# Patient Record
Sex: MALE | Race: BLACK OR AFRICAN AMERICAN
[De-identification: names, ages, dates, MRNs, and addresses within clinical notes are randomized per-mention and may not be internally consistent; named-entity substitution may affect disease eponyms.]

---

## 2017-12-23 VITALS — SYSTOLIC BLOOD PRESSURE: 149 MMHG | DIASTOLIC BLOOD PRESSURE: 76 MMHG

## 2018-12-10 ENCOUNTER — HOSPITAL ENCOUNTER (OUTPATIENT)
Dept: HOSPITAL 61 - ECHO | Age: 63
Discharge: HOME | End: 2018-12-10
Attending: INTERNAL MEDICINE
Payer: MEDICARE

## 2018-12-10 DIAGNOSIS — F17.210: ICD-10-CM

## 2018-12-10 DIAGNOSIS — I13.0: ICD-10-CM

## 2018-12-10 DIAGNOSIS — N18.3: ICD-10-CM

## 2018-12-10 DIAGNOSIS — I08.0: Primary | ICD-10-CM

## 2018-12-10 DIAGNOSIS — I50.20: ICD-10-CM

## 2018-12-10 DIAGNOSIS — E78.5: ICD-10-CM

## 2018-12-10 PROCEDURE — 93306 TTE W/DOPPLER COMPLETE: CPT

## 2018-12-10 NOTE — CARD
MR#: [id number]

Account#: [id number]

Accession#: [id number]

Date of Study: 12/10/2018

Ordering Physician: LING KRISHNAMURTHY, 

Referring Physician: LING KRISHNAMURTHY, 

Tech: Melva Rust





--------------- APPROVED REPORT --------------





EXAM: Two-dimensional and M-mode echocardiogram with Doppler and color Doppler.



Other Information 

Quality : AverageHR: 38bpm



INDICATION

Cardiomyopathy 



RISK FACTORS

Hypertension 

Hyperlipidemia

Smoking 



2D DIMENSIONS 

RVDd4.3 (2.9-3.5cm)Left Atrium(2D)4.3 (1.6-4.0cm)

IVSd1.3 (0.7-1.1cm)Aortic Root(2D)4.0 (2.0-3.7cm)

LVDd6.2 (3.9-5.9cm)LVOT Diameter2.3 (1.8-2.4cm)

PWd1.2 (0.7-1.1cm)LVDs4.6 (2.5-4.0cm)

FS (%) 24.8 %SV93.1 ml

LVEF(%)48.3 (>50%)



Aortic Valve

AoV Peak Richard.147.3cm/sAoV VTI30.3cm

AO Peak GR.8.7mmHgLVOT Peak Richard.73.3cm/s

LVOT  VTI 17.14cmAO Mean GR.5mmHg

ALIE (VMAX)1.46pg4JKK   (VTI)2.39cm2

AI P 1/2 [id number]



Mitral Valve

MV E Tvgyezus45.7cm/sMV E Peak Gr.84mmHg

MV DECEL [id number] A Tpadjmtj70.6cm/s

MV HUK79tgR/A  Ratio1.4

MVA (PHT)3.16cm2



TDI

E/Lateral E'8.1E/Medial E'7.1



Pulmonary Valve

PV Peak Kzldvrna595.7cm/sPV Peak Grad.4mmHg



Tricuspid Valve

TR P. Inemoouk923lj/sRAP YVOINORK8gqJz

TR Peak Gr.26zpHaGNLT23unQl



 LEFT VENTRICLE 

The Left Ventricle is mildly dilated. There is mild concentric left ventricular hypertrophy. The left
 ventricular systolic function is moderately impaired. The Ejection Fraction is 35%. There is global 
hypokinesis of the left ventricle.



 RIGHT VENTRICLE 

The right ventricle is mildly dilated. There is normal right ventricular wall thickness. The right ve
ntricular systolic function is normal.



 ATRIA 

The left atrium size is normal. The right atrium is moderately dilated. The interatrial septum is int
act with no evidence for an atrial septal defect or patent foramen ovale as noted on 2-D or Doppler i
maging.



 AORTIC VALVE 

The aortic valve is normal in structure and function. Doppler and Color Flow revealed mild to moderat
e aortic regurgitation. There is no significant aortic valvular stenosis.



 MITRAL VALVE 

The mitral valve is normal in structure and function. There is no mitral valve stenosis. Doppler and 
Color-flow revealed mild mitral regurgitation.



 TRICUSPID VALVE 

The tricuspid valve is normal in structure and function. Doppler and Color Flow revealed trace tricus
pid regurgitation. There is no tricuspid valve stenosis. 



 PULMONIC VALVE 

The pulmonic valve is not well visualized. Doppler and Color Flow revealed trace pulmonic valvular re
gurgitation.



 GREAT VESSELS 

The aortic root is mildly enlarged. The IVC is normal in size and collapses >50% with inspiration.



 PERICARDIAL EFFUSION 

There is no evidence of significant pericardial effusion.



Critical Notification

Critical Value: No



<Conclusion>

The left ventricular systolic function is moderately impaired.

The Ejection Fraction is 35%.

Mild to moderate aortic regurgitation.

Mild mitral regurgitation.

Trace tricuspid regurgitation.

There is no evidence of significant pericardial effusion.



Signed by : Prakash Salamanca, 

Electronically Approved : 12/10/2018 17:58:00

## 2019-09-25 ENCOUNTER — HOSPITAL ENCOUNTER (INPATIENT)
Dept: HOSPITAL 61 - ER | Age: 64
LOS: 7 days | Discharge: HOME | DRG: 177 | End: 2019-10-02
Attending: INTERNAL MEDICINE | Admitting: INTERNAL MEDICINE
Payer: MEDICARE

## 2019-09-25 VITALS — DIASTOLIC BLOOD PRESSURE: 71 MMHG | SYSTOLIC BLOOD PRESSURE: 104 MMHG

## 2019-09-25 VITALS — HEIGHT: 73 IN | BODY MASS INDEX: 23.92 KG/M2 | WEIGHT: 180.5 LBS

## 2019-09-25 VITALS — SYSTOLIC BLOOD PRESSURE: 126 MMHG | DIASTOLIC BLOOD PRESSURE: 80 MMHG

## 2019-09-25 VITALS — SYSTOLIC BLOOD PRESSURE: 141 MMHG | DIASTOLIC BLOOD PRESSURE: 83 MMHG

## 2019-09-25 VITALS — SYSTOLIC BLOOD PRESSURE: 149 MMHG | DIASTOLIC BLOOD PRESSURE: 91 MMHG

## 2019-09-25 VITALS — SYSTOLIC BLOOD PRESSURE: 138 MMHG | DIASTOLIC BLOOD PRESSURE: 76 MMHG

## 2019-09-25 VITALS — DIASTOLIC BLOOD PRESSURE: 79 MMHG | SYSTOLIC BLOOD PRESSURE: 140 MMHG

## 2019-09-25 VITALS — DIASTOLIC BLOOD PRESSURE: 80 MMHG | SYSTOLIC BLOOD PRESSURE: 124 MMHG

## 2019-09-25 VITALS — SYSTOLIC BLOOD PRESSURE: 146 MMHG | DIASTOLIC BLOOD PRESSURE: 85 MMHG

## 2019-09-25 VITALS — SYSTOLIC BLOOD PRESSURE: 133 MMHG | DIASTOLIC BLOOD PRESSURE: 79 MMHG

## 2019-09-25 VITALS — DIASTOLIC BLOOD PRESSURE: 86 MMHG | SYSTOLIC BLOOD PRESSURE: 143 MMHG

## 2019-09-25 VITALS — SYSTOLIC BLOOD PRESSURE: 137 MMHG | DIASTOLIC BLOOD PRESSURE: 80 MMHG

## 2019-09-25 VITALS — DIASTOLIC BLOOD PRESSURE: 80 MMHG | SYSTOLIC BLOOD PRESSURE: 152 MMHG

## 2019-09-25 VITALS — DIASTOLIC BLOOD PRESSURE: 64 MMHG | SYSTOLIC BLOOD PRESSURE: 107 MMHG

## 2019-09-25 VITALS — DIASTOLIC BLOOD PRESSURE: 78 MMHG | SYSTOLIC BLOOD PRESSURE: 127 MMHG

## 2019-09-25 VITALS — SYSTOLIC BLOOD PRESSURE: 119 MMHG | DIASTOLIC BLOOD PRESSURE: 75 MMHG

## 2019-09-25 VITALS — DIASTOLIC BLOOD PRESSURE: 85 MMHG | SYSTOLIC BLOOD PRESSURE: 140 MMHG

## 2019-09-25 VITALS — SYSTOLIC BLOOD PRESSURE: 122 MMHG | DIASTOLIC BLOOD PRESSURE: 84 MMHG

## 2019-09-25 DIAGNOSIS — Z71.6: ICD-10-CM

## 2019-09-25 DIAGNOSIS — I42.8: ICD-10-CM

## 2019-09-25 DIAGNOSIS — Z71.51: ICD-10-CM

## 2019-09-25 DIAGNOSIS — T17.590A: ICD-10-CM

## 2019-09-25 DIAGNOSIS — I50.43: ICD-10-CM

## 2019-09-25 DIAGNOSIS — Z83.3: ICD-10-CM

## 2019-09-25 DIAGNOSIS — I44.1: ICD-10-CM

## 2019-09-25 DIAGNOSIS — J44.1: ICD-10-CM

## 2019-09-25 DIAGNOSIS — F14.10: ICD-10-CM

## 2019-09-25 DIAGNOSIS — J98.09: ICD-10-CM

## 2019-09-25 DIAGNOSIS — F17.210: ICD-10-CM

## 2019-09-25 DIAGNOSIS — J98.11: ICD-10-CM

## 2019-09-25 DIAGNOSIS — J69.0: Primary | ICD-10-CM

## 2019-09-25 DIAGNOSIS — I44.7: ICD-10-CM

## 2019-09-25 DIAGNOSIS — Z91.19: ICD-10-CM

## 2019-09-25 DIAGNOSIS — J44.0: ICD-10-CM

## 2019-09-25 DIAGNOSIS — Z82.49: ICD-10-CM

## 2019-09-25 DIAGNOSIS — F12.10: ICD-10-CM

## 2019-09-25 DIAGNOSIS — J98.19: ICD-10-CM

## 2019-09-25 DIAGNOSIS — I13.0: ICD-10-CM

## 2019-09-25 DIAGNOSIS — F10.10: ICD-10-CM

## 2019-09-25 DIAGNOSIS — N18.3: ICD-10-CM

## 2019-09-25 DIAGNOSIS — J96.01: ICD-10-CM

## 2019-09-25 LAB
ALBUMIN SERPL-MCNC: 3.6 G/DL (ref 3.4–5)
ALBUMIN/GLOB SERPL: 1 {RATIO} (ref 1–1.7)
ALP SERPL-CCNC: 78 U/L (ref 46–116)
ALT SERPL-CCNC: 15 U/L (ref 16–63)
AMPHETAMINE/METHAMPHETAMINE: (no result)
ANION GAP SERPL CALC-SCNC: 10 MMOL/L (ref 6–14)
AST SERPL-CCNC: 15 U/L (ref 15–37)
BARBITURATES UR-MCNC: (no result) UG/ML
BASE EXCESS ABG: -1 MMOL/L (ref -3–3)
BASOPHILS # BLD AUTO: 0.1 X10^3/UL (ref 0–0.2)
BASOPHILS NFR BLD: 1 % (ref 0–3)
BENZODIAZ UR-MCNC: (no result) UG/L
BILIRUB SERPL-MCNC: 0.7 MG/DL (ref 0.2–1)
BUN SERPL-MCNC: 22 MG/DL (ref 8–26)
BUN/CREAT SERPL: 16 (ref 6–20)
CALCIUM SERPL-MCNC: 9.7 MG/DL (ref 8.5–10.1)
CANNABINOIDS UR-MCNC: (no result) UG/L
CHLORIDE SERPL-SCNC: 107 MMOL/L (ref 98–107)
CO2 SERPL-SCNC: 26 MMOL/L (ref 21–32)
COCAINE UR-MCNC: (no result) NG/ML
CREAT SERPL-MCNC: 1.4 MG/DL (ref 0.7–1.3)
EOSINOPHIL NFR BLD: 0.2 X10^3/UL (ref 0–0.7)
EOSINOPHIL NFR BLD: 2 % (ref 0–3)
ERYTHROCYTE [DISTWIDTH] IN BLOOD BY AUTOMATED COUNT: 15.1 % (ref 11.5–14.5)
GFR SERPLBLD BASED ON 1.73 SQ M-ARVRAT: 62.1 ML/MIN
GLOBULIN SER-MCNC: 3.5 G/DL (ref 2.2–3.8)
GLUCOSE SERPL-MCNC: 86 MG/DL (ref 70–99)
HCO3 BLDA-SCNC: 23 MMOL/L (ref 21–28)
HCT VFR BLD CALC: 46.3 % (ref 39–53)
HGB BLD-MCNC: 15.9 G/DL (ref 13–17.5)
INSPIRATION SETTING TIME VENT: 40
LYMPHOCYTES # BLD: 1.4 X10^3/UL (ref 1–4.8)
LYMPHOCYTES NFR BLD AUTO: 16 % (ref 24–48)
MCH RBC QN AUTO: 33 PG (ref 25–35)
MCHC RBC AUTO-ENTMCNC: 34 G/DL (ref 31–37)
MCV RBC AUTO: 97 FL (ref 79–100)
METHADONE SERPL-MCNC: (no result) NG/ML
MONO #: 1.2 X10^3/UL (ref 0–1.1)
MONOCYTES NFR BLD: 13 % (ref 0–9)
NEUT #: 6.2 X10^3/UL (ref 1.8–7.7)
NEUTROPHILS NFR BLD AUTO: 69 % (ref 31–73)
OPIATES UR-MCNC: (no result) NG/ML
PCO2 BLDA: 37 MMHG (ref 35–46)
PCP SERPL-MCNC: (no result) MG/DL
PLATELET # BLD AUTO: 110 X10^3/UL (ref 140–400)
PO2 BLDA: 58 MMHG (ref 65–108)
POTASSIUM SERPL-SCNC: 3.8 MMOL/L (ref 3.5–5.1)
PROT SERPL-MCNC: 7.1 G/DL (ref 6.4–8.2)
RBC # BLD AUTO: 4.79 X10^6/UL (ref 4.3–5.7)
SAO2 % BLDA: 91 % (ref 92–99)
SODIUM SERPL-SCNC: 143 MMOL/L (ref 136–145)
WBC # BLD AUTO: 9 X10^3/UL (ref 4–11)

## 2019-09-25 PROCEDURE — 82805 BLOOD GASES W/O2 SATURATION: CPT

## 2019-09-25 PROCEDURE — 80053 COMPREHEN METABOLIC PANEL: CPT

## 2019-09-25 PROCEDURE — 71045 X-RAY EXAM CHEST 1 VIEW: CPT

## 2019-09-25 PROCEDURE — 90471 IMMUNIZATION ADMIN: CPT

## 2019-09-25 PROCEDURE — 96375 TX/PRO/DX INJ NEW DRUG ADDON: CPT

## 2019-09-25 PROCEDURE — 96365 THER/PROPH/DIAG IV INF INIT: CPT

## 2019-09-25 PROCEDURE — 94760 N-INVAS EAR/PLS OXIMETRY 1: CPT

## 2019-09-25 PROCEDURE — A9540 TC99M MAA: HCPCS

## 2019-09-25 PROCEDURE — 90686 IIV4 VACC NO PRSV 0.5 ML IM: CPT

## 2019-09-25 PROCEDURE — A9558 XE133 XENON 10MCI: HCPCS

## 2019-09-25 PROCEDURE — 83880 ASSAY OF NATRIURETIC PEPTIDE: CPT

## 2019-09-25 PROCEDURE — 99406 BEHAV CHNG SMOKING 3-10 MIN: CPT

## 2019-09-25 PROCEDURE — 31622 DX BRONCHOSCOPE/WASH: CPT

## 2019-09-25 PROCEDURE — G0378 HOSPITAL OBSERVATION PER HR: HCPCS

## 2019-09-25 PROCEDURE — 93306 TTE W/DOPPLER COMPLETE: CPT

## 2019-09-25 PROCEDURE — 78582 LUNG VENTILAT&PERFUS IMAGING: CPT

## 2019-09-25 PROCEDURE — 71260 CT THORAX DX C+: CPT

## 2019-09-25 PROCEDURE — 36415 COLL VENOUS BLD VENIPUNCTURE: CPT

## 2019-09-25 PROCEDURE — 71046 X-RAY EXAM CHEST 2 VIEWS: CPT

## 2019-09-25 PROCEDURE — 94640 AIRWAY INHALATION TREATMENT: CPT

## 2019-09-25 PROCEDURE — 80307 DRUG TEST PRSMV CHEM ANLYZR: CPT

## 2019-09-25 PROCEDURE — 36600 WITHDRAWAL OF ARTERIAL BLOOD: CPT

## 2019-09-25 PROCEDURE — 85007 BL SMEAR W/DIFF WBC COUNT: CPT

## 2019-09-25 PROCEDURE — 84443 ASSAY THYROID STIM HORMONE: CPT

## 2019-09-25 PROCEDURE — 80202 ASSAY OF VANCOMYCIN: CPT

## 2019-09-25 PROCEDURE — 83735 ASSAY OF MAGNESIUM: CPT

## 2019-09-25 PROCEDURE — 84484 ASSAY OF TROPONIN QUANT: CPT

## 2019-09-25 PROCEDURE — 85025 COMPLETE CBC W/AUTO DIFF WBC: CPT

## 2019-09-25 PROCEDURE — 94618 PULMONARY STRESS TESTING: CPT

## 2019-09-25 PROCEDURE — 96374 THER/PROPH/DIAG INJ IV PUSH: CPT

## 2019-09-25 PROCEDURE — 93005 ELECTROCARDIOGRAM TRACING: CPT

## 2019-09-25 RX ADMIN — IPRATROPIUM BROMIDE AND ALBUTEROL SULFATE SCH ML: .5; 3 SOLUTION RESPIRATORY (INHALATION) at 15:48

## 2019-09-25 RX ADMIN — POTASSIUM CHLORIDE SCH MEQ: 750 TABLET, FILM COATED, EXTENDED RELEASE ORAL at 16:21

## 2019-09-25 RX ADMIN — IPRATROPIUM BROMIDE AND ALBUTEROL SULFATE SCH ML: .5; 3 SOLUTION RESPIRATORY (INHALATION) at 16:00

## 2019-09-25 RX ADMIN — ISOSORBIDE MONONITRATE SCH MG: 30 TABLET, EXTENDED RELEASE ORAL at 16:20

## 2019-09-25 RX ADMIN — METHYLPREDNISOLONE SODIUM SUCCINATE SCH MG: 125 INJECTION, POWDER, FOR SOLUTION INTRAMUSCULAR; INTRAVENOUS at 23:13

## 2019-09-25 RX ADMIN — IPRATROPIUM BROMIDE AND ALBUTEROL SULFATE SCH ML: .5; 3 SOLUTION RESPIRATORY (INHALATION) at 11:51

## 2019-09-25 RX ADMIN — IPRATROPIUM BROMIDE AND ALBUTEROL SULFATE SCH ML: .5; 3 SOLUTION RESPIRATORY (INHALATION) at 20:08

## 2019-09-25 RX ADMIN — VANCOMYCIN HYDROCHLORIDE PRN EACH: 1 INJECTION, POWDER, LYOPHILIZED, FOR SOLUTION INTRAVENOUS at 12:50

## 2019-09-25 RX ADMIN — Medication SCH CAP: at 21:09

## 2019-09-25 RX ADMIN — VANCOMYCIN HYDROCHLORIDE PRN EACH: 1 INJECTION, POWDER, LYOPHILIZED, FOR SOLUTION INTRAVENOUS at 12:47

## 2019-09-25 RX ADMIN — PIPERACILLIN SODIUM AND TAZOBACTAM SODIUM SCH MLS/HR: 3; .375 INJECTION, POWDER, LYOPHILIZED, FOR SOLUTION INTRAVENOUS at 16:16

## 2019-09-25 RX ADMIN — VANCOMYCIN HYDROCHLORIDE PRN EACH: 1 INJECTION, POWDER, LYOPHILIZED, FOR SOLUTION INTRAVENOUS at 12:48

## 2019-09-25 RX ADMIN — BUDESONIDE SCH MG: 0.5 INHALANT RESPIRATORY (INHALATION) at 15:00

## 2019-09-25 RX ADMIN — PIPERACILLIN SODIUM AND TAZOBACTAM SODIUM SCH MLS/HR: 3; .375 INJECTION, POWDER, LYOPHILIZED, FOR SOLUTION INTRAVENOUS at 23:13

## 2019-09-25 RX ADMIN — PIPERACILLIN SODIUM AND TAZOBACTAM SODIUM SCH MLS/HR: 3; .375 INJECTION, POWDER, LYOPHILIZED, FOR SOLUTION INTRAVENOUS at 18:36

## 2019-09-25 RX ADMIN — METHYLPREDNISOLONE SODIUM SUCCINATE SCH MG: 125 INJECTION, POWDER, FOR SOLUTION INTRAMUSCULAR; INTRAVENOUS at 18:36

## 2019-09-25 RX ADMIN — BUDESONIDE SCH MG: 0.5 INHALANT RESPIRATORY (INHALATION) at 20:08

## 2019-09-25 RX ADMIN — IPRATROPIUM BROMIDE AND ALBUTEROL SULFATE SCH ML: .5; 3 SOLUTION RESPIRATORY (INHALATION) at 20:00

## 2019-09-25 RX ADMIN — METHYLPREDNISOLONE SODIUM SUCCINATE SCH MG: 125 INJECTION, POWDER, FOR SOLUTION INTRAMUSCULAR; INTRAVENOUS at 16:17

## 2019-09-25 RX ADMIN — LISINOPRIL SCH MG: 20 TABLET ORAL at 16:20

## 2019-09-25 RX ADMIN — SIMVASTATIN SCH MG: 20 TABLET, FILM COATED ORAL at 21:09

## 2019-09-25 RX ADMIN — BACITRACIN SCH MLS/HR: 5000 INJECTION, POWDER, FOR SOLUTION INTRAMUSCULAR at 16:16

## 2019-09-25 RX ADMIN — FUROSEMIDE SCH MG: 40 TABLET ORAL at 16:17

## 2019-09-25 RX ADMIN — IPRATROPIUM BROMIDE AND ALBUTEROL SULFATE SCH ML: .5; 3 SOLUTION RESPIRATORY (INHALATION) at 08:34

## 2019-09-25 NOTE — PDOC2
LIZET LLOYD APRN 19 0913:


CARDIAC CONSULT


DATE OF CONSULT


Date of Consult


DATE: 19 


TIME: 09:04





REASON FOR CONSULT


Reason for Consult:


Bradycardia





REFERRING PHYSICIAN


Referring Physician:


Kathleen





SOURCE


Source:  Chart review, Patient





HISTORY OF PRESENT ILLNESS


HISTORY OF PRESENT ILLNESS


This is a pleasant 63 yo male admitted for complains of shortness of breath.  He

has been SOA in the last few days but more pronounced yesterday.  Sometimes he 

was wheezing and with cough but nonproductive.  Denies any chest pain or 

palpitations. No frequent dizziness or passing out. Upon admission he was noted 

with right lung mass which is new for him.  Currently he is compensated with 

Venti mask.  He continues to smoke tobacco and use cocaine. No significant 

increase in leg swelling.





PAST MEDICAL HISTORY


Past Medical History


Cardiovascular:  CHF, HTN, NICM


Pulmonary:  Asthma, COPD


GI:  Hemorrhoids


Heme/Onc:  No pertinent hx


Hepatobiliary:  No pertinent hx


Psych:  No pertinent hx


Rheumatologic:  No pertinent hx


Infectious disease:  No pertinent hx


ENT:  No pertinent hx


Renal/:  Chronic renal insufficiency


Endocrine:  No pertinent hx


Dermatology:  No pertinent hx





PAST SURGICAL HISTORY


Past Surgical History


hemorrhoidectomy and left inguinal hernia repair; Childhood torn ligament to 

left knee with repair





FAMILY HISTORY


Family History


Mother  at 72 with MI and DM


Father  at 80 with MI


Brother 1  with brain tumor at 50


Brother 2 & 3 alive with CHF


Sister alive with CHF as well





SOCIAL HISTORY


Social History


Smoke:  <1 pack per day


ALCOHOL:  occassional


Drugs:  Cocaine, Marijuana


Lives:  Alone





CURRENT MEDICATIONS


CURRENT MEDICATIONS





Current Medications








 Medications


  (Trade)  Dose


 Ordered  Sig/Irvin


 Route


 PRN Reason  Start Time


 Stop Time Status Last Admin


Dose Admin


 


 Albuterol/


 Ipratropium


  (Duoneb)  3 ml  1X  ONCE


 NEB


   19 04:15


 19 04:16 DC 19 04:17





 


 Methylprednisolone


 Sodium Succinate


  (SOLU-Medrol


 125MG VIAL)  125 mg  1X  ONCE


 IV


   19 04:15


 19 04:16 DC 19 04:51





 


 Piperacillin Sod/


 Tazobactam Sod


 4.5 gm/Sodium


 Chloride  100 ml @ 


 200 mls/hr  1X  ONCE


 IV


   19 06:00


 19 06:29 DC 19 06:04





 


 Vancomycin HCl


 1.75 gm/Sodium


 Chloride  500 ml @ 


 250 mls/hr  1X  ONCE


 IV


   19 06:00


 19 07:59 DC 19 08:04





 


 Albuterol/


 Ipratropium


  (Duoneb)  3 ml  RTQID


 NEB


   19 08:00


 19 07:59  19 08:34














ALLERGIES


ALLERGIES:  


Coded Allergies:  


     No Known Drug Allergies (Unverified , 3/31/14)





ROS


Review of System


14 point ROS evaluated with pertinent positives noted per HPI





PHYSICAL EXAM


General:  Alert, Oriented X3, Cooperative, mild distress


HEENT:  Atraumatic, Mucous membr. moist/pink


Lungs:  Other (diminished)


Heart:  Regular rate (SR/SB), Normal S1, Normal S2, Other (2/6 systolic murmur 

to LLS border)


Abdomen:  Soft, No tenderness


Extremities:  No cyanosis, Other (1+ bilateral LE pitting edema)


Skin:  No breakdown, No significant lesion


Neuro:  Normal speech, Sensation intact


Psych/Mental Status:  Mental status NL, Mood NL


MUSCULOSKELETAL:  Osteoarthritic changes both hands





VITALS/I&O


VITALS/I&O:





                                   Vital Signs








  Date Time  Temp Pulse Resp B/P (MAP) Pulse Ox O2 Delivery O2 Flow Rate FiO2


 


19 08:34     92 Venturi Mask 15.0 


 


19 05:57  64  155/83 (107)    


 


19 03:50 97.6  24     





 97.6       











LABS


Lab:





                                Laboratory Tests








Test


 19


03:55 19


04:15 19


05:00 19


05:42


 


Sodium Level


 143 mmol/L


(136-145) 


 


 





 


Potassium Level


 3.8 mmol/L


(3.5-5.1) 


 


 





 


Chloride Level


 107 mmol/L


() 


 


 





 


Carbon Dioxide Level


 26 mmol/L


(21-32) 


 


 





 


Anion Gap 10 (6-14)     


 


Blood Urea Nitrogen


 22 mg/dL


(8-26) 


 


 





 


Creatinine


 1.4 mg/dL


(0.7-1.3)  H 


 


 





 


Estimated GFR


(Cockcroft-Gault) 62.1  


 


 


 





 


BUN/Creatinine Ratio 16 (6-20)     


 


Glucose Level


 86 mg/dL


(70-99) 


 


 





 


Calcium Level


 9.7 mg/dL


(8.5-10.1) 


 


 





 


Total Bilirubin


 0.7 mg/dL


(0.2-1.0) 


 


 





 


Aspartate Amino Transferase


(AST) 15 U/L (15-37)


 


 


 





 


Alanine Aminotransferase (ALT)


 15 U/L (16-63)


L 


 


 





 


Alkaline Phosphatase


 78 U/L


() 


 


 





 


Troponin I Quantitative


 0.051 ng/mL


(0.000-0.055) 


 


 





 


NT-Pro-B-Type Natriuretic


Peptide 1600 pg/mL


(0-124)  H 


 


 





 


Total Protein


 7.1 g/dL


(6.4-8.2) 


 


 





 


Albumin


 3.6 g/dL


(3.4-5.0) 


 


 





 


Albumin/Globulin Ratio 1.0 (1.0-1.7)     


 


White Blood Count


 


 9.0 x10^3/uL


(4.0-11.0) 


 





 


Red Blood Count


 


 4.79 x10^6/uL


(4.30-5.70) 


 





 


Hemoglobin


 


 15.9 g/dL


(13.0-17.5) 


 





 


Hematocrit


 


 46.3 %


(39.0-53.0) 


 





 


Mean Corpuscular Volume


 


 97 fL ()


 


 





 


Mean Corpuscular Hemoglobin  33 pg (25-35)    


 


Mean Corpuscular Hemoglobin


Concent 


 34 g/dL


(31-37) 


 





 


Red Cell Distribution Width


 


 15.1 %


(11.5-14.5)  H 


 





 


Platelet Count


 


 110 x10^3/uL


(140-400)  L 


 





 


Neutrophils (%) (Auto)  69 % (31-73)    


 


Lymphocytes (%) (Auto)  16 % (24-48)  L  


 


Monocytes (%) (Auto)  13 % (0-9)  H  


 


Eosinophils (%) (Auto)  2 % (0-3)    


 


Basophils (%) (Auto)  1 % (0-3)    


 


Neutrophils # (Auto)


 


 6.2 x10^3/uL


(1.8-7.7) 


 





 


Lymphocytes # (Auto)


 


 1.4 x10^3/uL


(1.0-4.8) 


 





 


Monocytes # (Auto)


 


 1.2 x10^3/uL


(0.0-1.1)  H 


 





 


Eosinophils # (Auto)


 


 0.2 x10^3/uL


(0.0-0.7) 


 





 


Basophils # (Auto)


 


 0.1 x10^3/uL


(0.0-0.2) 


 





 


Urine Opiates Screen   Neg (NEG)   


 


Urine Methadone Screen   Neg (NEG)   


 


Urine Barbiturates   Neg (NEG)   


 


Urine Phencyclidine Screen   Neg (NEG)   


 


Urine


Amphetamine/Methamphetamine 


 


 Neg (NEG)  


 





 


Urine Benzodiazepines Screen   Neg (NEG)   


 


Urine Cocaine Screen   Pos (NEG)   


 


Urine Cannabinoids Screen   Neg (NEG)   


 


Urine Ethyl Alcohol   Neg (NEG)   


 


O2 Saturation    91 % (92-99)  L


 


Arterial Blood pH


 


 


 


 7.41


(7.35-7.45)


 


Arterial Blood pCO2 at


Patient Temp 


 


 


 37 mmHg


(35-46)


 


Arterial Blood pO2 at Patient


Temp 


 


 


 58 mmHg


()  L


 


Arterial Blood HCO3


 


 


 


 23 mmol/L


(21-28)


 


Arterial Blood Base Excess


 


 


 


 -1 mmol/L


(-3-3)


 


FiO2    40  


 


Test


 19


06:55 


 


 





 


Troponin I Quantitative


 0.049 ng/mL


(0.000-0.055) 


 


 








                                Laboratory Tests


19 04:15








                                Laboratory Tests


19 03:55











ECHOCARDIOGRAM


ECHOCARDIOGRAM





<Conclusion>


The left ventricular systolic function is moderately impaired.


The Ejection Fraction is 35%.


Mild to moderate aortic regurgitation.


Mild mitral regurgitation.


Trace tricuspid regurgitation.


There is no evidence of significant pericardial effusion.





DATE:     12/10/18 1758





ASSESSMENT/PLAN


ASSESSMENT/PLAN


1. Bradycardia: Mainly SB with episodes of Wenkebach with underlying high dose 

coreg


2. Dyspnea: mainly due to RUL collapse with possible endobronchial mass 


3. Substance abuse: UDS+cocaine


4. NICM: last EF 35%


5. Chronic systolic CHF: compensated


6. Chronic LBBB


7. HTN


8. CKD3


9. Hx of noncompliance


10. COPD with continued tobaccoism





Recommendations


1. He has deferred AICD in the past. Will DC coreg at this time. Start on 

hydralazine and imdur


2. Await pulmonary input. 


3. Will monitor BP and HR trend without coreg. TTE today and note EF, PAP and 

any potential tumor infiltration in the cardiac region


4. Smoking and cocaine cessation discussed. 


5. Supportive care.





ELIZABETH SELLERS MD 19:


CARDIAC CONSULT


ASSESSMENT/PLAN


ASSESSMENT/PLAN


Patient seen and examined.  Agree with NP's assessment and plan.


Tele showed Mobitz type I second degree AVB/wenckebach


Last echo showed EF 35%, clinically well compensated.  Repeat echo to monitor 

LVF.  


He declined ICD in past as noted above


Pulm following for RUL collapse


Thank you for your consultation











LIZET LLOYD          Sep 25, 2019 09:13


ELIZABETH SELLERS MD           Sep 25, 2019 20:34

## 2019-09-25 NOTE — CONS
DATE OF CONSULTATION:  09/25/2019



PULMONARY CONSULTATION



ATTENDING PHYSICIAN:  Dr. Holland.



REASON FOR CONSULTATION:  Abnormal CT chest and hypoxia.



HISTORY OF PRESENT ILLNESS:  The patient is a 64-year-old male who has 30 years

of tobacco use, 1 pack per day and history of cocaine, alcohol and marijuana

abuse.  The patient presented to the hospital complaining of shortness of

breath.  He had some back pain.  No anterior chest pain.  He has a mild cough,

no hemoptysis, no weight loss.  No headaches, no nausea, vomiting or diarrhea. 

The patient had a chest x-ray, which showed opacification in the right upper

lobe.  As a result, CT chest was performed and it showed collapse of the right

upper lobe with occlusion of the right upper lobe bronchus.  Suspicion for

endobronchial mass versus mucus plug.  I have been asked to see him for further

evaluation.  There was also mildly prominent left adrenal gland.  The patient is

currently requiring oxygen via Ventimask at 15 liters.



PAST MEDICAL HISTORY:  Significant for history of tobaccoism, suspect underlying

COPD.  History of CHF, hypertension.  His echocardiogram in 12/2018 had an EF of

35% and mild-to-moderate aortic regurgitation.



PAST SURGICAL HISTORY:  Hernia and knee surgery.



SOCIAL HISTORY:  Smoker for 30 years, 1 pack per day; history of marijuana use

for 15 years; history of cocaine abuse for 7-8 years; and history of alcohol

use.



REVIEW OF SYSTEMS:  12-point system obtained.  Pertinent positives discussed in

my history of present illness, otherwise, noncontributory.  All systems that

were negative were reviewed as well.



ALLERGIES:  None.



MEDICATIONS:  Reviewed as listed in the MRAD.



PHYSICAL EXAMINATION:

VITAL SIGNS:  Reviewed.  Blood pressure on the high side; pulse ox 84% on room

air, now 92% on 15 liters Venturi mask.

HEENT:  Sclerae nonicteric.

NECK:  Supple.

LUNGS:  Diminished breath sounds at the right upper chest.

CARDIOVASCULAR:  With a regular rate.

ABDOMEN:  Soft, nontender.

EXTREMITIES:  With no pitting edema.



LABORATORY DATA:  Reviewed.  White cell count 9.0, hemoglobin 15.9 and platelets

are 110.  BUN 22 and a creatinine of 1.4.  TSH 0.2.  ABGs with a pH of 7.41,

pCO2 of 37, and pO2 of 58 on 40% FiO2.



IMPRESSION:

1.  Acute hypoxic respiratory failure secondary to multifactorial etiologies

including combination of underlying chronic obstructive pulmonary disease, right

upper lobe collapse.  Cannot exclude the possibility of thromboembolic disease.

2.  Long history of tobacco use, suspect underlying chronic obstructive

pulmonary disease.  Along with cocaine, marijuana and alcohol abuse.

3.  Abnormal CT chest with right upper lobe collapse.  Likely postobstructive. 

Possibility of endobronchial lesion cannot be ruled out.  Mucous plug would be

another consideration.

4.  Cardiomyopathy with an ejection fraction of 35% based on previous

echocardiogram, repeat echocardiogram has been ordered.  Likely related to

cocaine abuse.



RECOMMENDATIONS:

1.  Continue with present current oxygen.

2.  Would consider doing a CT angiogram to rule out the possibility of

thromboembolic disease. will wait till am to avoid contrast twice a day.

3.  Currently requiring high flow oxygen at 15 liters via Venturi mask.  Will

need to have improvement in oxygenation before bronchoscopy can be performed

safely.  Tentatively, we will schedule for tomorrow if oxygenation improves and 
PE ruled out.

4.  Follow Cardiology recommendation.

5.  Monitor blood pressure closely.

6.  Add bronchodilators.

7.  Smoking cessation counseling provided.

8.  Discussed with RN.  We will follow along with you.



Critical care time 37 minutes.

 



______________________________

JESSE DOVE MD



DR:  TYSON/coby  JOB#:  [id number] / [id number]

DD:  09/25/2019 10:43  DT:  09/25/2019 11:03

DARIEL

## 2019-09-25 NOTE — HP
ADMIT DATE:  09/25/2019



CHIEF COMPLAINT:  Shortness of breath.



HISTORY OF PRESENT ILLNESS:  The patient is a pleasant elderly male who used to

smoke.  He has COPD, presented with shortness of breath.  He has a nonproductive

cough.  He has been weak.  He has got flank pain.  I discussed the case with ER

physician.  It appears that patient has COPD exacerbation, but was also found a

possible lung mass.  We are going to admit the patient and consult Pulmonary. 

As I understand, patient may be going for a bronchoscopy tomorrow.



PAST MEDICAL HISTORY:  COPD, tobacco abuse, CHF, asthma, hernia repair, left

knee repair, marijuana use.



ALLERGIES:  None.



FAMILY HISTORY:  Coronary artery disease.



SOCIAL HISTORY:  He quit smoking, no drinking or drugs.



MEDICATIONS:  Reviewed, please refer to the MRAD.



REVIEW OF SYSTEMS:  

GENERAL:  No history of weight change, weakness or fevers.

SKIN:  No bruising, hair changes or rashes.

EYES:  No blurred, double or loss of vision.

NOSE AND THROAT:  No history of nosebleeds, hoarseness or sore throat.

HEART:  No history of palpitations, chest pain or shortness of breath on

exertion.

LUNGS:  He complains of shortness of breath.

GASTROINTESTINAL:  Denies changes in appetite, nausea, vomiting, diarrhea or

constipation.

GENITOURINARY:  No history of frequency, urgency, hesitancy or nocturia.

NEUROLOGIC:  Denies history of numbness, tingling, tremor or weakness.

PSYCHIATRIC:  No history of panic, anxiety or depression.

ENDOCRINE:  No history of heat or cold intolerance, polyuria or polydipsia.

EXTREMITIES:  Denies muscle weakness, joint pain, pain on walking or stiffness.



PHYSICAL EXAMINATION:

VITALS:  Within normal limits and are stable.

GENERAL:  No apparent distress.  Alert and oriented.

HEENT:  Head is normocephalic, atraumatic, pupils were equally round and

reactive to light and accommodation.

NECK:  Supple, no JVD, no thyromegaly was noted.

LUNGS:  Clear to auscultation in all lung fields without rhonchi or wheezing.

HEART:  RRR, S1, S2 present.  Peripheral pulses intact, no obvious murmurs were

noted.

ABDOMEN:  Soft, nontender.  Positive bowel sounds no organomegaly, normal bowel

sounds.

EXTREMITIES:  Without any cyanosis, clubbing, or edema.  Pedal pulses intact,

Homans sign is negative.

NEUROLOGIC:  Normal speech, normal tone.  A & O x3, moves all extremities, no

obvious focal deficits.

PSYCHIATRIC:  Normal affect, normal mood.  Stable.

SKIN:  No ulcerations or rashes, good skin turgor, no jaundice.

VASCULAR:  Good capillary refill, neurovascular bundle appears to be intact..



LABORATORY DATA:  White count is 9, hemoglobin 15.9, platelets 110. 

Electrolytes are normal.



ASSESSMENT AND PLAN:  Chronic obstructive pulmonary disease exacerbation with

probable acute on chronic systolic and diastolic heart failure with an

incidental finding of a lung mass.  The patient has been admitted.  We consulted

Pulmonary.  I understand he is going for a bronchoscopy probably tomorrow or the

next day.  Consult Cardiology, home meds, DVT prophylaxis, full code.



PROGNOSIS:  Guarded.

 



______________________________

ANTONIA LUX DO



DR:  TRICIA/coby  JOB#:  [id number] / [id number]

DD:  09/25/2019 13:29  DT:  09/25/2019 13:57

## 2019-09-25 NOTE — RAD
EXAM: CHEST 1 VIEW 

 

History: Shortness of breath 

 

COMPARISON: 12/21/2017

 

TECHNIQUE: Single portable radiograph of the chest

 

Findings/

impression:

 

Mild cardiomegaly. Moderate opacity identified in the right upper lobe of 

the lungs could be central obstructing mass or postobstructive atelectasis

or pneumonia. Follow-up CT is recommended.

 

Electronically signed by: Iban Beard MD (9/25/2019 4:53 AM) [facility]

## 2019-09-25 NOTE — PHYS DOC
Past Medical History


Past Medical History:  Asthma, CHF, COPD, Hypertension


Past Surgical History:  Other


Additional Past Surgical Histo:  HERNIA, L KNEE


Alcohol Use:  Occasionally


Drug Use:  Marijuana





Adult General


Chief Complaint


Chief Complaint:  SHORTNESS OF BREATH





HPI


HPI


64-year-old male presents to the emergency department with complaints of 

shortness of breath, cough nonproductive. Patient is well describes left flank 

and back pain no injury. History of COPD, tobacco abuse, alcohol use, THC, 

cocaine. Most recent cocaine use approximately 3 days ago. Patient states 

shortness of breath started today as used nebulizer treatments however no 

significant improvement. Patient called EMS given his continued shortness of 

breath low oxygen concern.  Patient denies any chest pain, nausea, vomiting, 

abdominal pain, headache, visual changes. Exertion makes his shortness of breath

worse.





Review of Systems


Review of Systems





Constitutional: Denies fever or chills []


Eyes: Denies change in visual acuity, redness, or eye pain []


HENT: Denies nasal congestion 


Respiratory: Shortness of breath


Cardiovascular: No additional information not addressed in HPI []


GI: Denies abdominal pain, nausea, vomiting, bloody stools or diarrhea []


: Denies dysuria or hematuria []


Musculoskeletal: left side low back pain


Integument: Denies rash or skin lesions []


Neurologic: Denies headache, focal weakness or sensory changes []





All other systems were reviewed and found to be within normal limits, except as 

documented in this note.





Current Medications


Current Medications





Current Medications








 Medications


  (Trade)  Dose


 Ordered  Sig/Irvin  Start Time


 Stop Time Status Last Admin


Dose Admin


 


 Albuterol/


 Ipratropium


  (Duoneb)  3 ml  RTQID  19 08:00


 19 07:59   





 


 Info


  (CONTRAST GIVEN


 -- Rx MONITORING)  1 each  PRN DAILY  PRN  19 05:15


 19 05:14   





 


 Iohexol


  (Omnipaque 300


 Mg/ml)  75 ml  1X  ONCE  19 05:15


 19 05:16 DC  





 


 Methylprednisolone


 Sodium Succinate


  (SOLU-Medrol


 125MG VIAL)  125 mg  1X  ONCE  19 04:15


 19 04:16 DC 19 04:51


125 MG


 


 Ondansetron HCl


  (Zofran)  4 mg  PRN Q8HRS  PRN  19 05:45


 19 05:44   





 


 Piperacillin Sod/


 Tazobactam Sod


 4.5 gm/Sodium


 Chloride  100 ml @ 


 200 mls/hr  1X  ONCE  19 06:00


 19 06:29  19 06:04


200 MLS/HR


 


 Vancomycin HCl


 1.75 gm/Sodium


 Chloride  500 ml @ 


 250 mls/hr  1X  ONCE  19 06:00


 19 07:59   














Allergies


Allergies





Allergies








Coded Allergies Type Severity Reaction Last Updated Verified


 


  No Known Drug Allergies    3/31/14 No











Physical Exam


Physical Exam





Constitutional: Well developed, well nourished, no acute distress, non-toxic 

appearance. []


HENT: Normocephalic, atraumatic, bilateral external ears normal, oropharynx 

moist, no oral exudates, nose normal. []


Eyes: PERRLA, EOMI, conjunctiva normal, no discharge. [] 


Cardiovascular: Bradycardia


Lungs & Thorax:  Bilateral breath sounds clear to auscultation []


Abdomen: Bowel sounds normal, soft, no tenderness, no masses, no pulsatile 

masses. [] 


Skin: Warm, dry, no erythema, no rash. [] 


Back: TTP low back, no significant flank tenderness on exam


Extremities: No tenderness, no cyanosis, no clubbing, ROM intact, no edema. [] 


Neurologic: Alert and oriented X 3, no focal deficits noted. []


Psychologic: Affect normal, judgement normal, mood normal. []





Current Patient Data


Vital Signs





                                   Vital Signs








  Date Time  Temp Pulse Resp B/P (MAP) Pulse Ox O2 Delivery O2 Flow Rate FiO2


 


19 04:53  39  173/80 (111) 92 Room Air  


 


19 04:15       3.0 


 


19 03:50 97.6  24     





 97.6       








Lab Values





                                Laboratory Tests








Test


 19


03:55 19


04:15 19


05:00 19


05:42


 


Sodium Level


 143 mmol/L


(136-145) 


 


 





 


Potassium Level


 3.8 mmol/L


(3.5-5.1) 


 


 





 


Chloride Level


 107 mmol/L


() 


 


 





 


Carbon Dioxide Level


 26 mmol/L


(21-32) 


 


 





 


Anion Gap 10 (6-14)     


 


Blood Urea Nitrogen


 22 mg/dL


(8-26) 


 


 





 


Creatinine


 1.4 mg/dL


(0.7-1.3)  H 


 


 





 


Estimated GFR


(Cockcroft-Gault) 62.1  


 


 


 





 


BUN/Creatinine Ratio 16 (6-20)     


 


Glucose Level


 86 mg/dL


(70-99) 


 


 





 


Calcium Level


 9.7 mg/dL


(8.5-10.1) 


 


 





 


Total Bilirubin


 0.7 mg/dL


(0.2-1.0) 


 


 





 


Aspartate Amino Transferase


(AST) 15 U/L (15-37)


 


 


 





 


Alanine Aminotransferase (ALT)


 15 U/L (16-63)


L 


 


 





 


Alkaline Phosphatase


 78 U/L


() 


 


 





 


Troponin I Quantitative


 0.051 ng/mL


(0.000-0.055) 


 


 





 


NT-Pro-B-Type Natriuretic


Peptide 1600 pg/mL


(0-124)  H 


 


 





 


Total Protein


 7.1 g/dL


(6.4-8.2) 


 


 





 


Albumin


 3.6 g/dL


(3.4-5.0) 


 


 





 


Albumin/Globulin Ratio 1.0 (1.0-1.7)     


 


White Blood Count


 


 9.0 x10^3/uL


(4.0-11.0) 


 





 


Red Blood Count


 


 4.79 x10^6/uL


(4.30-5.70) 


 





 


Hemoglobin


 


 15.9 g/dL


(13.0-17.5) 


 





 


Hematocrit


 


 46.3 %


(39.0-53.0) 


 





 


Mean Corpuscular Volume


 


 97 fL ()


 


 





 


Mean Corpuscular Hemoglobin  33 pg (25-35)    


 


Mean Corpuscular Hemoglobin


Concent 


 34 g/dL


(31-37) 


 





 


Red Cell Distribution Width


 


 15.1 %


(11.5-14.5)  H 


 





 


Platelet Count


 


 110 x10^3/uL


(140-400)  L 


 





 


Neutrophils (%) (Auto)  69 % (31-73)    


 


Lymphocytes (%) (Auto)  16 % (24-48)  L  


 


Monocytes (%) (Auto)  13 % (0-9)  H  


 


Eosinophils (%) (Auto)  2 % (0-3)    


 


Basophils (%) (Auto)  1 % (0-3)    


 


Neutrophils # (Auto)


 


 6.2 x10^3/uL


(1.8-7.7) 


 





 


Lymphocytes # (Auto)


 


 1.4 x10^3/uL


(1.0-4.8) 


 





 


Monocytes # (Auto)


 


 1.2 x10^3/uL


(0.0-1.1)  H 


 





 


Eosinophils # (Auto)


 


 0.2 x10^3/uL


(0.0-0.7) 


 





 


Basophils # (Auto)


 


 0.1 x10^3/uL


(0.0-0.2) 


 





 


Urine Opiates Screen   Neg (NEG)   


 


Urine Methadone Screen   Neg (NEG)   


 


Urine Barbiturates   Neg (NEG)   


 


Urine Phencyclidine Screen   Neg (NEG)   


 


Urine


Amphetamine/Methamphetamine 


 


 Neg (NEG)  


 





 


Urine Benzodiazepines Screen   Neg (NEG)   


 


Urine Cocaine Screen   Pos (NEG)   


 


Urine Cannabinoids Screen   Neg (NEG)   


 


Urine Ethyl Alcohol   Neg (NEG)   


 


O2 Saturation    91 % (92-99)  L


 


Arterial Blood pH


 


 


 


 7.41


(7.35-7.45)


 


Arterial Blood pCO2 at


Patient Temp 


 


 


 37 mmHg


(35-46)


 


Arterial Blood pO2 at Patient


Temp 


 


 


 58 mmHg


()  L


 


Arterial Blood HCO3


 


 


 


 23 mmol/L


(21-28)


 


Arterial Blood Base Excess


 


 


 


 -1 mmol/L


(-3-3)


 


FiO2    40  





                                Laboratory Tests


19 04:15








                                Laboratory Tests


19 03:55














EKG


EKG


EKG reviewed, sinus bradycardia heart rate 48. Evidence of left bundle branch 

block however present on previous EKGs comparison in 2017. Patient does

 have evidence of T-wave inversion V5 V6, this was present as well in 2017.[]


Interpretation Time:


Interpretation time 0 359





Radiology/Procedures


Radiology/Procedures


[facility]


                    [address], [place] [id number] [phone]


                                        


                                 IMAGING REPORT





                                     Signed





PATIENT: CESAR BALDERAS  ACCOUNT: [id number]     MRN#: [id number]


: 1955           LOCATION: ER              AGE: 64


SEX: M                    EXAM DT: 19         ACCESSION#: [id number]


STATUS: REG ER            ORD. PHYSICIAN: HERBER CARRASCO MD


REASON: abnormal chest xray, Dyspnea


PROCEDURE: CT CHEST W/CONTRAST





Examination: CT chest with IV contrast


 


HISTORY: History of abnormal chest x-ray, dyspnea


 


COMPARISON: 2017


 


TECHNIQUE: Axial CT images of chest were performed with IV contrast. 


Coronal and sagittal reformats are performed.


 


Exposure: One or more of the following individualized dose reduction 


techniques were utilized for this examination:  1. Automated exposure 


control  2. Adjustment of the mA and/or kV according to patient size  3. 


Use of iterative reconstruction technique


 


FINDINGS:


 


1.2 cm hypodensity identified in the left lobe of the thyroid gland.. Mild


cardiomegaly. Coronary artery calcifications identified. There is 


occlusion of the right upper lobe bronchus with moderate size opacity in 


the right upper lobe with collapse of the right upper lobe likely 


postobstructive atelectasis possibly secondary to endobronchial 


pathology/mass.


 


Mild patchy airspace opacities identified in the left lung base. 


Emphysematous bullae identified in the bilateral apical lungs. The liver, 


spleen, grossly appears unremarkable. Mild prominent appearing left 


adrenal gland. Moderate degenerative changes thoracic spine.


 


 


IMPRESSION:


 


1. Collapse of the right upper lobe of the lung with occlusion of the 


right upper lobe bronchus suspicious for endobronchial mass or pathology. 


Recommend bronchoscopic evaluation.


 


2. Patchy airspace opacities identified in the left lung base likely 


atelectasis or infiltrates.


 


3. Mild prominent left adrenal gland partially visualized could be adrenal


adenoma or metastasis or hyperplasia.


 


4. 1.2 cm hypodensity identified in the left lobe of the thyroid gland. 


Consider follow-up ultrasound thyroid for further evaluation.


 


Electronically signed by: Iban Beard MD (2019 5:29 AM) [facility]














DICTATED and SIGNED BY:     IBAN BEARD MD


DATE:     19 0529


[]





                            [facility]


                    [address] [place] [address]


                                 [phone]


                                        


                                 IMAGING REPORT





                                     Signed





PATIENT: CESAR BALDERAS  ACCOUNT: [id number]     MRN#: [id number]


: 1955           LOCATION: ER              AGE: 64


SEX: M                    EXAM DT: 19         ACCESSION#: [id number]


STATUS: REG ER            ORD. PHYSICIAN: HERBER CARRASCO MD


REASON: sob


PROCEDURE: CHEST AP ONLY





 


EXAM: CHEST 1 VIEW 


 


History: Shortness of breath 


 


COMPARISON: 2017


 


TECHNIQUE: Single portable radiograph of the chest


 


Findings/


impression:


 


Mild cardiomegaly. Moderate opacity identified in the right upper lobe of 


the lungs could be central obstructing mass or postobstructive atelectasis


or pneumonia. Follow-up CT is recommended.


 


Electronically signed by: Iban Beard MD (2019 4:53 AM) [email or web address]














DICTATED and SIGNED BY:     IBAN BEARD MD


DATE:     19 0453





Course & Med Decision Making


Course & Med Decision Making


Pertinent Labs and Imaging studies reviewed. (See chart for details)





[]64-year-old male presents to the emergency department with complaints of 

shortness of breath, cough nonproductive. Patient is well describes left flank 

and back pain no injury. History of COPD, tobacco abuse, alcohol use, THC, 

cocaine. Most recent cocaine use approximately 3 days ago. Patient states 

shortness of breath started today as used nebulizer treatments however no 

significant improvement. Patient called EMS given his continued shortness of 

breath low oxygen concern.  Patient denies any chest pain, nausea, vomiting, 

abdominal pain, headache, visual changes. Exertion makes his shortness of breath

 worse.





Labs and imaging reviewed. BNP 1600, troponin 0.051, creatinine 1.4, pleasant, 

110. Impression is pending.  Chest x-ray reveals evidence of moderate opacity in

 right upper lobe with concern for obstructing mass or postobstructive ate

lectasis or pneumonia. CT is pending.  Patient received 125 Solu-Medrol, DuoNeb 

treatment upon arrival. Saturations have been 88-90% on 5 L. Patient is resting 

comfortably. ABG is pending - 7.41/37/58/23 





Chest CT reveals evidence of complete collapse of right upper lobe likely 

secondary to endobronchial mass with evidence of postobstructive pneumonia. 

Antibiotics provided in the emergency department. We'll plan for admission and 

further evaluation with pulmonary consultation, plan for likely bronchoscopy.  

Discussed admit with Hospitalist.





Deneen Disclaimer


Dragon Disclaimer


This electronic medical record was generated, in whole or in part, using a voice

 recognition dictation system.





Departure


Departure


Impression:  


   Primary Impression:  


   COPD exacerbation


   Additional Impressions:  


   Lung mass


   Pneumonia


   Hypoxia


Disposition:   ADMITTED AS INPATIENT


Admitting Physician:  HIMS


Condition:  STABLE


Referrals:  


NO PCP (PCP)





Problem Qualifiers








   Additional Impressions:  


   Pneumonia


   Pneumonia type:  due to unspecified organism  Laterality:  right  Lung 

   location:  upper lobe of lung  Qualified Codes:  J18.1 - Lobar pneumonia, 

   unspecified organism








HERBER CARRASCO MD              Sep 25, 2019 04:34

## 2019-09-25 NOTE — NUR
SS following for discharge planning. SS reviewed pt chart. Pt is from home and is currently 
requiring oxygen. SS will continue to follow for discharge planning.

## 2019-09-25 NOTE — EKG
[facility]

              [address] [place] [address]

Test Date:    2019               Test Time:    03:59:12

Pat Name:     CESAR BALDERAS            Department:   

Patient ID:   [id number]           Room:         110 1

Gender:       M                        Technician:   

:          1955               Requested By: HERBER CARRASCO

Order Number: [id number]           Reading MD:   Armando Melgoza MD

                                 Measurements

Intervals                              Axis          

Rate:         47                       P:            

TN:                                    QRS:          15

QRSD:         118                      T:            154

QT:           516                                    

QTc:          460                                    

                           Interpretive Statements

SR

LBBB

CANNOT RULE OUT LATERAL ISCHEMIA

Electronically Signed On 10-7-2019 13:55:41 CDT by Armando Melgoza MD

## 2019-09-25 NOTE — RAD
Examination: CT chest with IV contrast

 

HISTORY: History of abnormal chest x-ray, dyspnea

 

COMPARISON: 12/22/2017

 

TECHNIQUE: Axial CT images of chest were performed with IV contrast. 

Coronal and sagittal reformats are performed.

 

Exposure: One or more of the following individualized dose reduction 

techniques were utilized for this examination:  1. Automated exposure 

control  2. Adjustment of the mA and/or kV according to patient size  3. 

Use of iterative reconstruction technique

 

FINDINGS:

 

1.2 cm hypodensity identified in the left lobe of the thyroid gland.. Mild

cardiomegaly. Coronary artery calcifications identified. There is 

occlusion of the right upper lobe bronchus with moderate size opacity in 

the right upper lobe with collapse of the right upper lobe likely 

postobstructive atelectasis possibly secondary to endobronchial 

pathology/mass.

 

Mild patchy airspace opacities identified in the left lung base. 

Emphysematous bullae identified in the bilateral apical lungs. The liver, 

spleen, grossly appears unremarkable. Mild prominent appearing left 

adrenal gland. Moderate degenerative changes thoracic spine.

 

 

IMPRESSION:

 

1. Collapse of the right upper lobe of the lung with occlusion of the 

right upper lobe bronchus suspicious for endobronchial mass or pathology. 

Recommend bronchoscopic evaluation.

 

2. Patchy airspace opacities identified in the left lung base likely 

atelectasis or infiltrates.

 

3. Mild prominent left adrenal gland partially visualized could be adrenal

adenoma or metastasis or hyperplasia.

 

4. 1.2 cm hypodensity identified in the left lobe of the thyroid gland. 

Consider follow-up ultrasound thyroid for further evaluation.

 

Electronically signed by: Iban Beard MD (9/25/2019 5:29 AM) [facility]

## 2019-09-26 VITALS — DIASTOLIC BLOOD PRESSURE: 95 MMHG | SYSTOLIC BLOOD PRESSURE: 166 MMHG

## 2019-09-26 VITALS — SYSTOLIC BLOOD PRESSURE: 122 MMHG | DIASTOLIC BLOOD PRESSURE: 73 MMHG

## 2019-09-26 VITALS — SYSTOLIC BLOOD PRESSURE: 144 MMHG | DIASTOLIC BLOOD PRESSURE: 84 MMHG

## 2019-09-26 VITALS — SYSTOLIC BLOOD PRESSURE: 154 MMHG | DIASTOLIC BLOOD PRESSURE: 95 MMHG

## 2019-09-26 VITALS — DIASTOLIC BLOOD PRESSURE: 89 MMHG | SYSTOLIC BLOOD PRESSURE: 135 MMHG

## 2019-09-26 VITALS — SYSTOLIC BLOOD PRESSURE: 119 MMHG | DIASTOLIC BLOOD PRESSURE: 60 MMHG

## 2019-09-26 VITALS — DIASTOLIC BLOOD PRESSURE: 77 MMHG | SYSTOLIC BLOOD PRESSURE: 127 MMHG

## 2019-09-26 VITALS — SYSTOLIC BLOOD PRESSURE: 110 MMHG | DIASTOLIC BLOOD PRESSURE: 59 MMHG

## 2019-09-26 VITALS — SYSTOLIC BLOOD PRESSURE: 116 MMHG | DIASTOLIC BLOOD PRESSURE: 74 MMHG

## 2019-09-26 VITALS — DIASTOLIC BLOOD PRESSURE: 77 MMHG | SYSTOLIC BLOOD PRESSURE: 135 MMHG

## 2019-09-26 VITALS — DIASTOLIC BLOOD PRESSURE: 81 MMHG | SYSTOLIC BLOOD PRESSURE: 134 MMHG

## 2019-09-26 VITALS — DIASTOLIC BLOOD PRESSURE: 75 MMHG | SYSTOLIC BLOOD PRESSURE: 121 MMHG

## 2019-09-26 VITALS — SYSTOLIC BLOOD PRESSURE: 139 MMHG | DIASTOLIC BLOOD PRESSURE: 89 MMHG

## 2019-09-26 VITALS — DIASTOLIC BLOOD PRESSURE: 75 MMHG | SYSTOLIC BLOOD PRESSURE: 137 MMHG

## 2019-09-26 VITALS — SYSTOLIC BLOOD PRESSURE: 131 MMHG | DIASTOLIC BLOOD PRESSURE: 71 MMHG

## 2019-09-26 VITALS — DIASTOLIC BLOOD PRESSURE: 91 MMHG | SYSTOLIC BLOOD PRESSURE: 152 MMHG

## 2019-09-26 VITALS — DIASTOLIC BLOOD PRESSURE: 62 MMHG | SYSTOLIC BLOOD PRESSURE: 113 MMHG

## 2019-09-26 VITALS — DIASTOLIC BLOOD PRESSURE: 71 MMHG | SYSTOLIC BLOOD PRESSURE: 131 MMHG

## 2019-09-26 VITALS — SYSTOLIC BLOOD PRESSURE: 124 MMHG | DIASTOLIC BLOOD PRESSURE: 67 MMHG

## 2019-09-26 VITALS — DIASTOLIC BLOOD PRESSURE: 68 MMHG | SYSTOLIC BLOOD PRESSURE: 124 MMHG

## 2019-09-26 LAB
% LYMPHS: 7 % (ref 24–48)
% MONOS: 4 % (ref 0–10)
% SEGS: 89 % (ref 35–66)
ALBUMIN SERPL-MCNC: 2.8 G/DL (ref 3.4–5)
ALBUMIN/GLOB SERPL: 0.7 {RATIO} (ref 1–1.7)
ALP SERPL-CCNC: 71 U/L (ref 46–116)
ALT SERPL-CCNC: 10 U/L (ref 16–63)
ANION GAP SERPL CALC-SCNC: 7 MMOL/L (ref 6–14)
AST SERPL-CCNC: 9 U/L (ref 15–37)
BASOPHILS # BLD AUTO: 0 X10^3/UL (ref 0–0.2)
BASOPHILS NFR BLD: 0 % (ref 0–3)
BILIRUB SERPL-MCNC: 0.4 MG/DL (ref 0.2–1)
BUN SERPL-MCNC: 32 MG/DL (ref 8–26)
BUN/CREAT SERPL: 21 (ref 6–20)
CALCIUM SERPL-MCNC: 9.2 MG/DL (ref 8.5–10.1)
CHLORIDE SERPL-SCNC: 107 MMOL/L (ref 98–107)
CO2 SERPL-SCNC: 26 MMOL/L (ref 21–32)
CREAT SERPL-MCNC: 1.5 MG/DL (ref 0.7–1.3)
EOSINOPHIL NFR BLD: 0 % (ref 0–3)
EOSINOPHIL NFR BLD: 0 X10^3/UL (ref 0–0.7)
ERYTHROCYTE [DISTWIDTH] IN BLOOD BY AUTOMATED COUNT: 15.1 % (ref 11.5–14.5)
GFR SERPLBLD BASED ON 1.73 SQ M-ARVRAT: 57.4 ML/MIN
GLOBULIN SER-MCNC: 3.8 G/DL (ref 2.2–3.8)
GLUCOSE SERPL-MCNC: 157 MG/DL (ref 70–99)
HCT VFR BLD CALC: 42.7 % (ref 39–53)
HGB BLD-MCNC: 14.5 G/DL (ref 13–17.5)
LYMPHOCYTES # BLD: 0.4 X10^3/UL (ref 1–4.8)
LYMPHOCYTES NFR BLD AUTO: 4 % (ref 24–48)
MCH RBC QN AUTO: 33 PG (ref 25–35)
MCHC RBC AUTO-ENTMCNC: 34 G/DL (ref 31–37)
MCV RBC AUTO: 98 FL (ref 79–100)
MONO #: 0.3 X10^3/UL (ref 0–1.1)
MONOCYTES NFR BLD: 3 % (ref 0–9)
NEUT #: 9.5 X10^3/UL (ref 1.8–7.7)
NEUTROPHILS NFR BLD AUTO: 93 % (ref 31–73)
PLATELET # BLD AUTO: 109 X10^3/UL (ref 140–400)
PLATELET # BLD EST: (no result) 10*3/UL
POTASSIUM SERPL-SCNC: 4.1 MMOL/L (ref 3.5–5.1)
PROT SERPL-MCNC: 6.6 G/DL (ref 6.4–8.2)
RBC # BLD AUTO: 4.38 X10^6/UL (ref 4.3–5.7)
SODIUM SERPL-SCNC: 140 MMOL/L (ref 136–145)
WBC # BLD AUTO: 10.2 X10^3/UL (ref 4–11)

## 2019-09-26 RX ADMIN — IPRATROPIUM BROMIDE AND ALBUTEROL SULFATE SCH ML: .5; 3 SOLUTION RESPIRATORY (INHALATION) at 11:42

## 2019-09-26 RX ADMIN — ENOXAPARIN SODIUM SCH MG: 40 INJECTION SUBCUTANEOUS at 11:42

## 2019-09-26 RX ADMIN — BUDESONIDE SCH MG: 0.5 INHALANT RESPIRATORY (INHALATION) at 07:31

## 2019-09-26 RX ADMIN — METHYLPREDNISOLONE SODIUM SUCCINATE SCH MG: 125 INJECTION, POWDER, FOR SOLUTION INTRAMUSCULAR; INTRAVENOUS at 11:42

## 2019-09-26 RX ADMIN — METHYLPREDNISOLONE SODIUM SUCCINATE SCH MG: 125 INJECTION, POWDER, FOR SOLUTION INTRAMUSCULAR; INTRAVENOUS at 17:44

## 2019-09-26 RX ADMIN — PIPERACILLIN SODIUM AND TAZOBACTAM SODIUM SCH MLS/HR: 3; .375 INJECTION, POWDER, LYOPHILIZED, FOR SOLUTION INTRAVENOUS at 17:44

## 2019-09-26 RX ADMIN — Medication SCH CAP: at 09:37

## 2019-09-26 RX ADMIN — BUDESONIDE SCH MG: 0.5 INHALANT RESPIRATORY (INHALATION) at 20:20

## 2019-09-26 RX ADMIN — PIPERACILLIN SODIUM AND TAZOBACTAM SODIUM SCH MLS/HR: 3; .375 INJECTION, POWDER, LYOPHILIZED, FOR SOLUTION INTRAVENOUS at 23:49

## 2019-09-26 RX ADMIN — PIPERACILLIN SODIUM AND TAZOBACTAM SODIUM SCH MLS/HR: 3; .375 INJECTION, POWDER, LYOPHILIZED, FOR SOLUTION INTRAVENOUS at 11:42

## 2019-09-26 RX ADMIN — BACITRACIN SCH MLS/HR: 5000 INJECTION, POWDER, FOR SOLUTION INTRAMUSCULAR at 01:33

## 2019-09-26 RX ADMIN — IPRATROPIUM BROMIDE AND ALBUTEROL SULFATE SCH ML: .5; 3 SOLUTION RESPIRATORY (INHALATION) at 20:20

## 2019-09-26 RX ADMIN — VANCOMYCIN HYDROCHLORIDE SCH MLS/HR: 1 INJECTION, POWDER, FOR SOLUTION INTRAVENOUS at 21:23

## 2019-09-26 RX ADMIN — VANCOMYCIN HYDROCHLORIDE SCH MLS/HR: 1 INJECTION, POWDER, FOR SOLUTION INTRAVENOUS at 01:33

## 2019-09-26 RX ADMIN — SIMVASTATIN SCH MG: 20 TABLET, FILM COATED ORAL at 21:23

## 2019-09-26 RX ADMIN — METHYLPREDNISOLONE SODIUM SUCCINATE SCH MG: 125 INJECTION, POWDER, FOR SOLUTION INTRAMUSCULAR; INTRAVENOUS at 23:50

## 2019-09-26 RX ADMIN — ISOSORBIDE MONONITRATE SCH MG: 30 TABLET, EXTENDED RELEASE ORAL at 09:38

## 2019-09-26 RX ADMIN — IPRATROPIUM BROMIDE AND ALBUTEROL SULFATE SCH ML: .5; 3 SOLUTION RESPIRATORY (INHALATION) at 07:31

## 2019-09-26 RX ADMIN — IPRATROPIUM BROMIDE AND ALBUTEROL SULFATE SCH ML: .5; 3 SOLUTION RESPIRATORY (INHALATION) at 16:04

## 2019-09-26 RX ADMIN — Medication SCH CAP: at 21:23

## 2019-09-26 RX ADMIN — LISINOPRIL SCH MG: 20 TABLET ORAL at 09:37

## 2019-09-26 RX ADMIN — FUROSEMIDE SCH MG: 40 TABLET ORAL at 09:37

## 2019-09-26 RX ADMIN — VANCOMYCIN HYDROCHLORIDE PRN EACH: 1 INJECTION, POWDER, LYOPHILIZED, FOR SOLUTION INTRAVENOUS at 14:55

## 2019-09-26 RX ADMIN — POTASSIUM CHLORIDE SCH MEQ: 750 TABLET, FILM COATED, EXTENDED RELEASE ORAL at 09:36

## 2019-09-26 RX ADMIN — METHYLPREDNISOLONE SODIUM SUCCINATE SCH MG: 125 INJECTION, POWDER, FOR SOLUTION INTRAMUSCULAR; INTRAVENOUS at 05:09

## 2019-09-26 RX ADMIN — PIPERACILLIN SODIUM AND TAZOBACTAM SODIUM SCH MLS/HR: 3; .375 INJECTION, POWDER, LYOPHILIZED, FOR SOLUTION INTRAVENOUS at 05:09

## 2019-09-26 NOTE — RAD
Examination: LUNG VENT/PERFUSION SCAN(VQ)

 

History: Possible mass, increased oxygen glands

 

Comparison/Correlation: 9/26/2019 AP view of the chest

 

Findings: 17 mCi xenon-133 gas was administered. Ventilation imaging was 

performed in anterior and posterior projections. Delayed washout of 

radiotracer compatible COPD noted. Nonsegmental defect involving the right

upper lung field corresponds with atelectasis on chest x-ray examination.

 

5.5 mCi technetium 99m MAA was intravenously for perfusion imaging. 

Imaging was performed in 8 projections. Perfusion is unremarkable.

 

 

Impression:

Low probability for pulmonary embolism.

 

COPD.

 

Electronically signed by: Pietro Harvey MD (9/26/2019 11:28 AM) [facility]

## 2019-09-26 NOTE — NUR
Phone call received from Dr. Kitchen-- order received to cancel Ch CTA, order received for VQ 
scan, CXR. 

Notified Dr. Kitchen of venti mask 50%.



See VS.

## 2019-09-26 NOTE — RAD
CHEST AP ONLY

 

Clinical Indication: Right upper lobe mass or pneumonia

 

Comparison:  12/19/2017 CT chest without contrast., 9/25/2019 Portable 

Chest X-ray Exam

 

Findings: 

Portable upright frontal view of the chest was obtained. Cardiomegaly is 

present. Pulmonary vasculature is mildly congested. Right upper lobe 

atelectasis along the major minor fissure noted. No pleural effusion is 

appreciated. No acute bone abnormality. Left lateral pleural thickening or

infiltrate is new in the interval Pulmonary hyperinflation noted.

 

IMPRESSION: 

Decreasing right upper lobe atelectasis. New left lateral lower thoracic 

pleural thickening, atelectasis or infiltrate . Follow-up to resolution is

recommended.

 

 

Electronically signed by: Pietro Harvey MD (9/26/2019 7:48 AM) [facility]

## 2019-09-26 NOTE — PDOC
[employer] PROGRESS NOTE


Chief Complaint


Chief Complaint


COPD exacerbation


Possible lung mass


Hypoxia


Substance abuse





History of Present Illness


History of Present Illness


9/26/19


Pt was seen and examined in the ICU


Receiving O2 via Ventimask


EF = 35% with cardiomegaly


Continues to be hypoxia


Crackles heard on lung exam


Cr was 1.5 so CT with contrast could not be performed


V/Q scan for possible PE





Vitals/I&O


Vitals/I&O:





                                   Vital Signs








  Date Time  Temp Pulse Resp B/P (MAP) Pulse Ox O2 Delivery O2 Flow Rate FiO2


 


9/26/19 09:38  58  154/95    


 


9/26/19 07:31     93 Venturi Mask 15.0 


 


9/26/19 06:00   15     


 


9/26/19 03:08 97.9       





 97.9       














                                    I & O   


 


 9/25/19 9/25/19 9/26/19





 14:59 22:59 06:59


 


Intake Total 730 ml 1590 ml 240 ml


 


Output Total 400 ml 1150 ml 1000 ml


 


Balance 330 ml 440 ml -760 ml











Physical Exam


Physical Exam:


General:  Alert, No acute distress


Lungs:  Crackles (bases)


Cardiovascular:  S1, S2


Abdomen:  Soft, Non-tender


Neuro Exam:  Alert


Extremities:  No Edema


General:  Alert, Oriented X3, Cooperative, mild distress


Heart:  Regular rate (SR/SB), Normal S1, Normal S2, Other (2/6 systolic murmur 

to LLS border)


Lungs:  Crackles (bases)


Abdomen:  Soft, No tenderness


Extremities:  No cyanosis, Other (1+ bilateral LE pitting edema)


Skin:  No breakdown, No significant lesion





Labs


Labs:





Laboratory Tests








Test


 9/26/19


04:40


 


White Blood Count


 10.2 x10^3/uL


(4.0-11.0)


 


Red Blood Count


 4.38 x10^6/uL


(4.30-5.70)


 


Hemoglobin


 14.5 g/dL


(13.0-17.5)


 


Hematocrit


 42.7 %


(39.0-53.0)


 


Mean Corpuscular Volume 98 fL () 


 


Mean Corpuscular Hemoglobin 33 pg (25-35) 


 


Mean Corpuscular Hemoglobin


Concent 34 g/dL


(31-37)


 


Red Cell Distribution Width


 15.1 %


(11.5-14.5)


 


Platelet Count


 109 x10^3/uL


(140-400)


 


Neutrophils (%) (Auto) 93 % (31-73) 


 


Lymphocytes (%) (Auto) 4 % (24-48) 


 


Monocytes (%) (Auto) 3 % (0-9) 


 


Eosinophils (%) (Auto) 0 % (0-3) 


 


Basophils (%) (Auto) 0 % (0-3) 


 


Neutrophils # (Auto)


 9.5 x10^3/uL


(1.8-7.7)


 


Lymphocytes # (Auto)


 0.4 x10^3/uL


(1.0-4.8)


 


Monocytes # (Auto)


 0.3 x10^3/uL


(0.0-1.1)


 


Eosinophils # (Auto)


 0.0 x10^3/uL


(0.0-0.7)


 


Basophils # (Auto)


 0.0 x10^3/uL


(0.0-0.2)


 


Segmented Neutrophils % 89 % (35-66) 


 


Lymphocytes % 7 % (24-48) 


 


Monocytes % 4 % (0-10) 


 


Platelet Estimate


 Decreased


(ADEQUATE)


 


Sodium Level


 140 mmol/L


(136-145)


 


Potassium Level


 4.1 mmol/L


(3.5-5.1)


 


Chloride Level


 107 mmol/L


()


 


Carbon Dioxide Level


 26 mmol/L


(21-32)


 


Anion Gap 7 (6-14) 


 


Blood Urea Nitrogen


 32 mg/dL


(8-26)


 


Creatinine


 1.5 mg/dL


(0.7-1.3)


 


Estimated GFR


(Cockcroft-Gault) 57.4 





 


BUN/Creatinine Ratio 21 (6-20) 


 


Glucose Level


 157 mg/dL


(70-99)


 


Calcium Level


 9.2 mg/dL


(8.5-10.1)


 


Total Bilirubin


 0.4 mg/dL


(0.2-1.0)


 


Aspartate Amino Transf


(AST/SGOT) 9 U/L (15-37) 





 


Alanine Aminotransferase


(ALT/SGPT) 10 U/L (16-63) 





 


Alkaline Phosphatase


 71 U/L


()


 


Total Protein


 6.6 g/dL


(6.4-8.2)


 


Albumin


 2.8 g/dL


(3.4-5.0)


 


Albumin/Globulin Ratio 0.7 (1.0-1.7) 











Review of Systems


Review of Systems:


No nausea, no vomiting


No chest pain, no shortness of breath





Assessment and Plan


Assessmemt and Plan


Problems


Medical Problems:


(1) COPD exacerbation


Status: Acute  





(2) Left flank pain


Status: Acute  





(3) Lung mass


Status: Acute  





(4) Pneumonia


Status: Acute  





(5) Substance abuse


Status: Acute  





Assessment


COPD exacerbation


Lung mass


Hypoxia


Substance abuse





Plan


ICU monitoring


Bronchoscopy next week after oxygenation improves


Continue to consult with pulmonology


DVT prophylaxis


Substance use cessation


O2 via Ventimask


Full code


Total time 31 min








Comment


Review of Relevant


I have reviewed the following items brittany (where applicable) has been applied.


Medications:





Current Medications








 Medications


  (Trade)  Dose


 Ordered  Sig/Irvin


 Route


 PRN Reason  Start Time


 Stop Time Status Last Admin


Dose Admin


 


 Sodium Chloride  1,000 ml @ 


 60 mls/hr  [id number]


 IV


   9/25/19 11:15


    9/26/19 01:33





 


 Vancomycin HCl


  (Vanco Per


 Pharmacy)  1 each  PRN DAILY  PRN


 [facility]


 SEE COMMENTS  9/25/19 12:30


    9/25/19 12:50





 


 Methylprednisolone


 Sodium Succinate


  (SOLU-Medrol


 125MG VIAL)  62.5 mg  Q6HRS


 IV


   9/25/19 13:00


    9/26/19 05:09





 


 Hydralazine HCl


  (Apresoline)  25 mg  TID


 PO


   9/25/19 14:00


    9/26/19 09:38





 


 Isosorbide


 Mononitrate


  (Imdur)  30 mg  DAILY


 PO


   9/25/19 14:00


    9/26/19 09:38





 


 Vancomycin HCl 1


 gm/Sodium Chloride  250 ml @ 


 250 mls/hr  Q18H


 IV


   9/26/19 02:00


    9/26/19 01:33





 


 Piperacillin Sod/


 Tazobactam Sod


 3.375 gm/Sodium


 Chloride  50 ml @ 


 100 mls/hr  Q6HRS


 IV


   9/25/19 14:00


    9/26/19 05:09





 


 Amlodipine


 Besylate


  (Norvasc)  10 mg  DAILY


 PO


   9/25/19 15:00


    9/25/19 16:20





 


 Clonidine HCl


  (Catapres)  0.1 mg  BID


 PO


   9/25/19 15:00


    9/25/19 21:09





 


 Furosemide


  (Lasix)  40 mg  DAILY


 PO


   9/25/19 15:00


    9/26/19 09:37





 


 Lisinopril


  (Prinivil)  40 mg  DAILY


 PO


   9/25/19 15:00


    9/26/19 09:37





 


 Potassium Chloride


  (Klor-Con)  20 meq  DAILY


 PO


   9/25/19 15:00


    9/26/19 09:36





 


 Simvastatin


  (Zocor)  20 mg  HS


 PO


   9/25/19 21:00


    9/25/19 21:09





 


 Lactobacillus


 Rhamnosus


  (Culturelle)  1 cap  BID


 PO


   9/25/19 21:00


    9/26/19 09:37





 


 Budesonide


  (Pulmicort)  0.5 mg  RTBID


 NEB


   9/25/19 15:00


    9/26/19 07:31





 


 Albuterol/


 Ipratropium


  (Duoneb)  3 ml  RTQID


 NEB


   9/25/19 16:00


    9/26/19 07:31




















ANTONIA LUX III DO           Sep 26, 2019 11:10

## 2019-09-26 NOTE — PDOC
PULMONARY PROGRESS NOTES


Subjective


remains on 50%VM


Vitals





Vital Signs








  Date Time  Temp Pulse Resp B/P (MAP) Pulse Ox O2 Delivery O2 Flow Rate FiO2


 


9/26/19 09:38  58  154/95    


 


9/26/19 07:31     93 Venturi Mask 15.0 


 


9/26/19 06:00   15     


 


9/26/19 03:08 97.9       





 97.9       








General:  Alert, No acute distress


Lungs:  Crackles (bases)


Cardiovascular:  S1, S2


Abdomen:  Soft, Non-tender


Neuro Exam:  Alert


Extremities:  No Edema


Labs





Laboratory Tests








Test


 9/25/19


03:55 9/25/19


04:15 9/25/19


05:00 9/25/19


05:42


 


Sodium Level


 143 mmol/L


(136-145) 


 


 





 


Potassium Level


 3.8 mmol/L


(3.5-5.1) 


 


 





 


Chloride Level


 107 mmol/L


() 


 


 





 


Carbon Dioxide Level


 26 mmol/L


(21-32) 


 


 





 


Anion Gap 10 (6-14)    


 


Blood Urea Nitrogen


 22 mg/dL


(8-26) 


 


 





 


Creatinine


 1.4 mg/dL


(0.7-1.3) 


 


 





 


Estimated GFR


(Cockcroft-Gault) 62.1 


 


 


 





 


BUN/Creatinine Ratio 16 (6-20)    


 


Glucose Level


 86 mg/dL


(70-99) 


 


 





 


Calcium Level


 9.7 mg/dL


(8.5-10.1) 


 


 





 


Total Bilirubin


 0.7 mg/dL


(0.2-1.0) 


 


 





 


Aspartate Amino Transf


(AST/SGOT) 15 U/L (15-37) 


 


 


 





 


Alanine Aminotransferase


(ALT/SGPT) 15 U/L (16-63) 


 


 


 





 


Alkaline Phosphatase


 78 U/L


() 


 


 





 


Troponin I Quantitative


 0.051 ng/mL


(0.000-0.055) 


 


 





 


NT-Pro-B-Type Natriuretic


Peptide 1600 pg/mL


(0-124) 


 


 





 


Total Protein


 7.1 g/dL


(6.4-8.2) 


 


 





 


Albumin


 3.6 g/dL


(3.4-5.0) 


 


 





 


Albumin/Globulin Ratio 1.0 (1.0-1.7)    


 


White Blood Count


 


 9.0 x10^3/uL


(4.0-11.0) 


 





 


Red Blood Count


 


 4.79 x10^6/uL


(4.30-5.70) 


 





 


Hemoglobin


 


 15.9 g/dL


(13.0-17.5) 


 





 


Hematocrit


 


 46.3 %


(39.0-53.0) 


 





 


Mean Corpuscular Volume  97 fL ()   


 


Mean Corpuscular Hemoglobin  33 pg (25-35)   


 


Mean Corpuscular Hemoglobin


Concent 


 34 g/dL


(31-37) 


 





 


Red Cell Distribution Width


 


 15.1 %


(11.5-14.5) 


 





 


Platelet Count


 


 110 x10^3/uL


(140-400) 


 





 


Neutrophils (%) (Auto)  69 % (31-73)   


 


Lymphocytes (%) (Auto)  16 % (24-48)   


 


Monocytes (%) (Auto)  13 % (0-9)   


 


Eosinophils (%) (Auto)  2 % (0-3)   


 


Basophils (%) (Auto)  1 % (0-3)   


 


Neutrophils # (Auto)


 


 6.2 x10^3/uL


(1.8-7.7) 


 





 


Lymphocytes # (Auto)


 


 1.4 x10^3/uL


(1.0-4.8) 


 





 


Monocytes # (Auto)


 


 1.2 x10^3/uL


(0.0-1.1) 


 





 


Eosinophils # (Auto)


 


 0.2 x10^3/uL


(0.0-0.7) 


 





 


Basophils # (Auto)


 


 0.1 x10^3/uL


(0.0-0.2) 


 





 


Urine Opiates Screen   Neg (NEG)  


 


Urine Methadone Screen   Neg (NEG)  


 


Urine Barbiturates   Neg (NEG)  


 


Urine Phencyclidine Screen   Neg (NEG)  


 


Urine


Amphetamine/Methamphetamine 


 


 Neg (NEG) 


 





 


Urine Benzodiazepines Screen   Neg (NEG)  


 


Urine Cocaine Screen   Pos (NEG)  


 


Urine Cannabinoids Screen   Neg (NEG)  


 


Urine Ethyl Alcohol   Neg (NEG)  


 


O2 Saturation    91 % (92-99) 


 


Arterial Blood pH


 


 


 


 7.41


(7.35-7.45)


 


Arterial Blood pCO2 at


Patient Temp 


 


 


 37 mmHg


(35-46)


 


Arterial Blood pO2 at Patient


Temp 


 


 


 58 mmHg


()


 


Arterial Blood HCO3


 


 


 


 23 mmol/L


(21-28)


 


Arterial Blood Base Excess


 


 


 


 -1 mmol/L


(-3-3)


 


FiO2    40 


 


Test


 9/25/19


06:55 9/26/19


04:40 


 





 


Troponin I Quantitative


 0.049 ng/mL


(0.000-0.055) 


 


 





 


Thyroid Stimulating Hormone


(TSH) 0.269 uIU/mL


(0.358-3.74) 


 


 





 


White Blood Count


 


 10.2 x10^3/uL


(4.0-11.0) 


 





 


Red Blood Count


 


 4.38 x10^6/uL


(4.30-5.70) 


 





 


Hemoglobin


 


 14.5 g/dL


(13.0-17.5) 


 





 


Hematocrit


 


 42.7 %


(39.0-53.0) 


 





 


Mean Corpuscular Volume  98 fL ()   


 


Mean Corpuscular Hemoglobin  33 pg (25-35)   


 


Mean Corpuscular Hemoglobin


Concent 


 34 g/dL


(31-37) 


 





 


Red Cell Distribution Width


 


 15.1 %


(11.5-14.5) 


 





 


Platelet Count


 


 109 x10^3/uL


(140-400) 


 





 


Neutrophils (%) (Auto)  93 % (31-73)   


 


Lymphocytes (%) (Auto)  4 % (24-48)   


 


Monocytes (%) (Auto)  3 % (0-9)   


 


Eosinophils (%) (Auto)  0 % (0-3)   


 


Basophils (%) (Auto)  0 % (0-3)   


 


Neutrophils # (Auto)


 


 9.5 x10^3/uL


(1.8-7.7) 


 





 


Lymphocytes # (Auto)


 


 0.4 x10^3/uL


(1.0-4.8) 


 





 


Monocytes # (Auto)


 


 0.3 x10^3/uL


(0.0-1.1) 


 





 


Eosinophils # (Auto)


 


 0.0 x10^3/uL


(0.0-0.7) 


 





 


Basophils # (Auto)


 


 0.0 x10^3/uL


(0.0-0.2) 


 





 


Segmented Neutrophils %  89 % (35-66)   


 


Lymphocytes %  7 % (24-48)   


 


Monocytes %  4 % (0-10)   


 


Platelet Estimate


 


 Decreased


(ADEQUATE) 


 





 


Sodium Level


 


 140 mmol/L


(136-145) 


 





 


Potassium Level


 


 4.1 mmol/L


(3.5-5.1) 


 





 


Chloride Level


 


 107 mmol/L


() 


 





 


Carbon Dioxide Level


 


 26 mmol/L


(21-32) 


 





 


Anion Gap  7 (6-14)   


 


Blood Urea Nitrogen


 


 32 mg/dL


(8-26) 


 





 


Creatinine


 


 1.5 mg/dL


(0.7-1.3) 


 





 


Estimated GFR


(Cockcroft-Gault) 


 57.4 


 


 





 


BUN/Creatinine Ratio  21 (6-20)   


 


Glucose Level


 


 157 mg/dL


(70-99) 


 





 


Calcium Level


 


 9.2 mg/dL


(8.5-10.1) 


 





 


Total Bilirubin


 


 0.4 mg/dL


(0.2-1.0) 


 





 


Aspartate Amino Transf


(AST/SGOT) 


 9 U/L (15-37) 


 


 





 


Alanine Aminotransferase


(ALT/SGPT) 


 10 U/L (16-63) 


 


 





 


Alkaline Phosphatase


 


 71 U/L


() 


 





 


Total Protein


 


 6.6 g/dL


(6.4-8.2) 


 





 


Albumin


 


 2.8 g/dL


(3.4-5.0) 


 





 


Albumin/Globulin Ratio  0.7 (1.0-1.7)   








Laboratory Tests








Test


 9/26/19


04:40


 


White Blood Count


 10.2 x10^3/uL


(4.0-11.0)


 


Red Blood Count


 4.38 x10^6/uL


(4.30-5.70)


 


Hemoglobin


 14.5 g/dL


(13.0-17.5)


 


Hematocrit


 42.7 %


(39.0-53.0)


 


Mean Corpuscular Volume 98 fL () 


 


Mean Corpuscular Hemoglobin 33 pg (25-35) 


 


Mean Corpuscular Hemoglobin


Concent 34 g/dL


(31-37)


 


Red Cell Distribution Width


 15.1 %


(11.5-14.5)


 


Platelet Count


 109 x10^3/uL


(140-400)


 


Neutrophils (%) (Auto) 93 % (31-73) 


 


Lymphocytes (%) (Auto) 4 % (24-48) 


 


Monocytes (%) (Auto) 3 % (0-9) 


 


Eosinophils (%) (Auto) 0 % (0-3) 


 


Basophils (%) (Auto) 0 % (0-3) 


 


Neutrophils # (Auto)


 9.5 x10^3/uL


(1.8-7.7)


 


Lymphocytes # (Auto)


 0.4 x10^3/uL


(1.0-4.8)


 


Monocytes # (Auto)


 0.3 x10^3/uL


(0.0-1.1)


 


Eosinophils # (Auto)


 0.0 x10^3/uL


(0.0-0.7)


 


Basophils # (Auto)


 0.0 x10^3/uL


(0.0-0.2)


 


Segmented Neutrophils % 89 % (35-66) 


 


Lymphocytes % 7 % (24-48) 


 


Monocytes % 4 % (0-10) 


 


Platelet Estimate


 Decreased


(ADEQUATE)


 


Sodium Level


 140 mmol/L


(136-145)


 


Potassium Level


 4.1 mmol/L


(3.5-5.1)


 


Chloride Level


 107 mmol/L


()


 


Carbon Dioxide Level


 26 mmol/L


(21-32)


 


Anion Gap 7 (6-14) 


 


Blood Urea Nitrogen


 32 mg/dL


(8-26)


 


Creatinine


 1.5 mg/dL


(0.7-1.3)


 


Estimated GFR


(Cockcroft-Gault) 57.4 





 


BUN/Creatinine Ratio 21 (6-20) 


 


Glucose Level


 157 mg/dL


(70-99)


 


Calcium Level


 9.2 mg/dL


(8.5-10.1)


 


Total Bilirubin


 0.4 mg/dL


(0.2-1.0)


 


Aspartate Amino Transf


(AST/SGOT) 9 U/L (15-37) 





 


Alanine Aminotransferase


(ALT/SGPT) 10 U/L (16-63) 





 


Alkaline Phosphatase


 71 U/L


()


 


Total Protein


 6.6 g/dL


(6.4-8.2)


 


Albumin


 2.8 g/dL


(3.4-5.0)


 


Albumin/Globulin Ratio 0.7 (1.0-1.7) 








Medications





Active Scripts








 Medications  Dose


 Route/Sig


 Max Daily Dose Days Date Category Dose


Instructions


 


 Azithromycin


 Tablet


  (Azithromycin)


 250 Mg Tablet  1 Pkg


 PO UD


   6/27/17 Rx 


 


 Advair 250-50


 Diskus


  (Fluticasone/Salmeterol)


 1 Each Disk.w.dev  1 Puff


 IH BID


   6/26/17 Rx 


 


 Ipratropium


 Bromide 0.2 Mg/1


 Ml Solution  1 Vial


 NEB QID PRN


   6/26/17 Rx 


 


 Doxycycline


 Hyclate 100 Mg


 Tablet  1 Tab


 PO BID


   6/26/17 Rx  NEXT DOSE DUE AT BEDTIME TONIGHT


 


 Medrol


  (Methylprednisolone)


 4 Mg Tab.ds.pk  1 Pkg


 PO UD


   6/26/17 Rx 


 


 Albuterol Sulfate


 Neb Soln


  (Albuterol


 Sulfate) 0.63


 Mg/3 Ml Vial.neb  0.63 Mg


 NEB PRN Q4HRS PRN


  30 6/26/17 Rx  AS NEEDED


 


 Zocor


  (Simvastatin) 20


 Mg Tablet  20 Mg


 PO HS


   7/10/15 Reported  NEXT DOSE DUE TONIGHT AT BEDTIME


 


 Isosorbide


 Mononitrate Er


  (Isosorbide


 Mononitrate) 60


 Mg Tab.er.24h  60 Mg


 PO DAILY


   7/10/15 Reported  NEXT DOSE DUE IN AM


 


 Lisinopril 40 Mg


 Tablet  40 Mg


 PO DAILY


   12/15/14 Reported  NEXT DOSE DUE IN AM


 


 Carvedilol 25 Mg


 Tablet  25 Mg


 PO BIDWMEALS


   12/15/14 Reported  NEXT DOSE DUE AT DINNER THIS EVENING


 


 Clonidine Hcl 0.1


 Mg Tablet  0.1 Mg


 PO BID


   12/15/14 Reported  NEXT DOSE DUE AT BEDTIME TONIGHT


 


 Hydralazine Hcl


 50 Mg Tablet  50 Mg


 PO TID


   11/8/14 Reported  NEXT DOSE DUE AT BEDTIME TONIGHT


 


 Amlodipine


 Besylate 10 Mg


 Tablet  10 Mg


 PO DAILY


   2/24/14 Reported  NEXT DOSE DUE IN AM


 


 Furosemide 40 Mg


 Tablet  40 Mg


 PO DAILY


   2/24/14 Reported  NEXT DOSE DUE IN AM


 


 Potassium


 Chloride 10 Meq


 Tab.er.prt  20 Meq


 PO DAILY


   2/24/14 Reported  NEXT DOSE DUE IN AM








Comments


CXR 9/26


MILD CHF/ RUL ATELECTASIS





Impression


.


1.  Acute hypoxic respiratory failure secondary to multifactorial etiologies


including combination of underlying chronic obstructive pulmonary disease, right


upper lobe collapse.  Cannot exclude the possibility of thromboembolic disease. 

Possible CHF today


2.  Long history of tobacco use, suspect underlying chronic obstructive


pulmonary disease.  Along with cocaine, marijuana and alcohol abuse.


3.  Abnormal CT chest with right upper lobe collapse.  Likely postobstructive. 


Possibility of endobronchial lesion cannot be ruled out.  Mucous plug would be


another consideration.


4.  Cardiomyopathy with an ejection fraction of 35% based on previous


echocardiogram, repeat echocardiogram has been ordered.  Likely related to


cocaine abuse.





Plan


.





1.  Continue with present current oxygen.


2.  VQ scan today


3.  Currently requiring high flow oxygen at 15 liters via Venturi mask.  Will


need to have improvement in oxygenation before bronchoscopy can be performed


safely. 


4.  Follow Cardiology recommendation reg low EF


5.  Monitor blood pressure closely.


6.   bronchodilators.


7.  Smoking cessation counseling provided.


8.  Discussed with RN. Diuresis today


9.  Monitor renal function/ hold IVF











JESSE DOVE MD                 Sep 26, 2019 11:02

## 2019-09-26 NOTE — PDOC
PROGRESS NOTES


Subjective


Subjective


Feeling better today





Objective


Objective





Vital Signs








  Date Time  Temp Pulse Resp B/P (MAP) Pulse Ox O2 Delivery O2 Flow Rate FiO2


 


9/26/19 18:00  82 22 134/81 (98) 93 Nasal Cannula 6.0 


 


9/26/19 17:00 98.2       





 98.2       














Intake and Output 


 


 9/26/19





 07:00


 


Intake Total 2560 ml


 


Output Total 2550 ml


 


Balance 10 ml


 


 


 


Intake Oral 1510 ml


 


IV Total 1050 ml


 


Output Urine Total 2550 ml


 


# Voids 1











Physical Exam


Abdomen:  Soft, No tenderness


Heart:  Regular rate (SR/SB), Other (2/6 systolic murmur to LLS border)


Extremities:  Other (1+ bilateral LE pitting edema)


General:  Alert, No acute distress


HEENT:  Atraumatic, Mucous membr. moist/pink


Lungs:  Other (diminished)


Neuro:  Normal speech


Psych/Mental Status:  Mood NL





Assessment


Assessment


1. Bradycardia: Mainly SB with episodes of Wenkebach with underlying high dose 

coreg.  No significant pauses on tele.


2. Dyspnea: mainly due to RUL collapse with possible endobronchial mass.  Pulm 

following


3. Substance abuse: UDS+cocaine


4. NICM: last EF 35%.  Declined ICD in past


5. Chronic systolic CHF: well compensated


6. Chronic LBBB


7. HTN: controlled


8. CKD3


9. Hx of noncompliance


10. COPD with continued tobaccoism





Plan


Plan of Care


Problems


Medical Problems:


(1) COPD exacerbation


Status: Acute  





(2) Left flank pain


Status: Acute  





(3) Lung mass


Status: Acute  





(4) Pneumonia


Status: Acute  





(5) Substance abuse


Status: Acute  











Comment


Review of Relevant


I have reviewed the following items brittany (where applicable) has been applied.


Labs





Laboratory Tests








Test


 9/26/19


04:40


 


White Blood Count


 10.2 x10^3/uL


(4.0-11.0)


 


Red Blood Count


 4.38 x10^6/uL


(4.30-5.70)


 


Hemoglobin


 14.5 g/dL


(13.0-17.5)


 


Hematocrit


 42.7 %


(39.0-53.0)


 


Mean Corpuscular Volume 98 fL () 


 


Mean Corpuscular Hemoglobin 33 pg (25-35) 


 


Mean Corpuscular Hemoglobin


Concent 34 g/dL


(31-37)


 


Red Cell Distribution Width


 15.1 %


(11.5-14.5)


 


Platelet Count


 109 x10^3/uL


(140-400)


 


Neutrophils (%) (Auto) 93 % (31-73) 


 


Lymphocytes (%) (Auto) 4 % (24-48) 


 


Monocytes (%) (Auto) 3 % (0-9) 


 


Eosinophils (%) (Auto) 0 % (0-3) 


 


Basophils (%) (Auto) 0 % (0-3) 


 


Neutrophils # (Auto)


 9.5 x10^3/uL


(1.8-7.7)


 


Lymphocytes # (Auto)


 0.4 x10^3/uL


(1.0-4.8)


 


Monocytes # (Auto)


 0.3 x10^3/uL


(0.0-1.1)


 


Eosinophils # (Auto)


 0.0 x10^3/uL


(0.0-0.7)


 


Basophils # (Auto)


 0.0 x10^3/uL


(0.0-0.2)


 


Segmented Neutrophils % 89 % (35-66) 


 


Lymphocytes % 7 % (24-48) 


 


Monocytes % 4 % (0-10) 


 


Platelet Estimate


 Decreased


(ADEQUATE)


 


Sodium Level


 140 mmol/L


(136-145)


 


Potassium Level


 4.1 mmol/L


(3.5-5.1)


 


Chloride Level


 107 mmol/L


()


 


Carbon Dioxide Level


 26 mmol/L


(21-32)


 


Anion Gap 7 (6-14) 


 


Blood Urea Nitrogen


 32 mg/dL


(8-26)


 


Creatinine


 1.5 mg/dL


(0.7-1.3)


 


Estimated GFR


(Cockcroft-Gault) 57.4 





 


BUN/Creatinine Ratio 21 (6-20) 


 


Glucose Level


 157 mg/dL


(70-99)


 


Calcium Level


 9.2 mg/dL


(8.5-10.1)


 


Total Bilirubin


 0.4 mg/dL


(0.2-1.0)


 


Aspartate Amino Transf


(AST/SGOT) 9 U/L (15-37) 





 


Alanine Aminotransferase


(ALT/SGPT) 10 U/L (16-63) 





 


Alkaline Phosphatase


 71 U/L


()


 


Total Protein


 6.6 g/dL


(6.4-8.2)


 


Albumin


 2.8 g/dL


(3.4-5.0)


 


Albumin/Globulin Ratio 0.7 (1.0-1.7) 








Medications





Current Medications


Doxycycline Hyclate (Vibra-Tab) 100 mg BID PO ;  Start 9/25/19 at 21:00;  Status

UNV


Enoxaparin Sodium (Lovenox 40mg Syringe) 40 mg Q24H SQ  Last administered on 

9/26/19at 11:42;  Start 9/26/19 at 11:00


Furosemide (Lasix) 20 mg 1X  ONCE IVP  Last administered on 9/26/19at 11:42;  

Start 9/26/19 at 11:15;  Stop 9/26/19 at 11:16;  Status DC


Furosemide (Lasix) 20 mg 1X  ONCE IVP  Last administered on 9/26/19at 17:44;  

Start 9/26/19 at 17:30;  Stop 9/26/19 at 17:32;  Status DC


Lactobacillus Rhamnosus (Culturelle) 1 cap BID PO  Last administered on 

9/26/19at 09:37;  Start 9/25/19 at 21:00


Non-Formulary Medication (Fluticasone/ Salmeterol (Advair 250-50 Diskus)) 1 puff

BID IH ;  Start 9/25/19 at 21:00;  Status UNV


Simvastatin (Zocor) 20 mg HS PO  Last administered on 9/25/19at 21:09;  Start 

9/25/19 at 21:00


Vancomycin HCl (Vancomycin Trough Level) 1 each 1X  ONCE MC ;  Start 9/27/19 at 

13:30;  Stop 9/27/19 at 13:31


Vancomycin HCl 1 gm/Sodium Chloride 250 ml @  250 mls/hr Q18H IV  Last 

administered on 9/26/19at 01:33;  Start 9/26/19 at 02:00


Vitals/I & O





Vital Sign - Last 24 Hours








 9/25/19 9/25/19 9/25/19 9/25/19





 21:09 21:09 21:21 22:01


 


Pulse 63 63 66 60


 


Resp   30 15


 


B/P (MAP) 133/79 133/79 133/79 (97) 119/75 (90)


 


Pulse Ox   90 92


 


O2 Delivery   Nasal Cannula Nasal Cannula


 


O2 Flow Rate   6.0 6.0





 9/25/19 9/25/19 9/26/19 9/26/19





 23:39 23:43 00:15 02:10


 


Temp 97.8   





 97.8   


 


Pulse 70   55


 


Resp 24   14


 


B/P (MAP) 104/71 (82)   144/84 (104)


 


Pulse Ox 89  92 92


 


O2 Delivery Nasal Cannula Nasal Cannula Venturi Mask Venturi Mask


 


O2 Flow Rate 6.0 6.0 15.0 15.0


 


    





    





 9/26/19 9/26/19 9/26/19 9/26/19





 03:08 03:59 04:10 05:27


 


Temp 97.9   





 97.9   


 


Pulse 58  56 63


 


Resp 15  15 21


 


B/P (MAP) 122/73 (89)  113/62 (79) 110/59 (76)


 


Pulse Ox 93  93 95


 


O2 Delivery Venturi Mask Venturi Mask Venturi Mask Venturi Mask


 


O2 Flow Rate 15.0 15.0 15.0 15.0


 


    





    





 9/26/19 9/26/19 9/26/19 9/26/19





 06:00 07:00 07:31 08:00


 


Pulse 62 56  


 


Resp 15 16  


 


B/P (MAP) 119/60 (79) 119/60 (79)  


 


Pulse Ox 94 94 93 


 


O2 Delivery Venturi Mask Venturi Mask Venturi Mask Venturi Mask


 


O2 Flow Rate 15.0 15.0 15.0 15.0





 9/26/19 9/26/19 9/26/19 9/26/19





 08:00 09:00 09:37 09:38


 


Temp 98.3   





 98.3   


 


Pulse 59 63 61 58


 


Resp 15 20  


 


B/P (MAP) 152/91 (111) 154/95 (114) 154/95 154/95


 


Pulse Ox 95 95  


 


O2 Delivery Venturi Mask Venturi Mask  


 


O2 Flow Rate 15.0 15.0  


 


    





    





 9/26/19 9/26/19 9/26/19 9/26/19





 09:38 10:00 11:00 11:42


 


Pulse 58 64 67 


 


Resp  20 19 


 


B/P (MAP) 154/95 166/95 (118) 139/89 (106) 


 


Pulse Ox  96 97 94


 


O2 Delivery  Nasal Cannula Nasal Cannula Nasal Cannula


 


O2 Flow Rate  6.0 6.0 6.0





 9/26/19 9/26/19 9/26/19 9/26/19





 11:43 11:57 12:00 13:00


 


Temp  97.9  





  97.9  


 


Pulse 66 72  71


 


Resp  16  25


 


B/P (MAP) 139/89 135/89 (104)  121/75 (90)


 


Pulse Ox  99  95


 


O2 Delivery  Nasal Cannula Nasal Cannula Nasal Cannula


 


O2 Flow Rate  6.0 6.0 6.0


 


    





    





 9/26/19 9/26/19 9/26/19 9/26/19





 14:00 15:00 15:20 15:21


 


Pulse 72 66 74 73


 


Resp 21 22  


 


B/P (MAP) 127/77 (94) 131/71 (91) 131/71 131/71


 


Pulse Ox 98 95  


 


O2 Delivery Nasal Cannula Nasal Cannula  


 


O2 Flow Rate 6.0 6.0  





 9/26/19 9/26/19 9/26/19 9/26/19





 16:00 16:00 16:04 17:00


 


Temp    98.2





    98.2


 


Pulse  72  69


 


Resp  17  19


 


B/P (MAP)  124/68 (86)  124/67 (86)


 


Pulse Ox  94 94 95


 


O2 Delivery Nasal Cannula Nasal Cannula Nasal Cannula Nasal Cannula


 


O2 Flow Rate 6.0 6.0 6.0 6.0


 


    





    





 9/26/19   





 18:00   


 


Pulse 82   


 


Resp 22   


 


B/P (MAP) 134/81 (98)   


 


Pulse Ox 93   


 


O2 Delivery Nasal Cannula   


 


O2 Flow Rate 6.0   














Intake and Output   


 


 9/25/19 9/25/19 9/26/19





 15:00 23:00 07:00


 


Intake Total 730 ml 1590 ml 240 ml


 


Output Total 400 ml 1150 ml 1000 ml


 


Balance 330 ml 440 ml -760 ml

















ELIZABETH SELLERS MD           Sep 26, 2019 20:19

## 2019-09-27 VITALS — DIASTOLIC BLOOD PRESSURE: 83 MMHG | SYSTOLIC BLOOD PRESSURE: 149 MMHG

## 2019-09-27 VITALS — SYSTOLIC BLOOD PRESSURE: 136 MMHG | DIASTOLIC BLOOD PRESSURE: 75 MMHG

## 2019-09-27 VITALS — SYSTOLIC BLOOD PRESSURE: 156 MMHG | DIASTOLIC BLOOD PRESSURE: 85 MMHG

## 2019-09-27 VITALS — DIASTOLIC BLOOD PRESSURE: 85 MMHG | SYSTOLIC BLOOD PRESSURE: 155 MMHG

## 2019-09-27 VITALS — SYSTOLIC BLOOD PRESSURE: 152 MMHG | DIASTOLIC BLOOD PRESSURE: 94 MMHG

## 2019-09-27 VITALS — SYSTOLIC BLOOD PRESSURE: 149 MMHG | DIASTOLIC BLOOD PRESSURE: 89 MMHG

## 2019-09-27 VITALS — DIASTOLIC BLOOD PRESSURE: 89 MMHG | SYSTOLIC BLOOD PRESSURE: 156 MMHG

## 2019-09-27 VITALS — DIASTOLIC BLOOD PRESSURE: 101 MMHG | SYSTOLIC BLOOD PRESSURE: 160 MMHG

## 2019-09-27 VITALS — SYSTOLIC BLOOD PRESSURE: 165 MMHG | DIASTOLIC BLOOD PRESSURE: 89 MMHG

## 2019-09-27 VITALS — SYSTOLIC BLOOD PRESSURE: 127 MMHG | DIASTOLIC BLOOD PRESSURE: 72 MMHG

## 2019-09-27 VITALS — SYSTOLIC BLOOD PRESSURE: 160 MMHG | DIASTOLIC BLOOD PRESSURE: 90 MMHG

## 2019-09-27 VITALS — SYSTOLIC BLOOD PRESSURE: 168 MMHG | DIASTOLIC BLOOD PRESSURE: 88 MMHG

## 2019-09-27 VITALS — SYSTOLIC BLOOD PRESSURE: 137 MMHG | DIASTOLIC BLOOD PRESSURE: 82 MMHG

## 2019-09-27 LAB — VANC TR: 11.2 MCG/ML (ref 10–20)

## 2019-09-27 RX ADMIN — IPRATROPIUM BROMIDE AND ALBUTEROL SULFATE SCH ML: .5; 3 SOLUTION RESPIRATORY (INHALATION) at 19:35

## 2019-09-27 RX ADMIN — ISOSORBIDE MONONITRATE SCH MG: 30 TABLET, EXTENDED RELEASE ORAL at 08:40

## 2019-09-27 RX ADMIN — ENOXAPARIN SODIUM SCH MG: 40 INJECTION SUBCUTANEOUS at 11:42

## 2019-09-27 RX ADMIN — PIPERACILLIN SODIUM AND TAZOBACTAM SODIUM SCH MLS/HR: 3; .375 INJECTION, POWDER, LYOPHILIZED, FOR SOLUTION INTRAVENOUS at 06:30

## 2019-09-27 RX ADMIN — METHYLPREDNISOLONE SODIUM SUCCINATE SCH MG: 125 INJECTION, POWDER, FOR SOLUTION INTRAMUSCULAR; INTRAVENOUS at 06:30

## 2019-09-27 RX ADMIN — VANCOMYCIN HYDROCHLORIDE PRN EACH: 1 INJECTION, POWDER, LYOPHILIZED, FOR SOLUTION INTRAVENOUS at 14:12

## 2019-09-27 RX ADMIN — PIPERACILLIN SODIUM AND TAZOBACTAM SODIUM SCH MLS/HR: 3; .375 INJECTION, POWDER, LYOPHILIZED, FOR SOLUTION INTRAVENOUS at 19:16

## 2019-09-27 RX ADMIN — BUDESONIDE SCH MG: 0.5 INHALANT RESPIRATORY (INHALATION) at 19:35

## 2019-09-27 RX ADMIN — PIPERACILLIN SODIUM AND TAZOBACTAM SODIUM SCH MLS/HR: 3; .375 INJECTION, POWDER, LYOPHILIZED, FOR SOLUTION INTRAVENOUS at 13:55

## 2019-09-27 RX ADMIN — VANCOMYCIN HYDROCHLORIDE SCH MLS/HR: 1 INJECTION, POWDER, FOR SOLUTION INTRAVENOUS at 16:55

## 2019-09-27 RX ADMIN — METHYLPREDNISOLONE SODIUM SUCCINATE SCH MG: 125 INJECTION, POWDER, FOR SOLUTION INTRAMUSCULAR; INTRAVENOUS at 13:54

## 2019-09-27 RX ADMIN — BUDESONIDE SCH MG: 0.5 INHALANT RESPIRATORY (INHALATION) at 09:00

## 2019-09-27 RX ADMIN — LISINOPRIL SCH MG: 20 TABLET ORAL at 08:39

## 2019-09-27 RX ADMIN — Medication SCH CAP: at 08:39

## 2019-09-27 RX ADMIN — IPRATROPIUM BROMIDE AND ALBUTEROL SULFATE SCH ML: .5; 3 SOLUTION RESPIRATORY (INHALATION) at 11:54

## 2019-09-27 RX ADMIN — VANCOMYCIN HYDROCHLORIDE PRN EACH: 1 INJECTION, POWDER, LYOPHILIZED, FOR SOLUTION INTRAVENOUS at 10:59

## 2019-09-27 RX ADMIN — METHYLPREDNISOLONE SODIUM SUCCINATE SCH MG: 125 INJECTION, POWDER, FOR SOLUTION INTRAMUSCULAR; INTRAVENOUS at 18:18

## 2019-09-27 RX ADMIN — FUROSEMIDE SCH MG: 40 TABLET ORAL at 08:40

## 2019-09-27 RX ADMIN — Medication SCH CAP: at 21:01

## 2019-09-27 RX ADMIN — SIMVASTATIN SCH MG: 20 TABLET, FILM COATED ORAL at 21:01

## 2019-09-27 RX ADMIN — IPRATROPIUM BROMIDE AND ALBUTEROL SULFATE SCH ML: .5; 3 SOLUTION RESPIRATORY (INHALATION) at 16:54

## 2019-09-27 RX ADMIN — IPRATROPIUM BROMIDE AND ALBUTEROL SULFATE SCH ML: .5; 3 SOLUTION RESPIRATORY (INHALATION) at 09:00

## 2019-09-27 RX ADMIN — POTASSIUM CHLORIDE SCH MEQ: 750 TABLET, FILM COATED, EXTENDED RELEASE ORAL at 08:40

## 2019-09-27 RX ADMIN — METOPROLOL SUCCINATE SCH MG: 50 TABLET, EXTENDED RELEASE ORAL at 13:57

## 2019-09-27 NOTE — PDOC
[facility] PROGRESS NOTE


Chief Complaint


Chief Complaint


COPD exacerbation


Possible lung mass


Hypoxia


Substance abuse





History of Present Illness


History of Present Illness


9/27/19


Pt was seen and examined in the ICU


Pt was on the commode


Receiving O2 via Ventimask


Charts and labs reviewed


EF = 35% with cardiomegaly


Continues to be hypoxia


Crackles heard on lung exam


ABG pH is 7.41


D/w RN





9/26/19


Pt was seen and examined in the ICU


Receiving O2 via Ventimask


EF = 35% with cardiomegaly


Continues to be hypoxia


Crackles heard on lung exam


Cr was 1.5 so CT with contrast could not be performed


V/Q scan for possible PE





Vitals/I&O


Vitals/I&O:





                                   Vital Signs








  Date Time  Temp Pulse Resp B/P (MAP) Pulse Ox O2 Delivery O2 Flow Rate FiO2


 


9/27/19 11:54      Nasal Cannula 6.0 


 


9/27/19 11:19 98.7 72 16 149/89 (109) 93   





 98.7       














                                    I & O   


 


 9/26/19 9/26/19 9/27/19





 15:00 23:00 07:00


 


Intake Total 992 ml 950 ml 700 ml


 


Output Total 2050 ml 1225 ml 1100 ml


 


Balance -1058 ml -275 ml -400 ml











Physical Exam


Physical Exam:


General:  Alert, No acute distress


Lungs:  Crackles (bases)


Cardiovascular:  S1, S2


Abdomen:  Soft, Non-tender


Neuro Exam:  Alert


Extremities:  No Edema


General:  Alert, No acute distress


Heart:  Regular rate (SR/SB), Other (2/6 systolic murmur to LLS border)


Lungs:  Crackles (bases)


Abdomen:  Soft, No tenderness


Extremities:  Other (1+ bilateral LE pitting edema)





Review of Systems


Review of Systems:


Unable to obtain labs due to Pt being on commode





Assessment and Plan


Assessmemt and Plan


Problems


Medical Problems:


(1) Acute on chronic combined systolic and diastolic heart failure


Status: Acute  





(2) Acute respiratory failure with hypoxia


Status: Acute  





(3) Bradycardia


Status: Acute  





(4) CKD (chronic kidney disease), stage III


Status: Chronic  





(5) COPD exacerbation


Status: Acute  





(6) Dyspnea


Status: Acute  





(7) HTN (hypertension)


Status: Chronic  





(8) LBBB (left bundle branch block)


Status: Chronic  





(9) Left flank pain


Status: Acute  





(10) Lung mass


Status: Acute  





(11) NICM (nonischemic cardiomyopathy)


Status: Chronic  





(12) Pneumonia


Status: Acute  





(13) Substance abuse


Status: Acute  





Assessment


COPD exacerbation


Lung mass


Hypoxia


Substance abuse





Plan


ICU monitoring


Continue to consult with pulmonology


Meds per pulmonology


Bronchoscopy scheduled for next week


DVT prophylaxis


Substance use cessation


O2 via Ventimask


Full code


Home meds


PT/OT











Comment


Review of Relevant


I have reviewed the following items brittany (where applicable) has been applied.


Medications:





Current Medications








 Medications


  (Trade)  Dose


 Ordered  Sig/Irvin


 Route


 PRN Reason  Start Time


 Stop Time Status Last Admin


Dose Admin


 


 Furosemide


  (Lasix)  20 mg  1X  ONCE


 IVP


   9/26/19 17:30


 9/26/19 17:32 DC 9/26/19 17:44




















ANTONIA LUX III DO           Sep 27, 2019 12:07

## 2019-09-27 NOTE — CARD
MR#: [id number]

Account#: [id number]

Accession#: [id number]

Date of Study: 09/25/2019

Ordering Physician: LIZET LLOYD, 

Referring Physician: LIZET LLOYD, 

Tech: 





--------------- APPROVED REPORT --------------





EXAM: Two-dimensional and M-mode echocardiogram with Doppler and color Doppler.



Other Information 

Technically limited study due to  body habitus.



INDICATION

COPD  

Dyspnea 

Cardiomyopathy 

Congestive Heart Failure 



RISK FACTORS

Hypertension 



2D DIMENSIONS 

RVDd4.5 (2.9-3.5cm)Left Atrium(2D)4.3 (1.6-4.0cm)

IVSd1.4 (0.7-1.1cm)Aortic Root(2D)4.3 (2.0-3.7cm)

LVDd5.3 (3.9-5.9cm)LVOT Diameter2.2 (1.8-2.4cm)

PWd1.4 (0.7-1.1cm)LVDs4.0 (2.5-4.0cm)

FS (%) 36.9 %SV89.5 ml

LVEF(%)66.3 (>50%)



Aortic Valve

AoV Peak Richard.207.7cm/sAoV VTI31.3cm

AO Peak GR.17.3mmHgLVOT  VTI 25.24cm

AO Mean GR.5mmHgAI P 1/2 [id number]



Mitral Valve

MV E Bamvqchr46.5cm/sMV DECEL [id number]

MV A Avttihof02.2cm/sE/A  Ratio0.9



TDI

Lateral E' P. V5.92cm/sMedial E' P. V5.47cm/s

E/Lateral E'8.7E/Medial E'9.4



Pulmonary Vein

S1 Dybbgomm97.6cm/sS2 Xhfdyege23.71cm/s

D2 Rbboyggo92.7cm/sPVa vsxdrjcv32fgnx



 LEFT VENTRICLE 

The left ventricle is normal size. There is moderate concentric left ventricular hypertrophy. The sys
tolic function is mildly impaired. EF 40-45% There is global hypokinesis of the left ventricle. The s
eptal motion is suggestive of conduction defect. Transmitral Doppler flow pattern is Grade I-abnormal
 relaxation pattern.



 RIGHT VENTRICLE 

The right ventricle is normal size. There is normal right ventricular wall thickness. The right ventr
icular systolic function is normal.



 ATRIA 

The left atrium is mildly dilated. The right atrium is moderately dilated. The interatrial septum is 
intact with no evidence for an atrial septal defect or patent foramen ovale as noted on 2-D or Dopple
r imaging.



 AORTIC VALVE 

The aortic valve is normal in structure and function. Doppler and Color Flow revealed moderate aortic
 regurgitation. There is no significant aortic valvular stenosis.



 MITRAL VALVE 

The mitral valve is normal in structure and function. There is no evidence of mitral valve prolapse. 
There is no mitral valve stenosis. Doppler and Color-flow revealed trace mitral regurgitation.



 TRICUSPID VALVE 

The tricuspid valve is normal in structure and function. Doppler and Color Flow revealed no tricuspid
 valve regurgitation noted. There is no tricuspid valve prolapse or vegetation. There is no tricuspid
 valve stenosis.



 PULMONIC VALVE 

The pulmonic valve is not well visualized. Doppler and Color Flow revealed no pulmonic valvular regur
gitation. There is no pulmonic valvular stenosis.



 GREAT VESSELS 

The aortic root is mildly enlarged. The IVC is dilated and collapses >50% with inspiration.



 PERICARDIAL EFFUSION 

There is no evidence of significant pericardial effusion.



Critical Notification

Critical Value: No



<Conclusion>

The systolic function is mildly impaired. EF 40-45%

There is global hypokinesis of the left ventricle. The septal motion is suggestive of conduction defe
ct.

Doppler and Color Flow revealed moderate aortic regurgitation.



Signed by : Armando Melgoza, 

Electronically Approved : 09/25/2019 11:40:44

## 2019-09-27 NOTE — RAD
CHEST AP ONLY

 

Clinical Indication: Congestion

 

Comparison:  9/26/2019 AP view of the chest.

 

Findings: 

Portable upright frontal view of the chest was obtained. Heart size is 

enlarged. Right upper lobe atelectasis along the minor fissure is again 

identified with decreased right upper lobe volume. There is no 

pneumothorax. No pleural effusion is appreciated. No acute bone 

abnormality.

 

IMPRESSION: 

Right upper lobe atelectasis again seen. Mild decrease in right upper lobe

volume in the interval.

 

Electronically signed by: Pietro Harvey MD (9/27/2019 7:51 AM) [facility]

## 2019-09-27 NOTE — PDOC
CARDIO Progress Notes


Date and Time


Date of Service


9/27/2019


Time of Evaluation


1120





Subjective


Subjective:  No Chest Pain, No Palpitations, Other (SOA at times)





Vitals


Vitals





Vital Signs








  Date Time  Temp Pulse Resp B/P (MAP) Pulse Ox O2 Delivery O2 Flow Rate FiO2


 


9/27/19 11:19 98.7 72 16 149/89 (109) 93 Nasal Cannula 6.0 





 98.7       








Weight


Weight [ ]





Input and Output


Intake and Output











Intake and Output 


 


 9/27/19





 07:00


 


Intake Total 2642 ml


 


Output Total 4375 ml


 


Balance -1733 ml


 


 


 


Intake Oral 1950 ml


 


IV Total 692 ml


 


Output Urine Total 4375 ml


 


# Bowel Movements 2











Physical Exam


HEENT:  Neck Supple W Full Motion


Chest:  Symmetric


LUNGS:  Other (diminished )


Heart:  RRR (SR with intermittent PVCs)


Abdomen:  Soft N/T


Extremities:  No Calf Tenderness


Neurology:  alert, oriented, follow commands





Assessment


Assessment


1. Bradycardia: Mainly Wenkebach with underlying high dose coreg. None further


2. Dyspnea: mainly due to RUL collapse with possible endobronchial mass. 


3. Substance abuse: UDS+cocaine


4. NICM: last EF 35% and now better at 40-45%


5. Chronic systolic CHF: compensated


6. Chronic LBBB


7. HTN: better


8. CKD3


9. Hx of noncompliance


10. COPD with continued tobaccoism


11. Moderate AI





Recommendations


1. He has deferred AICD in the past. Restart BB with lower dose this time with 

toprol, for better PVC control. Monitor rhythm for any significant bradycardia


2. Continue on hydralazine and imdur


3. Bronch pending for next week per pulmonary


3. Smoking and cocaine cessation reinforced


5. Supportive care.











LIZET LLOYD APRN          Sep 27, 2019 11:40

## 2019-09-27 NOTE — NUR
Pharmacy Vancomycin Dosing Note



S: Consulted to monitor and dose vancomycin started 09/25/19.



O: CESAR BALDERAS is a 64 year old M with Pneumonia.



Other Antibiotics: 

ZOSYN 3.375G IV Q6HRS (9/25 -)



LABS:

Last BUN: 32 

Last Creatinine: 1.5 

Creatinine Clearance: 52 mL/min

Last WBC: 10.2 

Last Procalcitonin: - 

Tmax (past 24 hours): 98.7 



Microbiology:  

NO CX PENDING



I/O: 2592/4375 



Drug Levels:

Last Trough level: 11.2  on 09/27/19 at 1325 

Last dose given 09/26/19 at 2123 



Vancomycin Dosing:

Dosing Weight: Actual

Target Trough: 15-20





A: Based on: trough





P: 1. Increase Vancomycin to 1250 mg IV q18h

   2. Follow up Trough level to be ordered as needed 

   3. Pharmacy will continue to monitor, follow and adjust therapy as needed.

 

 Maolrie Haji RPH, 09/27/19 [id number]

## 2019-09-27 NOTE — NUR
Patient transferred from ICU to room 203 at 1620. Pt A&OX4. VSS. 5L O2 NC placed on pt. No 
complaints of pain. Will continue to monitor.

## 2019-09-27 NOTE — PDOC
PULMONARY PROGRESS NOTES


Subjective


less soa


on canula now


Vitals





Vital Signs








  Date Time  Temp Pulse Resp B/P (MAP) Pulse Ox O2 Delivery O2 Flow Rate FiO2


 


9/27/19 11:54      Nasal Cannula 6.0 


 


9/27/19 11:19 98.7 72 16 149/89 (109) 93   





 98.7       








General:  Alert, No acute distress


Lungs:  Crackles (bases)


Cardiovascular:  S1, S2


Abdomen:  Soft, Non-tender


Neuro Exam:  Alert


Extremities:  No Edema


Labs





Laboratory Tests








Test


 9/26/19


04:40


 


White Blood Count


 10.2 x10^3/uL


(4.0-11.0)


 


Red Blood Count


 4.38 x10^6/uL


(4.30-5.70)


 


Hemoglobin


 14.5 g/dL


(13.0-17.5)


 


Hematocrit


 42.7 %


(39.0-53.0)


 


Mean Corpuscular Volume 98 fL () 


 


Mean Corpuscular Hemoglobin 33 pg (25-35) 


 


Mean Corpuscular Hemoglobin


Concent 34 g/dL


(31-37)


 


Red Cell Distribution Width


 15.1 %


(11.5-14.5)


 


Platelet Count


 109 x10^3/uL


(140-400)


 


Neutrophils (%) (Auto) 93 % (31-73) 


 


Lymphocytes (%) (Auto) 4 % (24-48) 


 


Monocytes (%) (Auto) 3 % (0-9) 


 


Eosinophils (%) (Auto) 0 % (0-3) 


 


Basophils (%) (Auto) 0 % (0-3) 


 


Neutrophils # (Auto)


 9.5 x10^3/uL


(1.8-7.7)


 


Lymphocytes # (Auto)


 0.4 x10^3/uL


(1.0-4.8)


 


Monocytes # (Auto)


 0.3 x10^3/uL


(0.0-1.1)


 


Eosinophils # (Auto)


 0.0 x10^3/uL


(0.0-0.7)


 


Basophils # (Auto)


 0.0 x10^3/uL


(0.0-0.2)


 


Segmented Neutrophils % 89 % (35-66) 


 


Lymphocytes % 7 % (24-48) 


 


Monocytes % 4 % (0-10) 


 


Platelet Estimate


 Decreased


(ADEQUATE)


 


Sodium Level


 140 mmol/L


(136-145)


 


Potassium Level


 4.1 mmol/L


(3.5-5.1)


 


Chloride Level


 107 mmol/L


()


 


Carbon Dioxide Level


 26 mmol/L


(21-32)


 


Anion Gap 7 (6-14) 


 


Blood Urea Nitrogen


 32 mg/dL


(8-26)


 


Creatinine


 1.5 mg/dL


(0.7-1.3)


 


Estimated GFR


(Cockcroft-Gault) 57.4 





 


BUN/Creatinine Ratio 21 (6-20) 


 


Glucose Level


 157 mg/dL


(70-99)


 


Calcium Level


 9.2 mg/dL


(8.5-10.1)


 


Total Bilirubin


 0.4 mg/dL


(0.2-1.0)


 


Aspartate Amino Transf


(AST/SGOT) 9 U/L (15-37) 





 


Alanine Aminotransferase


(ALT/SGPT) 10 U/L (16-63) 





 


Alkaline Phosphatase


 71 U/L


()


 


Total Protein


 6.6 g/dL


(6.4-8.2)


 


Albumin


 2.8 g/dL


(3.4-5.0)


 


Albumin/Globulin Ratio 0.7 (1.0-1.7) 








Medications





Active Scripts








 Medications  Dose


 Route/Sig


 Max Daily Dose Days Date Category Dose


Instructions


 


 Azithromycin


 Tablet


  (Azithromycin)


 250 Mg Tablet  1 Pkg


 PO UD


   6/27/17 Rx 


 


 Advair 250-50


 Diskus


  (Fluticasone/Salmeterol)


 1 Each Disk.w.dev  1 Puff


 IH BID


   6/26/17 Rx 


 


 Ipratropium


 Bromide 0.2 Mg/1


 Ml Solution  1 Vial


 NEB QID PRN


   6/26/17 Rx 


 


 Doxycycline


 Hyclate 100 Mg


 Tablet  1 Tab


 PO BID


   6/26/17 Rx  NEXT DOSE DUE AT BEDTIME TONIGHT


 


 Medrol


  (Methylprednisolone)


 4 Mg Tab.ds.pk  1 Pkg


 PO UD


   6/26/17 Rx 


 


 Albuterol Sulfate


 Neb Soln


  (Albuterol


 Sulfate) 0.63


 Mg/3 Ml Vial.neb  0.63 Mg


 NEB PRN Q4HRS PRN


  30 6/26/17 Rx  AS NEEDED


 


 Zocor


  (Simvastatin) 20


 Mg Tablet  20 Mg


 PO HS


   7/10/15 Reported  NEXT DOSE DUE TONIGHT AT BEDTIME


 


 Isosorbide


 Mononitrate Er


  (Isosorbide


 Mononitrate) 60


 Mg Tab.er.24h  60 Mg


 PO DAILY


   7/10/15 Reported  NEXT DOSE DUE IN AM


 


 Lisinopril 40 Mg


 Tablet  40 Mg


 PO DAILY


   12/15/14 Reported  NEXT DOSE DUE IN AM


 


 Carvedilol 25 Mg


 Tablet  25 Mg


 PO BIDWMEALS


   12/15/14 Reported  NEXT DOSE DUE AT DINNER THIS EVENING


 


 Clonidine Hcl 0.1


 Mg Tablet  0.1 Mg


 PO BID


   12/15/14 Reported  NEXT DOSE DUE AT BEDTIME TONIGHT


 


 Hydralazine Hcl


 50 Mg Tablet  50 Mg


 PO TID


   11/8/14 Reported  NEXT DOSE DUE AT BEDTIME TONIGHT


 


 Amlodipine


 Besylate 10 Mg


 Tablet  10 Mg


 PO DAILY


   2/24/14 Reported  NEXT DOSE DUE IN AM


 


 Furosemide 40 Mg


 Tablet  40 Mg


 PO DAILY


   2/24/14 Reported  NEXT DOSE DUE IN AM


 


 Potassium


 Chloride 10 Meq


 Tab.er.prt  20 Meq


 PO DAILY


   2/24/14 Reported  NEXT DOSE DUE IN AM








Comments


CXR 9/27


MILD CHF improved. / RUL ATELECTASIS





Impression


.


1.  Acute hypoxic respiratory failure secondary to multifactorial etiologies


including combination of underlying chronic obstructive pulmonary disease, right


upper lobe collapse. No evidence of thromboembolic disease. Possible CHF 9/26


2.  Long history of tobacco use, suspect underlying chronic obstructive


pulmonary disease.  Along with cocaine, marijuana and alcohol abuse.


3.  Abnormal CT chest with right upper lobe collapse.  Likely postobstructive. 


Possibility of endobronchial lesion cannot be ruled out.  Mucous plug would be


another consideration.


4.  Cardiomyopathy with an ejection fraction of 35% based on previous


echocardiogram, repeat echocardiogram has been ordered.  Likely related to


cocaine abuse.





Plan


.





1.  Continue with present current oxygen.


2.  VQ scan neg for PE


3.  Currently requiring  oxygen 


4.  Follow Cardiology recommendation reg low EF


5.  Monitor blood pressure closely.


6.   bronchodilators.


7.  Smoking cessation counseling provided.


8.  Discussed with RN. 


9.  will need Bronch for persistent RUL atelectasis . Bronch hoang not available 

till tuesday. will schedule tuesday











JESSE DOVE MD                 Sep 27, 2019 13:07

## 2019-09-28 VITALS — DIASTOLIC BLOOD PRESSURE: 108 MMHG | SYSTOLIC BLOOD PRESSURE: 176 MMHG

## 2019-09-28 VITALS — DIASTOLIC BLOOD PRESSURE: 71 MMHG | SYSTOLIC BLOOD PRESSURE: 143 MMHG

## 2019-09-28 VITALS — DIASTOLIC BLOOD PRESSURE: 83 MMHG | SYSTOLIC BLOOD PRESSURE: 156 MMHG

## 2019-09-28 VITALS — DIASTOLIC BLOOD PRESSURE: 92 MMHG | SYSTOLIC BLOOD PRESSURE: 156 MMHG

## 2019-09-28 VITALS — SYSTOLIC BLOOD PRESSURE: 133 MMHG | DIASTOLIC BLOOD PRESSURE: 74 MMHG

## 2019-09-28 VITALS — SYSTOLIC BLOOD PRESSURE: 150 MMHG | DIASTOLIC BLOOD PRESSURE: 91 MMHG

## 2019-09-28 LAB
ALBUMIN SERPL-MCNC: 2.9 G/DL (ref 3.4–5)
ALBUMIN/GLOB SERPL: 0.8 {RATIO} (ref 1–1.7)
ALP SERPL-CCNC: 69 U/L (ref 46–116)
ALT SERPL-CCNC: 13 U/L (ref 16–63)
ANION GAP SERPL CALC-SCNC: 7 MMOL/L (ref 6–14)
AST SERPL-CCNC: 14 U/L (ref 15–37)
BASOPHILS # BLD AUTO: 0 X10^3/UL (ref 0–0.2)
BASOPHILS NFR BLD: 0 % (ref 0–3)
BILIRUB SERPL-MCNC: 0.3 MG/DL (ref 0.2–1)
BUN SERPL-MCNC: 28 MG/DL (ref 8–26)
BUN/CREAT SERPL: 20 (ref 6–20)
CALCIUM SERPL-MCNC: 9.4 MG/DL (ref 8.5–10.1)
CHLORIDE SERPL-SCNC: 105 MMOL/L (ref 98–107)
CO2 SERPL-SCNC: 30 MMOL/L (ref 21–32)
CREAT SERPL-MCNC: 1.4 MG/DL (ref 0.7–1.3)
EOSINOPHIL NFR BLD: 0 % (ref 0–3)
EOSINOPHIL NFR BLD: 0 X10^3/UL (ref 0–0.7)
ERYTHROCYTE [DISTWIDTH] IN BLOOD BY AUTOMATED COUNT: 15.3 % (ref 11.5–14.5)
GFR SERPLBLD BASED ON 1.73 SQ M-ARVRAT: 62.1 ML/MIN
GLOBULIN SER-MCNC: 3.8 G/DL (ref 2.2–3.8)
GLUCOSE SERPL-MCNC: 130 MG/DL (ref 70–99)
HCT VFR BLD CALC: 43.7 % (ref 39–53)
HGB BLD-MCNC: 14.8 G/DL (ref 13–17.5)
LYMPHOCYTES # BLD: 0.3 X10^3/UL (ref 1–4.8)
LYMPHOCYTES NFR BLD AUTO: 3 % (ref 24–48)
MCH RBC QN AUTO: 33 PG (ref 25–35)
MCHC RBC AUTO-ENTMCNC: 34 G/DL (ref 31–37)
MCV RBC AUTO: 97 FL (ref 79–100)
MONO #: 0.4 X10^3/UL (ref 0–1.1)
MONOCYTES NFR BLD: 4 % (ref 0–9)
NEUT #: 9.6 X10^3/UL (ref 1.8–7.7)
NEUTROPHILS NFR BLD AUTO: 93 % (ref 31–73)
PLATELET # BLD AUTO: 125 X10^3/UL (ref 140–400)
POTASSIUM SERPL-SCNC: 3.5 MMOL/L (ref 3.5–5.1)
PROT SERPL-MCNC: 6.7 G/DL (ref 6.4–8.2)
RBC # BLD AUTO: 4.49 X10^6/UL (ref 4.3–5.7)
SODIUM SERPL-SCNC: 142 MMOL/L (ref 136–145)
WBC # BLD AUTO: 10.3 X10^3/UL (ref 4–11)

## 2019-09-28 RX ADMIN — METHYLPREDNISOLONE SODIUM SUCCINATE SCH MG: 125 INJECTION, POWDER, FOR SOLUTION INTRAMUSCULAR; INTRAVENOUS at 17:38

## 2019-09-28 RX ADMIN — PIPERACILLIN SODIUM AND TAZOBACTAM SODIUM SCH MLS/HR: 3; .375 INJECTION, POWDER, LYOPHILIZED, FOR SOLUTION INTRAVENOUS at 06:27

## 2019-09-28 RX ADMIN — PIPERACILLIN SODIUM AND TAZOBACTAM SODIUM SCH MLS/HR: 3; .375 INJECTION, POWDER, LYOPHILIZED, FOR SOLUTION INTRAVENOUS at 12:25

## 2019-09-28 RX ADMIN — METHYLPREDNISOLONE SODIUM SUCCINATE SCH MG: 125 INJECTION, POWDER, FOR SOLUTION INTRAMUSCULAR; INTRAVENOUS at 00:00

## 2019-09-28 RX ADMIN — IPRATROPIUM BROMIDE AND ALBUTEROL SULFATE SCH ML: .5; 3 SOLUTION RESPIRATORY (INHALATION) at 12:28

## 2019-09-28 RX ADMIN — IPRATROPIUM BROMIDE AND ALBUTEROL SULFATE SCH ML: .5; 3 SOLUTION RESPIRATORY (INHALATION) at 20:07

## 2019-09-28 RX ADMIN — PIPERACILLIN SODIUM AND TAZOBACTAM SODIUM SCH MLS/HR: 3; .375 INJECTION, POWDER, LYOPHILIZED, FOR SOLUTION INTRAVENOUS at 23:27

## 2019-09-28 RX ADMIN — LISINOPRIL SCH MG: 20 TABLET ORAL at 08:34

## 2019-09-28 RX ADMIN — ENOXAPARIN SODIUM SCH MG: 40 INJECTION SUBCUTANEOUS at 08:32

## 2019-09-28 RX ADMIN — Medication SCH CAP: at 20:51

## 2019-09-28 RX ADMIN — PIPERACILLIN SODIUM AND TAZOBACTAM SODIUM SCH MLS/HR: 3; .375 INJECTION, POWDER, LYOPHILIZED, FOR SOLUTION INTRAVENOUS at 00:00

## 2019-09-28 RX ADMIN — Medication SCH CAP: at 08:34

## 2019-09-28 RX ADMIN — PIPERACILLIN SODIUM AND TAZOBACTAM SODIUM SCH MLS/HR: 3; .375 INJECTION, POWDER, LYOPHILIZED, FOR SOLUTION INTRAVENOUS at 17:38

## 2019-09-28 RX ADMIN — METHYLPREDNISOLONE SODIUM SUCCINATE SCH MG: 125 INJECTION, POWDER, FOR SOLUTION INTRAMUSCULAR; INTRAVENOUS at 23:27

## 2019-09-28 RX ADMIN — METOPROLOL SUCCINATE SCH MG: 50 TABLET, EXTENDED RELEASE ORAL at 08:33

## 2019-09-28 RX ADMIN — ISOSORBIDE MONONITRATE SCH MG: 30 TABLET, EXTENDED RELEASE ORAL at 08:33

## 2019-09-28 RX ADMIN — IPRATROPIUM BROMIDE AND ALBUTEROL SULFATE SCH ML: .5; 3 SOLUTION RESPIRATORY (INHALATION) at 16:07

## 2019-09-28 RX ADMIN — FUROSEMIDE SCH MG: 40 TABLET ORAL at 08:33

## 2019-09-28 RX ADMIN — VANCOMYCIN HYDROCHLORIDE PRN EACH: 1 INJECTION, POWDER, LYOPHILIZED, FOR SOLUTION INTRAVENOUS at 13:09

## 2019-09-28 RX ADMIN — IPRATROPIUM BROMIDE AND ALBUTEROL SULFATE SCH ML: .5; 3 SOLUTION RESPIRATORY (INHALATION) at 08:00

## 2019-09-28 RX ADMIN — BUDESONIDE SCH MG: 0.5 INHALANT RESPIRATORY (INHALATION) at 08:00

## 2019-09-28 RX ADMIN — POTASSIUM CHLORIDE SCH MEQ: 750 TABLET, FILM COATED, EXTENDED RELEASE ORAL at 08:32

## 2019-09-28 RX ADMIN — BUDESONIDE SCH MG: 0.5 INHALANT RESPIRATORY (INHALATION) at 20:07

## 2019-09-28 RX ADMIN — METHYLPREDNISOLONE SODIUM SUCCINATE SCH MG: 125 INJECTION, POWDER, FOR SOLUTION INTRAMUSCULAR; INTRAVENOUS at 06:27

## 2019-09-28 RX ADMIN — VANCOMYCIN HYDROCHLORIDE SCH MLS/HR: 1 INJECTION, POWDER, FOR SOLUTION INTRAVENOUS at 08:32

## 2019-09-28 RX ADMIN — SIMVASTATIN SCH MG: 20 TABLET, FILM COATED ORAL at 20:51

## 2019-09-28 RX ADMIN — METHYLPREDNISOLONE SODIUM SUCCINATE SCH MG: 125 INJECTION, POWDER, FOR SOLUTION INTRAMUSCULAR; INTRAVENOUS at 12:26

## 2019-09-28 NOTE — PDOC
PULMONARY PROGRESS NOTES


Subjective


sob better, has occ cough, no pain


Vitals





Vital Signs








  Date Time  Temp Pulse Resp B/P (MAP) Pulse Ox O2 Delivery O2 Flow Rate FiO2


 


9/28/19 03:09 98.3 62 18 156/92 (113) 93 Nasal Cannula 5.0 





 98.3       








General:  Alert, No acute distress


Lungs:  Crackles (bases)


Cardiovascular:  S1, S2


Abdomen:  Soft, Non-tender


Neuro Exam:  Alert


Extremities:  No Edema


Labs





Laboratory Tests








Test


 9/27/19


13:25 9/28/19


04:00


 


Vancomycin Level Trough


 11.2 mcg/mL


(10.0-20.0) 





 


Vancomycin Last Dose Date 9/26/19  


 


Vancomycin Last Dose Time 2000  


 


White Blood Count


 


 10.3 x10^3/uL


(4.0-11.0)


 


Red Blood Count


 


 4.49 x10^6/uL


(4.30-5.70)


 


Hemoglobin


 


 14.8 g/dL


(13.0-17.5)


 


Hematocrit


 


 43.7 %


(39.0-53.0)


 


Mean Corpuscular Volume  97 fL () 


 


Mean Corpuscular Hemoglobin  33 pg (25-35) 


 


Mean Corpuscular Hemoglobin


Concent 


 34 g/dL


(31-37)


 


Red Cell Distribution Width


 


 15.3 %


(11.5-14.5)


 


Platelet Count


 


 125 x10^3/uL


(140-400)


 


Neutrophils (%) (Auto)  93 % (31-73) 


 


Lymphocytes (%) (Auto)  3 % (24-48) 


 


Monocytes (%) (Auto)  4 % (0-9) 


 


Eosinophils (%) (Auto)  0 % (0-3) 


 


Basophils (%) (Auto)  0 % (0-3) 


 


Neutrophils # (Auto)


 


 9.6 x10^3/uL


(1.8-7.7)


 


Lymphocytes # (Auto)


 


 0.3 x10^3/uL


(1.0-4.8)


 


Monocytes # (Auto)


 


 0.4 x10^3/uL


(0.0-1.1)


 


Eosinophils # (Auto)


 


 0.0 x10^3/uL


(0.0-0.7)


 


Basophils # (Auto)


 


 0.0 x10^3/uL


(0.0-0.2)


 


Sodium Level


 


 142 mmol/L


(136-145)


 


Potassium Level


 


 3.5 mmol/L


(3.5-5.1)


 


Chloride Level


 


 105 mmol/L


()


 


Carbon Dioxide Level


 


 30 mmol/L


(21-32)


 


Anion Gap  7 (6-14) 


 


Blood Urea Nitrogen


 


 28 mg/dL


(8-26)


 


Creatinine


 


 1.4 mg/dL


(0.7-1.3)


 


Estimated GFR


(Cockcroft-Gault) 


 62.1 





 


BUN/Creatinine Ratio  20 (6-20) 


 


Glucose Level


 


 130 mg/dL


(70-99)


 


Calcium Level


 


 9.4 mg/dL


(8.5-10.1)


 


Total Bilirubin


 


 0.3 mg/dL


(0.2-1.0)


 


Aspartate Amino Transf


(AST/SGOT) 


 14 U/L (15-37) 





 


Alanine Aminotransferase


(ALT/SGPT) 


 13 U/L (16-63) 





 


Alkaline Phosphatase


 


 69 U/L


()


 


Total Protein


 


 6.7 g/dL


(6.4-8.2)


 


Albumin


 


 2.9 g/dL


(3.4-5.0)


 


Albumin/Globulin Ratio  0.8 (1.0-1.7) 








Laboratory Tests








Test


 9/27/19


13:25 9/28/19


04:00


 


Vancomycin Level Trough


 11.2 mcg/mL


(10.0-20.0) 





 


Vancomycin Last Dose Date 9/26/19  


 


Vancomycin Last Dose Time 2000  


 


White Blood Count


 


 10.3 x10^3/uL


(4.0-11.0)


 


Red Blood Count


 


 4.49 x10^6/uL


(4.30-5.70)


 


Hemoglobin


 


 14.8 g/dL


(13.0-17.5)


 


Hematocrit


 


 43.7 %


(39.0-53.0)


 


Mean Corpuscular Volume  97 fL () 


 


Mean Corpuscular Hemoglobin  33 pg (25-35) 


 


Mean Corpuscular Hemoglobin


Concent 


 34 g/dL


(31-37)


 


Red Cell Distribution Width


 


 15.3 %


(11.5-14.5)


 


Platelet Count


 


 125 x10^3/uL


(140-400)


 


Neutrophils (%) (Auto)  93 % (31-73) 


 


Lymphocytes (%) (Auto)  3 % (24-48) 


 


Monocytes (%) (Auto)  4 % (0-9) 


 


Eosinophils (%) (Auto)  0 % (0-3) 


 


Basophils (%) (Auto)  0 % (0-3) 


 


Neutrophils # (Auto)


 


 9.6 x10^3/uL


(1.8-7.7)


 


Lymphocytes # (Auto)


 


 0.3 x10^3/uL


(1.0-4.8)


 


Monocytes # (Auto)


 


 0.4 x10^3/uL


(0.0-1.1)


 


Eosinophils # (Auto)


 


 0.0 x10^3/uL


(0.0-0.7)


 


Basophils # (Auto)


 


 0.0 x10^3/uL


(0.0-0.2)


 


Sodium Level


 


 142 mmol/L


(136-145)


 


Potassium Level


 


 3.5 mmol/L


(3.5-5.1)


 


Chloride Level


 


 105 mmol/L


()


 


Carbon Dioxide Level


 


 30 mmol/L


(21-32)


 


Anion Gap  7 (6-14) 


 


Blood Urea Nitrogen


 


 28 mg/dL


(8-26)


 


Creatinine


 


 1.4 mg/dL


(0.7-1.3)


 


Estimated GFR


(Cockcroft-Gault) 


 62.1 





 


BUN/Creatinine Ratio  20 (6-20) 


 


Glucose Level


 


 130 mg/dL


(70-99)


 


Calcium Level


 


 9.4 mg/dL


(8.5-10.1)


 


Total Bilirubin


 


 0.3 mg/dL


(0.2-1.0)


 


Aspartate Amino Transf


(AST/SGOT) 


 14 U/L (15-37) 





 


Alanine Aminotransferase


(ALT/SGPT) 


 13 U/L (16-63) 





 


Alkaline Phosphatase


 


 69 U/L


()


 


Total Protein


 


 6.7 g/dL


(6.4-8.2)


 


Albumin


 


 2.9 g/dL


(3.4-5.0)


 


Albumin/Globulin Ratio  0.8 (1.0-1.7) 








Medications





Active Scripts








 Medications  Dose


 Route/Sig


 Max Daily Dose Days Date Category Dose


Instructions


 


 Azithromycin


 Tablet


  (Azithromycin)


 250 Mg Tablet  1 Pkg


 PO UD


   6/27/17 Rx 


 


 Advair 250-50


 Diskus


  (Fluticasone/Salmeterol)


 1 Each Disk.w.dev  1 Puff


 IH BID


   6/26/17 Rx 


 


 Ipratropium


 Bromide 0.2 Mg/1


 Ml Solution  1 Vial


 NEB QID PRN


   6/26/17 Rx 


 


 Doxycycline


 Hyclate 100 Mg


 Tablet  1 Tab


 PO BID


   6/26/17 Rx  NEXT DOSE DUE AT BEDTIME TONIGHT


 


 Medrol


  (Methylprednisolone)


 4 Mg Tab.ds.pk  1 Pkg


 PO UD


   6/26/17 Rx 


 


 Albuterol Sulfate


 Neb Soln


  (Albuterol


 Sulfate) 0.63


 Mg/3 Ml Vial.neb  0.63 Mg


 NEB PRN Q4HRS PRN


  30 6/26/17 Rx  AS NEEDED


 


 Zocor


  (Simvastatin) 20


 Mg Tablet  20 Mg


 PO HS


   7/10/15 Reported  NEXT DOSE DUE TONIGHT AT BEDTIME


 


 Isosorbide


 Mononitrate Er


  (Isosorbide


 Mononitrate) 60


 Mg Tab.er.24h  60 Mg


 PO DAILY


   7/10/15 Reported  NEXT DOSE DUE IN AM


 


 Lisinopril 40 Mg


 Tablet  40 Mg


 PO DAILY


   12/15/14 Reported  NEXT DOSE DUE IN AM


 


 Carvedilol 25 Mg


 Tablet  25 Mg


 PO BIDWMEALS


   12/15/14 Reported  NEXT DOSE DUE AT DINNER THIS EVENING


 


 Clonidine Hcl 0.1


 Mg Tablet  0.1 Mg


 PO BID


   12/15/14 Reported  NEXT DOSE DUE AT BEDTIME TONIGHT


 


 Hydralazine Hcl


 50 Mg Tablet  50 Mg


 PO TID


   11/8/14 Reported  NEXT DOSE DUE AT BEDTIME TONIGHT


 


 Amlodipine


 Besylate 10 Mg


 Tablet  10 Mg


 PO DAILY


   2/24/14 Reported  NEXT DOSE DUE IN AM


 


 Furosemide 40 Mg


 Tablet  40 Mg


 PO DAILY


   2/24/14 Reported  NEXT DOSE DUE IN AM


 


 Potassium


 Chloride 10 Meq


 Tab.er.prt  20 Meq


 PO DAILY


   2/24/14 Reported  NEXT DOSE DUE IN AM








Comments


CXR 9/27


MILD CHF improved. / RUL ATELECTASIS





echo





The systolic function is mildly impaired. EF 40-45%


There is global hypokinesis of the left ventricle. The septal motion is 

suggestive of conduction defect.


Doppler and Color Flow revealed moderate aortic regurgitation.





Impression


.


1.  Acute hypoxic respiratory failure secondary to multifactorial etiologies


including combination of underlying chronic obstructive pulmonary disease, right


upper lobe atelectasis. No evidence of thromboembolic disease. Possible CHF 9/26


2.  Long history of tobacco use, suspect underlying chronic obstructive


pulmonary disease.  Along with cocaine, marijuana and alcohol abuse.


3.  Abnormal CT chest with right upper lobe collapse.  Likely postobstructive. 


Possibility of endobronchial lesion cannot be ruled out.  Mucous plug would be


another consideration.


4.  Cardiomyopathy, mod AI





Plan


.





1.  02 titration 


2.  VQ scan neg for PE


3.  Currently requiring  oxygen 


4.  Follow Cardiology recommendation reg low EF and mod AI


5.  Monitor blood pressure closely.


6.   bronchodilators.


7.  Smoking cessation counseling provided.


8.  Discussed with RN. 


9.  will need Bronch for persistent RUL atelectasis . Bronch hoang not available 

till tuesday. will schedule tuesday











AUTUMN WILLIAM MD               Sep 28, 2019 06:54

## 2019-09-28 NOTE — PDOC
[facility] PROGRESS NOTE


Chief Complaint


Chief Complaint


COPD exacerbation


Possible lung mass


Hypoxia


Substance abuse





History of Present Illness


History of Present Illness


9/28/19


Pt seen and examined


Pt has been feeling better and getting more sleep on the floor. He is glad to be

out of the ICU and is breathing better. 


Pt recieving O2 via nasal cannula 


He still has some wheezing and a cough


Ejection fraction 35%


Appetite has improved


Scheduled bronch for Tuesday 


DW RN 





9/27/19


Pt was seen and examined in the ICU


Pt was on the commode


Receiving O2 via Ventimask


Charts and labs reviewed


EF = 35% with cardiomegaly


Continues to be hypoxia


Crackles heard on lung exam


ABG pH is 7.41


D/w RN





9/26/19


Pt was seen and examined in the ICU


Receiving O2 via Ventimask


EF = 35% with cardiomegaly


Continues to be hypoxia


Crackles heard on lung exam


Cr was 1.5 so CT with contrast could not be performed


V/Q scan for possible PE





Vitals/I&O


Vitals/I&O:





                                   Vital Signs








  Date Time  Temp Pulse Resp B/P (MAP) Pulse Ox O2 Delivery O2 Flow Rate FiO2


 


9/28/19 10:49 98.2 64 18 133/74 (93) 94 Nasal Cannula 5.0 





 98.2       














                                    I & O   


 


 9/27/19 9/27/19 9/28/19





 15:00 23:00 07:00


 


Intake Total 600 ml 50 ml 


 


Output Total  300 ml 


 


Balance 600 ml -250 ml 











Physical Exam


Physical Exam:


General:  Alert, No acute distress


Lungs:  Crackles (bases)


Cardiovascular:  S1, S2


Abdomen:  Soft, Non-tender


Neuro Exam:  Alert


Extremities:  No Edema


General:  Alert, No acute distress


Heart:  Regular rate (SR/SB), Other (2/6 systolic murmur to LLS border)


Lungs:  Crackles (bases)


Abdomen:  Soft, No tenderness


Extremities:  Other (1+ bilateral LE pitting edema)





Labs


Labs:





Laboratory Tests








Test


 9/27/19


13:25 9/28/19


04:00


 


Vancomycin Level Trough


 11.2 mcg/mL


(10.0-20.0) 





 


Vancomycin Last Dose Date 9/26/19  


 


Vancomycin Last Dose Time 2000  


 


White Blood Count


 


 10.3 x10^3/uL


(4.0-11.0)


 


Red Blood Count


 


 4.49 x10^6/uL


(4.30-5.70)


 


Hemoglobin


 


 14.8 g/dL


(13.0-17.5)


 


Hematocrit


 


 43.7 %


(39.0-53.0)


 


Mean Corpuscular Volume  97 fL () 


 


Mean Corpuscular Hemoglobin  33 pg (25-35) 


 


Mean Corpuscular Hemoglobin


Concent 


 34 g/dL


(31-37)


 


Red Cell Distribution Width


 


 15.3 %


(11.5-14.5)


 


Platelet Count


 


 125 x10^3/uL


(140-400)


 


Neutrophils (%) (Auto)  93 % (31-73) 


 


Lymphocytes (%) (Auto)  3 % (24-48) 


 


Monocytes (%) (Auto)  4 % (0-9) 


 


Eosinophils (%) (Auto)  0 % (0-3) 


 


Basophils (%) (Auto)  0 % (0-3) 


 


Neutrophils # (Auto)


 


 9.6 x10^3/uL


(1.8-7.7)


 


Lymphocytes # (Auto)


 


 0.3 x10^3/uL


(1.0-4.8)


 


Monocytes # (Auto)


 


 0.4 x10^3/uL


(0.0-1.1)


 


Eosinophils # (Auto)


 


 0.0 x10^3/uL


(0.0-0.7)


 


Basophils # (Auto)


 


 0.0 x10^3/uL


(0.0-0.2)


 


Sodium Level


 


 142 mmol/L


(136-145)


 


Potassium Level


 


 3.5 mmol/L


(3.5-5.1)


 


Chloride Level


 


 105 mmol/L


()


 


Carbon Dioxide Level


 


 30 mmol/L


(21-32)


 


Anion Gap  7 (6-14) 


 


Blood Urea Nitrogen


 


 28 mg/dL


(8-26)


 


Creatinine


 


 1.4 mg/dL


(0.7-1.3)


 


Estimated GFR


(Cockcroft-Gault) 


 62.1 





 


BUN/Creatinine Ratio  20 (6-20) 


 


Glucose Level


 


 130 mg/dL


(70-99)


 


Calcium Level


 


 9.4 mg/dL


(8.5-10.1)


 


Total Bilirubin


 


 0.3 mg/dL


(0.2-1.0)


 


Aspartate Amino Transf


(AST/SGOT) 


 14 U/L (15-37) 





 


Alanine Aminotransferase


(ALT/SGPT) 


 13 U/L (16-63) 





 


Alkaline Phosphatase


 


 69 U/L


()


 


Total Protein


 


 6.7 g/dL


(6.4-8.2)


 


Albumin


 


 2.9 g/dL


(3.4-5.0)


 


Albumin/Globulin Ratio  0.8 (1.0-1.7) 











Review of Systems


Review of Systems:


Denies pain


Denies n/v/d





Assessment and Plan


Assessmemt and Plan


Problems


Medical Problems:


(1) Acute on chronic combined systolic and diastolic heart failure


Status: Acute  





(2) Acute respiratory failure with hypoxia


Status: Acute  





(3) Bradycardia


Status: Acute  





(4) CKD (chronic kidney disease), stage III


Status: Chronic  





(5) COPD exacerbation


Status: Acute  





(6) Dyspnea


Status: Acute  





(7) HTN (hypertension)


Status: Chronic  





(8) LBBB (left bundle branch block)


Status: Chronic  





(9) Left flank pain


Status: Acute  





(10) Lung mass


Status: Acute  





(11) NICM (nonischemic cardiomyopathy)


Status: Chronic  





(12) Pneumonia


Status: Acute  





(13) Substance abuse


Status: Acute  





Assessment


COPD exacerbation


Lung mass


Hypoxia


Substance abuse





Plan


Home meds


Continue breathing treatments


Continue to consult with pulmonology


Bronchoscopy scheduled for Tuesday


DVT prophylaxis


Substance use cessation


O2 via nasal cannula


Full code


PT/OT





Comment


Review of Relevant


I have reviewed the following items brittany (where applicable) has been applied.


Medications:





Current Medications








 Medications


  (Trade)  Dose


 Ordered  Sig/Irvin


 Route


 PRN Reason  Start Time


 Stop Time Status Last Admin


Dose Admin


 


 Vancomycin HCl


  (Vancomycin


 Trough Level)  1 each  1X  ONCE


 MC


   9/27/19 13:30


 9/27/19 13:31 DC 9/27/19 13:30





 


 Metoprolol


 Succinate


  (Toprol Xl)  50 mg  DAILY


 PO


   9/27/19 12:00


    9/28/19 08:33





 


 Vancomycin HCl


 1.25 gm/Sodium


 Chloride  250 ml @ 


 167 mls/hr  Q18H


 IV


   9/27/19 15:00


    9/28/19 08:32




















ANTONIA LUX K III DO           Sep 28, 2019 11:19

## 2019-09-28 NOTE — PDOC
PROGRESS NOTES


Subjective


Subjective


Patient seen and examined





Objective


Objective





Vital Signs








  Date Time  Temp Pulse Resp B/P (MAP) Pulse Ox O2 Delivery O2 Flow Rate FiO2


 


9/28/19 12:29     96 Nasal Cannula 5.0 


 


9/28/19 12:26  61  136/80    


 


9/28/19 10:49 98.2  18     





 98.2       














Intake and Output 


 


 9/28/19





 07:00


 


Intake Total 650 ml


 


Output Total 300 ml


 


Balance 350 ml


 


 


 


Intake Oral 650 ml


 


Output Urine Total 300 ml


 


# Voids 6


 


# Bowel Movements 2











Physical Exam


Abdomen:  Normal bowel sounds


Heart:  Regular rate


General:  mild distress


Lungs:  Other (decreased breath sounds)





Assessment


Assessment


Problems


Medical Problems:


(1) Acute on chronic combined systolic and diastolic heart failure


Status: Acute  





(2) Acute respiratory failure with hypoxia


Status: Acute  





(3) Bradycardia


Status: Acute  





(4) CKD (chronic kidney disease), stage III


Status: Chronic  





(5) COPD exacerbation


Status: Acute  





(6) Dyspnea


Status: Acute  





(7) HTN (hypertension)


Status: Chronic  





(8) LBBB (left bundle branch block)


Status: Chronic  





(9) Left flank pain


Status: Acute  





(10) Lung mass


Status: Acute  





(11) NICM (nonischemic cardiomyopathy)


Status: Chronic  





(12) Pneumonia


Status: Acute  





(13) Substance abuse


Status: Acute  





Bradycardia. Coreg held. Rhythm improved.


Dyspnea. Right upper lobe collapse with possible mass. As per the pulmonary 

service.


Substance abuse. Possible cocaine on UDS.


Nonischemic cardiomyopathy. Has improved from 35% to 40-45%. Continuing on 

medical treatment as above.


Improved hypertension.


Chronic kidney disease. Monitoring lab.


History of noncompliance.





Comment


Review of Relevant


I have reviewed the following items brittany (where applicable) has been applied.


Labs





Laboratory Tests








Test


 9/27/19


13:25 9/28/19


04:00


 


Vancomycin Level Trough


 11.2 mcg/mL


(10.0-20.0) 





 


Vancomycin Last Dose Date 9/26/19  


 


Vancomycin Last Dose Time 2000  


 


White Blood Count


 


 10.3 x10^3/uL


(4.0-11.0)


 


Red Blood Count


 


 4.49 x10^6/uL


(4.30-5.70)


 


Hemoglobin


 


 14.8 g/dL


(13.0-17.5)


 


Hematocrit


 


 43.7 %


(39.0-53.0)


 


Mean Corpuscular Volume  97 fL () 


 


Mean Corpuscular Hemoglobin  33 pg (25-35) 


 


Mean Corpuscular Hemoglobin


Concent 


 34 g/dL


(31-37)


 


Red Cell Distribution Width


 


 15.3 %


(11.5-14.5)


 


Platelet Count


 


 125 x10^3/uL


(140-400)


 


Neutrophils (%) (Auto)  93 % (31-73) 


 


Lymphocytes (%) (Auto)  3 % (24-48) 


 


Monocytes (%) (Auto)  4 % (0-9) 


 


Eosinophils (%) (Auto)  0 % (0-3) 


 


Basophils (%) (Auto)  0 % (0-3) 


 


Neutrophils # (Auto)


 


 9.6 x10^3/uL


(1.8-7.7)


 


Lymphocytes # (Auto)


 


 0.3 x10^3/uL


(1.0-4.8)


 


Monocytes # (Auto)


 


 0.4 x10^3/uL


(0.0-1.1)


 


Eosinophils # (Auto)


 


 0.0 x10^3/uL


(0.0-0.7)


 


Basophils # (Auto)


 


 0.0 x10^3/uL


(0.0-0.2)


 


Sodium Level


 


 142 mmol/L


(136-145)


 


Potassium Level


 


 3.5 mmol/L


(3.5-5.1)


 


Chloride Level


 


 105 mmol/L


()


 


Carbon Dioxide Level


 


 30 mmol/L


(21-32)


 


Anion Gap  7 (6-14) 


 


Blood Urea Nitrogen


 


 28 mg/dL


(8-26)


 


Creatinine


 


 1.4 mg/dL


(0.7-1.3)


 


Estimated GFR


(Cockcroft-Gault) 


 62.1 





 


BUN/Creatinine Ratio  20 (6-20) 


 


Glucose Level


 


 130 mg/dL


(70-99)


 


Calcium Level


 


 9.4 mg/dL


(8.5-10.1)


 


Total Bilirubin


 


 0.3 mg/dL


(0.2-1.0)


 


Aspartate Amino Transf


(AST/SGOT) 


 14 U/L (15-37) 





 


Alanine Aminotransferase


(ALT/SGPT) 


 13 U/L (16-63) 





 


Alkaline Phosphatase


 


 69 U/L


()


 


Total Protein


 


 6.7 g/dL


(6.4-8.2)


 


Albumin


 


 2.9 g/dL


(3.4-5.0)


 


Albumin/Globulin Ratio  0.8 (1.0-1.7) 








Laboratory Tests








Test


 9/27/19


13:25 9/28/19


04:00


 


Vancomycin Level Trough


 11.2 mcg/mL


(10.0-20.0) 





 


Vancomycin Last Dose Date 9/26/19  


 


Vancomycin Last Dose Time 2000  


 


White Blood Count


 


 10.3 x10^3/uL


(4.0-11.0)


 


Red Blood Count


 


 4.49 x10^6/uL


(4.30-5.70)


 


Hemoglobin


 


 14.8 g/dL


(13.0-17.5)


 


Hematocrit


 


 43.7 %


(39.0-53.0)


 


Mean Corpuscular Volume  97 fL () 


 


Mean Corpuscular Hemoglobin  33 pg (25-35) 


 


Mean Corpuscular Hemoglobin


Concent 


 34 g/dL


(31-37)


 


Red Cell Distribution Width


 


 15.3 %


(11.5-14.5)


 


Platelet Count


 


 125 x10^3/uL


(140-400)


 


Neutrophils (%) (Auto)  93 % (31-73) 


 


Lymphocytes (%) (Auto)  3 % (24-48) 


 


Monocytes (%) (Auto)  4 % (0-9) 


 


Eosinophils (%) (Auto)  0 % (0-3) 


 


Basophils (%) (Auto)  0 % (0-3) 


 


Neutrophils # (Auto)


 


 9.6 x10^3/uL


(1.8-7.7)


 


Lymphocytes # (Auto)


 


 0.3 x10^3/uL


(1.0-4.8)


 


Monocytes # (Auto)


 


 0.4 x10^3/uL


(0.0-1.1)


 


Eosinophils # (Auto)


 


 0.0 x10^3/uL


(0.0-0.7)


 


Basophils # (Auto)


 


 0.0 x10^3/uL


(0.0-0.2)


 


Sodium Level


 


 142 mmol/L


(136-145)


 


Potassium Level


 


 3.5 mmol/L


(3.5-5.1)


 


Chloride Level


 


 105 mmol/L


()


 


Carbon Dioxide Level


 


 30 mmol/L


(21-32)


 


Anion Gap  7 (6-14) 


 


Blood Urea Nitrogen


 


 28 mg/dL


(8-26)


 


Creatinine


 


 1.4 mg/dL


(0.7-1.3)


 


Estimated GFR


(Cockcroft-Gault) 


 62.1 





 


BUN/Creatinine Ratio  20 (6-20) 


 


Glucose Level


 


 130 mg/dL


(70-99)


 


Calcium Level


 


 9.4 mg/dL


(8.5-10.1)


 


Total Bilirubin


 


 0.3 mg/dL


(0.2-1.0)


 


Aspartate Amino Transf


(AST/SGOT) 


 14 U/L (15-37) 





 


Alanine Aminotransferase


(ALT/SGPT) 


 13 U/L (16-63) 





 


Alkaline Phosphatase


 


 69 U/L


()


 


Total Protein


 


 6.7 g/dL


(6.4-8.2)


 


Albumin


 


 2.9 g/dL


(3.4-5.0)


 


Albumin/Globulin Ratio  0.8 (1.0-1.7) 








Medications





Current Medications


Albuterol/ Ipratropium (Duoneb) 3 ml 1X  ONCE NEB  Last administered on 

9/25/19at 04:17;  Start 9/25/19 at 04:15;  Stop 9/25/19 at 04:16;  Status DC


Methylprednisolone Sodium Succinate (SOLU-Medrol 125MG VIAL) 125 mg 1X  ONCE IV 

Last administered on 9/25/19at 04:51;  Start 9/25/19 at 04:15;  Stop 9/25/19 at 

04:16;  Status DC


Iohexol (Omnipaque 300 Mg/ml) 75 ml 1X  ONCE IV ;  Start 9/25/19 at 05:15;  Stop

9/25/19 at 05:16;  Status DC


Info (CONTRAST GIVEN -- Rx MONITORING) 1 each PRN DAILY  PRN MC SEE COMMENTS;  

Start 9/25/19 at 05:15;  Stop 9/27/19 at 05:14;  Status DC


Piperacillin Sod/ Tazobactam Sod 4.5 gm/Sodium Chloride 100 ml @  200 mls/hr 1X 

ONCE IV  Last administered on 9/25/19at 06:04;  Start 9/25/19 at 06:00;  Stop 

9/25/19 at 06:29;  Status DC


Vancomycin HCl 1.75 gm/Sodium Chloride 500 ml @  250 mls/hr 1X  ONCE IV  Last 

administered on 9/25/19at 08:04;  Start 9/25/19 at 06:00;  Stop 9/25/19 at 

07:59;  Status DC


Ondansetron HCl (Zofran) 4 mg PRN Q8HRS  PRN IV NAUSEA/VOMITING;  Start 9/25/19 

at 05:45;  Stop 9/26/19 at 05:44;  Status DC


Albuterol/ Ipratropium (Duoneb) 3 ml RTQID NEB  Last administered on 9/25/19at 

20:08;  Start 9/25/19 at 08:00;  Stop 9/26/19 at 07:59;  Status DC


Influenza Virus Vaccine Quadrival (Afluria Quad 2019-20 (3yr Up) Syringe) 0.5 ml

ONCE ONCE VAX IM  Last administered on 9/25/19at 16:26;  Start 9/25/19 at 08:00;

 Stop 9/25/19 at 08:05;  Status DC


Sodium Chloride 1,000 ml @  60 mls/hr [id number] IV  Last administered on 9/26/19at

01:33;  Start 9/25/19 at 11:15;  Stop 9/26/19 at 17:31;  Status DC


Piperacillin Sod/ Tazobactam Sod (Zosyn Per Pharmacy) 1 each PRN DAILY  PRN MC 

SEE COMMENTS;  Start 9/25/19 at 12:30


Vancomycin HCl (Vanco Per Pharmacy) 1 each PRN DAILY  PRN MC SEE COMMENTS Last 

administered on 9/27/19at 14:12;  Start 9/25/19 at 12:30


Methylprednisolone Sodium Succinate (SOLU-Medrol 125MG VIAL) 62.5 mg Q6HRS IV  

Last administered on 9/28/19at 12:26;  Start 9/25/19 at 13:00


Hydralazine HCl (Apresoline) 25 mg TID PO  Last administered on 9/28/19at 12:26;

 Start 9/25/19 at 14:00


Isosorbide Mononitrate (Imdur) 30 mg DAILY PO  Last administered on 9/28/19at 

08:33;  Start 9/25/19 at 14:00


Hydralazine HCl (Apresoline Inj) 10 mg PRN Q4HRS  PRN IVP ELEVATED BP, SEE 

COMMENTS;  Start 9/25/19 at 12:45


Vancomycin HCl 1 gm/Sodium Chloride 250 ml @  250 mls/hr Q18H IV  Last 

administered on 9/26/19at 21:23;  Start 9/26/19 at 02:00;  Stop 9/27/19 at 

14:18;  Status DC


Vancomycin HCl (Vancomycin Trough Level) 1 each 1X  ONCE MC  Last administered 

on 9/27/19at 13:30;  Start 9/27/19 at 13:30;  Stop 9/27/19 at 13:31;  Status DC


Piperacillin Sod/ Tazobactam Sod 3.375 gm/Sodium Chloride 50 ml @  100 mls/hr 

Q6HRS IV  Last administered on 9/28/19at 12:25;  Start 9/25/19 at 14:00


Amlodipine Besylate (Norvasc) 10 mg DAILY PO  Last administered on 9/28/19at 

08:34;  Start 9/25/19 at 15:00


Clonidine HCl (Catapres) 0.1 mg BID PO  Last administered on 9/28/19at 08:35;  

Start 9/25/19 at 15:00


Doxycycline Hyclate (Vibra-Tab) 100 mg BID PO ;  Start 9/25/19 at 21:00;  Status

UNV


Furosemide (Lasix) 40 mg DAILY PO  Last administered on 9/28/19at 08:33;  Start 

9/25/19 at 15:00


Hydralazine HCl (Apresoline) 50 mg TID PO ;  Start 9/25/19 at 15:00;  Stop 

9/25/19 at 15:31;  Status DC


Lisinopril (Prinivil) 40 mg DAILY PO  Last administered on 9/28/19at 08:34;  

Start 9/25/19 at 15:00


Potassium Chloride (Klor-Con) 20 meq DAILY PO  Last administered on 9/28/19at 

08:32;  Start 9/25/19 at 15:00


Simvastatin (Zocor) 20 mg HS PO  Last administered on 9/27/19at 21:01;  Start 

9/25/19 at 21:00


Non-Formulary Medication (Albuterol Sulfate (Albuterol Sulfate Neb Soln)) 0.63 

mg PRN Q4HRS  PRN NEB SHORTNESS OF BREATH;  Start 9/25/19 at 13:45;  Status UNV


Carvedilol (Coreg) 25 mg BIDWMEALS PO ;  Start 9/25/19 at 17:00;  Stop 9/25/19 

at 15:19;  Status DC


Non-Formulary Medication (Fluticasone/ Salmeterol (Advair 250-50 Diskus)) 1 puff

BID IH ;  Start 9/25/19 at 21:00;  Status UNV


Isosorbide Mononitrate (Imdur) 60 mg DAILY PO ;  Start 9/25/19 at 15:00;  Stop 

9/25/19 at 15:31;  Status DC


Non-Formulary Medication (Methylprednisolone (Medrol)) 1 pkg UD PO ;  Start 

9/25/19 at 13:45;  Stop 9/25/19 at 13:41;  Status DC


Lactobacillus Rhamnosus (Culturelle) 1 cap BID PO  Last administered on 

9/28/19at 08:34;  Start 9/25/19 at 21:00


Albuterol Sulfate (Ventolin Neb Soln) 2.5 mg PRN Q4HRS  PRN NEB SHORTNESS OF 

BREATH;  Start 9/25/19 at 13:45


Budesonide (Pulmicort) 0.5 mg RTBID NEB  Last administered on 9/27/19at 19:35;  

Start 9/25/19 at 15:00


Albuterol/ Ipratropium (Duoneb) 3 ml RTQID NEB  Last administered on 9/28/19at 

12:28;  Start 9/25/19 at 16:00


Enoxaparin Sodium (Lovenox 40mg Syringe) 40 mg Q24H SQ  Last administered on 

9/28/19at 08:32;  Start 9/26/19 at 11:00


Furosemide (Lasix) 20 mg 1X  ONCE IVP  Last administered on 9/26/19at 11:42;  

Start 9/26/19 at 11:15;  Stop 9/26/19 at 11:16;  Status DC


Furosemide (Lasix) 20 mg 1X  ONCE IVP  Last administered on 9/26/19at 17:44;  

Start 9/26/19 at 17:30;  Stop 9/26/19 at 17:32;  Status DC


Metoprolol Succinate (Toprol Xl) 50 mg DAILY PO  Last administered on 9/28/19at 

08:33;  Start 9/27/19 at 12:00


Vancomycin HCl 1.25 gm/Sodium Chloride 250 ml @  167 mls/hr Q18H IV  Last 

administered on 9/28/19at 08:32;  Start 9/27/19 at 15:00


Temazepam (Restoril) 7.5 mg PRN QHS  PRN PO INSOMNIA;  Start 9/27/19 at 18:45


Guaifenesin (Robitussin Dm) 10 ml PRN Q6HRS  PRN PO COUGH;  Start 9/27/19 at 

18:45


Acetaminophen (Tylenol) 500 mg PRN Q6HRS  PRN PO MILD PAIN / TEMP;  Start 

9/27/19 at 18:45


Ondansetron HCl (Zofran) 4 mg PRN Q6HRS  PRN IVP NAUSEA/VOMITING;  Start 9/27/19

at 18:45





Active Scripts


Active


Azithromycin Tablet (Azithromycin) 250 Mg Tablet 1 Pkg PO UD


Advair 250-50 Diskus (Fluticasone/Salmeterol) 1 Each Disk.w.dev 1 Puff IH BID


Ipratropium Bromide 0.2 Mg/1 Ml Solution 1 Vial NEB QID PRN


Doxycycline Hyclate 100 Mg Tablet 1 Tab PO BID


     NEXT DOSE DUE AT BEDTIME TONIGHT


Medrol (Methylprednisolone) 4 Mg Tab.ds.pk 1 Pkg PO UD


Albuterol Sulfate Neb Soln (Albuterol Sulfate) 0.63 Mg/3 Ml Vial.neb 0.63 Mg NEB

PRN Q4HRS PRN 30 Days


     AS NEEDED


Reported


Zocor (Simvastatin) 20 Mg Tablet 20 Mg PO HS


     NEXT DOSE DUE TONIGHT AT BEDTIME


Isosorbide Mononitrate Er (Isosorbide Mononitrate) 60 Mg Tab.er.24h 60 Mg PO 

DAILY


     NEXT DOSE DUE IN AM


Lisinopril 40 Mg Tablet 40 Mg PO DAILY


     NEXT DOSE DUE IN AM


Carvedilol 25 Mg Tablet 25 Mg PO BIDWMEALS


     NEXT DOSE DUE AT DINNER THIS EVENING


Clonidine Hcl 0.1 Mg Tablet 0.1 Mg PO BID


     NEXT DOSE DUE AT BEDTIME TONIGHT


Hydralazine Hcl 50 Mg Tablet 50 Mg PO TID


     NEXT DOSE DUE AT BEDTIME TONIGHT


Amlodipine Besylate 10 Mg Tablet 10 Mg PO DAILY


     NEXT DOSE DUE IN AM


Furosemide 40 Mg Tablet 40 Mg PO DAILY


     NEXT DOSE DUE IN AM


Potassium Chloride 10 Meq Tab.er.prt 20 Meq PO DAILY


     NEXT DOSE DUE IN AM


Vitals/I & O





Vital Sign - Last 24 Hours








 9/27/19 9/27/19 9/27/19 9/27/19





 13:00 13:57 16:25 16:30


 


Temp 98.7  98.0 





 98.7  98.0 


 


Pulse 72 72 65 


 


Resp 18  20 


 


B/P (MAP) 136/75 (95) 136/75 155/85 (108) 


 


Pulse Ox 93  91 


 


O2 Delivery Nasal Cannula  Nasal Cannula Nasal Cannula


 


O2 Flow Rate 6.0  5.0 5.0


 


    





    





 9/27/19 9/27/19 9/27/19 9/27/19





 16:54 16:56 19:00 19:42


 


Temp   98.2 





   98.2 


 


Pulse  68 63 


 


Resp   24 


 


B/P (MAP)  155/89 127/72 (90) 


 


Pulse Ox 96  93 96


 


O2 Delivery Nasal Cannula  Nasal Cannula Nasal Cannula


 


O2 Flow Rate 5.0  6.0 5.0


 


    





    





 9/27/19 9/27/19 9/27/19 9/27/19





 20:13 21:00 21:01 23:00


 


Temp    98.2





    98.2


 


Pulse  67 67 67


 


Resp    20


 


B/P (MAP)  127/72 127/72 156/85 (108)


 


Pulse Ox    92


 


O2 Delivery Nasal Cannula   Nasal Cannula


 


O2 Flow Rate 5.0   6.0


 


    





    





 9/28/19 9/28/19 9/28/19 9/28/19





 03:09 07:00 07:52 08:33


 


Temp 98.3 98.3  





 98.3 98.3  


 


Pulse 62 66  76


 


Resp 18 18  


 


B/P (MAP) 156/92 (113) 176/108 (130)  176/108


 


Pulse Ox 93 93  


 


O2 Delivery Nasal Cannula Nasal Cannula Nasal Cannula 


 


O2 Flow Rate 5.0 5.0 5.0 


 


    





    





 9/28/19 9/28/19 9/28/19 9/28/19





 08:33 08:33 08:34 08:34


 


Pulse 76 78 76 76


 


B/P (MAP) 176/108 176/108 176/108 176/108





 9/28/19 9/28/19 9/28/19 9/28/19





 08:35 10:49 12:26 12:29


 


Temp  98.2  





  98.2  


 


Pulse 76 64 61 


 


Resp  18  


 


B/P (MAP) 176/108 133/74 (93) 136/80 


 


Pulse Ox  94  96


 


O2 Delivery  Nasal Cannula  Nasal Cannula


 


O2 Flow Rate  5.0  5.0














Intake and Output   


 


 9/27/19 9/27/19 9/28/19





 15:00 23:00 07:00


 


Intake Total 600 ml 50 ml 


 


Output Total  300 ml 


 


Balance 600 ml -250 ml 

















LING KRISHNAMURTHY MD            Sep 28, 2019 12:41

## 2019-09-29 VITALS — DIASTOLIC BLOOD PRESSURE: 76 MMHG | SYSTOLIC BLOOD PRESSURE: 133 MMHG

## 2019-09-29 VITALS — DIASTOLIC BLOOD PRESSURE: 90 MMHG | SYSTOLIC BLOOD PRESSURE: 150 MMHG

## 2019-09-29 VITALS — SYSTOLIC BLOOD PRESSURE: 162 MMHG | DIASTOLIC BLOOD PRESSURE: 92 MMHG

## 2019-09-29 VITALS — SYSTOLIC BLOOD PRESSURE: 141 MMHG | DIASTOLIC BLOOD PRESSURE: 80 MMHG

## 2019-09-29 VITALS — SYSTOLIC BLOOD PRESSURE: 130 MMHG | DIASTOLIC BLOOD PRESSURE: 87 MMHG

## 2019-09-29 VITALS — SYSTOLIC BLOOD PRESSURE: 127 MMHG | DIASTOLIC BLOOD PRESSURE: 78 MMHG

## 2019-09-29 LAB
ALBUMIN SERPL-MCNC: 2.7 G/DL (ref 3.4–5)
ALBUMIN/GLOB SERPL: 0.7 {RATIO} (ref 1–1.7)
ALP SERPL-CCNC: 59 U/L (ref 46–116)
ALT SERPL-CCNC: 13 U/L (ref 16–63)
ANION GAP SERPL CALC-SCNC: 5 MMOL/L (ref 6–14)
AST SERPL-CCNC: 12 U/L (ref 15–37)
BASOPHILS # BLD AUTO: 0 X10^3/UL (ref 0–0.2)
BASOPHILS NFR BLD: 0 % (ref 0–3)
BILIRUB SERPL-MCNC: 0.2 MG/DL (ref 0.2–1)
BUN SERPL-MCNC: 31 MG/DL (ref 8–26)
BUN/CREAT SERPL: 21 (ref 6–20)
CALCIUM SERPL-MCNC: 9 MG/DL (ref 8.5–10.1)
CHLORIDE SERPL-SCNC: 105 MMOL/L (ref 98–107)
CO2 SERPL-SCNC: 33 MMOL/L (ref 21–32)
CREAT SERPL-MCNC: 1.5 MG/DL (ref 0.7–1.3)
EOSINOPHIL NFR BLD: 0 % (ref 0–3)
EOSINOPHIL NFR BLD: 0 X10^3/UL (ref 0–0.7)
ERYTHROCYTE [DISTWIDTH] IN BLOOD BY AUTOMATED COUNT: 14.7 % (ref 11.5–14.5)
GFR SERPLBLD BASED ON 1.73 SQ M-ARVRAT: 57.4 ML/MIN
GLOBULIN SER-MCNC: 3.7 G/DL (ref 2.2–3.8)
GLUCOSE SERPL-MCNC: 151 MG/DL (ref 70–99)
HCT VFR BLD CALC: 44.1 % (ref 39–53)
HGB BLD-MCNC: 14.9 G/DL (ref 13–17.5)
LYMPHOCYTES # BLD: 0.3 X10^3/UL (ref 1–4.8)
LYMPHOCYTES NFR BLD AUTO: 3 % (ref 24–48)
MCH RBC QN AUTO: 33 PG (ref 25–35)
MCHC RBC AUTO-ENTMCNC: 34 G/DL (ref 31–37)
MCV RBC AUTO: 97 FL (ref 79–100)
MONO #: 0.4 X10^3/UL (ref 0–1.1)
MONOCYTES NFR BLD: 5 % (ref 0–9)
NEUT #: 7.9 X10^3/UL (ref 1.8–7.7)
NEUTROPHILS NFR BLD AUTO: 92 % (ref 31–73)
PLATELET # BLD AUTO: 131 X10^3/UL (ref 140–400)
POTASSIUM SERPL-SCNC: 3.5 MMOL/L (ref 3.5–5.1)
PROT SERPL-MCNC: 6.4 G/DL (ref 6.4–8.2)
RBC # BLD AUTO: 4.54 X10^6/UL (ref 4.3–5.7)
SODIUM SERPL-SCNC: 143 MMOL/L (ref 136–145)
WBC # BLD AUTO: 8.6 X10^3/UL (ref 4–11)

## 2019-09-29 RX ADMIN — VANCOMYCIN HYDROCHLORIDE SCH MLS/HR: 1 INJECTION, POWDER, FOR SOLUTION INTRAVENOUS at 03:27

## 2019-09-29 RX ADMIN — PIPERACILLIN SODIUM AND TAZOBACTAM SODIUM SCH MLS/HR: 3; .375 INJECTION, POWDER, LYOPHILIZED, FOR SOLUTION INTRAVENOUS at 17:18

## 2019-09-29 RX ADMIN — IPRATROPIUM BROMIDE AND ALBUTEROL SULFATE SCH ML: .5; 3 SOLUTION RESPIRATORY (INHALATION) at 19:27

## 2019-09-29 RX ADMIN — METHYLPREDNISOLONE SODIUM SUCCINATE SCH MG: 125 INJECTION, POWDER, FOR SOLUTION INTRAMUSCULAR; INTRAVENOUS at 17:17

## 2019-09-29 RX ADMIN — ISOSORBIDE MONONITRATE SCH MG: 30 TABLET, EXTENDED RELEASE ORAL at 07:53

## 2019-09-29 RX ADMIN — IPRATROPIUM BROMIDE AND ALBUTEROL SULFATE SCH ML: .5; 3 SOLUTION RESPIRATORY (INHALATION) at 12:40

## 2019-09-29 RX ADMIN — VANCOMYCIN HYDROCHLORIDE SCH MLS/HR: 1 INJECTION, POWDER, FOR SOLUTION INTRAVENOUS at 20:33

## 2019-09-29 RX ADMIN — POTASSIUM CHLORIDE SCH MEQ: 750 TABLET, FILM COATED, EXTENDED RELEASE ORAL at 07:53

## 2019-09-29 RX ADMIN — BUDESONIDE SCH MG: 0.5 INHALANT RESPIRATORY (INHALATION) at 08:24

## 2019-09-29 RX ADMIN — BUDESONIDE SCH MG: 0.5 INHALANT RESPIRATORY (INHALATION) at 19:27

## 2019-09-29 RX ADMIN — FUROSEMIDE SCH MG: 40 TABLET ORAL at 07:55

## 2019-09-29 RX ADMIN — ENOXAPARIN SODIUM SCH MG: 40 INJECTION SUBCUTANEOUS at 07:53

## 2019-09-29 RX ADMIN — METHYLPREDNISOLONE SODIUM SUCCINATE SCH MG: 125 INJECTION, POWDER, FOR SOLUTION INTRAMUSCULAR; INTRAVENOUS at 12:32

## 2019-09-29 RX ADMIN — METOPROLOL SUCCINATE SCH MG: 50 TABLET, EXTENDED RELEASE ORAL at 07:56

## 2019-09-29 RX ADMIN — PIPERACILLIN SODIUM AND TAZOBACTAM SODIUM SCH MLS/HR: 3; .375 INJECTION, POWDER, LYOPHILIZED, FOR SOLUTION INTRAVENOUS at 05:55

## 2019-09-29 RX ADMIN — LISINOPRIL SCH MG: 20 TABLET ORAL at 07:55

## 2019-09-29 RX ADMIN — IPRATROPIUM BROMIDE AND ALBUTEROL SULFATE SCH ML: .5; 3 SOLUTION RESPIRATORY (INHALATION) at 08:24

## 2019-09-29 RX ADMIN — IPRATROPIUM BROMIDE AND ALBUTEROL SULFATE SCH ML: .5; 3 SOLUTION RESPIRATORY (INHALATION) at 16:34

## 2019-09-29 RX ADMIN — VANCOMYCIN HYDROCHLORIDE PRN EACH: 1 INJECTION, POWDER, LYOPHILIZED, FOR SOLUTION INTRAVENOUS at 09:14

## 2019-09-29 RX ADMIN — SIMVASTATIN SCH MG: 20 TABLET, FILM COATED ORAL at 20:32

## 2019-09-29 RX ADMIN — PIPERACILLIN SODIUM AND TAZOBACTAM SODIUM SCH MLS/HR: 3; .375 INJECTION, POWDER, LYOPHILIZED, FOR SOLUTION INTRAVENOUS at 12:32

## 2019-09-29 RX ADMIN — METHYLPREDNISOLONE SODIUM SUCCINATE SCH MG: 125 INJECTION, POWDER, FOR SOLUTION INTRAMUSCULAR; INTRAVENOUS at 05:55

## 2019-09-29 RX ADMIN — Medication SCH CAP: at 07:55

## 2019-09-29 RX ADMIN — Medication SCH CAP: at 20:30

## 2019-09-29 NOTE — PDOC
PROGRESS NOTES


Subjective


Subjective


Patient seen and examined


The patient looks and feels better today.





Objective


Objective





Vital Signs








  Date Time  Temp Pulse Resp B/P (MAP) Pulse Ox O2 Delivery O2 Flow Rate FiO2


 


9/29/19 12:41     95 Nasal Cannula 5.0 


 


9/29/19 11:31 98.2 74 20 133/76 (95)    





 98.2       














Intake and Output 


 


 9/29/19





 07:00


 


Intake Total 680 ml


 


Output Total 1175 ml


 


Balance -495 ml


 


 


 


Intake Oral 680 ml


 


Output Urine Total 1175 ml


 


# Voids 2











Physical Exam


Abdomen:  Normal bowel sounds


Heart:  Regular rate


General:  mild distress


Lungs:  Other (decreased breath sounds)





Assessment


Assessment


Problems


Medical Problems:


(1) Acute on chronic combined systolic and diastolic heart failure


Status: Acute  





(2) Acute respiratory failure with hypoxia


Status: Acute  





(3) Bradycardia


Status: Acute  





(4) CKD (chronic kidney disease), stage III


Status: Chronic  





(5) COPD exacerbation


Status: Acute  





(6) Dyspnea


Status: Acute  





(7) HTN (hypertension)


Status: Chronic  





(8) LBBB (left bundle branch block)


Status: Chronic  





(9) Left flank pain


Status: Acute  





(10) Lung mass


Status: Acute  





(11) NICM (nonischemic cardiomyopathy)


Status: Chronic  





(12) Pneumonia


Status: Acute  





(13) Substance abuse


Status: Acute  








Bradycardia. Coreg held. Rhythm improved.


Dyspnea. Right upper lobe collapse with possible mass. Bronchoscope on Tuesday.


Substance abuse. Possible cocaine on UDS.


Nonischemic cardiomyopathy. Has improved from 35% to 40-45%. Continuing on 

medical treatment as above.


Improved hypertension.


Chronic kidney disease. Monitoring lab.


History of noncompliance.





Comment


Review of Relevant


I have reviewed the following items brittany (where applicable) has been applied.


Labs





Laboratory Tests








Test


 9/28/19


04:00 9/29/19


04:00


 


White Blood Count


 10.3 x10^3/uL


(4.0-11.0) 8.6 x10^3/uL


(4.0-11.0)


 


Red Blood Count


 4.49 x10^6/uL


(4.30-5.70) 4.54 x10^6/uL


(4.30-5.70)


 


Hemoglobin


 14.8 g/dL


(13.0-17.5) 14.9 g/dL


(13.0-17.5)


 


Hematocrit


 43.7 %


(39.0-53.0) 44.1 %


(39.0-53.0)


 


Mean Corpuscular Volume 97 fL ()  97 fL () 


 


Mean Corpuscular Hemoglobin 33 pg (25-35)  33 pg (25-35) 


 


Mean Corpuscular Hemoglobin


Concent 34 g/dL


(31-37) 34 g/dL


(31-37)


 


Red Cell Distribution Width


 15.3 %


(11.5-14.5) 14.7 %


(11.5-14.5)


 


Platelet Count


 125 x10^3/uL


(140-400) 131 x10^3/uL


(140-400)


 


Neutrophils (%) (Auto) 93 % (31-73)  92 % (31-73) 


 


Lymphocytes (%) (Auto) 3 % (24-48)  3 % (24-48) 


 


Monocytes (%) (Auto) 4 % (0-9)  5 % (0-9) 


 


Eosinophils (%) (Auto) 0 % (0-3)  0 % (0-3) 


 


Basophils (%) (Auto) 0 % (0-3)  0 % (0-3) 


 


Neutrophils # (Auto)


 9.6 x10^3/uL


(1.8-7.7) 7.9 x10^3/uL


(1.8-7.7)


 


Lymphocytes # (Auto)


 0.3 x10^3/uL


(1.0-4.8) 0.3 x10^3/uL


(1.0-4.8)


 


Monocytes # (Auto)


 0.4 x10^3/uL


(0.0-1.1) 0.4 x10^3/uL


(0.0-1.1)


 


Eosinophils # (Auto)


 0.0 x10^3/uL


(0.0-0.7) 0.0 x10^3/uL


(0.0-0.7)


 


Basophils # (Auto)


 0.0 x10^3/uL


(0.0-0.2) 0.0 x10^3/uL


(0.0-0.2)


 


Sodium Level


 142 mmol/L


(136-145) 143 mmol/L


(136-145)


 


Potassium Level


 3.5 mmol/L


(3.5-5.1) 3.5 mmol/L


(3.5-5.1)


 


Chloride Level


 105 mmol/L


() 105 mmol/L


()


 


Carbon Dioxide Level


 30 mmol/L


(21-32) 33 mmol/L


(21-32)


 


Anion Gap 7 (6-14)  5 (6-14) 


 


Blood Urea Nitrogen


 28 mg/dL


(8-26) 31 mg/dL


(8-26)


 


Creatinine


 1.4 mg/dL


(0.7-1.3) 1.5 mg/dL


(0.7-1.3)


 


Estimated GFR


(Cockcroft-Gault) 62.1 


 57.4 





 


BUN/Creatinine Ratio 20 (6-20)  21 (6-20) 


 


Glucose Level


 130 mg/dL


(70-99) 151 mg/dL


(70-99)


 


Calcium Level


 9.4 mg/dL


(8.5-10.1) 9.0 mg/dL


(8.5-10.1)


 


Total Bilirubin


 0.3 mg/dL


(0.2-1.0) 0.2 mg/dL


(0.2-1.0)


 


Aspartate Amino Transf


(AST/SGOT) 14 U/L (15-37) 


 12 U/L (15-37) 





 


Alanine Aminotransferase


(ALT/SGPT) 13 U/L (16-63) 


 13 U/L (16-63) 





 


Alkaline Phosphatase


 69 U/L


() 59 U/L


()


 


Total Protein


 6.7 g/dL


(6.4-8.2) 6.4 g/dL


(6.4-8.2)


 


Albumin


 2.9 g/dL


(3.4-5.0) 2.7 g/dL


(3.4-5.0)


 


Albumin/Globulin Ratio 0.8 (1.0-1.7)  0.7 (1.0-1.7) 








Laboratory Tests








Test


 9/29/19


04:00


 


White Blood Count


 8.6 x10^3/uL


(4.0-11.0)


 


Red Blood Count


 4.54 x10^6/uL


(4.30-5.70)


 


Hemoglobin


 14.9 g/dL


(13.0-17.5)


 


Hematocrit


 44.1 %


(39.0-53.0)


 


Mean Corpuscular Volume 97 fL () 


 


Mean Corpuscular Hemoglobin 33 pg (25-35) 


 


Mean Corpuscular Hemoglobin


Concent 34 g/dL


(31-37)


 


Red Cell Distribution Width


 14.7 %


(11.5-14.5)


 


Platelet Count


 131 x10^3/uL


(140-400)


 


Neutrophils (%) (Auto) 92 % (31-73) 


 


Lymphocytes (%) (Auto) 3 % (24-48) 


 


Monocytes (%) (Auto) 5 % (0-9) 


 


Eosinophils (%) (Auto) 0 % (0-3) 


 


Basophils (%) (Auto) 0 % (0-3) 


 


Neutrophils # (Auto)


 7.9 x10^3/uL


(1.8-7.7)


 


Lymphocytes # (Auto)


 0.3 x10^3/uL


(1.0-4.8)


 


Monocytes # (Auto)


 0.4 x10^3/uL


(0.0-1.1)


 


Eosinophils # (Auto)


 0.0 x10^3/uL


(0.0-0.7)


 


Basophils # (Auto)


 0.0 x10^3/uL


(0.0-0.2)


 


Sodium Level


 143 mmol/L


(136-145)


 


Potassium Level


 3.5 mmol/L


(3.5-5.1)


 


Chloride Level


 105 mmol/L


()


 


Carbon Dioxide Level


 33 mmol/L


(21-32)


 


Anion Gap 5 (6-14) 


 


Blood Urea Nitrogen


 31 mg/dL


(8-26)


 


Creatinine


 1.5 mg/dL


(0.7-1.3)


 


Estimated GFR


(Cockcroft-Gault) 57.4 





 


BUN/Creatinine Ratio 21 (6-20) 


 


Glucose Level


 151 mg/dL


(70-99)


 


Calcium Level


 9.0 mg/dL


(8.5-10.1)


 


Total Bilirubin


 0.2 mg/dL


(0.2-1.0)


 


Aspartate Amino Transf


(AST/SGOT) 12 U/L (15-37) 





 


Alanine Aminotransferase


(ALT/SGPT) 13 U/L (16-63) 





 


Alkaline Phosphatase


 59 U/L


()


 


Total Protein


 6.4 g/dL


(6.4-8.2)


 


Albumin


 2.7 g/dL


(3.4-5.0)


 


Albumin/Globulin Ratio 0.7 (1.0-1.7) 








Medications





Current Medications


Albuterol/ Ipratropium (Duoneb) 3 ml 1X  ONCE NEB  Last administered on 

9/25/19at 04:17;  Start 9/25/19 at 04:15;  Stop 9/25/19 at 04:16;  Status DC


Methylprednisolone Sodium Succinate (SOLU-Medrol 125MG VIAL) 125 mg 1X  ONCE IV 

Last administered on 9/25/19at 04:51;  Start 9/25/19 at 04:15;  Stop 9/25/19 at 

04:16;  Status DC


Iohexol (Omnipaque 300 Mg/ml) 75 ml 1X  ONCE IV ;  Start 9/25/19 at 05:15;  Stop

9/25/19 at 05:16;  Status DC


Info (CONTRAST GIVEN -- Rx MONITORING) 1 each PRN DAILY  PRN MC SEE COMMENTS;  

Start 9/25/19 at 05:15;  Stop 9/27/19 at 05:14;  Status DC


Piperacillin Sod/ Tazobactam Sod 4.5 gm/Sodium Chloride 100 ml @  200 mls/hr 1X 

ONCE IV  Last administered on 9/25/19at 06:04;  Start 9/25/19 at 06:00;  Stop 

9/25/19 at 06:29;  Status DC


Vancomycin HCl 1.75 gm/Sodium Chloride 500 ml @  250 mls/hr 1X  ONCE IV  Last 

administered on 9/25/19at 08:04;  Start 9/25/19 at 06:00;  Stop 9/25/19 at 

07:59;  Status DC


Ondansetron HCl (Zofran) 4 mg PRN Q8HRS  PRN IV NAUSEA/VOMITING;  Start 9/25/19 

at 05:45;  Stop 9/26/19 at 05:44;  Status DC


Albuterol/ Ipratropium (Duoneb) 3 ml RTQID NEB  Last administered on 9/25/19at 

20:08;  Start 9/25/19 at 08:00;  Stop 9/26/19 at 07:59;  Status DC


Influenza Virus Vaccine Quadrival (Afluria Quad 2019-20 (3yr Up) Syringe) 0.5 ml

ONCE ONCE VAX IM  Last administered on 9/25/19at 16:26;  Start 9/25/19 at 08:00;

 Stop 9/25/19 at 08:05;  Status DC


Sodium Chloride 1,000 ml @  60 mls/hr [id number] IV  Last administered on 9/26/19at

01:33;  Start 9/25/19 at 11:15;  Stop 9/26/19 at 17:31;  Status DC


Piperacillin Sod/ Tazobactam Sod (Zosyn Per Pharmacy) 1 each PRN DAILY  PRN MC 

SEE COMMENTS;  Start 9/25/19 at 12:30


Vancomycin HCl (Vanco Per Pharmacy) 1 each PRN DAILY  PRN MC SEE COMMENTS Last 

administered on 9/29/19at 09:14;  Start 9/25/19 at 12:30


Methylprednisolone Sodium Succinate (SOLU-Medrol 125MG VIAL) 62.5 mg Q6HRS IV  

Last administered on 9/29/19at 12:32;  Start 9/25/19 at 13:00


Hydralazine HCl (Apresoline) 25 mg TID PO  Last administered on 9/29/19at 07:54;

 Start 9/25/19 at 14:00


Isosorbide Mononitrate (Imdur) 30 mg DAILY PO  Last administered on 9/29/19at 

07:53;  Start 9/25/19 at 14:00


Hydralazine HCl (Apresoline Inj) 10 mg PRN Q4HRS  PRN IVP ELEVATED BP, SEE 

COMMENTS;  Start 9/25/19 at 12:45


Vancomycin HCl 1 gm/Sodium Chloride 250 ml @  250 mls/hr Q18H IV  Last 

administered on 9/26/19at 21:23;  Start 9/26/19 at 02:00;  Stop 9/27/19 at 

14:18;  Status DC


Vancomycin HCl (Vancomycin Trough Level) 1 each 1X  ONCE MC  Last administered 

on 9/27/19at 13:30;  Start 9/27/19 at 13:30;  Stop 9/27/19 at 13:31;  Status DC


Piperacillin Sod/ Tazobactam Sod 3.375 gm/Sodium Chloride 50 ml @  100 mls/hr 

Q6HRS IV  Last administered on 9/29/19at 12:32;  Start 9/25/19 at 14:00


Amlodipine Besylate (Norvasc) 10 mg DAILY PO  Last administered on 9/29/19at 

07:55;  Start 9/25/19 at 15:00


Clonidine HCl (Catapres) 0.1 mg BID PO  Last administered on 9/29/19at 07:54;  

Start 9/25/19 at 15:00


Doxycycline Hyclate (Vibra-Tab) 100 mg BID PO ;  Start 9/25/19 at 21:00;  Status

UNV


Furosemide (Lasix) 40 mg DAILY PO  Last administered on 9/29/19at 07:55;  Start 

9/25/19 at 15:00


Hydralazine HCl (Apresoline) 50 mg TID PO ;  Start 9/25/19 at 15:00;  Stop 

9/25/19 at 15:31;  Status DC


Lisinopril (Prinivil) 40 mg DAILY PO  Last administered on 9/29/19at 07:55;  

Start 9/25/19 at 15:00


Potassium Chloride (Klor-Con) 20 meq DAILY PO  Last administered on 9/29/19at 

07:53;  Start 9/25/19 at 15:00


Simvastatin (Zocor) 20 mg HS PO  Last administered on 9/28/19at 20:51;  Start 

9/25/19 at 21:00


Non-Formulary Medication (Albuterol Sulfate (Albuterol Sulfate Neb Soln)) 0.63 

mg PRN Q4HRS  PRN NEB SHORTNESS OF BREATH;  Start 9/25/19 at 13:45;  Status UNV


Carvedilol (Coreg) 25 mg BIDWMEALS PO ;  Start 9/25/19 at 17:00;  Stop 9/25/19 

at 15:19;  Status DC


Non-Formulary Medication (Fluticasone/ Salmeterol (Advair 250-50 Diskus)) 1 puff

BID IH ;  Start 9/25/19 at 21:00;  Status UNV


Isosorbide Mononitrate (Imdur) 60 mg DAILY PO ;  Start 9/25/19 at 15:00;  Stop 

9/25/19 at 15:31;  Status DC


Non-Formulary Medication (Methylprednisolone (Medrol)) 1 pkg UD PO ;  Start 

9/25/19 at 13:45;  Stop 9/25/19 at 13:41;  Status DC


Lactobacillus Rhamnosus (Culturelle) 1 cap BID PO  Last administered on 

9/29/19at 07:55;  Start 9/25/19 at 21:00


Albuterol Sulfate (Ventolin Neb Soln) 2.5 mg PRN Q4HRS  PRN NEB SHORTNESS OF 

BREATH;  Start 9/25/19 at 13:45


Budesonide (Pulmicort) 0.5 mg RTBID NEB  Last administered on 9/29/19at 08:24;  

Start 9/25/19 at 15:00


Albuterol/ Ipratropium (Duoneb) 3 ml RTQID NEB  Last administered on 9/29/19at 

12:40;  Start 9/25/19 at 16:00


Enoxaparin Sodium (Lovenox 40mg Syringe) 40 mg Q24H SQ  Last administered on 

9/29/19at 07:53;  Start 9/26/19 at 11:00


Furosemide (Lasix) 20 mg 1X  ONCE IVP  Last administered on 9/26/19at 11:42;  

Start 9/26/19 at 11:15;  Stop 9/26/19 at 11:16;  Status DC


Furosemide (Lasix) 20 mg 1X  ONCE IVP  Last administered on 9/26/19at 17:44;  

Start 9/26/19 at 17:30;  Stop 9/26/19 at 17:32;  Status DC


Metoprolol Succinate (Toprol Xl) 50 mg DAILY PO  Last administered on 9/29/19at 

07:56;  Start 9/27/19 at 12:00


Vancomycin HCl 1.25 gm/Sodium Chloride 250 ml @  167 mls/hr Q18H IV  Last 

administered on 9/29/19at 03:27;  Start 9/27/19 at 15:00


Temazepam (Restoril) 7.5 mg PRN QHS  PRN PO INSOMNIA;  Start 9/27/19 at 18:45


Guaifenesin (Robitussin Dm) 10 ml PRN Q6HRS  PRN PO COUGH;  Start 9/27/19 at 

18:45


Acetaminophen (Tylenol) 500 mg PRN Q6HRS  PRN PO MILD PAIN / TEMP;  Start 

9/27/19 at 18:45


Ondansetron HCl (Zofran) 4 mg PRN Q6HRS  PRN IVP NAUSEA/VOMITING;  Start 9/27/19

at 18:45





Active Scripts


Active


Azithromycin Tablet (Azithromycin) 250 Mg Tablet 1 Pkg PO UD


Advair 250-50 Diskus (Fluticasone/Salmeterol) 1 Each Disk.w.dev 1 Puff IH BID


Ipratropium Bromide 0.2 Mg/1 Ml Solution 1 Vial NEB QID PRN


Doxycycline Hyclate 100 Mg Tablet 1 Tab PO BID


     NEXT DOSE DUE AT BEDTIME TONIGHT


Medrol (Methylprednisolone) 4 Mg Tab.ds.pk 1 Pkg PO UD


Albuterol Sulfate Neb Soln (Albuterol Sulfate) 0.63 Mg/3 Ml Vial.neb 0.63 Mg NEB

PRN Q4HRS PRN 30 Days


     AS NEEDED


Reported


Zocor (Simvastatin) 20 Mg Tablet 20 Mg PO HS


     NEXT DOSE DUE TONIGHT AT BEDTIME


Isosorbide Mononitrate Er (Isosorbide Mononitrate) 60 Mg Tab.er.24h 60 Mg PO 

DAILY


     NEXT DOSE DUE IN AM


Lisinopril 40 Mg Tablet 40 Mg PO DAILY


     NEXT DOSE DUE IN AM


Carvedilol 25 Mg Tablet 25 Mg PO BIDWMEALS


     NEXT DOSE DUE AT DINNER THIS EVENING


Clonidine Hcl 0.1 Mg Tablet 0.1 Mg PO BID


     NEXT DOSE DUE AT BEDTIME TONIGHT


Hydralazine Hcl 50 Mg Tablet 50 Mg PO TID


     NEXT DOSE DUE AT BEDTIME TONIGHT


Amlodipine Besylate 10 Mg Tablet 10 Mg PO DAILY


     NEXT DOSE DUE IN AM


Furosemide 40 Mg Tablet 40 Mg PO DAILY


     NEXT DOSE DUE IN AM


Potassium Chloride 10 Meq Tab.er.prt 20 Meq PO DAILY


     NEXT DOSE DUE IN AM


Vitals/I & O





Vital Sign - Last 24 Hours








 9/28/19 9/28/19 9/28/19 9/28/19





 15:00 16:09 19:00 20:08


 


Temp 98.2  98.1 





 98.2  98.1 


 


Pulse 64  70 


 


Resp 18  20 


 


B/P (MAP) 143/71 (95)  156/83 (107) 


 


Pulse Ox 91  91 94


 


O2 Delivery Nasal Cannula Nasal Cannula Nasal Cannula Nasal Cannula


 


O2 Flow Rate 5.0 5.0 5.0 5.0


 


    





    





 9/28/19 9/28/19 9/28/19 9/28/19





 20:27 20:51 20:52 23:00


 


Temp    98.1





    98.1


 


Pulse  70 70 66


 


Resp    20


 


B/P (MAP)  156/83 156/83 150/91 (110)


 


Pulse Ox    91


 


O2 Delivery Nasal Cannula   Nasal Cannula


 


O2 Flow Rate 5.0   5.0


 


    





    





 9/29/19 9/29/19 9/29/19 9/29/19





 03:00 07:00 07:53 07:54


 


Temp 98.1 98.3  





 98.1 98.3  


 


Pulse 75 75 72 72


 


Resp 20 20  


 


B/P (MAP) 162/92 (115) 150/90 (110) 150/90 150/90


 


Pulse Ox 90 92  


 


O2 Delivery Nasal Cannula Nasal Cannula  


 


O2 Flow Rate 5.0 5.0  


 


    





    





 9/29/19 9/29/19 9/29/19 9/29/19





 07:54 07:55 07:55 07:56


 


Pulse 72 72 72 72


 


B/P (MAP) 150/90 150/90 150/90 150/90





 9/29/19 9/29/19 9/29/19 9/29/19





 08:02 08:45 11:31 12:41


 


Temp   98.2 





   98.2 


 


Pulse   74 


 


Resp   20 


 


B/P (MAP)   133/76 (95) 


 


Pulse Ox  95 92 95


 


O2 Delivery Nasal Cannula Nasal Cannula Nasal Cannula Nasal Cannula


 


O2 Flow Rate 5.0 5.0 5.0 5.0














Intake and Output   


 


 9/28/19 9/28/19 9/29/19





 15:00 23:00 07:00


 


Intake Total 280 ml 280 ml 120 ml


 


Output Total 425 ml 750 ml 


 


Balance -145 ml -470 ml 120 ml

















LING KRISHNAMURTHY MD            Sep 29, 2019 13:51

## 2019-09-29 NOTE — PDOC
[facility] PROGRESS NOTE


Chief Complaint


Chief Complaint


COPD exacerbation


Possible lung mass


Hypoxia


Substance abuse





History of Present Illness


History of Present Illness


9/29/19


Pt seen and examined at bedside


Out of ICU, in better condition


EF = 40-45%


Bronchoscopy scheduled for Tuesday


Right sided atelectasis based on pulmonary progress note


Charts and labs reviewed





9/28/19


Pt seen and examined


Pt has been feeling better and getting more sleep on the floor. He is glad to be

out of the ICU and is breathing better. 


Pt recieving O2 via nasal cannula 


He still has some wheezing and a cough


Ejection fraction 35%


Appetite has improved


Scheduled bronch for Tuesday 


DW RN 





9/27/19


Pt was seen and examined in the ICU


Pt was on the commode


Receiving O2 via Ventimask


Charts and labs reviewed


EF = 35% with cardiomegaly


Continues to be hypoxia


Crackles heard on lung exam


ABG pH is 7.41


D/w RN





9/26/19


Pt was seen and examined in the ICU


Receiving O2 via Ventimask


EF = 35% with cardiomegaly


Continues to be hypoxia


Crackles heard on lung exam


Cr was 1.5 so CT with contrast could not be performed


V/Q scan for possible PE





Vitals/I&O


Vitals/I&O:





                                   Vital Signs








  Date Time  Temp Pulse Resp B/P (MAP) Pulse Ox O2 Delivery O2 Flow Rate FiO2


 


9/29/19 11:31 98.2 74 20 133/76 (95) 92 Nasal Cannula 5.0 





 98.2       














                                    I & O   


 


 9/28/19 9/28/19 9/29/19





 15:00 23:00 07:00


 


Intake Total 280 ml 280 ml 120 ml


 


Output Total 425 ml 750 ml 


 


Balance -145 ml -470 ml 120 ml











Physical Exam


Physical Exam:


General:  Alert, No acute distress


Lungs:  Crackles (bases)


Cardiovascular:  S1, S2


Abdomen:  Soft, Non-tender


Neuro Exam:  Alert


Extremities:  No Edema


General:  mild distress


Heart:  Regular rate


Lungs:  Crackles (bases)


Abdomen:  Normal bowel sounds


Extremities:  Other (1+ bilateral LE pitting edema)





Labs


Labs:





Laboratory Tests








Test


 9/29/19


04:00


 


White Blood Count


 8.6 x10^3/uL


(4.0-11.0)


 


Red Blood Count


 4.54 x10^6/uL


(4.30-5.70)


 


Hemoglobin


 14.9 g/dL


(13.0-17.5)


 


Hematocrit


 44.1 %


(39.0-53.0)


 


Mean Corpuscular Volume 97 fL () 


 


Mean Corpuscular Hemoglobin 33 pg (25-35) 


 


Mean Corpuscular Hemoglobin


Concent 34 g/dL


(31-37)


 


Red Cell Distribution Width


 14.7 %


(11.5-14.5)


 


Platelet Count


 131 x10^3/uL


(140-400)


 


Neutrophils (%) (Auto) 92 % (31-73) 


 


Lymphocytes (%) (Auto) 3 % (24-48) 


 


Monocytes (%) (Auto) 5 % (0-9) 


 


Eosinophils (%) (Auto) 0 % (0-3) 


 


Basophils (%) (Auto) 0 % (0-3) 


 


Neutrophils # (Auto)


 7.9 x10^3/uL


(1.8-7.7)


 


Lymphocytes # (Auto)


 0.3 x10^3/uL


(1.0-4.8)


 


Monocytes # (Auto)


 0.4 x10^3/uL


(0.0-1.1)


 


Eosinophils # (Auto)


 0.0 x10^3/uL


(0.0-0.7)


 


Basophils # (Auto)


 0.0 x10^3/uL


(0.0-0.2)


 


Sodium Level


 143 mmol/L


(136-145)


 


Potassium Level


 3.5 mmol/L


(3.5-5.1)


 


Chloride Level


 105 mmol/L


()


 


Carbon Dioxide Level


 33 mmol/L


(21-32)


 


Anion Gap 5 (6-14) 


 


Blood Urea Nitrogen


 31 mg/dL


(8-26)


 


Creatinine


 1.5 mg/dL


(0.7-1.3)


 


Estimated GFR


(Cockcroft-Gault) 57.4 





 


BUN/Creatinine Ratio 21 (6-20) 


 


Glucose Level


 151 mg/dL


(70-99)


 


Calcium Level


 9.0 mg/dL


(8.5-10.1)


 


Total Bilirubin


 0.2 mg/dL


(0.2-1.0)


 


Aspartate Amino Transf


(AST/SGOT) 12 U/L (15-37) 





 


Alanine Aminotransferase


(ALT/SGPT) 13 U/L (16-63) 





 


Alkaline Phosphatase


 59 U/L


()


 


Total Protein


 6.4 g/dL


(6.4-8.2)


 


Albumin


 2.7 g/dL


(3.4-5.0)


 


Albumin/Globulin Ratio 0.7 (1.0-1.7) 











Review of Systems


Review of Systems:


No nausea, no vomiting


No chest pain, no palpitations





Assessment and Plan


Assessmemt and Plan


Problems


Medical Problems:


(1) Acute on chronic combined systolic and diastolic heart failure


Status: Acute  





(2) Acute respiratory failure with hypoxia


Status: Acute  





(3) Bradycardia


Status: Acute  





(4) CKD (chronic kidney disease), stage III


Status: Chronic  





(5) COPD exacerbation


Status: Acute  





(6) Dyspnea


Status: Acute  





(7) HTN (hypertension)


Status: Chronic  





(8) LBBB (left bundle branch block)


Status: Chronic  





(9) Left flank pain


Status: Acute  





(10) Lung mass


Status: Acute  





(11) NICM (nonischemic cardiomyopathy)


Status: Chronic  





(12) Pneumonia


Status: Acute  





(13) Substance abuse


Status: Acute  





Assessement


COPD


Substance abuse


Lung mass 


Right sided atelectasis





Plan


Bronchodilators


Steroids


Awaiting bronchoscopy results


IV antibiotics


Continue O2 by nasal cannula


Full code








Comment


Review of Relevant


I have reviewed the following items brittany (where applicable) has been applied.











ANTONIA LUX III DO           Sep 29, 2019 12:04

## 2019-09-29 NOTE — PDOC
PULMONARY PROGRESS NOTES


Subjective


sob better, has occ cough, no sputum, no pain


Vitals





Vital Signs








  Date Time  Temp Pulse Resp B/P (MAP) Pulse Ox O2 Delivery O2 Flow Rate FiO2


 


9/29/19 03:00 98.1 75 20 162/92 (115) 90 Nasal Cannula 5.0 





 98.1       








General:  Alert, No acute distress


Lungs:  Crackles (bases)


Cardiovascular:  S1, S2


Abdomen:  Soft, Non-tender


Neuro Exam:  Alert


Extremities:  No Edema


Labs





Laboratory Tests








Test


 9/27/19


13:25 9/28/19


04:00 9/29/19


04:00


 


Vancomycin Level Trough


 11.2 mcg/mL


(10.0-20.0) 


 





 


Vancomycin Last Dose Date 9/26/19   


 


Vancomycin Last Dose Time 2000   


 


White Blood Count


 


 10.3 x10^3/uL


(4.0-11.0) 8.6 x10^3/uL


(4.0-11.0)


 


Red Blood Count


 


 4.49 x10^6/uL


(4.30-5.70) 4.54 x10^6/uL


(4.30-5.70)


 


Hemoglobin


 


 14.8 g/dL


(13.0-17.5) 14.9 g/dL


(13.0-17.5)


 


Hematocrit


 


 43.7 %


(39.0-53.0) 44.1 %


(39.0-53.0)


 


Mean Corpuscular Volume  97 fL ()  97 fL () 


 


Mean Corpuscular Hemoglobin  33 pg (25-35)  33 pg (25-35) 


 


Mean Corpuscular Hemoglobin


Concent 


 34 g/dL


(31-37) 34 g/dL


(31-37)


 


Red Cell Distribution Width


 


 15.3 %


(11.5-14.5) 14.7 %


(11.5-14.5)


 


Platelet Count


 


 125 x10^3/uL


(140-400) 131 x10^3/uL


(140-400)


 


Neutrophils (%) (Auto)  93 % (31-73)  92 % (31-73) 


 


Lymphocytes (%) (Auto)  3 % (24-48)  3 % (24-48) 


 


Monocytes (%) (Auto)  4 % (0-9)  5 % (0-9) 


 


Eosinophils (%) (Auto)  0 % (0-3)  0 % (0-3) 


 


Basophils (%) (Auto)  0 % (0-3)  0 % (0-3) 


 


Neutrophils # (Auto)


 


 9.6 x10^3/uL


(1.8-7.7) 7.9 x10^3/uL


(1.8-7.7)


 


Lymphocytes # (Auto)


 


 0.3 x10^3/uL


(1.0-4.8) 0.3 x10^3/uL


(1.0-4.8)


 


Monocytes # (Auto)


 


 0.4 x10^3/uL


(0.0-1.1) 0.4 x10^3/uL


(0.0-1.1)


 


Eosinophils # (Auto)


 


 0.0 x10^3/uL


(0.0-0.7) 0.0 x10^3/uL


(0.0-0.7)


 


Basophils # (Auto)


 


 0.0 x10^3/uL


(0.0-0.2) 0.0 x10^3/uL


(0.0-0.2)


 


Sodium Level


 


 142 mmol/L


(136-145) 143 mmol/L


(136-145)


 


Potassium Level


 


 3.5 mmol/L


(3.5-5.1) 3.5 mmol/L


(3.5-5.1)


 


Chloride Level


 


 105 mmol/L


() 105 mmol/L


()


 


Carbon Dioxide Level


 


 30 mmol/L


(21-32) 33 mmol/L


(21-32)


 


Anion Gap  7 (6-14)  5 (6-14) 


 


Blood Urea Nitrogen


 


 28 mg/dL


(8-26) 31 mg/dL


(8-26)


 


Creatinine


 


 1.4 mg/dL


(0.7-1.3) 1.5 mg/dL


(0.7-1.3)


 


Estimated GFR


(Cockcroft-Gault) 


 62.1 


 57.4 





 


BUN/Creatinine Ratio  20 (6-20)  21 (6-20) 


 


Glucose Level


 


 130 mg/dL


(70-99) 151 mg/dL


(70-99)


 


Calcium Level


 


 9.4 mg/dL


(8.5-10.1) 9.0 mg/dL


(8.5-10.1)


 


Total Bilirubin


 


 0.3 mg/dL


(0.2-1.0) 0.2 mg/dL


(0.2-1.0)


 


Aspartate Amino Transf


(AST/SGOT) 


 14 U/L (15-37) 


 12 U/L (15-37) 





 


Alanine Aminotransferase


(ALT/SGPT) 


 13 U/L (16-63) 


 13 U/L (16-63) 





 


Alkaline Phosphatase


 


 69 U/L


() 59 U/L


()


 


Total Protein


 


 6.7 g/dL


(6.4-8.2) 6.4 g/dL


(6.4-8.2)


 


Albumin


 


 2.9 g/dL


(3.4-5.0) 2.7 g/dL


(3.4-5.0)


 


Albumin/Globulin Ratio  0.8 (1.0-1.7)  0.7 (1.0-1.7) 








Laboratory Tests








Test


 9/29/19


04:00


 


White Blood Count


 8.6 x10^3/uL


(4.0-11.0)


 


Red Blood Count


 4.54 x10^6/uL


(4.30-5.70)


 


Hemoglobin


 14.9 g/dL


(13.0-17.5)


 


Hematocrit


 44.1 %


(39.0-53.0)


 


Mean Corpuscular Volume 97 fL () 


 


Mean Corpuscular Hemoglobin 33 pg (25-35) 


 


Mean Corpuscular Hemoglobin


Concent 34 g/dL


(31-37)


 


Red Cell Distribution Width


 14.7 %


(11.5-14.5)


 


Platelet Count


 131 x10^3/uL


(140-400)


 


Neutrophils (%) (Auto) 92 % (31-73) 


 


Lymphocytes (%) (Auto) 3 % (24-48) 


 


Monocytes (%) (Auto) 5 % (0-9) 


 


Eosinophils (%) (Auto) 0 % (0-3) 


 


Basophils (%) (Auto) 0 % (0-3) 


 


Neutrophils # (Auto)


 7.9 x10^3/uL


(1.8-7.7)


 


Lymphocytes # (Auto)


 0.3 x10^3/uL


(1.0-4.8)


 


Monocytes # (Auto)


 0.4 x10^3/uL


(0.0-1.1)


 


Eosinophils # (Auto)


 0.0 x10^3/uL


(0.0-0.7)


 


Basophils # (Auto)


 0.0 x10^3/uL


(0.0-0.2)


 


Sodium Level


 143 mmol/L


(136-145)


 


Potassium Level


 3.5 mmol/L


(3.5-5.1)


 


Chloride Level


 105 mmol/L


()


 


Carbon Dioxide Level


 33 mmol/L


(21-32)


 


Anion Gap 5 (6-14) 


 


Blood Urea Nitrogen


 31 mg/dL


(8-26)


 


Creatinine


 1.5 mg/dL


(0.7-1.3)


 


Estimated GFR


(Cockcroft-Gault) 57.4 





 


BUN/Creatinine Ratio 21 (6-20) 


 


Glucose Level


 151 mg/dL


(70-99)


 


Calcium Level


 9.0 mg/dL


(8.5-10.1)


 


Total Bilirubin


 0.2 mg/dL


(0.2-1.0)


 


Aspartate Amino Transf


(AST/SGOT) 12 U/L (15-37) 





 


Alanine Aminotransferase


(ALT/SGPT) 13 U/L (16-63) 





 


Alkaline Phosphatase


 59 U/L


()


 


Total Protein


 6.4 g/dL


(6.4-8.2)


 


Albumin


 2.7 g/dL


(3.4-5.0)


 


Albumin/Globulin Ratio 0.7 (1.0-1.7) 








Medications





Active Scripts








 Medications  Dose


 Route/Sig


 Max Daily Dose Days Date Category Dose


Instructions


 


 Azithromycin


 Tablet


  (Azithromycin)


 250 Mg Tablet  1 Pkg


 PO UD


   6/27/17 Rx 


 


 Advair 250-50


 Diskus


  (Fluticasone/Salmeterol)


 1 Each Disk.w.dev  1 Puff


 IH BID


   6/26/17 Rx 


 


 Ipratropium


 Bromide 0.2 Mg/1


 Ml Solution  1 Vial


 NEB QID PRN


   6/26/17 Rx 


 


 Doxycycline


 Hyclate 100 Mg


 Tablet  1 Tab


 PO BID


   6/26/17 Rx  NEXT DOSE DUE AT BEDTIME TONIGHT


 


 Medrol


  (Methylprednisolone)


 4 Mg Tab.ds.pk  1 Pkg


 PO UD


   6/26/17 Rx 


 


 Albuterol Sulfate


 Neb Soln


  (Albuterol


 Sulfate) 0.63


 Mg/3 Ml Vial.neb  0.63 Mg


 NEB PRN Q4HRS PRN


  30 6/26/17 Rx  AS NEEDED


 


 Zocor


  (Simvastatin) 20


 Mg Tablet  20 Mg


 PO HS


   7/10/15 Reported  NEXT DOSE DUE TONIGHT AT BEDTIME


 


 Isosorbide


 Mononitrate Er


  (Isosorbide


 Mononitrate) 60


 Mg Tab.er.24h  60 Mg


 PO DAILY


   7/10/15 Reported  NEXT DOSE DUE IN AM


 


 Lisinopril 40 Mg


 Tablet  40 Mg


 PO DAILY


   12/15/14 Reported  NEXT DOSE DUE IN AM


 


 Carvedilol 25 Mg


 Tablet  25 Mg


 PO BIDWMEALS


   12/15/14 Reported  NEXT DOSE DUE AT DINNER THIS EVENING


 


 Clonidine Hcl 0.1


 Mg Tablet  0.1 Mg


 PO BID


   12/15/14 Reported  NEXT DOSE DUE AT BEDTIME TONIGHT


 


 Hydralazine Hcl


 50 Mg Tablet  50 Mg


 PO TID


   11/8/14 Reported  NEXT DOSE DUE AT BEDTIME TONIGHT


 


 Amlodipine


 Besylate 10 Mg


 Tablet  10 Mg


 PO DAILY


   2/24/14 Reported  NEXT DOSE DUE IN AM


 


 Furosemide 40 Mg


 Tablet  40 Mg


 PO DAILY


   2/24/14 Reported  NEXT DOSE DUE IN AM


 


 Potassium


 Chloride 10 Meq


 Tab.er.prt  20 Meq


 PO DAILY


   2/24/14 Reported  NEXT DOSE DUE IN AM








Comments


CXR 9/27


MILD CHF improved. / RUL ATELECTASIS





echo





The systolic function is mildly impaired. EF 40-45%


There is global hypokinesis of the left ventricle. The septal motion is 

suggestive of conduction defect.


Doppler and Color Flow revealed moderate aortic regurgitation.





Impression


.


1.  Acute hypoxic respiratory failure secondary to multifactorial etiologies


including combination of underlying chronic obstructive pulmonary disease, right


upper lobe atelectasis. No evidence of thromboembolic disease. Possible CHF 9/26


2.  Long history of tobacco use, suspect underlying chronic obstructive


pulmonary disease.  Along with cocaine, marijuana and alcohol abuse.


3.  Abnormal CT chest with right upper lobe collapse.  Likely postobstructive. 


Possibility of endobronchial lesion cannot be ruled out.  Mucous plug would be


another consideration.


4.  Cardiomyopathy, mod AI





Plan


.





1.  02 titration 


2.  VQ scan neg for PE


3.  cxr in am


4.  Follow Cardiology recommendation reg low EF and mod AI


5.  Monitor blood pressure closely.


6.   bronchodilators.


7.  Smoking cessation counseling provided.


8.  Discussed with RN and pt. 


9.  will need Bronch for persistent RUL atelectasis . Bronch tech not available 

till Tuesday. will schedule Tuesday











AUTUMN WILLIAM MD               Sep 29, 2019 07:07

## 2019-09-30 VITALS — SYSTOLIC BLOOD PRESSURE: 177 MMHG | DIASTOLIC BLOOD PRESSURE: 90 MMHG

## 2019-09-30 VITALS — DIASTOLIC BLOOD PRESSURE: 71 MMHG | SYSTOLIC BLOOD PRESSURE: 129 MMHG

## 2019-09-30 VITALS — SYSTOLIC BLOOD PRESSURE: 143 MMHG | DIASTOLIC BLOOD PRESSURE: 89 MMHG

## 2019-09-30 VITALS — DIASTOLIC BLOOD PRESSURE: 80 MMHG | SYSTOLIC BLOOD PRESSURE: 135 MMHG

## 2019-09-30 VITALS — DIASTOLIC BLOOD PRESSURE: 76 MMHG | SYSTOLIC BLOOD PRESSURE: 134 MMHG

## 2019-09-30 VITALS — SYSTOLIC BLOOD PRESSURE: 161 MMHG | DIASTOLIC BLOOD PRESSURE: 84 MMHG

## 2019-09-30 LAB
ALBUMIN SERPL-MCNC: 2.6 G/DL (ref 3.4–5)
ALBUMIN/GLOB SERPL: 0.7 {RATIO} (ref 1–1.7)
ALP SERPL-CCNC: 84 U/L (ref 46–116)
ALT SERPL-CCNC: 13 U/L (ref 16–63)
ANION GAP SERPL CALC-SCNC: 6 MMOL/L (ref 6–14)
AST SERPL-CCNC: 12 U/L (ref 15–37)
BASOPHILS # BLD AUTO: 0 X10^3/UL (ref 0–0.2)
BASOPHILS NFR BLD: 0 % (ref 0–3)
BILIRUB SERPL-MCNC: 0.3 MG/DL (ref 0.2–1)
BUN SERPL-MCNC: 30 MG/DL (ref 8–26)
BUN/CREAT SERPL: 20 (ref 6–20)
CALCIUM SERPL-MCNC: 9 MG/DL (ref 8.5–10.1)
CHLORIDE SERPL-SCNC: 106 MMOL/L (ref 98–107)
CO2 SERPL-SCNC: 30 MMOL/L (ref 21–32)
CREAT SERPL-MCNC: 1.5 MG/DL (ref 0.7–1.3)
EOSINOPHIL NFR BLD: 0 % (ref 0–3)
EOSINOPHIL NFR BLD: 0 X10^3/UL (ref 0–0.7)
ERYTHROCYTE [DISTWIDTH] IN BLOOD BY AUTOMATED COUNT: 15 % (ref 11.5–14.5)
GFR SERPLBLD BASED ON 1.73 SQ M-ARVRAT: 57.4 ML/MIN
GLOBULIN SER-MCNC: 3.7 G/DL (ref 2.2–3.8)
GLUCOSE SERPL-MCNC: 146 MG/DL (ref 70–99)
HCT VFR BLD CALC: 45.6 % (ref 39–53)
HGB BLD-MCNC: 15.2 G/DL (ref 13–17.5)
LYMPHOCYTES # BLD: 0.3 X10^3/UL (ref 1–4.8)
LYMPHOCYTES NFR BLD AUTO: 3 % (ref 24–48)
MCH RBC QN AUTO: 33 PG (ref 25–35)
MCHC RBC AUTO-ENTMCNC: 33 G/DL (ref 31–37)
MCV RBC AUTO: 98 FL (ref 79–100)
MONO #: 0.5 X10^3/UL (ref 0–1.1)
MONOCYTES NFR BLD: 6 % (ref 0–9)
NEUT #: 8.7 X10^3/UL (ref 1.8–7.7)
NEUTROPHILS NFR BLD AUTO: 91 % (ref 31–73)
PLATELET # BLD AUTO: 140 X10^3/UL (ref 140–400)
POTASSIUM SERPL-SCNC: 3.7 MMOL/L (ref 3.5–5.1)
PROT SERPL-MCNC: 6.3 G/DL (ref 6.4–8.2)
RBC # BLD AUTO: 4.66 X10^6/UL (ref 4.3–5.7)
SODIUM SERPL-SCNC: 142 MMOL/L (ref 136–145)
WBC # BLD AUTO: 9.5 X10^3/UL (ref 4–11)

## 2019-09-30 RX ADMIN — METHYLPREDNISOLONE SODIUM SUCCINATE SCH MG: 125 INJECTION, POWDER, FOR SOLUTION INTRAMUSCULAR; INTRAVENOUS at 23:36

## 2019-09-30 RX ADMIN — SIMVASTATIN SCH MG: 20 TABLET, FILM COATED ORAL at 21:00

## 2019-09-30 RX ADMIN — PIPERACILLIN SODIUM AND TAZOBACTAM SODIUM SCH MLS/HR: 3; .375 INJECTION, POWDER, LYOPHILIZED, FOR SOLUTION INTRAVENOUS at 17:55

## 2019-09-30 RX ADMIN — BUDESONIDE SCH MG: 0.5 INHALANT RESPIRATORY (INHALATION) at 07:32

## 2019-09-30 RX ADMIN — IPRATROPIUM BROMIDE AND ALBUTEROL SULFATE SCH ML: .5; 3 SOLUTION RESPIRATORY (INHALATION) at 15:49

## 2019-09-30 RX ADMIN — POTASSIUM CHLORIDE SCH MEQ: 750 TABLET, FILM COATED, EXTENDED RELEASE ORAL at 08:19

## 2019-09-30 RX ADMIN — Medication SCH CAP: at 08:18

## 2019-09-30 RX ADMIN — METOPROLOL SUCCINATE SCH MG: 50 TABLET, EXTENDED RELEASE ORAL at 08:18

## 2019-09-30 RX ADMIN — FUROSEMIDE SCH MG: 40 TABLET ORAL at 08:19

## 2019-09-30 RX ADMIN — METHYLPREDNISOLONE SODIUM SUCCINATE SCH MG: 125 INJECTION, POWDER, FOR SOLUTION INTRAMUSCULAR; INTRAVENOUS at 12:16

## 2019-09-30 RX ADMIN — Medication SCH CAP: at 21:00

## 2019-09-30 RX ADMIN — BUDESONIDE SCH MG: 0.5 INHALANT RESPIRATORY (INHALATION) at 20:12

## 2019-09-30 RX ADMIN — IPRATROPIUM BROMIDE AND ALBUTEROL SULFATE SCH ML: .5; 3 SOLUTION RESPIRATORY (INHALATION) at 07:32

## 2019-09-30 RX ADMIN — METHYLPREDNISOLONE SODIUM SUCCINATE SCH MG: 125 INJECTION, POWDER, FOR SOLUTION INTRAMUSCULAR; INTRAVENOUS at 00:04

## 2019-09-30 RX ADMIN — VANCOMYCIN HYDROCHLORIDE SCH MLS/HR: 1 INJECTION, POWDER, FOR SOLUTION INTRAVENOUS at 14:27

## 2019-09-30 RX ADMIN — LISINOPRIL SCH MG: 20 TABLET ORAL at 08:17

## 2019-09-30 RX ADMIN — IPRATROPIUM BROMIDE AND ALBUTEROL SULFATE SCH ML: .5; 3 SOLUTION RESPIRATORY (INHALATION) at 20:12

## 2019-09-30 RX ADMIN — ISOSORBIDE MONONITRATE SCH MG: 30 TABLET, EXTENDED RELEASE ORAL at 08:18

## 2019-09-30 RX ADMIN — PIPERACILLIN SODIUM AND TAZOBACTAM SODIUM SCH MLS/HR: 3; .375 INJECTION, POWDER, LYOPHILIZED, FOR SOLUTION INTRAVENOUS at 12:16

## 2019-09-30 RX ADMIN — ENOXAPARIN SODIUM SCH MG: 40 INJECTION SUBCUTANEOUS at 10:59

## 2019-09-30 RX ADMIN — IPRATROPIUM BROMIDE AND ALBUTEROL SULFATE SCH ML: .5; 3 SOLUTION RESPIRATORY (INHALATION) at 11:42

## 2019-09-30 RX ADMIN — METHYLPREDNISOLONE SODIUM SUCCINATE SCH MG: 125 INJECTION, POWDER, FOR SOLUTION INTRAMUSCULAR; INTRAVENOUS at 05:43

## 2019-09-30 RX ADMIN — PIPERACILLIN SODIUM AND TAZOBACTAM SODIUM SCH MLS/HR: 3; .375 INJECTION, POWDER, LYOPHILIZED, FOR SOLUTION INTRAVENOUS at 05:43

## 2019-09-30 RX ADMIN — PIPERACILLIN SODIUM AND TAZOBACTAM SODIUM SCH MLS/HR: 3; .375 INJECTION, POWDER, LYOPHILIZED, FOR SOLUTION INTRAVENOUS at 23:37

## 2019-09-30 RX ADMIN — PIPERACILLIN SODIUM AND TAZOBACTAM SODIUM SCH MLS/HR: 3; .375 INJECTION, POWDER, LYOPHILIZED, FOR SOLUTION INTRAVENOUS at 00:06

## 2019-09-30 RX ADMIN — METHYLPREDNISOLONE SODIUM SUCCINATE SCH MG: 125 INJECTION, POWDER, FOR SOLUTION INTRAMUSCULAR; INTRAVENOUS at 17:55

## 2019-09-30 NOTE — RAD
PA and lateral chest x-ray compared to similar examination dated 9/27/2019

for follow-up on atelectasis.

 

FINDINGS: There is worsening atelectasis in the right upper lobe, with 

near complete collapse of the right upper lobe, resulting in tenting of 

the right hemidiaphragm. Left lung has developed patchy airspace disease 

in the left lung base which could reflect atelectasis or pneumonic 

infiltrate. Heart size is enlarged but stable. Hilar adenopathy is 

suggested, though this could be on the basis of prominent pulmonary 

vasculature is well.

 

IMPRESSION:

1. Progression to near complete right upper lobe atelectasis.

2. Development of atelectasis or infiltrate in the left lung base.

3. Stable cardiomegaly without congestive heart failure.

 

Electronically signed by: Alek Mora MD (9/30/2019 2:11 PM) [id number]

## 2019-09-30 NOTE — PDOC
CARDIO Progress Notes


Date and Time


Date of Service


9/30/19


Time of Evaluation


1210





Subjective


Subjective:  No Chest Pain, No Palpitations, Other (SOA better )





Vitals


Vitals





Vital Signs








  Date Time  Temp Pulse Resp B/P (MAP) Pulse Ox O2 Delivery O2 Flow Rate FiO2


 


9/30/19 11:43      Nasal Cannula 4.0 


 


9/30/19 11:09 98.4 68 18 143/89 (107) 93   





 98.4       








Weight


Weight [ ]





Input and Output


Intake and Output











Intake and Output 


 


 9/30/19





 07:00


 


Intake Total 3160 ml


 


Balance 3160 ml


 


 


 


Intake Oral 2810 ml


 


IV Total 350 ml


 


# Voids 4











Laboratory


Labs





Laboratory Tests








Test


 9/30/19


03:50


 


White Blood Count


 9.5 x10^3/uL


(4.0-11.0)


 


Red Blood Count


 4.66 x10^6/uL


(4.30-5.70)


 


Hemoglobin


 15.2 g/dL


(13.0-17.5)


 


Hematocrit


 45.6 %


(39.0-53.0)


 


Mean Corpuscular Volume 98 fL () 


 


Mean Corpuscular Hemoglobin 33 pg (25-35) 


 


Mean Corpuscular Hemoglobin


Concent 33 g/dL


(31-37)


 


Red Cell Distribution Width


 15.0 %


(11.5-14.5)


 


Platelet Count


 140 x10^3/uL


(140-400)


 


Neutrophils (%) (Auto) 91 % (31-73) 


 


Lymphocytes (%) (Auto) 3 % (24-48) 


 


Monocytes (%) (Auto) 6 % (0-9) 


 


Eosinophils (%) (Auto) 0 % (0-3) 


 


Basophils (%) (Auto) 0 % (0-3) 


 


Neutrophils # (Auto)


 8.7 x10^3/uL


(1.8-7.7)


 


Lymphocytes # (Auto)


 0.3 x10^3/uL


(1.0-4.8)


 


Monocytes # (Auto)


 0.5 x10^3/uL


(0.0-1.1)


 


Eosinophils # (Auto)


 0.0 x10^3/uL


(0.0-0.7)


 


Basophils # (Auto)


 0.0 x10^3/uL


(0.0-0.2)


 


Sodium Level


 142 mmol/L


(136-145)


 


Potassium Level


 3.7 mmol/L


(3.5-5.1)


 


Chloride Level


 106 mmol/L


()


 


Carbon Dioxide Level


 30 mmol/L


(21-32)


 


Anion Gap 6 (6-14) 


 


Blood Urea Nitrogen


 30 mg/dL


(8-26)


 


Creatinine


 1.5 mg/dL


(0.7-1.3)


 


Estimated GFR


(Cockcroft-Gault) 57.4 





 


BUN/Creatinine Ratio 20 (6-20) 


 


Glucose Level


 146 mg/dL


(70-99)


 


Calcium Level


 9.0 mg/dL


(8.5-10.1)


 


Total Bilirubin


 0.3 mg/dL


(0.2-1.0)


 


Aspartate Amino Transf


(AST/SGOT) 12 U/L (15-37) 





 


Alanine Aminotransferase


(ALT/SGPT) 13 U/L (16-63) 





 


Alkaline Phosphatase


 84 U/L


()


 


Total Protein


 6.3 g/dL


(6.4-8.2)


 


Albumin


 2.6 g/dL


(3.4-5.0)


 


Albumin/Globulin Ratio 0.7 (1.0-1.7) 











Physical Exam


HEENT:  Neck Supple W Full Motion


Chest:  Symmetric


LUNGS:  Other (friction rub )


Heart:  RRR (SR with intermittent PVCs)


Abdomen:  Soft N/T


Extremities:  No Calf Tenderness


Neurology:  alert, oriented, follow commands





Assessment


Assessment


1. Bradycardia; improved.


2. Dyspnea: mainly due to RUL collapse with possible endobronchial mass. Bronch 

planned for tomorrow.


3. Substance abuse: UDS+cocaine


4. NICM: last EF 35% and now better at 40-45%


5. Chronic systolic CHF: clinically compensated


6. Chronic LBBB


7. HTN: labile 


8. CKD: Cr stable 


9. Hx of noncompliance


10. COPD with continued tobaccoism


11. Moderate AI








Recommendations





Continue Toprol given cardiomyopathy


Afterload reduction; continue with hydralazine, Imdur, ACEi 


Smoking and cocaine cessation reinforced


Supportive care.











YEIMY FLORES           Sep 30, 2019 12:12

## 2019-09-30 NOTE — PDOC
PULMONARY PROGRESS NOTES


Subjective


sob better, has occ cough, no sputum, no pain


Vitals





Vital Signs








  Date Time  Temp Pulse Resp B/P (MAP) Pulse Ox O2 Delivery O2 Flow Rate FiO2


 


9/30/19 11:09 98.4 68 18 143/89 (107) 93 Nasal Cannula 4.0 





 98.4       








General:  Alert, No acute distress


Lungs:  Crackles (bases)


Cardiovascular:  S1, S2


Abdomen:  Soft, Non-tender


Neuro Exam:  Alert


Extremities:  No Edema


Labs





Laboratory Tests








Test


 9/29/19


04:00 9/30/19


03:50


 


White Blood Count


 8.6 x10^3/uL


(4.0-11.0) 9.5 x10^3/uL


(4.0-11.0)


 


Red Blood Count


 4.54 x10^6/uL


(4.30-5.70) 4.66 x10^6/uL


(4.30-5.70)


 


Hemoglobin


 14.9 g/dL


(13.0-17.5) 15.2 g/dL


(13.0-17.5)


 


Hematocrit


 44.1 %


(39.0-53.0) 45.6 %


(39.0-53.0)


 


Mean Corpuscular Volume 97 fL ()  98 fL () 


 


Mean Corpuscular Hemoglobin 33 pg (25-35)  33 pg (25-35) 


 


Mean Corpuscular Hemoglobin


Concent 34 g/dL


(31-37) 33 g/dL


(31-37)


 


Red Cell Distribution Width


 14.7 %


(11.5-14.5) 15.0 %


(11.5-14.5)


 


Platelet Count


 131 x10^3/uL


(140-400) 140 x10^3/uL


(140-400)


 


Neutrophils (%) (Auto) 92 % (31-73)  91 % (31-73) 


 


Lymphocytes (%) (Auto) 3 % (24-48)  3 % (24-48) 


 


Monocytes (%) (Auto) 5 % (0-9)  6 % (0-9) 


 


Eosinophils (%) (Auto) 0 % (0-3)  0 % (0-3) 


 


Basophils (%) (Auto) 0 % (0-3)  0 % (0-3) 


 


Neutrophils # (Auto)


 7.9 x10^3/uL


(1.8-7.7) 8.7 x10^3/uL


(1.8-7.7)


 


Lymphocytes # (Auto)


 0.3 x10^3/uL


(1.0-4.8) 0.3 x10^3/uL


(1.0-4.8)


 


Monocytes # (Auto)


 0.4 x10^3/uL


(0.0-1.1) 0.5 x10^3/uL


(0.0-1.1)


 


Eosinophils # (Auto)


 0.0 x10^3/uL


(0.0-0.7) 0.0 x10^3/uL


(0.0-0.7)


 


Basophils # (Auto)


 0.0 x10^3/uL


(0.0-0.2) 0.0 x10^3/uL


(0.0-0.2)


 


Sodium Level


 143 mmol/L


(136-145) 142 mmol/L


(136-145)


 


Potassium Level


 3.5 mmol/L


(3.5-5.1) 3.7 mmol/L


(3.5-5.1)


 


Chloride Level


 105 mmol/L


() 106 mmol/L


()


 


Carbon Dioxide Level


 33 mmol/L


(21-32) 30 mmol/L


(21-32)


 


Anion Gap 5 (6-14)  6 (6-14) 


 


Blood Urea Nitrogen


 31 mg/dL


(8-26) 30 mg/dL


(8-26)


 


Creatinine


 1.5 mg/dL


(0.7-1.3) 1.5 mg/dL


(0.7-1.3)


 


Estimated GFR


(Cockcroft-Gault) 57.4 


 57.4 





 


BUN/Creatinine Ratio 21 (6-20)  20 (6-20) 


 


Glucose Level


 151 mg/dL


(70-99) 146 mg/dL


(70-99)


 


Calcium Level


 9.0 mg/dL


(8.5-10.1) 9.0 mg/dL


(8.5-10.1)


 


Total Bilirubin


 0.2 mg/dL


(0.2-1.0) 0.3 mg/dL


(0.2-1.0)


 


Aspartate Amino Transf


(AST/SGOT) 12 U/L (15-37) 


 12 U/L (15-37) 





 


Alanine Aminotransferase


(ALT/SGPT) 13 U/L (16-63) 


 13 U/L (16-63) 





 


Alkaline Phosphatase


 59 U/L


() 84 U/L


()


 


Total Protein


 6.4 g/dL


(6.4-8.2) 6.3 g/dL


(6.4-8.2)


 


Albumin


 2.7 g/dL


(3.4-5.0) 2.6 g/dL


(3.4-5.0)


 


Albumin/Globulin Ratio 0.7 (1.0-1.7)  0.7 (1.0-1.7) 








Laboratory Tests








Test


 9/30/19


03:50


 


White Blood Count


 9.5 x10^3/uL


(4.0-11.0)


 


Red Blood Count


 4.66 x10^6/uL


(4.30-5.70)


 


Hemoglobin


 15.2 g/dL


(13.0-17.5)


 


Hematocrit


 45.6 %


(39.0-53.0)


 


Mean Corpuscular Volume 98 fL () 


 


Mean Corpuscular Hemoglobin 33 pg (25-35) 


 


Mean Corpuscular Hemoglobin


Concent 33 g/dL


(31-37)


 


Red Cell Distribution Width


 15.0 %


(11.5-14.5)


 


Platelet Count


 140 x10^3/uL


(140-400)


 


Neutrophils (%) (Auto) 91 % (31-73) 


 


Lymphocytes (%) (Auto) 3 % (24-48) 


 


Monocytes (%) (Auto) 6 % (0-9) 


 


Eosinophils (%) (Auto) 0 % (0-3) 


 


Basophils (%) (Auto) 0 % (0-3) 


 


Neutrophils # (Auto)


 8.7 x10^3/uL


(1.8-7.7)


 


Lymphocytes # (Auto)


 0.3 x10^3/uL


(1.0-4.8)


 


Monocytes # (Auto)


 0.5 x10^3/uL


(0.0-1.1)


 


Eosinophils # (Auto)


 0.0 x10^3/uL


(0.0-0.7)


 


Basophils # (Auto)


 0.0 x10^3/uL


(0.0-0.2)


 


Sodium Level


 142 mmol/L


(136-145)


 


Potassium Level


 3.7 mmol/L


(3.5-5.1)


 


Chloride Level


 106 mmol/L


()


 


Carbon Dioxide Level


 30 mmol/L


(21-32)


 


Anion Gap 6 (6-14) 


 


Blood Urea Nitrogen


 30 mg/dL


(8-26)


 


Creatinine


 1.5 mg/dL


(0.7-1.3)


 


Estimated GFR


(Cockcroft-Gault) 57.4 





 


BUN/Creatinine Ratio 20 (6-20) 


 


Glucose Level


 146 mg/dL


(70-99)


 


Calcium Level


 9.0 mg/dL


(8.5-10.1)


 


Total Bilirubin


 0.3 mg/dL


(0.2-1.0)


 


Aspartate Amino Transf


(AST/SGOT) 12 U/L (15-37) 





 


Alanine Aminotransferase


(ALT/SGPT) 13 U/L (16-63) 





 


Alkaline Phosphatase


 84 U/L


()


 


Total Protein


 6.3 g/dL


(6.4-8.2)


 


Albumin


 2.6 g/dL


(3.4-5.0)


 


Albumin/Globulin Ratio 0.7 (1.0-1.7) 








Medications





Active Scripts








 Medications  Dose


 Route/Sig


 Max Daily Dose Days Date Category Dose


Instructions


 


 Azithromycin


 Tablet


  (Azithromycin)


 250 Mg Tablet  1 Pkg


 PO UD


   6/27/17 Rx 


 


 Advair 250-50


 Diskus


  (Fluticasone/Salmeterol)


 1 Each Disk.w.dev  1 Puff


 IH BID


   6/26/17 Rx 


 


 Ipratropium


 Bromide 0.2 Mg/1


 Ml Solution  1 Vial


 NEB QID PRN


   6/26/17 Rx 


 


 Doxycycline


 Hyclate 100 Mg


 Tablet  1 Tab


 PO BID


   6/26/17 Rx  NEXT DOSE DUE AT BEDTIME TONIGHT


 


 Medrol


  (Methylprednisolone)


 4 Mg Tab.ds.pk  1 Pkg


 PO UD


   6/26/17 Rx 


 


 Albuterol Sulfate


 Neb Soln


  (Albuterol


 Sulfate) 0.63


 Mg/3 Ml Vial.neb  0.63 Mg


 NEB PRN Q4HRS PRN


  30 6/26/17 Rx  AS NEEDED


 


 Zocor


  (Simvastatin) 20


 Mg Tablet  20 Mg


 PO HS


   7/10/15 Reported  NEXT DOSE DUE TONIGHT AT BEDTIME


 


 Isosorbide


 Mononitrate Er


  (Isosorbide


 Mononitrate) 60


 Mg Tab.er.24h  60 Mg


 PO DAILY


   7/10/15 Reported  NEXT DOSE DUE IN AM


 


 Lisinopril 40 Mg


 Tablet  40 Mg


 PO DAILY


   12/15/14 Reported  NEXT DOSE DUE IN AM


 


 Carvedilol 25 Mg


 Tablet  25 Mg


 PO BIDWMEALS


   12/15/14 Reported  NEXT DOSE DUE AT DINNER THIS EVENING


 


 Clonidine Hcl 0.1


 Mg Tablet  0.1 Mg


 PO BID


   12/15/14 Reported  NEXT DOSE DUE AT BEDTIME TONIGHT


 


 Hydralazine Hcl


 50 Mg Tablet  50 Mg


 PO TID


   11/8/14 Reported  NEXT DOSE DUE AT BEDTIME TONIGHT


 


 Amlodipine


 Besylate 10 Mg


 Tablet  10 Mg


 PO DAILY


   2/24/14 Reported  NEXT DOSE DUE IN AM


 


 Furosemide 40 Mg


 Tablet  40 Mg


 PO DAILY


   2/24/14 Reported  NEXT DOSE DUE IN AM


 


 Potassium


 Chloride 10 Meq


 Tab.er.prt  20 Meq


 PO DAILY


   2/24/14 Reported  NEXT DOSE DUE IN AM








Comments


CXR 9/30


 RUL ATELECTASIS ,worse





echo





The systolic function is mildly impaired. EF 40-45%


There is global hypokinesis of the left ventricle. The septal motion is 

suggestive of conduction defect.


Doppler and Color Flow revealed moderate aortic regurgitation.





Impression


.


1.  Acute hypoxic respiratory failure secondary to multifactorial etiologies


including combination of underlying chronic obstructive pulmonary disease, right


upper lobe atelectasis. No evidence of thromboembolic disease. Possible CHF 9/26


2.  Long history of tobacco use, suspect underlying chronic obstructive


pulmonary disease.  Along with cocaine, marijuana and alcohol abuse.


3.  Abnormal CT chest with right upper lobe collapse.  Likely postobstructive. 


Possibility of endobronchial lesion cannot be ruled out.  Mucous plug would be


another consideration.


4.  Cardiomyopathy, mod AI





Plan


.





1.  02 titration 


2.  VQ scan neg for PE


3.  cxr ,no change in RUL collapse


4.  Follow Cardiology recommendation reg low EF and mod AI


5.  Monitor blood pressure closely.


6.   bronchodilators.


7.  Smoking cessation counseling provided.


8.  Discussed with RN and pt. 


9.  will need Bronch for persistent RUL atelectasis .schedule TuJESSE Redding MD                 Sep 30, 2019 11:39

## 2019-09-30 NOTE — NUR
SS following up with discharge planning. Pt transferred to second floor from ICU. Pt is 
currently requiring oxygen. No PT/OT recommendations at this time. SS will continue to 
follow for discharge planning.

## 2019-09-30 NOTE — PDOC
[facility] [facility] PROGRESS NOTE


Chief Complaint


Chief Complaint


COPD exacerbation


Possible lung mass


Hypoxia


Substance abuse





History of Present Illness


History of Present Illness


9/30/19


Pt seen and examined at bedside


Had CXR completed today


Bronchoscopy scheduled for Tuesday


Charts and labs reviewed





9/29/19


Pt seen and examined at bedside


Out of ICU, in better condition


EF = 40-45%


Bronchoscopy scheduled for Tuesday


Right sided atelectasis based on pulmonary progress note


Charts and labs reviewed





9/28/19


Pt seen and examined


Pt has been feeling better and getting more sleep on the floor. He is glad to be

out of the ICU and is breathing better. 


Pt recieving O2 via nasal cannula 


He still has some wheezing and a cough


Ejection fraction 35%


Appetite has improved


Scheduled bronch for Tuesday 


DW RN 





9/27/19


Pt was seen and examined in the ICU


Pt was on the commode


Receiving O2 via Ventimask


Charts and labs reviewed


EF = 35% with cardiomegaly


Continues to be hypoxia


Crackles heard on lung exam


ABG pH is 7.41


D/w RN





9/26/19


Pt was seen and examined in the ICU


Receiving O2 via Ventimask


EF = 35% with cardiomegaly


Continues to be hypoxia


Crackles heard on lung exam


Cr was 1.5 so CT with contrast could not be performed


V/Q scan for possible PE





Vitals/I&O


Vitals/I&O:





                                   Vital Signs








  Date Time  Temp Pulse Resp B/P (MAP) Pulse Ox O2 Delivery O2 Flow Rate FiO2


 


9/30/19 11:43      Nasal Cannula 4.0 


 


9/30/19 11:09 98.4 68 18 143/89 (107) 93   





 98.4       














                                    I & O   


 


 9/29/19 9/29/19 9/30/19





 15:00 23:00 07:00


 


Intake Total 1110 ml 1050 ml 1000 ml


 


Balance 1110 ml 1050 ml 1000 ml











Physical Exam


Physical Exam:


General:  Alert, No acute distress


Lungs:  Crackles (bases)


Cardiovascular:  S1, S2


Abdomen:  Soft, Non-tender


Neuro Exam:  Alert


Extremities:  No Edema


General:  mild distress


Heart:  Regular rate


Lungs:  Crackles (bases)


Abdomen:  Normal bowel sounds


Extremities:  Other (1+ bilateral LE pitting edema)





Labs


Labs:





Laboratory Tests








Test


 9/30/19


03:50


 


White Blood Count


 9.5 x10^3/uL


(4.0-11.0)


 


Red Blood Count


 4.66 x10^6/uL


(4.30-5.70)


 


Hemoglobin


 15.2 g/dL


(13.0-17.5)


 


Hematocrit


 45.6 %


(39.0-53.0)


 


Mean Corpuscular Volume 98 fL () 


 


Mean Corpuscular Hemoglobin 33 pg (25-35) 


 


Mean Corpuscular Hemoglobin


Concent 33 g/dL


(31-37)


 


Red Cell Distribution Width


 15.0 %


(11.5-14.5)


 


Platelet Count


 140 x10^3/uL


(140-400)


 


Neutrophils (%) (Auto) 91 % (31-73) 


 


Lymphocytes (%) (Auto) 3 % (24-48) 


 


Monocytes (%) (Auto) 6 % (0-9) 


 


Eosinophils (%) (Auto) 0 % (0-3) 


 


Basophils (%) (Auto) 0 % (0-3) 


 


Neutrophils # (Auto)


 8.7 x10^3/uL


(1.8-7.7)


 


Lymphocytes # (Auto)


 0.3 x10^3/uL


(1.0-4.8)


 


Monocytes # (Auto)


 0.5 x10^3/uL


(0.0-1.1)


 


Eosinophils # (Auto)


 0.0 x10^3/uL


(0.0-0.7)


 


Basophils # (Auto)


 0.0 x10^3/uL


(0.0-0.2)


 


Sodium Level


 142 mmol/L


(136-145)


 


Potassium Level


 3.7 mmol/L


(3.5-5.1)


 


Chloride Level


 106 mmol/L


()


 


Carbon Dioxide Level


 30 mmol/L


(21-32)


 


Anion Gap 6 (6-14) 


 


Blood Urea Nitrogen


 30 mg/dL


(8-26)


 


Creatinine


 1.5 mg/dL


(0.7-1.3)


 


Estimated GFR


(Cockcroft-Gault) 57.4 





 


BUN/Creatinine Ratio 20 (6-20) 


 


Glucose Level


 146 mg/dL


(70-99)


 


Calcium Level


 9.0 mg/dL


(8.5-10.1)


 


Total Bilirubin


 0.3 mg/dL


(0.2-1.0)


 


Aspartate Amino Transf


(AST/SGOT) 12 U/L (15-37) 





 


Alanine Aminotransferase


(ALT/SGPT) 13 U/L (16-63) 





 


Alkaline Phosphatase


 84 U/L


()


 


Total Protein


 6.3 g/dL


(6.4-8.2)


 


Albumin


 2.6 g/dL


(3.4-5.0)


 


Albumin/Globulin Ratio 0.7 (1.0-1.7) 











Review of Systems


Review of Systems:


No nausea, no vomiting


No chest pain, no palpitations





Assessment and Plan


Assessmemt and Plan


Problems


Medical Problems:


(1) Acute on chronic combined systolic and diastolic heart failure


Status: Acute  





(2) Acute respiratory failure with hypoxia


Status: Acute  





(3) Bradycardia


Status: Acute  





(4) CKD (chronic kidney disease), stage III


Status: Chronic  





(5) COPD exacerbation


Status: Acute  





(6) Dyspnea


Status: Acute  





(7) HTN (hypertension)


Status: Chronic  





(8) LBBB (left bundle branch block)


Status: Chronic  





(9) Left flank pain


Status: Acute  





(10) Lung mass


Status: Acute  





(11) NICM (nonischemic cardiomyopathy)


Status: Chronic  





(12) Pneumonia


Status: Acute  





(13) Substance abuse


Status: Acute  





Assessment


COPD exacerbation


CHF


Pneumonia


Substance abuse





Plan 


Titrate down O2


DVT prophylaxis


Steroids


Abx


O2 by nasal cannula


Labs


Await bronchoscopy results


Pulmonary following


Full code








Comment


Review of Relevant


I have reviewed the following items brittany (where applicable) has been applied.











ANTONIA LUX III DO           Sep 30, 2019 11:56

## 2019-10-01 VITALS — SYSTOLIC BLOOD PRESSURE: 158 MMHG | DIASTOLIC BLOOD PRESSURE: 93 MMHG

## 2019-10-01 VITALS — DIASTOLIC BLOOD PRESSURE: 80 MMHG | SYSTOLIC BLOOD PRESSURE: 152 MMHG

## 2019-10-01 VITALS — SYSTOLIC BLOOD PRESSURE: 164 MMHG | DIASTOLIC BLOOD PRESSURE: 90 MMHG

## 2019-10-01 VITALS — SYSTOLIC BLOOD PRESSURE: 148 MMHG | DIASTOLIC BLOOD PRESSURE: 86 MMHG

## 2019-10-01 VITALS — DIASTOLIC BLOOD PRESSURE: 108 MMHG | SYSTOLIC BLOOD PRESSURE: 169 MMHG

## 2019-10-01 VITALS — DIASTOLIC BLOOD PRESSURE: 91 MMHG | SYSTOLIC BLOOD PRESSURE: 154 MMHG

## 2019-10-01 LAB
ALBUMIN SERPL-MCNC: 2.6 G/DL (ref 3.4–5)
ALBUMIN/GLOB SERPL: 0.7 {RATIO} (ref 1–1.7)
ALP SERPL-CCNC: 87 U/L (ref 46–116)
ALT SERPL-CCNC: 12 U/L (ref 16–63)
ANION GAP SERPL CALC-SCNC: 7 MMOL/L (ref 6–14)
AST SERPL-CCNC: 12 U/L (ref 15–37)
BASOPHILS # BLD AUTO: 0 X10^3/UL (ref 0–0.2)
BASOPHILS NFR BLD: 0 % (ref 0–3)
BILIRUB SERPL-MCNC: 0.3 MG/DL (ref 0.2–1)
BUN SERPL-MCNC: 32 MG/DL (ref 8–26)
BUN/CREAT SERPL: 21 (ref 6–20)
CALCIUM SERPL-MCNC: 8.8 MG/DL (ref 8.5–10.1)
CHLORIDE SERPL-SCNC: 105 MMOL/L (ref 98–107)
CO2 SERPL-SCNC: 30 MMOL/L (ref 21–32)
CREAT SERPL-MCNC: 1.5 MG/DL (ref 0.7–1.3)
EOSINOPHIL NFR BLD: 0 % (ref 0–3)
EOSINOPHIL NFR BLD: 0 X10^3/UL (ref 0–0.7)
ERYTHROCYTE [DISTWIDTH] IN BLOOD BY AUTOMATED COUNT: 15 % (ref 11.5–14.5)
GFR SERPLBLD BASED ON 1.73 SQ M-ARVRAT: 57.4 ML/MIN
GLOBULIN SER-MCNC: 3.7 G/DL (ref 2.2–3.8)
GLUCOSE SERPL-MCNC: 140 MG/DL (ref 70–99)
HCT VFR BLD CALC: 45.4 % (ref 39–53)
HGB BLD-MCNC: 15.3 G/DL (ref 13–17.5)
LYMPHOCYTES # BLD: 0.2 X10^3/UL (ref 1–4.8)
LYMPHOCYTES NFR BLD AUTO: 2 % (ref 24–48)
MCH RBC QN AUTO: 33 PG (ref 25–35)
MCHC RBC AUTO-ENTMCNC: 34 G/DL (ref 31–37)
MCV RBC AUTO: 97 FL (ref 79–100)
MONO #: 0.6 X10^3/UL (ref 0–1.1)
MONOCYTES NFR BLD: 6 % (ref 0–9)
NEUT #: 9.5 X10^3/UL (ref 1.8–7.7)
NEUTROPHILS NFR BLD AUTO: 92 % (ref 31–73)
PLATELET # BLD AUTO: 148 X10^3/UL (ref 140–400)
POTASSIUM SERPL-SCNC: 3.6 MMOL/L (ref 3.5–5.1)
PROT SERPL-MCNC: 6.3 G/DL (ref 6.4–8.2)
RBC # BLD AUTO: 4.66 X10^6/UL (ref 4.3–5.7)
SODIUM SERPL-SCNC: 142 MMOL/L (ref 136–145)
WBC # BLD AUTO: 10.4 X10^3/UL (ref 4–11)

## 2019-10-01 PROCEDURE — 0B948ZZ DRAINAGE OF RIGHT UPPER LOBE BRONCHUS, VIA NATURAL OR ARTIFICIAL OPENING ENDOSCOPIC: ICD-10-PCS | Performed by: INTERNAL MEDICINE

## 2019-10-01 RX ADMIN — LISINOPRIL SCH MG: 20 TABLET ORAL at 07:48

## 2019-10-01 RX ADMIN — Medication SCH CAP: at 20:42

## 2019-10-01 RX ADMIN — ENOXAPARIN SODIUM SCH MG: 40 INJECTION SUBCUTANEOUS at 11:00

## 2019-10-01 RX ADMIN — ISOSORBIDE MONONITRATE SCH MG: 30 TABLET, EXTENDED RELEASE ORAL at 07:47

## 2019-10-01 RX ADMIN — POTASSIUM CHLORIDE SCH MEQ: 750 TABLET, FILM COATED, EXTENDED RELEASE ORAL at 07:47

## 2019-10-01 RX ADMIN — PIPERACILLIN SODIUM AND TAZOBACTAM SODIUM SCH MLS/HR: 3; .375 INJECTION, POWDER, LYOPHILIZED, FOR SOLUTION INTRAVENOUS at 12:53

## 2019-10-01 RX ADMIN — FUROSEMIDE SCH MG: 40 TABLET ORAL at 07:48

## 2019-10-01 RX ADMIN — METHYLPREDNISOLONE SODIUM SUCCINATE SCH MG: 125 INJECTION, POWDER, FOR SOLUTION INTRAMUSCULAR; INTRAVENOUS at 12:53

## 2019-10-01 RX ADMIN — BUDESONIDE SCH MG: 0.5 INHALANT RESPIRATORY (INHALATION) at 07:59

## 2019-10-01 RX ADMIN — METHYLPREDNISOLONE SODIUM SUCCINATE SCH MG: 125 INJECTION, POWDER, FOR SOLUTION INTRAMUSCULAR; INTRAVENOUS at 17:15

## 2019-10-01 RX ADMIN — METOPROLOL SUCCINATE SCH MG: 50 TABLET, EXTENDED RELEASE ORAL at 07:48

## 2019-10-01 RX ADMIN — METHYLPREDNISOLONE SODIUM SUCCINATE SCH MG: 125 INJECTION, POWDER, FOR SOLUTION INTRAMUSCULAR; INTRAVENOUS at 23:51

## 2019-10-01 RX ADMIN — BUDESONIDE SCH MG: 0.5 INHALANT RESPIRATORY (INHALATION) at 19:32

## 2019-10-01 RX ADMIN — PIPERACILLIN SODIUM AND TAZOBACTAM SODIUM SCH MLS/HR: 3; .375 INJECTION, POWDER, LYOPHILIZED, FOR SOLUTION INTRAVENOUS at 23:52

## 2019-10-01 RX ADMIN — Medication SCH CAP: at 07:47

## 2019-10-01 RX ADMIN — IPRATROPIUM BROMIDE AND ALBUTEROL SULFATE SCH ML: .5; 3 SOLUTION RESPIRATORY (INHALATION) at 07:59

## 2019-10-01 RX ADMIN — PIPERACILLIN SODIUM AND TAZOBACTAM SODIUM SCH MLS/HR: 3; .375 INJECTION, POWDER, LYOPHILIZED, FOR SOLUTION INTRAVENOUS at 05:49

## 2019-10-01 RX ADMIN — SIMVASTATIN SCH MG: 20 TABLET, FILM COATED ORAL at 20:42

## 2019-10-01 RX ADMIN — IPRATROPIUM BROMIDE AND ALBUTEROL SULFATE SCH ML: .5; 3 SOLUTION RESPIRATORY (INHALATION) at 12:00

## 2019-10-01 RX ADMIN — IPRATROPIUM BROMIDE AND ALBUTEROL SULFATE SCH ML: .5; 3 SOLUTION RESPIRATORY (INHALATION) at 19:32

## 2019-10-01 RX ADMIN — PIPERACILLIN SODIUM AND TAZOBACTAM SODIUM SCH MLS/HR: 3; .375 INJECTION, POWDER, LYOPHILIZED, FOR SOLUTION INTRAVENOUS at 17:15

## 2019-10-01 RX ADMIN — METHYLPREDNISOLONE SODIUM SUCCINATE SCH MG: 125 INJECTION, POWDER, FOR SOLUTION INTRAMUSCULAR; INTRAVENOUS at 05:49

## 2019-10-01 RX ADMIN — VANCOMYCIN HYDROCHLORIDE PRN EACH: 1 INJECTION, POWDER, LYOPHILIZED, FOR SOLUTION INTRAVENOUS at 11:12

## 2019-10-01 RX ADMIN — IPRATROPIUM BROMIDE AND ALBUTEROL SULFATE SCH ML: .5; 3 SOLUTION RESPIRATORY (INHALATION) at 15:09

## 2019-10-01 RX ADMIN — VANCOMYCIN HYDROCHLORIDE PRN EACH: 1 INJECTION, POWDER, LYOPHILIZED, FOR SOLUTION INTRAVENOUS at 11:16

## 2019-10-01 RX ADMIN — VANCOMYCIN HYDROCHLORIDE SCH MLS/HR: 1 INJECTION, POWDER, FOR SOLUTION INTRAVENOUS at 08:35

## 2019-10-01 NOTE — PDOC
CARDIO Progress Notes


Date and Time


Date of Service


10/1/2019


Time of Evaluation


1020





Subjective


Subjective:  No Chest Pain, No Palpitations, Other (SOA better )





Vitals


Vitals





Vital Signs








  Date Time  Temp Pulse Resp B/P (MAP) Pulse Ox O2 Delivery O2 Flow Rate FiO2


 


10/1/19 07:56     93 Nasal Cannula 3.0 


 


10/1/19 07:00 97.7 60 20 158/93 (114)    





 97.7       








Weight


Weight [ ]





Input and Output


Intake and Output











Intake and Output 


 


 10/1/19





 07:00


 


Intake Total 1350 ml


 


Balance 1350 ml


 


 


 


Intake Oral 1250 ml


 


IV Total 100 ml


 


# Voids 8


 


# Bowel Movements 1











Laboratory


Labs





Laboratory Tests








Test


 10/1/19


03:55


 


White Blood Count


 10.4 x10^3/uL


(4.0-11.0)


 


Red Blood Count


 4.66 x10^6/uL


(4.30-5.70)


 


Hemoglobin


 15.3 g/dL


(13.0-17.5)


 


Hematocrit


 45.4 %


(39.0-53.0)


 


Mean Corpuscular Volume 97 fL () 


 


Mean Corpuscular Hemoglobin 33 pg (25-35) 


 


Mean Corpuscular Hemoglobin


Concent 34 g/dL


(31-37)


 


Red Cell Distribution Width


 15.0 %


(11.5-14.5)


 


Platelet Count


 148 x10^3/uL


(140-400)


 


Neutrophils (%) (Auto) 92 % (31-73) 


 


Lymphocytes (%) (Auto) 2 % (24-48) 


 


Monocytes (%) (Auto) 6 % (0-9) 


 


Eosinophils (%) (Auto) 0 % (0-3) 


 


Basophils (%) (Auto) 0 % (0-3) 


 


Neutrophils # (Auto)


 9.5 x10^3/uL


(1.8-7.7)


 


Lymphocytes # (Auto)


 0.2 x10^3/uL


(1.0-4.8)


 


Monocytes # (Auto)


 0.6 x10^3/uL


(0.0-1.1)


 


Eosinophils # (Auto)


 0.0 x10^3/uL


(0.0-0.7)


 


Basophils # (Auto)


 0.0 x10^3/uL


(0.0-0.2)


 


Sodium Level


 142 mmol/L


(136-145)


 


Potassium Level


 3.6 mmol/L


(3.5-5.1)


 


Chloride Level


 105 mmol/L


()


 


Carbon Dioxide Level


 30 mmol/L


(21-32)


 


Anion Gap 7 (6-14) 


 


Blood Urea Nitrogen


 32 mg/dL


(8-26)


 


Creatinine


 1.5 mg/dL


(0.7-1.3)


 


Estimated GFR


(Cockcroft-Gault) 57.4 





 


BUN/Creatinine Ratio 21 (6-20) 


 


Glucose Level


 140 mg/dL


(70-99)


 


Calcium Level


 8.8 mg/dL


(8.5-10.1)


 


Total Bilirubin


 0.3 mg/dL


(0.2-1.0)


 


Aspartate Amino Transf


(AST/SGOT) 12 U/L (15-37) 





 


Alanine Aminotransferase


(ALT/SGPT) 12 U/L (16-63) 





 


Alkaline Phosphatase


 87 U/L


()


 


Total Protein


 6.3 g/dL


(6.4-8.2)


 


Albumin


 2.6 g/dL


(3.4-5.0)


 


Albumin/Globulin Ratio 0.7 (1.0-1.7) 











Physical Exam


HEENT:  Neck Supple W Full Motion


Chest:  Symmetric


LUNGS:  Other (upper rhonchi)


Heart:  RRR (SR with intermittent PVCs)


Abdomen:  Soft N/T


Extremities:  No Calf Tenderness, Other (trace LE edema)


Neurology:  alert, oriented, follow commands





Assessment


Assessment


1. Asymptomatic SB: chronic LBBB with intermittent AIVR


2. Dyspnea: mainly due to RUL collapse with possible endobronchial mass. Bronch 

planned for today


3. Substance abuse: UDS+cocaine


4. NICM: last EF 35% and now better at 40-45%


5. Chronic systolic CHF: clinically compensated


6. HTN: labile being NPO for bronch


7. CKD: Cr stable 


9. Hx of noncompliance


10. COPD with continued tobaccoism


11. Moderate AI





Recommendations


1. Continue Toprol and secondary prevention measures.


2. Continue BP control with hydralazine, Imdur, ACEi 


3. Smoking and cocaine cessation reinforced. He has refused AICD in the past. 


4. Check Mg.  Continue lasix therapy.











LIZET LLOYD           Oct 1, 2019 10:34

## 2019-10-01 NOTE — PDOC
[employer] [facility] PROGRESS NOTE


Chief Complaint


Chief Complaint


COPD exacerbation


Possible lung mass


Hypoxia


Substance abuse





History of Present Illness


History of Present Illness


10/01/19


Pt seen and examined with nasal cannula 3.0 


PT is scheduled for a bronchoscopy today 


Pt is feeling better and wants to be discharged as soon as possible. We 

explained to him that it will be up to Dr. Kitchen if he can go home today or 

tomorrow. 


EF = 40-45% 


FER RN 








9/30/19


Pt seen and examined at bedside


Had CXR completed today


Bronchoscopy scheduled for Tuesday


Charts and labs reviewed





9/29/19


Pt seen and examined at bedside


Out of ICU, in better condition


EF = 40-45%


Bronchoscopy scheduled for Tuesday


Right sided atelectasis based on pulmonary progress note


Charts and labs reviewed





9/28/19


Pt seen and examined


Pt has been feeling better and getting more sleep on the floor. He is glad to be

out of the ICU and is breathing better. 


Pt recieving O2 via nasal cannula 


He still has some wheezing and a cough


Ejection fraction 35%


Appetite has improved


Scheduled bronch for Tuesday 


FER RN 





9/27/19


Pt was seen and examined in the ICU


Pt was on the commode


Receiving O2 via Ventimask


Charts and labs reviewed


EF = 35% with cardiomegaly


Continues to be hypoxia


Crackles heard on lung exam


ABG pH is 7.41


D/w RN





9/26/19


Pt was seen and examined in the ICU


Receiving O2 via Ventimask


EF = 35% with cardiomegaly


Continues to be hypoxia


Crackles heard on lung exam


Cr was 1.5 so CT with contrast could not be performed


V/Q scan for possible PE





Vitals/I&O


Vitals/I&O:





                                   Vital Signs








  Date Time  Temp Pulse Resp B/P (MAP) Pulse Ox O2 Delivery O2 Flow Rate FiO2


 


10/1/19 07:56     93 Nasal Cannula 3.0 


 


10/1/19 07:00 97.7 60 20 158/93 (114)    





 97.7       














                                    I & O   


 


 9/30/19 9/30/19 10/1/19





 15:00 23:00 07:00


 


Intake Total 550 ml 800 ml 0 ml


 


Balance 550 ml 800 ml 0 ml











Physical Exam


Physical Exam:


General:  Alert, No acute distress


Lungs:  Crackles (bases)


Cardiovascular:  S1, S2


Abdomen:  Soft, Non-tender


Neuro Exam:  Alert


Extremities:  No Edema


General:  Alert, Oriented X3, Cooperative, mild distress


Heart:  Regular rate


Lungs:  Crackles (bases)


Abdomen:  Normal bowel sounds


Extremities:  No clubbing, No cyanosis, Other (1+ bilateral LE pitting edema)


Skin:  No rashes





Labs


Labs:





Laboratory Tests








Test


 10/1/19


03:55


 


White Blood Count


 10.4 x10^3/uL


(4.0-11.0)


 


Red Blood Count


 4.66 x10^6/uL


(4.30-5.70)


 


Hemoglobin


 15.3 g/dL


(13.0-17.5)


 


Hematocrit


 45.4 %


(39.0-53.0)


 


Mean Corpuscular Volume 97 fL () 


 


Mean Corpuscular Hemoglobin 33 pg (25-35) 


 


Mean Corpuscular Hemoglobin


Concent 34 g/dL


(31-37)


 


Red Cell Distribution Width


 15.0 %


(11.5-14.5)


 


Platelet Count


 148 x10^3/uL


(140-400)


 


Neutrophils (%) (Auto) 92 % (31-73) 


 


Lymphocytes (%) (Auto) 2 % (24-48) 


 


Monocytes (%) (Auto) 6 % (0-9) 


 


Eosinophils (%) (Auto) 0 % (0-3) 


 


Basophils (%) (Auto) 0 % (0-3) 


 


Neutrophils # (Auto)


 9.5 x10^3/uL


(1.8-7.7)


 


Lymphocytes # (Auto)


 0.2 x10^3/uL


(1.0-4.8)


 


Monocytes # (Auto)


 0.6 x10^3/uL


(0.0-1.1)


 


Eosinophils # (Auto)


 0.0 x10^3/uL


(0.0-0.7)


 


Basophils # (Auto)


 0.0 x10^3/uL


(0.0-0.2)


 


Sodium Level


 142 mmol/L


(136-145)


 


Potassium Level


 3.6 mmol/L


(3.5-5.1)


 


Chloride Level


 105 mmol/L


()


 


Carbon Dioxide Level


 30 mmol/L


(21-32)


 


Anion Gap 7 (6-14) 


 


Blood Urea Nitrogen


 32 mg/dL


(8-26)


 


Creatinine


 1.5 mg/dL


(0.7-1.3)


 


Estimated GFR


(Cockcroft-Gault) 57.4 





 


BUN/Creatinine Ratio 21 (6-20) 


 


Glucose Level


 140 mg/dL


(70-99)


 


Calcium Level


 8.8 mg/dL


(8.5-10.1)


 


Magnesium Level


 2.5 mg/dL


(1.8-2.4)


 


Total Bilirubin


 0.3 mg/dL


(0.2-1.0)


 


Aspartate Amino Transf


(AST/SGOT) 12 U/L (15-37) 





 


Alanine Aminotransferase


(ALT/SGPT) 12 U/L (16-63) 





 


Alkaline Phosphatase


 87 U/L


()


 


Total Protein


 6.3 g/dL


(6.4-8.2)


 


Albumin


 2.6 g/dL


(3.4-5.0)


 


Albumin/Globulin Ratio 0.7 (1.0-1.7) 











Review of Systems


Review of Systems:


Denies pain


Denies n/v/d





Assessment and Plan


Assessmemt and Plan


Problems


Medical Problems:


(1) Acute on chronic combined systolic and diastolic heart failure


Status: Acute  





(2) Acute respiratory failure with hypoxia


Status: Acute  





(3) Bradycardia


Status: Acute  





(4) CKD (chronic kidney disease), stage III


Status: Chronic  





(5) COPD exacerbation


Status: Acute  





(6) Dyspnea


Status: Acute  





(7) HTN (hypertension)


Status: Chronic  





(8) LBBB (left bundle branch block)


Status: Chronic  





(9) Left flank pain


Status: Acute  





(10) Lung mass


Status: Acute  





(11) NICM (nonischemic cardiomyopathy)


Status: Chronic  





(12) Pneumonia


Status: Acute  





(13) Substance abuse


Status: Acute  





Assessment


COPD exacerbation


CHF


Pneumonia


Substance abuse





Plan 


Await bronchoscopy report


Discharge okay with pulmonology  


Titrate down O2 nasal canula 


Steroids


DVT prophylaxis


Labs


Pulmonary following


Full code





Comment


Review of Relevant


I have reviewed the following items brittany (where applicable) has been applied.











ANTONIA LUX III DO            Oct 1, 2019 11:25

## 2019-10-01 NOTE — OP
DATE OF SURGERY:  



BRONCHOSCOPY NOTE



INDICATIONS:  Right upper lobe collapse, mucus plug versus endobronchial lesion.



DESCRIPTION OF PROCEDURE:  Informed consent was obtained from the patient.  All

risks and benefits were explained.  He agreed to proceed with the procedure. 

Propofol was used for sedation by Anesthesia.  Bronch was introduced through the

right nostril.  The upper airway was passed.  Thick white secretions seen in the

upper airway.  Vocal cords move equally with respiration.  Tiny polyps were seen

at the vocal cords.  Bronch was introduced through the vocal cords into the

trachea.  Minimal white secretions seen in the distal trachea.  Carey was

sharp.  The right lung was examined.  There were thick slimy secretions causing

near occlusion of all the subsegments of the right upper lobe bronchus.  Saline

irrigation done and multiple passes were made through the airway to remove a

very thick slimy secretions.  Once the secretions were removed, airway was

examined, no endobronchial lesions seen.  There was some bronchomalacia observed

in the right upper lobe.  Right middle lobe was patent, right lower lobe was

patent.  No significant secretions seen.  Left lung was examined.  All

subsegments of left upper lobe, lingula and left lower lobe was patent as well. 

No significant secretions seen.



IMPRESSION:

1.  Thick slimy secretions seen in all the subsegment of the right upper lobe

causing occlusion of all the subsegments.  Therapeutic bronchoscope performed. 

All secretions were removed.  Patency of the airway achieved.  No endobronchial

lesions seen.  No specimens obtained.

2.  Bronchomalacia involving the right upper lobe.

3.  Minimal polyp seen at the vocal cord.

4.  The patient tolerated the procedure well.  The patient could be discharged

home in the next 24 hours.

 



______________________________

JESSE DOVE MD



DR:  TYSON/coby  JOB#:  [id number] / [id number]

DD:  10/01/2019 12:13  DT:  10/01/2019 12:25

## 2019-10-01 NOTE — NUR
SS following up with discharge planning. PT recommended home independent. SS will continue 
to follow for discharge planning.

## 2019-10-01 NOTE — PDOC
PULMONARY PROGRESS NOTES


Subjective


sob better, has occ cough, no sputum, no pain


Vitals





Vital Signs








  Date Time  Temp Pulse Resp B/P (MAP) Pulse Ox O2 Delivery O2 Flow Rate FiO2


 


10/1/19 07:56     93 Nasal Cannula 3.0 


 


10/1/19 07:00 97.7 60 20 158/93 (114)    





 97.7       








General:  Alert, No acute distress


Lungs:  Crackles (bases)


Cardiovascular:  S1, S2


Abdomen:  Soft, Non-tender


Neuro Exam:  Alert


Extremities:  No Edema


Labs





Laboratory Tests








Test


 9/30/19


03:50 10/1/19


03:55


 


White Blood Count


 9.5 x10^3/uL


(4.0-11.0) 10.4 x10^3/uL


(4.0-11.0)


 


Red Blood Count


 4.66 x10^6/uL


(4.30-5.70) 4.66 x10^6/uL


(4.30-5.70)


 


Hemoglobin


 15.2 g/dL


(13.0-17.5) 15.3 g/dL


(13.0-17.5)


 


Hematocrit


 45.6 %


(39.0-53.0) 45.4 %


(39.0-53.0)


 


Mean Corpuscular Volume 98 fL ()  97 fL () 


 


Mean Corpuscular Hemoglobin 33 pg (25-35)  33 pg (25-35) 


 


Mean Corpuscular Hemoglobin


Concent 33 g/dL


(31-37) 34 g/dL


(31-37)


 


Red Cell Distribution Width


 15.0 %


(11.5-14.5) 15.0 %


(11.5-14.5)


 


Platelet Count


 140 x10^3/uL


(140-400) 148 x10^3/uL


(140-400)


 


Neutrophils (%) (Auto) 91 % (31-73)  92 % (31-73) 


 


Lymphocytes (%) (Auto) 3 % (24-48)  2 % (24-48) 


 


Monocytes (%) (Auto) 6 % (0-9)  6 % (0-9) 


 


Eosinophils (%) (Auto) 0 % (0-3)  0 % (0-3) 


 


Basophils (%) (Auto) 0 % (0-3)  0 % (0-3) 


 


Neutrophils # (Auto)


 8.7 x10^3/uL


(1.8-7.7) 9.5 x10^3/uL


(1.8-7.7)


 


Lymphocytes # (Auto)


 0.3 x10^3/uL


(1.0-4.8) 0.2 x10^3/uL


(1.0-4.8)


 


Monocytes # (Auto)


 0.5 x10^3/uL


(0.0-1.1) 0.6 x10^3/uL


(0.0-1.1)


 


Eosinophils # (Auto)


 0.0 x10^3/uL


(0.0-0.7) 0.0 x10^3/uL


(0.0-0.7)


 


Basophils # (Auto)


 0.0 x10^3/uL


(0.0-0.2) 0.0 x10^3/uL


(0.0-0.2)


 


Sodium Level


 142 mmol/L


(136-145) 142 mmol/L


(136-145)


 


Potassium Level


 3.7 mmol/L


(3.5-5.1) 3.6 mmol/L


(3.5-5.1)


 


Chloride Level


 106 mmol/L


() 105 mmol/L


()


 


Carbon Dioxide Level


 30 mmol/L


(21-32) 30 mmol/L


(21-32)


 


Anion Gap 6 (6-14)  7 (6-14) 


 


Blood Urea Nitrogen


 30 mg/dL


(8-26) 32 mg/dL


(8-26)


 


Creatinine


 1.5 mg/dL


(0.7-1.3) 1.5 mg/dL


(0.7-1.3)


 


Estimated GFR


(Cockcroft-Gault) 57.4 


 57.4 





 


BUN/Creatinine Ratio 20 (6-20)  21 (6-20) 


 


Glucose Level


 146 mg/dL


(70-99) 140 mg/dL


(70-99)


 


Calcium Level


 9.0 mg/dL


(8.5-10.1) 8.8 mg/dL


(8.5-10.1)


 


Total Bilirubin


 0.3 mg/dL


(0.2-1.0) 0.3 mg/dL


(0.2-1.0)


 


Aspartate Amino Transf


(AST/SGOT) 12 U/L (15-37) 


 12 U/L (15-37) 





 


Alanine Aminotransferase


(ALT/SGPT) 13 U/L (16-63) 


 12 U/L (16-63) 





 


Alkaline Phosphatase


 84 U/L


() 87 U/L


()


 


Total Protein


 6.3 g/dL


(6.4-8.2) 6.3 g/dL


(6.4-8.2)


 


Albumin


 2.6 g/dL


(3.4-5.0) 2.6 g/dL


(3.4-5.0)


 


Albumin/Globulin Ratio 0.7 (1.0-1.7)  0.7 (1.0-1.7) 








Laboratory Tests








Test


 10/1/19


03:55


 


White Blood Count


 10.4 x10^3/uL


(4.0-11.0)


 


Red Blood Count


 4.66 x10^6/uL


(4.30-5.70)


 


Hemoglobin


 15.3 g/dL


(13.0-17.5)


 


Hematocrit


 45.4 %


(39.0-53.0)


 


Mean Corpuscular Volume 97 fL () 


 


Mean Corpuscular Hemoglobin 33 pg (25-35) 


 


Mean Corpuscular Hemoglobin


Concent 34 g/dL


(31-37)


 


Red Cell Distribution Width


 15.0 %


(11.5-14.5)


 


Platelet Count


 148 x10^3/uL


(140-400)


 


Neutrophils (%) (Auto) 92 % (31-73) 


 


Lymphocytes (%) (Auto) 2 % (24-48) 


 


Monocytes (%) (Auto) 6 % (0-9) 


 


Eosinophils (%) (Auto) 0 % (0-3) 


 


Basophils (%) (Auto) 0 % (0-3) 


 


Neutrophils # (Auto)


 9.5 x10^3/uL


(1.8-7.7)


 


Lymphocytes # (Auto)


 0.2 x10^3/uL


(1.0-4.8)


 


Monocytes # (Auto)


 0.6 x10^3/uL


(0.0-1.1)


 


Eosinophils # (Auto)


 0.0 x10^3/uL


(0.0-0.7)


 


Basophils # (Auto)


 0.0 x10^3/uL


(0.0-0.2)


 


Sodium Level


 142 mmol/L


(136-145)


 


Potassium Level


 3.6 mmol/L


(3.5-5.1)


 


Chloride Level


 105 mmol/L


()


 


Carbon Dioxide Level


 30 mmol/L


(21-32)


 


Anion Gap 7 (6-14) 


 


Blood Urea Nitrogen


 32 mg/dL


(8-26)


 


Creatinine


 1.5 mg/dL


(0.7-1.3)


 


Estimated GFR


(Cockcroft-Gault) 57.4 





 


BUN/Creatinine Ratio 21 (6-20) 


 


Glucose Level


 140 mg/dL


(70-99)


 


Calcium Level


 8.8 mg/dL


(8.5-10.1)


 


Total Bilirubin


 0.3 mg/dL


(0.2-1.0)


 


Aspartate Amino Transf


(AST/SGOT) 12 U/L (15-37) 





 


Alanine Aminotransferase


(ALT/SGPT) 12 U/L (16-63) 





 


Alkaline Phosphatase


 87 U/L


()


 


Total Protein


 6.3 g/dL


(6.4-8.2)


 


Albumin


 2.6 g/dL


(3.4-5.0)


 


Albumin/Globulin Ratio 0.7 (1.0-1.7) 








Medications





Active Scripts








 Medications  Dose


 Route/Sig


 Max Daily Dose Days Date Category Dose


Instructions


 


 Azithromycin


 Tablet


  (Azithromycin)


 250 Mg Tablet  1 Pkg


 PO UD


   6/27/17 Rx 


 


 Advair 250-50


 Diskus


  (Fluticasone/Salmeterol)


 1 Each Disk.w.dev  1 Puff


 IH BID


   6/26/17 Rx 


 


 Ipratropium


 Bromide 0.2 Mg/1


 Ml Solution  1 Vial


 NEB QID PRN


   6/26/17 Rx 


 


 Doxycycline


 Hyclate 100 Mg


 Tablet  1 Tab


 PO BID


   6/26/17 Rx  NEXT DOSE DUE AT BEDTIME TONIGHT


 


 Medrol


  (Methylprednisolone)


 4 Mg Tab.ds.pk  1 Pkg


 PO UD


   6/26/17 Rx 


 


 Albuterol Sulfate


 Neb Soln


  (Albuterol


 Sulfate) 0.63


 Mg/3 Ml Vial.neb  0.63 Mg


 NEB PRN Q4HRS PRN


  30 6/26/17 Rx  AS NEEDED


 


 Zocor


  (Simvastatin) 20


 Mg Tablet  20 Mg


 PO HS


   7/10/15 Reported  NEXT DOSE DUE TONIGHT AT BEDTIME


 


 Isosorbide


 Mononitrate Er


  (Isosorbide


 Mononitrate) 60


 Mg Tab.er.24h  60 Mg


 PO DAILY


   7/10/15 Reported  NEXT DOSE DUE IN AM


 


 Lisinopril 40 Mg


 Tablet  40 Mg


 PO DAILY


   12/15/14 Reported  NEXT DOSE DUE IN AM


 


 Carvedilol 25 Mg


 Tablet  25 Mg


 PO BIDWMEALS


   12/15/14 Reported  NEXT DOSE DUE AT DINNER THIS EVENING


 


 Clonidine Hcl 0.1


 Mg Tablet  0.1 Mg


 PO BID


   12/15/14 Reported  NEXT DOSE DUE AT BEDTIME TONIGHT


 


 Hydralazine Hcl


 50 Mg Tablet  50 Mg


 PO TID


   11/8/14 Reported  NEXT DOSE DUE AT BEDTIME TONIGHT


 


 Amlodipine


 Besylate 10 Mg


 Tablet  10 Mg


 PO DAILY


   2/24/14 Reported  NEXT DOSE DUE IN AM


 


 Furosemide 40 Mg


 Tablet  40 Mg


 PO DAILY


   2/24/14 Reported  NEXT DOSE DUE IN AM


 


 Potassium


 Chloride 10 Meq


 Tab.er.prt  20 Meq


 PO DAILY


   2/24/14 Reported  NEXT DOSE DUE IN AM








Comments


CXR 9/30


 RUL ATELECTASIS ,worse





echo





The systolic function is mildly impaired. EF 40-45%


There is global hypokinesis of the left ventricle. The septal motion is 

suggestive of conduction defect.


Doppler and Color Flow revealed moderate aortic regurgitation.





Impression


.


1.  Acute hypoxic respiratory failure secondary to multifactorial etiologies


including combination of underlying chronic obstructive pulmonary disease, right


upper lobe atelectasis. No evidence of thromboembolic disease. Possible CHF 9/26


2.  Long history of tobacco use, suspect underlying chronic obstructive


pulmonary disease.  Along with cocaine, marijuana and alcohol abuse.


3.  Abnormal CT chest with right upper lobe collapse.  Likely postobstructive. 


Possibility of endobronchial lesion cannot be ruled out.  Mucous plug would be


another consideration.


4.  Cardiomyopathy, mod AI





Plan


.





1.  02 titration 


2.  VQ scan neg for PE


3.  cxr ,Persistent RUL collapse


4.  Follow Cardiology recommendation reg low EF and mod AI


5.  Monitor blood pressure closely.


6.   bronchodilators.


7.  Smoking cessation counseling provided.


8.  Discussed with RN and pt. 


9.  will need Bronch for persistent RUL atelectasis .scheduled today. All R/B 

explained. he agrees











JESSE DOVE MD                  Oct 1, 2019 09:46

## 2019-10-02 VITALS — DIASTOLIC BLOOD PRESSURE: 84 MMHG | SYSTOLIC BLOOD PRESSURE: 139 MMHG

## 2019-10-02 VITALS — DIASTOLIC BLOOD PRESSURE: 93 MMHG | SYSTOLIC BLOOD PRESSURE: 151 MMHG

## 2019-10-02 VITALS — DIASTOLIC BLOOD PRESSURE: 98 MMHG | SYSTOLIC BLOOD PRESSURE: 164 MMHG

## 2019-10-02 VITALS — SYSTOLIC BLOOD PRESSURE: 125 MMHG | DIASTOLIC BLOOD PRESSURE: 70 MMHG

## 2019-10-02 LAB
ALBUMIN SERPL-MCNC: 2.5 G/DL (ref 3.4–5)
ALBUMIN/GLOB SERPL: 0.7 {RATIO} (ref 1–1.7)
ALP SERPL-CCNC: 67 U/L (ref 46–116)
ALT SERPL-CCNC: 12 U/L (ref 16–63)
ANION GAP SERPL CALC-SCNC: 8 MMOL/L (ref 6–14)
AST SERPL-CCNC: 11 U/L (ref 15–37)
BASOPHILS # BLD AUTO: 0 X10^3/UL (ref 0–0.2)
BASOPHILS NFR BLD: 0 % (ref 0–3)
BILIRUB SERPL-MCNC: 0.3 MG/DL (ref 0.2–1)
BUN SERPL-MCNC: 41 MG/DL (ref 8–26)
BUN/CREAT SERPL: 26 (ref 6–20)
CALCIUM SERPL-MCNC: 8.8 MG/DL (ref 8.5–10.1)
CHLORIDE SERPL-SCNC: 105 MMOL/L (ref 98–107)
CO2 SERPL-SCNC: 30 MMOL/L (ref 21–32)
CREAT SERPL-MCNC: 1.6 MG/DL (ref 0.7–1.3)
EOSINOPHIL NFR BLD: 0 % (ref 0–3)
EOSINOPHIL NFR BLD: 0 X10^3/UL (ref 0–0.7)
ERYTHROCYTE [DISTWIDTH] IN BLOOD BY AUTOMATED COUNT: 15.2 % (ref 11.5–14.5)
GFR SERPLBLD BASED ON 1.73 SQ M-ARVRAT: 53.3 ML/MIN
GLOBULIN SER-MCNC: 3.4 G/DL (ref 2.2–3.8)
GLUCOSE SERPL-MCNC: 138 MG/DL (ref 70–99)
HCT VFR BLD CALC: 46.3 % (ref 39–53)
HGB BLD-MCNC: 15.6 G/DL (ref 13–17.5)
LYMPHOCYTES # BLD: 0.2 X10^3/UL (ref 1–4.8)
LYMPHOCYTES NFR BLD AUTO: 2 % (ref 24–48)
MCH RBC QN AUTO: 33 PG (ref 25–35)
MCHC RBC AUTO-ENTMCNC: 34 G/DL (ref 31–37)
MCV RBC AUTO: 97 FL (ref 79–100)
MONO #: 0.9 X10^3/UL (ref 0–1.1)
MONOCYTES NFR BLD: 8 % (ref 0–9)
NEUT #: 9.8 X10^3/UL (ref 1.8–7.7)
NEUTROPHILS NFR BLD AUTO: 90 % (ref 31–73)
PLATELET # BLD AUTO: 150 X10^3/UL (ref 140–400)
POTASSIUM SERPL-SCNC: 3.9 MMOL/L (ref 3.5–5.1)
PROT SERPL-MCNC: 5.9 G/DL (ref 6.4–8.2)
RBC # BLD AUTO: 4.76 X10^6/UL (ref 4.3–5.7)
SODIUM SERPL-SCNC: 143 MMOL/L (ref 136–145)
WBC # BLD AUTO: 10.9 X10^3/UL (ref 4–11)

## 2019-10-02 RX ADMIN — VANCOMYCIN HYDROCHLORIDE PRN EACH: 1 INJECTION, POWDER, LYOPHILIZED, FOR SOLUTION INTRAVENOUS at 09:53

## 2019-10-02 RX ADMIN — IPRATROPIUM BROMIDE AND ALBUTEROL SULFATE SCH ML: .5; 3 SOLUTION RESPIRATORY (INHALATION) at 11:42

## 2019-10-02 RX ADMIN — ENOXAPARIN SODIUM SCH MG: 40 INJECTION SUBCUTANEOUS at 11:00

## 2019-10-02 RX ADMIN — POTASSIUM CHLORIDE SCH MEQ: 750 TABLET, FILM COATED, EXTENDED RELEASE ORAL at 08:57

## 2019-10-02 RX ADMIN — BUDESONIDE SCH MG: 0.5 INHALANT RESPIRATORY (INHALATION) at 07:36

## 2019-10-02 RX ADMIN — LISINOPRIL SCH MG: 20 TABLET ORAL at 08:57

## 2019-10-02 RX ADMIN — Medication SCH CAP: at 08:56

## 2019-10-02 RX ADMIN — ISOSORBIDE MONONITRATE SCH MG: 30 TABLET, EXTENDED RELEASE ORAL at 08:59

## 2019-10-02 RX ADMIN — IPRATROPIUM BROMIDE AND ALBUTEROL SULFATE SCH ML: .5; 3 SOLUTION RESPIRATORY (INHALATION) at 15:22

## 2019-10-02 RX ADMIN — METHYLPREDNISOLONE SODIUM SUCCINATE SCH MG: 125 INJECTION, POWDER, FOR SOLUTION INTRAMUSCULAR; INTRAVENOUS at 05:48

## 2019-10-02 RX ADMIN — VANCOMYCIN HYDROCHLORIDE SCH MLS/HR: 1 INJECTION, POWDER, FOR SOLUTION INTRAVENOUS at 02:36

## 2019-10-02 RX ADMIN — IPRATROPIUM BROMIDE AND ALBUTEROL SULFATE SCH ML: .5; 3 SOLUTION RESPIRATORY (INHALATION) at 07:36

## 2019-10-02 RX ADMIN — PIPERACILLIN SODIUM AND TAZOBACTAM SODIUM SCH MLS/HR: 3; .375 INJECTION, POWDER, LYOPHILIZED, FOR SOLUTION INTRAVENOUS at 05:49

## 2019-10-02 RX ADMIN — METOPROLOL SUCCINATE SCH MG: 50 TABLET, EXTENDED RELEASE ORAL at 08:58

## 2019-10-02 RX ADMIN — FUROSEMIDE SCH MG: 40 TABLET ORAL at 08:57

## 2019-10-02 NOTE — PDOC
[facility] PROGRESS NOTE


Chief Complaint


Chief Complaint


COPD exacerbation


Possible lung mass


Hypoxia


Substance abuse





History of Present Illness


History of Present Illness


10/2/19


Pt seen and examined at bedside


Nasal canula 1.5


Pt had bronchoscopy on 10/1


Bronch IMPRESSION:


1.  Thick slimy secretions seen in all the subsegment of the right upper lobe


causing occlusion of all the subsegments.  Therapeutic bronchoscope performed. 


All secretions were removed.  Patency of the airway achieved.  No endobronchial


lesions seen.  No specimens obtained.


2.  Bronchomalacia involving the right upper lobe.


3.  Minimal polyp seen at the vocal cord.


4.  The patient tolerated the procedure well.  The patient could be discharged


home in the next 24 hours.


Charts and labs reviewed


EF = 40-45%


BUN is 41, up from 32


Cr is 1.6. up from 1.5





10/01/19


Pt seen and examined with nasal cannula 3.0 


PT is scheduled for a bronchoscopy today 


Pt is feeling better and wants to be discharged as soon as possible. We 

explained to him that it will be up to Dr. Kitchen if he can go home today or 

tomorrow. 


EF = 40-45% 


FER RN 








9/30/19


Pt seen and examined at bedside


Had CXR completed today


Bronchoscopy scheduled for Tuesday


Charts and labs reviewed





9/29/19


Pt seen and examined at bedside


Out of ICU, in better condition


EF = 40-45%


Bronchoscopy scheduled for Tuesday


Right sided atelectasis based on pulmonary progress note


Charts and labs reviewed





9/28/19


Pt seen and examined


Pt has been feeling better and getting more sleep on the floor. He is glad to be

out of the ICU and is breathing better. 


Pt recieving O2 via nasal cannula 


He still has some wheezing and a cough


Ejection fraction 35%


Appetite has improved


Scheduled bronch for Tuesday 


FER RN 





9/27/19


Pt was seen and examined in the ICU


Pt was on the commode


Receiving O2 via Ventimask


Charts and labs reviewed


EF = 35% with cardiomegaly


Continues to be hypoxia


Crackles heard on lung exam


ABG pH is 7.41


D/w RN





9/26/19


Pt was seen and examined in the ICU


Receiving O2 via Ventimask


EF = 35% with cardiomegaly


Continues to be hypoxia


Crackles heard on lung exam


Cr was 1.5 so CT with contrast could not be performed


V/Q scan for possible PE





Vitals/I&O


Vitals/I&O:





                                   Vital Signs








  Date Time  Temp Pulse Resp B/P (MAP) Pulse Ox O2 Delivery O2 Flow Rate FiO2


 


10/2/19 10:30 98.4 75 18 139/84 (102) 91 Nasal Cannula 3.0 





 98.4       














                                    I & O   


 


 10/1/19 10/1/19 10/2/19





 15:00 23:00 07:00


 


Intake Total 1140 ml 290 ml 750 ml


 


Balance 1140 ml 290 ml 750 ml











Physical Exam


Physical Exam:


General:  Alert, No acute distress


Lungs:  Crackles (bases)


Cardiovascular:  S1, S2


Abdomen:  Soft, Non-tender


Neuro Exam:  Alert


Extremities:  No Edema


General:  Alert, Oriented X3, Cooperative, mild distress


Heart:  Regular rate


Lungs:  Crackles (bases)


Abdomen:  Normal bowel sounds


Extremities:  No clubbing, No cyanosis, Other (1+ bilateral LE pitting edema)


Skin:  No rashes





Labs


Labs:





Laboratory Tests








Test


 10/2/19


03:45


 


White Blood Count


 10.9 x10^3/uL


(4.0-11.0)


 


Red Blood Count


 4.76 x10^6/uL


(4.30-5.70)


 


Hemoglobin


 15.6 g/dL


(13.0-17.5)


 


Hematocrit


 46.3 %


(39.0-53.0)


 


Mean Corpuscular Volume 97 fL () 


 


Mean Corpuscular Hemoglobin 33 pg (25-35) 


 


Mean Corpuscular Hemoglobin


Concent 34 g/dL


(31-37)


 


Red Cell Distribution Width


 15.2 %


(11.5-14.5)


 


Platelet Count


 150 x10^3/uL


(140-400)


 


Neutrophils (%) (Auto) 90 % (31-73) 


 


Lymphocytes (%) (Auto) 2 % (24-48) 


 


Monocytes (%) (Auto) 8 % (0-9) 


 


Eosinophils (%) (Auto) 0 % (0-3) 


 


Basophils (%) (Auto) 0 % (0-3) 


 


Neutrophils # (Auto)


 9.8 x10^3/uL


(1.8-7.7)


 


Lymphocytes # (Auto)


 0.2 x10^3/uL


(1.0-4.8)


 


Monocytes # (Auto)


 0.9 x10^3/uL


(0.0-1.1)


 


Eosinophils # (Auto)


 0.0 x10^3/uL


(0.0-0.7)


 


Basophils # (Auto)


 0.0 x10^3/uL


(0.0-0.2)


 


Sodium Level


 143 mmol/L


(136-145)


 


Potassium Level


 3.9 mmol/L


(3.5-5.1)


 


Chloride Level


 105 mmol/L


()


 


Carbon Dioxide Level


 30 mmol/L


(21-32)


 


Anion Gap 8 (6-14) 


 


Blood Urea Nitrogen


 41 mg/dL


(8-26)


 


Creatinine


 1.6 mg/dL


(0.7-1.3)


 


Estimated GFR


(Cockcroft-Gault) 53.3 





 


BUN/Creatinine Ratio 26 (6-20) 


 


Glucose Level


 138 mg/dL


(70-99)


 


Calcium Level


 8.8 mg/dL


(8.5-10.1)


 


Total Bilirubin


 0.3 mg/dL


(0.2-1.0)


 


Aspartate Amino Transf


(AST/SGOT) 11 U/L (15-37) 





 


Alanine Aminotransferase


(ALT/SGPT) 12 U/L (16-63) 





 


Alkaline Phosphatase


 67 U/L


()


 


Total Protein


 5.9 g/dL


(6.4-8.2)


 


Albumin


 2.5 g/dL


(3.4-5.0)


 


Albumin/Globulin Ratio 0.7 (1.0-1.7) 











Review of Systems


Review of Systems:


Pt denies fever


Pt denies n/v/d





Assessment and Plan


Assessmemt and Plan


Problems


Medical Problems:


(1) Acute on chronic combined systolic and diastolic heart failure


Status: Acute  





(2) Acute respiratory failure with hypoxia


Status: Acute  





(3) Bradycardia


Status: Acute  





(4) CKD (chronic kidney disease), stage III


Status: Chronic  





(5) COPD exacerbation


Status: Acute  





(6) Dyspnea


Status: Acute  





(7) HTN (hypertension)


Status: Chronic  





(8) LBBB (left bundle branch block)


Status: Chronic  





(9) Left flank pain


Status: Acute  





(10) Lung mass


Status: Acute  





(11) NICM (nonischemic cardiomyopathy)


Status: Chronic  





(12) Pneumonia


Status: Acute  





(13) Substance abuse


Status: Acute  





Assessment


COPD exacerbation


CHF


Pneumonia


Substance abuse





Plan 


Discharge discharge if pt passes 6 min walk


Titrate down O2 nasal canula 


Left prescriptions for O2, Medrol, and augmentin


Meds per cardio


DVT prophylaxis


Appreciate subspecialist input


Full code








Comment


Review of Relevant


I have reviewed the following items brittany (where applicable) has been applied.


Medications:





Current Medications








 Medications


  (Trade)  Dose


 Ordered  Sig/Irvin


 Route


 PRN Reason  Start Time


 Stop Time Status Last Admin


Dose Admin


 


 Ringer's Solution  1,000 ml @ 


 75 mls/hr  1X  ONCE


 IV


   10/1/19 11:45


 10/2/19 01:04 DC 10/1/19 11:43




















ANTONIA LUX III DO            Oct 2, 2019 11:43

## 2019-10-02 NOTE — PDOC
YEIMY FLORES APRN 10/2/19 1124:


CARDIO Progress Notes


Date and Time


Date of Service


10/2/19


Time of Evaluation


1115





Subjective


Subjective:  No Chest Pain, No Palpitations, Other (SOA better)





Vitals


Vitals





Vital Signs








  Date Time  Temp Pulse Resp B/P (MAP) Pulse Ox O2 Delivery O2 Flow Rate FiO2


 


10/2/19 10:30 98.4 75 18 139/84 (102) 91 Nasal Cannula 3.0 





 98.4       








Weight


Weight [ ]





Input and Output


Intake and Output











Intake and Output 


 


 10/2/19





 06:59


 


Intake Total 2180 ml


 


Balance 2180 ml


 


 


 


Intake Oral 880 ml


 


IV Total 1300 ml


 


# Voids 6


 


# Bowel Movements 2











Laboratory


Labs





Laboratory Tests








Test


 10/2/19


03:45


 


White Blood Count


 10.9 x10^3/uL


(4.0-11.0)


 


Red Blood Count


 4.76 x10^6/uL


(4.30-5.70)


 


Hemoglobin


 15.6 g/dL


(13.0-17.5)


 


Hematocrit


 46.3 %


(39.0-53.0)


 


Mean Corpuscular Volume 97 fL () 


 


Mean Corpuscular Hemoglobin 33 pg (25-35) 


 


Mean Corpuscular Hemoglobin


Concent 34 g/dL


(31-37)


 


Red Cell Distribution Width


 15.2 %


(11.5-14.5)


 


Platelet Count


 150 x10^3/uL


(140-400)


 


Neutrophils (%) (Auto) 90 % (31-73) 


 


Lymphocytes (%) (Auto) 2 % (24-48) 


 


Monocytes (%) (Auto) 8 % (0-9) 


 


Eosinophils (%) (Auto) 0 % (0-3) 


 


Basophils (%) (Auto) 0 % (0-3) 


 


Neutrophils # (Auto)


 9.8 x10^3/uL


(1.8-7.7)


 


Lymphocytes # (Auto)


 0.2 x10^3/uL


(1.0-4.8)


 


Monocytes # (Auto)


 0.9 x10^3/uL


(0.0-1.1)


 


Eosinophils # (Auto)


 0.0 x10^3/uL


(0.0-0.7)


 


Basophils # (Auto)


 0.0 x10^3/uL


(0.0-0.2)


 


Sodium Level


 143 mmol/L


(136-145)


 


Potassium Level


 3.9 mmol/L


(3.5-5.1)


 


Chloride Level


 105 mmol/L


()


 


Carbon Dioxide Level


 30 mmol/L


(21-32)


 


Anion Gap 8 (6-14) 


 


Blood Urea Nitrogen


 41 mg/dL


(8-26)


 


Creatinine


 1.6 mg/dL


(0.7-1.3)


 


Estimated GFR


(Cockcroft-Gault) 53.3 





 


BUN/Creatinine Ratio 26 (6-20) 


 


Glucose Level


 138 mg/dL


(70-99)


 


Calcium Level


 8.8 mg/dL


(8.5-10.1)


 


Total Bilirubin


 0.3 mg/dL


(0.2-1.0)


 


Aspartate Amino Transf


(AST/SGOT) 11 U/L (15-37) 





 


Alanine Aminotransferase


(ALT/SGPT) 12 U/L (16-63) 





 


Alkaline Phosphatase


 67 U/L


()


 


Total Protein


 5.9 g/dL


(6.4-8.2)


 


Albumin


 2.5 g/dL


(3.4-5.0)


 


Albumin/Globulin Ratio 0.7 (1.0-1.7) 











Physical Exam


HEENT:  Neck Supple W Full Motion


Chest:  Symmetric


LUNGS:  Other (faint rhonchi)


Heart:  RRR (SR with intermittent PVCs)


Abdomen:  Soft N/T


Extremities:  No Edema, No Calf Tenderness


Neurology:  alert, oriented, follow commands





Assessment


Assessment


1. Asymptomatic SB: chronic LBBB 


2. Dyspnea; bronch with thick secretions causing occlusion of all subsegments. 

No mass   


3. Substance abuse: UDS+cocaine


4. NICM: last EF 35% and now better at 40-45%


5. Chronic systolic CHF: clinically compensated


6. HTN: better controlled 


7. CKD: Cr stable 


9. Hx of noncompliance


10. COPD with continued tobaccoism


11. Moderate AI





Recommendations





Secondary prevention measures.


Optimization with ACEi, BB, Lasix therapy


Smoking and cocaine cessation reinforced. 


Supportive care. May discharge from a CV standpoint


Follow up with Dr. Krishnamurthy 11/14/19 at 1:30pm





LING KRISHNAMURTHY MD 10/2/19 1702:


CARDIO Progress Notes


Assessment


Assessment


The patient was seen and examined. 


1. Asymptomatic SB: chronic LBBB. Stable.  Continue present medications.


2. Dyspnea; bronch with thick secretions causing occlusion of all subsegments. 

No mass   


3. Substance abuse: UDS+cocaine


4. NICM: last EF 35% and now better at 40-45%


5. Chronic systolic CHF: clinically compensated


6. HTN: better controlled 


7. CKD: Cr stable 


9. Hx of noncompliance


10. COPD with continued tobaccoism


11. Moderate AI


Continue present cardiac treatments. We'll follow-up as an outpatient.











YEIMY FLORES            Oct 2, 2019 11:24


LING KRISHNAMURTHY MD             Oct 2, 2019 17:02

## 2019-10-02 NOTE — RAD
Examination: PORTABLE CHEST 1V

 

History: Post bronchus, right upper lobe atelectasis

 

Comparison/Correlation: 9/30/2019 to the chest x-ray exam

 

Findings: Portable upright frontal view chest was obtained. Previously 

evident right atelectasis is noted in the diminished. Minimal residual 

atelectasis is suggested the medial right apical level.

No pneumothorax.

Heart size is enlarged but this may in part be technique related. 

Retrocardiac atelectasis is present. Mild pulmonary vascular congestion 

and interstitial thickening which may represent edema is again seen. 

Blunting of the left costophrenic angle noted.

 

 

Impression:

Notable decrease in atelectasis. Minimal residual atelectasis appears to 

be present.

 

Small left pleural effusion. Pulmonary vasculature congestion.

 

No significant change in retrocardiac left basilar aeration.

 

Electronically signed by: Pietro Harvey MD (10/2/2019 12:44 PM) [facility]

## 2019-10-02 NOTE — NUR
Discharge Note:



CESAR BALDERAS [address]



Discharge instructions and discharge home medications reviewed with Patient and a copy 
given. All questions have been answered and understanding verbalized. Patient refused home 
oxygen stating "I don't need this, I ain't leaving my house."



The following instructions and handouts were given: CHF, COPD exac, sodium restriction



Discontinued lines and drains: Peripheral IV intact.



Patient discharged to Home or Self Care with Family Member via Wheelchair

## 2019-10-02 NOTE — NUR
SS following up with discharge planning. Oxygen order received. SS phoned and faxed oxygen 
order and clinical to [facility], [phone]; fax [phone]. SS provided pt's RN with 
oxygen tank for home. Discharge order on the chart.

## 2019-10-02 NOTE — DS
DATE OF DISCHARGE:  10/02/2019



ADMISSION DIAGNOSIS:  Respiratory failure.



DISCHARGE DIAGNOSES:  Multifactorial respiratory failure including chronic

obstructive pulmonary disease, atelectasis, probable mild congestive heart

failure, tobacco abuse, mucus plugging.



CONSULTS:  Cardiology and Pulmonary.



PROCEDURES:  Bronchoscopy, which mainly showed mucus plugs with no endotracheal

lesions.



HOSPITAL COURSE:  The patient is a pleasant middle-aged male who smokes too

much.  He also uses cocaine and marijuana.  Basically, he presented with

multifactorial respiratory failure.  There was some concern he could have a mass

in his lungs.  We did consult Pulmonary.  He did go for a bronchoscopy

yesterday.  Apparently that only showed mucus plugging.  Today, I saw him and

examined him, he is doing great.  We planned to discharge with close outpatient

followup.



DISPOSITION:  Home.



ACTIVITY:  As tolerated.



DIET:  Low sodium.



MEDICATIONS:  Please see the MRAD.



TOTAL TIME:  32 minutes.

 



______________________________

KENNYL DARSHAN LUX DO



DR:  TRICIA/coby  JOB#:  [id number] / [id number]

DD:  10/02/2019 11:42  DT:  10/02/2019 12:08

## 2019-10-02 NOTE — PDOC
PULMONARY PROGRESS NOTES


Subjective


sob better, has occ cough, no sputum, no pain


Vitals





Vital Signs








  Date Time  Temp Pulse Resp B/P (MAP) Pulse Ox O2 Delivery O2 Flow Rate FiO2


 


10/2/19 08:59  87  164/98    


 


10/2/19 08:13      Nasal Cannula 1.5 


 


10/2/19 07:36     95   


 


10/2/19 07:07 98.9  18     





 98.9       








General:  Alert, No acute distress


Lungs:  Crackles (bases)


Cardiovascular:  S1, S2


Abdomen:  Soft, Non-tender


Neuro Exam:  Alert


Extremities:  No Edema


Labs





Laboratory Tests








Test


 10/1/19


03:55 10/2/19


03:45


 


White Blood Count


 10.4 x10^3/uL


(4.0-11.0) 10.9 x10^3/uL


(4.0-11.0)


 


Red Blood Count


 4.66 x10^6/uL


(4.30-5.70) 4.76 x10^6/uL


(4.30-5.70)


 


Hemoglobin


 15.3 g/dL


(13.0-17.5) 15.6 g/dL


(13.0-17.5)


 


Hematocrit


 45.4 %


(39.0-53.0) 46.3 %


(39.0-53.0)


 


Mean Corpuscular Volume 97 fL ()  97 fL () 


 


Mean Corpuscular Hemoglobin 33 pg (25-35)  33 pg (25-35) 


 


Mean Corpuscular Hemoglobin


Concent 34 g/dL


(31-37) 34 g/dL


(31-37)


 


Red Cell Distribution Width


 15.0 %


(11.5-14.5) 15.2 %


(11.5-14.5)


 


Platelet Count


 148 x10^3/uL


(140-400) 150 x10^3/uL


(140-400)


 


Neutrophils (%) (Auto) 92 % (31-73)  90 % (31-73) 


 


Lymphocytes (%) (Auto) 2 % (24-48)  2 % (24-48) 


 


Monocytes (%) (Auto) 6 % (0-9)  8 % (0-9) 


 


Eosinophils (%) (Auto) 0 % (0-3)  0 % (0-3) 


 


Basophils (%) (Auto) 0 % (0-3)  0 % (0-3) 


 


Neutrophils # (Auto)


 9.5 x10^3/uL


(1.8-7.7) 9.8 x10^3/uL


(1.8-7.7)


 


Lymphocytes # (Auto)


 0.2 x10^3/uL


(1.0-4.8) 0.2 x10^3/uL


(1.0-4.8)


 


Monocytes # (Auto)


 0.6 x10^3/uL


(0.0-1.1) 0.9 x10^3/uL


(0.0-1.1)


 


Eosinophils # (Auto)


 0.0 x10^3/uL


(0.0-0.7) 0.0 x10^3/uL


(0.0-0.7)


 


Basophils # (Auto)


 0.0 x10^3/uL


(0.0-0.2) 0.0 x10^3/uL


(0.0-0.2)


 


Sodium Level


 142 mmol/L


(136-145) 143 mmol/L


(136-145)


 


Potassium Level


 3.6 mmol/L


(3.5-5.1) 3.9 mmol/L


(3.5-5.1)


 


Chloride Level


 105 mmol/L


() 105 mmol/L


()


 


Carbon Dioxide Level


 30 mmol/L


(21-32) 30 mmol/L


(21-32)


 


Anion Gap 7 (6-14)  8 (6-14) 


 


Blood Urea Nitrogen


 32 mg/dL


(8-26) 41 mg/dL


(8-26)


 


Creatinine


 1.5 mg/dL


(0.7-1.3) 1.6 mg/dL


(0.7-1.3)


 


Estimated GFR


(Cockcroft-Gault) 57.4 


 53.3 





 


BUN/Creatinine Ratio 21 (6-20)  26 (6-20) 


 


Glucose Level


 140 mg/dL


(70-99) 138 mg/dL


(70-99)


 


Calcium Level


 8.8 mg/dL


(8.5-10.1) 8.8 mg/dL


(8.5-10.1)


 


Magnesium Level


 2.5 mg/dL


(1.8-2.4) 





 


Total Bilirubin


 0.3 mg/dL


(0.2-1.0) 0.3 mg/dL


(0.2-1.0)


 


Aspartate Amino Transf


(AST/SGOT) 12 U/L (15-37) 


 11 U/L (15-37) 





 


Alanine Aminotransferase


(ALT/SGPT) 12 U/L (16-63) 


 12 U/L (16-63) 





 


Alkaline Phosphatase


 87 U/L


() 67 U/L


()


 


Total Protein


 6.3 g/dL


(6.4-8.2) 5.9 g/dL


(6.4-8.2)


 


Albumin


 2.6 g/dL


(3.4-5.0) 2.5 g/dL


(3.4-5.0)


 


Albumin/Globulin Ratio 0.7 (1.0-1.7)  0.7 (1.0-1.7) 








Laboratory Tests








Test


 10/2/19


03:45


 


White Blood Count


 10.9 x10^3/uL


(4.0-11.0)


 


Red Blood Count


 4.76 x10^6/uL


(4.30-5.70)


 


Hemoglobin


 15.6 g/dL


(13.0-17.5)


 


Hematocrit


 46.3 %


(39.0-53.0)


 


Mean Corpuscular Volume 97 fL () 


 


Mean Corpuscular Hemoglobin 33 pg (25-35) 


 


Mean Corpuscular Hemoglobin


Concent 34 g/dL


(31-37)


 


Red Cell Distribution Width


 15.2 %


(11.5-14.5)


 


Platelet Count


 150 x10^3/uL


(140-400)


 


Neutrophils (%) (Auto) 90 % (31-73) 


 


Lymphocytes (%) (Auto) 2 % (24-48) 


 


Monocytes (%) (Auto) 8 % (0-9) 


 


Eosinophils (%) (Auto) 0 % (0-3) 


 


Basophils (%) (Auto) 0 % (0-3) 


 


Neutrophils # (Auto)


 9.8 x10^3/uL


(1.8-7.7)


 


Lymphocytes # (Auto)


 0.2 x10^3/uL


(1.0-4.8)


 


Monocytes # (Auto)


 0.9 x10^3/uL


(0.0-1.1)


 


Eosinophils # (Auto)


 0.0 x10^3/uL


(0.0-0.7)


 


Basophils # (Auto)


 0.0 x10^3/uL


(0.0-0.2)


 


Sodium Level


 143 mmol/L


(136-145)


 


Potassium Level


 3.9 mmol/L


(3.5-5.1)


 


Chloride Level


 105 mmol/L


()


 


Carbon Dioxide Level


 30 mmol/L


(21-32)


 


Anion Gap 8 (6-14) 


 


Blood Urea Nitrogen


 41 mg/dL


(8-26)


 


Creatinine


 1.6 mg/dL


(0.7-1.3)


 


Estimated GFR


(Cockcroft-Gault) 53.3 





 


BUN/Creatinine Ratio 26 (6-20) 


 


Glucose Level


 138 mg/dL


(70-99)


 


Calcium Level


 8.8 mg/dL


(8.5-10.1)


 


Total Bilirubin


 0.3 mg/dL


(0.2-1.0)


 


Aspartate Amino Transf


(AST/SGOT) 11 U/L (15-37) 





 


Alanine Aminotransferase


(ALT/SGPT) 12 U/L (16-63) 





 


Alkaline Phosphatase


 67 U/L


()


 


Total Protein


 5.9 g/dL


(6.4-8.2)


 


Albumin


 2.5 g/dL


(3.4-5.0)


 


Albumin/Globulin Ratio 0.7 (1.0-1.7) 








Medications





Active Scripts








 Medications  Dose


 Route/Sig


 Max Daily Dose Days Date Category Dose


Instructions


 


 Azithromycin


 Tablet


  (Azithromycin)


 250 Mg Tablet  1 Pkg


 PO UD


   6/27/17 Rx 


 


 Advair 250-50


 Diskus


  (Fluticasone/Salmeterol)


 1 Each Disk.w.dev  1 Puff


 IH BID


   6/26/17 Rx 


 


 Ipratropium


 Bromide 0.2 Mg/1


 Ml Solution  1 Vial


 NEB QID PRN


   6/26/17 Rx 


 


 Doxycycline


 Hyclate 100 Mg


 Tablet  1 Tab


 PO BID


   6/26/17 Rx  NEXT DOSE DUE AT BEDTIME TONIGHT


 


 Medrol


  (Methylprednisolone)


 4 Mg Tab.ds.pk  1 Pkg


 PO UD


   6/26/17 Rx 


 


 Albuterol Sulfate


 Neb Soln


  (Albuterol


 Sulfate) 0.63


 Mg/3 Ml Vial.neb  0.63 Mg


 NEB PRN Q4HRS PRN


  30 6/26/17 Rx  AS NEEDED


 


 Zocor


  (Simvastatin) 20


 Mg Tablet  20 Mg


 PO HS


   7/10/15 Reported  NEXT DOSE DUE TONIGHT AT BEDTIME


 


 Isosorbide


 Mononitrate Er


  (Isosorbide


 Mononitrate) 60


 Mg Tab.er.24h  60 Mg


 PO DAILY


   7/10/15 Reported  NEXT DOSE DUE IN AM


 


 Lisinopril 40 Mg


 Tablet  40 Mg


 PO DAILY


   12/15/14 Reported  NEXT DOSE DUE IN AM


 


 Carvedilol 25 Mg


 Tablet  25 Mg


 PO BIDWMEALS


   12/15/14 Reported  NEXT DOSE DUE AT DINNER THIS EVENING


 


 Clonidine Hcl 0.1


 Mg Tablet  0.1 Mg


 PO BID


   12/15/14 Reported  NEXT DOSE DUE AT BEDTIME TONIGHT


 


 Hydralazine Hcl


 50 Mg Tablet  50 Mg


 PO TID


   11/8/14 Reported  NEXT DOSE DUE AT BEDTIME TONIGHT


 


 Amlodipine


 Besylate 10 Mg


 Tablet  10 Mg


 PO DAILY


   2/24/14 Reported  NEXT DOSE DUE IN AM


 


 Furosemide 40 Mg


 Tablet  40 Mg


 PO DAILY


   2/24/14 Reported  NEXT DOSE DUE IN AM


 


 Potassium


 Chloride 10 Meq


 Tab.er.prt  20 Meq


 PO DAILY


   2/24/14 Reported  NEXT DOSE DUE IN AM








Comments


CXR 10/2


 RUL ATELECTASIS ,resolved


echo





The systolic function is mildly impaired. EF 40-45%


There is global hypokinesis of the left ventricle. The septal motion is 

suggestive of conduction defect.


Doppler and Color Flow revealed moderate aortic regurgitation.





Impression


.


1.  Acute hypoxic respiratory failure secondary to multifactorial etiologies


including combination of underlying chronic obstructive pulmonary disease, right


upper lobe atelectasis. No evidence of thromboembolic disease. Possible CHF 9/26


2.  Long history of tobacco use, suspect underlying chronic obstructive


pulmonary disease.  Along with cocaine, marijuana and alcohol abuse.


3.  Abnormal CT chest with right upper lobe collapse.  s/p Bronch/  Mucous plug 

removed. no endobronchial lesion


4.  Cardiomyopathy, mod AI





Plan


.





1.  02 titration 


2.  VQ scan neg for PE


3.  cxr ,10/2 RUL collapse resolved post bronch


4.  Follow Cardiology recommendation reg low EF and mod AI


5.  Monitor blood pressure closely.


6.   bronchodilators.


7.  Smoking cessation counseling provided.


8.  Discussed with RN and pt. 


9.  ok with [facility]











JESSE DOVE MD                  Oct 2, 2019 09:29

## 2020-05-12 ENCOUNTER — HOSPITAL ENCOUNTER (EMERGENCY)
Dept: HOSPITAL 61 - ER | Age: 65
Discharge: HOME | End: 2020-05-12
Payer: MEDICARE

## 2020-05-12 VITALS — WEIGHT: 187.39 LBS | BODY MASS INDEX: 27.76 KG/M2 | HEIGHT: 69 IN

## 2020-05-12 VITALS — DIASTOLIC BLOOD PRESSURE: 98 MMHG | SYSTOLIC BLOOD PRESSURE: 150 MMHG

## 2020-05-12 DIAGNOSIS — F17.200: ICD-10-CM

## 2020-05-12 DIAGNOSIS — F12.90: ICD-10-CM

## 2020-05-12 DIAGNOSIS — J98.11: Primary | ICD-10-CM

## 2020-05-12 DIAGNOSIS — J44.9: ICD-10-CM

## 2020-05-12 DIAGNOSIS — I11.0: ICD-10-CM

## 2020-05-12 DIAGNOSIS — F10.10: ICD-10-CM

## 2020-05-12 DIAGNOSIS — Z98.890: ICD-10-CM

## 2020-05-12 DIAGNOSIS — I50.9: ICD-10-CM

## 2020-05-12 DIAGNOSIS — F14.90: ICD-10-CM

## 2020-05-12 DIAGNOSIS — Z95.0: ICD-10-CM

## 2020-05-12 DIAGNOSIS — R09.02: ICD-10-CM

## 2020-05-12 LAB
ALBUMIN SERPL-MCNC: 3 G/DL (ref 3.4–5)
ALBUMIN/GLOB SERPL: 0.8 {RATIO} (ref 1–1.7)
ALP SERPL-CCNC: 50 U/L (ref 46–116)
ALT SERPL-CCNC: 12 U/L (ref 16–63)
ANION GAP SERPL CALC-SCNC: 9 MMOL/L (ref 6–14)
AST SERPL-CCNC: 15 U/L (ref 15–37)
BASOPHILS # BLD AUTO: 0 X10^3/UL (ref 0–0.2)
BASOPHILS NFR BLD: 1 % (ref 0–3)
BILIRUB SERPL-MCNC: 1.2 MG/DL (ref 0.2–1)
BUN SERPL-MCNC: 18 MG/DL (ref 8–26)
BUN/CREAT SERPL: 15 (ref 6–20)
CALCIUM SERPL-MCNC: 9.8 MG/DL (ref 8.5–10.1)
CHLORIDE SERPL-SCNC: 101 MMOL/L (ref 98–107)
CO2 SERPL-SCNC: 28 MMOL/L (ref 21–32)
CREAT SERPL-MCNC: 1.2 MG/DL (ref 0.7–1.3)
EOSINOPHIL NFR BLD: 0.1 X10^3/UL (ref 0–0.7)
EOSINOPHIL NFR BLD: 2 % (ref 0–3)
ERYTHROCYTE [DISTWIDTH] IN BLOOD BY AUTOMATED COUNT: 15.5 % (ref 11.5–14.5)
GFR SERPLBLD BASED ON 1.73 SQ M-ARVRAT: 74 ML/MIN
GLOBULIN SER-MCNC: 3.8 G/DL (ref 2.2–3.8)
GLUCOSE SERPL-MCNC: 93 MG/DL (ref 70–99)
HCT VFR BLD CALC: 41.5 % (ref 39–53)
HGB BLD-MCNC: 13.8 G/DL (ref 13–17.5)
LYMPHOCYTES # BLD: 0.5 X10^3/UL (ref 1–4.8)
LYMPHOCYTES NFR BLD AUTO: 5 % (ref 24–48)
MCH RBC QN AUTO: 32 PG (ref 25–35)
MCHC RBC AUTO-ENTMCNC: 33 G/DL (ref 31–37)
MCV RBC AUTO: 95 FL (ref 79–100)
MONO #: 0.9 X10^3/UL (ref 0–1.1)
MONOCYTES NFR BLD: 11 % (ref 0–9)
NEUT #: 7.2 X10^3/UL (ref 1.8–7.7)
NEUTROPHILS NFR BLD AUTO: 82 % (ref 31–73)
PLATELET # BLD AUTO: 108 X10^3/UL (ref 140–400)
POTASSIUM SERPL-SCNC: 3.8 MMOL/L (ref 3.5–5.1)
PROT SERPL-MCNC: 6.8 G/DL (ref 6.4–8.2)
RBC # BLD AUTO: 4.38 X10^6/UL (ref 4.3–5.7)
SODIUM SERPL-SCNC: 138 MMOL/L (ref 136–145)
WBC # BLD AUTO: 8.8 X10^3/UL (ref 4–11)

## 2020-05-12 PROCEDURE — 85025 COMPLETE CBC W/AUTO DIFF WBC: CPT

## 2020-05-12 PROCEDURE — 99285 EMERGENCY DEPT VISIT HI MDM: CPT

## 2020-05-12 PROCEDURE — 71045 X-RAY EXAM CHEST 1 VIEW: CPT

## 2020-05-12 PROCEDURE — 93005 ELECTROCARDIOGRAM TRACING: CPT

## 2020-05-12 PROCEDURE — 36415 COLL VENOUS BLD VENIPUNCTURE: CPT

## 2020-05-12 PROCEDURE — 83880 ASSAY OF NATRIURETIC PEPTIDE: CPT

## 2020-05-12 PROCEDURE — 80053 COMPREHEN METABOLIC PANEL: CPT

## 2020-05-12 PROCEDURE — 84484 ASSAY OF TROPONIN QUANT: CPT

## 2020-05-12 NOTE — RAD
CHEST AP ONLY

 

History: Shortness of breath

 

Comparison: April 10, 2020 radiograph. April 11, 2020 CT.

 

Findings:

Right upper lobe complete atelectasis. Mild patchy mid and bibasilar 

opacities, decreased compared to prior. No pleural effusion. Cardiomegaly,

unchanged. Stable left-sided pacemaker. No pneumothorax. Prominence of the

right hilum, unchanged.

 

Impression: 

1.  Complete right upper lobe atelectasis, may represent mucus plugging or

obstructing mass. CT can further evaluate.

2.  Cardiomegaly, unchanged.

 

Electronically signed by: Dagoberto Adams DO (5/12/2020 3:00 PM) [id number]

## 2020-05-12 NOTE — PHYS DOC
Past Medical History


Past Medical History:  Asthma, CHF, COPD, Hypertension, Other


Additional Past Medical Histor:  Drug abuse


Past Surgical History:  Pacemaker, Other


Additional Past Surgical Histo:  HERNIA, L KNEE


Smoking Status:  Current Every Day Smoker


Alcohol Use:  Heavy


Drug Use:  Cocaine, Marijuana





General Adult


EDM:


Chief Complaint:  SHORTNESS OF BREATH





HPI:


HPI:





65-year-old male past medical history of COPD asthma on 6 L nasal cannula 

presents for evaluation of low oxygen saturation.  Patient states prior to 

arrival home nurse was evaluating patient and he had an oxygen saturation in the

80s.  Patient has no complaints   Patient tells me he his shortness of breath is

not worse than normal.  He denies any associated chest pain.  Patient states he 

has had a cough with sputum production for the last several months.  Patient 

denies any fevers chills or chest pain.





During my exam patients oxygen saturation 95% on 6L NC.





Review of Systems:


Review of Systems:


Constitutional:  Denies fever or chills. []


Eyes:  Denies change in visual acuity. []


HENT:  Denies nasal congestion or sore throat. [] 


Respiratory:  Denies cough or shortness of breath. [] 


Cardiovascular:  Denies chest pain or edema. [] 


GI:  Denies abdominal pain, nausea, vomiting, bloody stools or diarrhea. [] 


:  Denies dysuria. [] 


Musculoskeletal:  Denies back pain or joint pain. [] 


Integument:  Denies rash. [] 


Neurologic:  Denies headache, focal weakness or sensory changes. [] 


Endocrine:  Denies polyuria or polydipsia. [] 


Lymphatic:  Denies swollen glands. [] 


Psychiatric:  Denies depression or anxiety. []





Heart Score:


Risk Factors:


Risk Factors:  DM, Current or recent (<one month) smoker, HTN, HLP, family 

history of CAD, obesity.


Risk Scores:


Score 0 - 3:  2.5% MACE over next 6 weeks - Discharge Home


Score 4 - 6:  20.3% MACE over next 6 weeks - Admit for Clinical Observation


Score 7 - 10:  72.7% MACE over next 6 weeks - Early Invasive Strategies





Allergies:


Allergies:





Allergies








Coded Allergies Type Severity Reaction Last Updated Verified


 


  No Known Drug Allergies    10/1/19 No











Physical Exam:


PE:





Constitutional: Well developed, well nourished, no acute distress, non-toxic 

appearance. []


HENT: Normocephalic, atraumatic, bilateral external ears normal, oropharynx 

moist, no oral exudates, nose normal. []


Eyes: PERRLA, EOMI, conjunctiva normal, no discharge. [] 


Neck: Normal range of motion, no tenderness, supple, no stridor. [] 


Cardiovascular:Heart rate regular rhythm, no murmur []


Lungs & Thorax:  Bilateral breath sounds clear to auscultation []


Abdomen: Bowel sounds normal, soft, no tenderness, no masses, no pulsatile 

masses. [] 


Skin: Warm, dry, no erythema, no rash. [] 


Back: No tenderness, no CVA tenderness. [] 


Extremities: No tenderness, no cyanosis, no clubbing, ROM intact, no edema. [] 


Neurologic: Alert and oriented X 3, normal motor function, normal sensory 

function, no focal deficits noted. []


Psychologic: Affect normal, judgement normal, mood normal. []





Current Patient Data:


Vital Signs:





                                   Vital Signs








  Date Time  Temp Pulse Resp B/P (MAP) Pulse Ox O2 Delivery O2 Flow Rate FiO2


 


5/12/20 14:00 99.0 72 26 138/86 (103) 95 Nasal Cannula 6.0 





 99.0       











EKG:


EKG:


[ekg time 1415


heart rate 77


sinus rhythm


no stemi]





Radiology/Procedures:


Radiology/Procedures:


[]


Impression:


Impression: 


1.  Complete right upper lobe atelectasis, may represent mucus plugging or


obstructing mass. CT can further evaluate.


2.  Cardiomegaly, unchanged.





Course & Med Decision Making:


Course & Med Decision Making


Pertinent Labs and Imaging studies reviewed. (See chart for details)





[] Patient was evaluated for chief complaint.  Work-up consisted of EKG, 

laboratory analysis, and radiologic imaging.  Results reviewed and discussed 

with patient.





X-ray findings show a right upper lobe atelectasis.  I discussed these findings 

with the radiologist who states the findings are not new. The initial 

radiologist impression states CT imaging of chest might be helpful to 

differentiate a mucous plug versus obstructing mass.  Based upon initial 

radiologist's impression I had ordered a CT of patient's chest.  But after 

discussion with the radiologist I elected to cancel CT of chest.  Radiologist 

stated that he reviewed previous xrays and last CT and patient may benefit from 

a bronchoscopy vs another CT chest.  I agree with this plan.  I feel that 

patient is stable for discharge. I dicussed all lab and xray finds with patient.

  It is recommended that patient follow up with PCP and subsequently with 

pulmonology for further evaluation of atelectasis.





Dragon Disclaimer:


Dragon Disclaimer:


This electronic medical record was generated, in whole or in part, using a voice

 recognition dictation system.





Departure


Departure


Impression:  


   Primary Impression:  


   Hypoxia


   Additional Impression:  


   Atelectasis of right lung


Disposition:  01 HOME, SELF-CARE


Condition:  STABLE


Referrals:  


UNKNOWN PCP NAME (PCP)


Patient Instructions:  Atelectasis, Hypoxemia


Scripts


Doxycycline Monohydrate (DOXYCYCLINE MONOHYDRATE) 100 Mg Capsule


1 CAP PO BID, #14 CAP


   Prov: CHARITO SAPP DO         5/12/20











CHARITO SAPP DO            May 12, 2020 14:21

## 2020-05-13 NOTE — EKG
[facility]

              [address] [place] [address]

Test Date:    2020               Test Time:    14:15:01

Pat Name:     CESAR BALDERAS            Department:   

Patient ID:   [id number]           Room:          

Gender:       M                        Technician:   

:          1955               Requested By: CHARITO SAPP

Order Number: [id number]           Reading MD:   Prakash Salamanca

                                 Measurements

Intervals                              Axis          

Rate:         77                       P:            90

CO:           114                      QRS:          -153

QRSD:         176                      T:            46

QT:           434                                    

QTc:          493                                    

                           Interpretive Statements

VENTRICULAR PACED RHYTHM

Electronically Signed On 2020 8:24:19 CDT by Prakash Salamanca

## 2020-08-04 VITALS — SYSTOLIC BLOOD PRESSURE: 126 MMHG | DIASTOLIC BLOOD PRESSURE: 92 MMHG

## 2020-08-05 ENCOUNTER — HOSPITAL ENCOUNTER (INPATIENT)
Dept: HOSPITAL 61 - ER | Age: 65
LOS: 6 days | Discharge: HOME | DRG: 177 | End: 2020-08-11
Attending: INTERNAL MEDICINE | Admitting: INTERNAL MEDICINE
Payer: MEDICARE

## 2020-08-05 VITALS — SYSTOLIC BLOOD PRESSURE: 131 MMHG | DIASTOLIC BLOOD PRESSURE: 91 MMHG

## 2020-08-05 VITALS — SYSTOLIC BLOOD PRESSURE: 125 MMHG | DIASTOLIC BLOOD PRESSURE: 83 MMHG

## 2020-08-05 VITALS — SYSTOLIC BLOOD PRESSURE: 126 MMHG | DIASTOLIC BLOOD PRESSURE: 81 MMHG

## 2020-08-05 VITALS — SYSTOLIC BLOOD PRESSURE: 118 MMHG | DIASTOLIC BLOOD PRESSURE: 80 MMHG

## 2020-08-05 VITALS — BODY MASS INDEX: 22.73 KG/M2 | WEIGHT: 171.52 LBS | HEIGHT: 73 IN

## 2020-08-05 VITALS — SYSTOLIC BLOOD PRESSURE: 122 MMHG | DIASTOLIC BLOOD PRESSURE: 84 MMHG

## 2020-08-05 DIAGNOSIS — J69.0: ICD-10-CM

## 2020-08-05 DIAGNOSIS — I44.7: ICD-10-CM

## 2020-08-05 DIAGNOSIS — J98.11: ICD-10-CM

## 2020-08-05 DIAGNOSIS — Z20.828: ICD-10-CM

## 2020-08-05 DIAGNOSIS — I27.21: ICD-10-CM

## 2020-08-05 DIAGNOSIS — I49.5: ICD-10-CM

## 2020-08-05 DIAGNOSIS — I42.8: ICD-10-CM

## 2020-08-05 DIAGNOSIS — J43.2: ICD-10-CM

## 2020-08-05 DIAGNOSIS — I50.43: ICD-10-CM

## 2020-08-05 DIAGNOSIS — I48.0: ICD-10-CM

## 2020-08-05 DIAGNOSIS — N17.9: ICD-10-CM

## 2020-08-05 DIAGNOSIS — J15.6: Primary | ICD-10-CM

## 2020-08-05 DIAGNOSIS — J96.21: ICD-10-CM

## 2020-08-05 DIAGNOSIS — Z95.0: ICD-10-CM

## 2020-08-05 DIAGNOSIS — Z83.3: ICD-10-CM

## 2020-08-05 DIAGNOSIS — E78.5: ICD-10-CM

## 2020-08-05 DIAGNOSIS — I13.0: ICD-10-CM

## 2020-08-05 DIAGNOSIS — F17.210: ICD-10-CM

## 2020-08-05 DIAGNOSIS — F14.20: ICD-10-CM

## 2020-08-05 DIAGNOSIS — Z82.49: ICD-10-CM

## 2020-08-05 DIAGNOSIS — Z91.19: ICD-10-CM

## 2020-08-05 DIAGNOSIS — J96.22: ICD-10-CM

## 2020-08-05 DIAGNOSIS — N18.3: ICD-10-CM

## 2020-08-05 DIAGNOSIS — Z79.899: ICD-10-CM

## 2020-08-05 DIAGNOSIS — F12.10: ICD-10-CM

## 2020-08-05 LAB
ANION GAP SERPL CALC-SCNC: 1 MMOL/L (ref 6–14)
BASE EXCESS STD BLDA CALC-SCNC: 3 MMOL/L (ref -3–3)
BASE EXCESS STD BLDV CALC-SCNC: 5 MMOL/L (ref 0–3)
BASOPHILS # BLD AUTO: 0 X10^3/UL (ref 0–0.2)
BASOPHILS NFR BLD: 1 % (ref 0–3)
BUN SERPL-MCNC: 15 MG/DL (ref 8–26)
CALCIUM SERPL-MCNC: 9.6 MG/DL (ref 8.5–10.1)
CHLORIDE SERPL-SCNC: 105 MMOL/L (ref 98–107)
CO2 BLDV-SCNC: 33 MMOL/L (ref 21–32)
CO2 SERPL-SCNC: 35 MMOL/L (ref 21–32)
CREAT SERPL-MCNC: 1.3 MG/DL (ref 0.7–1.3)
EOSINOPHIL NFR BLD: 0.1 X10^3/UL (ref 0–0.7)
EOSINOPHIL NFR BLD: 2 % (ref 0–3)
ERYTHROCYTE [DISTWIDTH] IN BLOOD BY AUTOMATED COUNT: 16.3 % (ref 11.5–14.5)
GFR SERPLBLD BASED ON 1.73 SQ M-ARVRAT: 67.5 ML/MIN
GLUCOSE SERPL-MCNC: 104 MG/DL (ref 70–99)
HCO3 BLDA-SCNC: 30 MMOL/L (ref 21–28)
HCO3 BLDV-SCNC: 32 MMOL/L (ref 24–28)
HCT VFR BLD CALC: 37.4 % (ref 39–53)
HGB BLD-MCNC: 12.4 G/DL (ref 13–17.5)
INSPIRATION SETTING TIME VENT: 21
LYMPHOCYTES # BLD: 0.7 X10^3/UL (ref 1–4.8)
LYMPHOCYTES NFR BLD AUTO: 10 % (ref 24–48)
MCH RBC QN AUTO: 31 PG (ref 25–35)
MCHC RBC AUTO-ENTMCNC: 33 G/DL (ref 31–37)
MCV RBC AUTO: 93 FL (ref 79–100)
METHGB MFR BLD: 0.4 % (ref 0–1.9)
MONO #: 0.6 X10^3/UL (ref 0–1.1)
MONOCYTES NFR BLD: 9 % (ref 0–9)
NEUT #: 5.2 X10^3/UL (ref 1.8–7.7)
NEUTROPHILS NFR BLD AUTO: 78 % (ref 31–73)
OXYHGB MFR BLD: 64.1 %
PCO2 BLDA: 57 MMHG (ref 35–46)
PCO2 BLDV: 64 MMHG (ref 41–51)
PH BLDV: 7.3 [PH] (ref 7.32–7.42)
PLATELET # BLD AUTO: 121 X10^3/UL (ref 140–400)
PO2 BLDA: < 42 MMHG (ref 65–108)
PO2 BLDV: 76 MMHG (ref 20–40)
POTASSIUM SERPL-SCNC: 3.9 MMOL/L (ref 3.5–5.1)
RBC # BLD AUTO: 4.03 X10^6/UL (ref 4.3–5.7)
SAO2 % BLDA: 65 % (ref 92–99)
SAO2 % BLDV FROM PO2: 93 %
SODIUM SERPL-SCNC: 141 MMOL/L (ref 136–145)
WBC # BLD AUTO: 6.6 X10^3/UL (ref 4–11)

## 2020-08-05 PROCEDURE — 84145 PROCALCITONIN (PCT): CPT

## 2020-08-05 PROCEDURE — 96366 THER/PROPH/DIAG IV INF ADDON: CPT

## 2020-08-05 PROCEDURE — 94660 CPAP INITIATION&MGMT: CPT

## 2020-08-05 PROCEDURE — 71250 CT THORAX DX C-: CPT

## 2020-08-05 PROCEDURE — 85025 COMPLETE CBC W/AUTO DIFF WBC: CPT

## 2020-08-05 PROCEDURE — 93005 ELECTROCARDIOGRAM TRACING: CPT

## 2020-08-05 PROCEDURE — 71045 X-RAY EXAM CHEST 1 VIEW: CPT

## 2020-08-05 PROCEDURE — 5A09357 ASSISTANCE WITH RESPIRATORY VENTILATION, LESS THAN 24 CONSECUTIVE HOURS, CONTINUOUS POSITIVE AIRWAY PRESSURE: ICD-10-PCS | Performed by: INTERNAL MEDICINE

## 2020-08-05 PROCEDURE — 96361 HYDRATE IV INFUSION ADD-ON: CPT

## 2020-08-05 PROCEDURE — 94640 AIRWAY INHALATION TREATMENT: CPT

## 2020-08-05 PROCEDURE — 82805 BLOOD GASES W/O2 SATURATION: CPT

## 2020-08-05 PROCEDURE — 96365 THER/PROPH/DIAG IV INF INIT: CPT

## 2020-08-05 PROCEDURE — 82962 GLUCOSE BLOOD TEST: CPT

## 2020-08-05 PROCEDURE — 94760 N-INVAS EAR/PLS OXIMETRY 1: CPT

## 2020-08-05 PROCEDURE — 83880 ASSAY OF NATRIURETIC PEPTIDE: CPT

## 2020-08-05 PROCEDURE — 71046 X-RAY EXAM CHEST 2 VIEWS: CPT

## 2020-08-05 PROCEDURE — 36600 WITHDRAWAL OF ARTERIAL BLOOD: CPT

## 2020-08-05 PROCEDURE — 83735 ASSAY OF MAGNESIUM: CPT

## 2020-08-05 PROCEDURE — 96375 TX/PRO/DX INJ NEW DRUG ADDON: CPT

## 2020-08-05 PROCEDURE — 85027 COMPLETE CBC AUTOMATED: CPT

## 2020-08-05 PROCEDURE — 96368 THER/DIAG CONCURRENT INF: CPT

## 2020-08-05 PROCEDURE — 84484 ASSAY OF TROPONIN QUANT: CPT

## 2020-08-05 PROCEDURE — 82803 BLOOD GASES ANY COMBINATION: CPT

## 2020-08-05 PROCEDURE — 80048 BASIC METABOLIC PNL TOTAL CA: CPT

## 2020-08-05 PROCEDURE — G0378 HOSPITAL OBSERVATION PER HR: HCPCS

## 2020-08-05 PROCEDURE — 99291 CRITICAL CARE FIRST HOUR: CPT

## 2020-08-05 PROCEDURE — 36415 COLL VENOUS BLD VENIPUNCTURE: CPT

## 2020-08-05 NOTE — PHYS DOC
Past Medical History


Past Medical History:  Asthma, CHF, COPD, Hypertension, Other


Additional Past Medical Histor:  Drug abuse


 (CAMILLA JEFFERSON DO)


Past Surgical History:  Pacemaker, Other


Additional Past Surgical Histo:  HERNIA, L KNEE


 (CAMILLA JEFFERSON DO)


Smoking Status:  Current Every Day Smoker


Alcohol Use:  Heavy


Drug Use:  Cocaine, Marijuana


 (CAMILLA JEFFERSON DO)





General Adult


EDM:


Chief Complaint:  SHORTNESS OF BREATH





HPI:


HPI:





The history was obtained from the patient.  Patient is a 65-year-old male with 

PMH oxygen dependent COPD who presents with a chief complaint of progressive 

shortness of breath over the past several days.  He does note a dry cough 

without increase sputum production.  He denies any chest pain.  He states that 

shortness of breath seems to be worse with exertion and slightly present at 

rest.  He does use 6 L nasal cannula at baseline he states.  He has been using 

his inhaler every 4-6 hours as prescribed without relief.  He states that he 

does follow with a pulmonologist but forgets this physician's name.  He denies 

any recent antibiotics or steroids.  Denies any recent hospitalizations.  He 

denies any history of ventilator requirement.  He denies any vomiting.  He 

denies any known exposure to coronavirus.  Denies any objective fevers.  Denies 

syncope.  No other complaints.


 (CAMILLA JEFFERSON DO)





Review of Systems:


Review of Systems:


Constitutional:   Denies fever or chills. []


Eyes:   Denies change in visual acuity. []


HENT:   Denies nasal congestion or sore throat. [] 


Respiratory:   Positive for shortness of breath


Cardiovascular:   Denies chest pain or edema. [] 


GI:   Denies abdominal pain, nausea, vomiting, bloody stools or diarrhea. [] 


:  Denies dysuria. [] 


Musculoskeletal:   Denies back pain or joint pain. [] 


Integument:   Denies rash. [] 


Neurologic:   Denies headache, focal weakness or sensory changes. [] 


Endocrine:   Denies polyuria or polydipsia. [] 


Lymphatic:  Denies swollen glands. [] 


Psychiatric:  Denies depression or anxiety. []


 (CAMILLA JEFFERSON DO)





Heart Score:


Risk Factors:


Risk Factors:  DM, Current or recent (<one month) smoker, HTN, HLP, family 

history of CAD, obesity.


Risk Scores:


Score 0 - 3:  2.5% MACE over next 6 weeks - Discharge Home


Score 4 - 6:  20.3% MACE over next 6 weeks - Admit for Clinical Observation


Score 7 - 10:  72.7% MACE over next 6 weeks - Early Invasive Strategies


 (CAMILLA JEFFERSON DO)





Current Medications:





Current Medications








 Medications


  (Trade)  Dose


 Ordered  Sig/Irvin  Start Time


 Stop Time Status Last Admin


Dose Admin


 


 Albuterol/


 Ipratropium


  (Duoneb)  9 ml  1X  ONCE  20 17:00


 20 17:01 DC  





 


 Prednisone


  (Prednisone)  60 mg  1X  ONCE  20 17:00


 20 17:01 DC 20 17:09


60 MG


 


 Sodium Chloride  1,000 ml @ 


 1,000 mls/hr  1X  ONCE  20 17:00


 20 17:59  20 17:09


1,000 MLS/HR








 (CAMILLA JEFFERSON DO)





Allergies:


Allergies:





Allergies








Coded Allergies Type Severity Reaction Last Updated Verified


 


  No Known Drug Allergies    10/1/19 No








 (CAMILLA JEFFERSON DO)





Physical Exam:


PE:





Constitutional: Well developed, well nourished, no acute distress, non-toxic 

appearance. []


HENT: Normocephalic, atraumatic, bilateral external ears normal, oropharynx mois

t, no oral exudates, nose normal. []


Eyes: PERRLA, EOMI, conjunctiva normal, no discharge. [] 


Neck: Normal range of motion, no tenderness, supple, no stridor. [] 


Cardiovascular:Heart rate regular rhythm, no murmur []


Lungs & Thorax: Speaking in full sentences.  98% on 6 L nasal cannula.  No 

accessory muscle usage or retractions noted.  Good aeration.  Slight end 

expiratory wheezes appreciated in the bases bilaterally.


Abdomen: , soft, no tenderness, no masses, no pulsatile masses. [] 


Skin: Warm, dry, no erythema, no rash. [] 


Back: No tenderness, no CVA tenderness. [] 


Extremities: No tenderness, no cyanosis, no clubbing, ROM intact, no edema. [] 


Neurologic: Alert and oriented X 3, normal motor function, normal sensory 

function, no focal deficits noted. []


Psychologic: Affect normal, judgement normal, mood normal. []


 (CAMILLA JEFFERSON DO)





Current Patient Data:


Labs:





                                Laboratory Tests








Test


 20


16:58


 


White Blood Count


 6.6 x10^3/uL


(4.0-11.0)


 


Red Blood Count


 4.03 x10^6/uL


(4.30-5.70)  L


 


Hemoglobin


 12.4 g/dL


(13.0-17.5)  L


 


Hematocrit


 37.4 %


(39.0-53.0)  L


 


Mean Corpuscular Volume


 93 fL ()





 


Mean Corpuscular Hemoglobin 31 pg (25-35)  


 


Mean Corpuscular Hemoglobin


Concent 33 g/dL


(31-37)


 


Red Cell Distribution Width


 16.3 %


(11.5-14.5)  H


 


Platelet Count


 121 x10^3/uL


(140-400)  L


 


Neutrophils (%) (Auto) 78 % (31-73)  H


 


Lymphocytes (%) (Auto) 10 % (24-48)  L


 


Monocytes (%) (Auto) 9 % (0-9)  


 


Eosinophils (%) (Auto) 2 % (0-3)  


 


Basophils (%) (Auto) 1 % (0-3)  


 


Neutrophils # (Auto)


 5.2 x10^3/uL


(1.8-7.7)


 


Lymphocytes # (Auto)


 0.7 x10^3/uL


(1.0-4.8)  L


 


Monocytes # (Auto)


 0.6 x10^3/uL


(0.0-1.1)


 


Eosinophils # (Auto)


 0.1 x10^3/uL


(0.0-0.7)


 


Basophils # (Auto)


 0.0 x10^3/uL


(0.0-0.2)


 


Sodium Level


 141 mmol/L


(136-145)


 


Potassium Level


 3.9 mmol/L


(3.5-5.1)


 


Chloride Level


 105 mmol/L


()


 


Carbon Dioxide Level


 35 mmol/L


(21-32)  H


 


Anion Gap 1 (6-14)  L


 


Blood Urea Nitrogen


 15 mg/dL


(8-26)


 


Creatinine


 1.3 mg/dL


(0.7-1.3)


 


Estimated GFR


(Cockcroft-Gault) 67.5  





 


Glucose Level


 104 mg/dL


(70-99)  H


 


Calcium Level


 9.6 mg/dL


(8.5-10.1)





                                Laboratory Tests


20 16:58








                                Laboratory Tests


20 16:58








Vital Signs:





Vital Signs








  Date Time  Temp Pulse Resp B/P (MAP) Pulse Ox O2 Delivery O2 Flow Rate FiO2


 


20 17:28     94 Nasal Cannula 6.0 


 


20 17:21 98.0 64 24 133/85 (101) 6 Nasal Cannula 96.0 





 98.0       








 (CAMILLA JEFFERSON DO)





EKG:


EKG:


EKG consistent with ventricular paced rhythm.  Ventricular rate of 64 bpm.  Axis

 abnormal secondary to ventricular pacing.  Occasional PVC noted.  No acute ST 

segment elevation appreciated.  Overall similar to previous EKG from May 15, 

2020 []


 (CAMILLA JEFFERSON DO)





Radiology/Procedures:


Radiology/Procedures:


[][facility]


                    [address] [place] [address]


                                 [phone]


                                        


                                 IMAGING REPORT





                                     Signed





PATIENT: CESAR ABLDERAS  ACCOUNT: [id number]     MRN#: [id number]


: 1955           LOCATION: ER              AGE: 65


SEX: M                    EXAM DT: 20         ACCESSION#: [id number]


STATUS: REG ER            ORD. PHYSICIAN: CAMILLA JEFFERSON DO


REASON: SOB


PROCEDURE: CHEST AP ONLY





EXAM: CHEST AP ONLY


 


INDICATION: Reason: SOB / Spl. Instructions:  / History: .


 


TECHNIQUE: Single view 


 


COMPARISON: 2020


 


FINDINGS:


 


Left chest multichamber pacemaker is redemonstrated.


 


Heart is enlarged, slightly greater than before.


 


The great vessels show aortic calcification and tortuosity, similar to 


prior.


 


Fullness to the right greater than left bilateral cat is present and 


could reflect enlarged pulmonary arteries.


 


Lungs show worsening aeration bilaterally with ill-defined opacity 


developing most conspicuously in the anterior right upper lobe


 


No evidence of a pneumothorax but small bilateral pleural effusions are 


present, likely


The right.


 


There are no significant osseous abnormalities.


 


IMPRESSION:


 


Findings suggesting CHF exacerbation with developing airspace opacity in 


the lungs, likely alveolar edema. Superimposed pneumonia not excluded.


 


 


Electronically signed by: Avtar Zheng MD (2020 5:35 PM) 


[id number]














DICTATED and SIGNED BY:     AVTAR ZHENG MD


DATE:     20 5358


 (CAMILLA JEFFERSON DO)





Course & Med Decision Making:


Course & Med Decision Making


Pertinent Labs and Imaging studies reviewed. (See chart for details)





Patient is an oxygen dependent COPD patient who presents with chief complaint of

 shortness of breath and cough.  Per chart review patient does have an extensive

 cardiac history as well including complete heart block with biventricular 

pacemaker placement as well as heart failure.  Initial examination he does have 

some wheezing.  He shows no signs of respiratory failure.  Chest x-ray does show

 some increased consolidation versus pulmonary edema on the right middle lobe.  

Given the unclear nature of this chest x-ray finding IV Levaquin will be 

administered.  He was given duo nebs and oral prednisone.  At this time troponin

 and BNP results are pending.  He remains on his home oxygen level.  COVID swab 

was obtained and is pending.





At this time signout has been given to Dr. Faustin.  We did discuss the 

current lab results, imaging results, medications given, and plan of care.  

Please see update note for further details and final disposition  []





COVID-19 CRITERIA:    The patient was evaluated during the global COVID-19 

pandemic, and that diagnosis was suspected/considered upon their initial 

presentation.  Their evaluation, treatment and testing was consistent with 

current guidelines for patients who present with complaints or symptoms that may

 be related to COVID-19.


 (CAMILLA JEFFERSON DO)


Course & Med Decision Making


-the patient was seen and evaluated at shift change and multiple occasions 

throughout his hospitalization here in the emergency department.  Patient is 

suffering from significant hypoxemia and what looks like early CO2 retention 

with a borderline respiratory acidosis.  Patient has been started on BiPAP and 

seems to be stable enough for admission to the hospital.  He does not appear to 

be need to be animated at this time.  IV antibiotics have been started for his 

community-acquired pneumonia.  Is noted that he had a reaction to Levaquin.  I 

discussed the case with the hospitalist who agreed to admit the patient.


 (BRIGIDO FAUSTIN MD)


Dragon Disclaimer:


Dragon Disclaimer:


This electronic medical record was generated, in whole or in part, using a voice

 recognition dictation system.


 (CAMILLA JEFFERSON DO)





Departure


Departure


Impression:  


   Primary Impression:  


   Acute respiratory failure with hypoxia


   Additional Impressions:  


   COPD exacerbation


   Community acquired pneumonia


   Qualified Codes:  J18.9 - Pneumonia, unspecified organism


Disposition:   ADMITTED AS INPATIENT


Condition:  CRITICAL


Referrals:  


HIEN ALFONSO (PCP)





Justicifation of Admission Dx:


Justifications for Admission:


Justification of Admission Dx:  N/A


 (CAMILLA JEFFERSON DO)





Critical Care Note


Total Time (mins):  45


Comments


Critical care time of 45 minutes was billed outside of any teaching or procedure

 time.  Critical care time was billed secondary to impending collapse of the 

cardiovascular and respiratory system.


Problems:  


(1) Community acquired pneumonia


Qualifiers:  


   Qualified Codes:  J18.9 - Pneumonia, unspecified organism


(2) Acute respiratory failure with hypoxia (BRIGIDO FAUSTIN MD)





Critical Care Time


 Critical care time was [] minutes exclusive of procedures.


 (BRIGIDO FAUSTIN MD)











CAMILLA JEFFERSON DO           Aug 5, 2020 17:26


BRIGIDO FAUSTIN MD           Aug 5, 2020 20:05

## 2020-08-05 NOTE — NUR
Patient admitted to room 111, patient on non-rebreather for transport. RT brought bipap to 
bedside. Able to orient to room and call light. Patient comfortable and has no complaints at 
this time. Able to answer admission questions, says there is no change to history/home meds 
since last admit in May. Received admit orders from Dr. Gordon.

## 2020-08-05 NOTE — PDOC1
History and Physical


Date of Service:


DOS:


DATE: 8/5/20 


TIME: 19:12





Chief Complaint:


Problems:  


(1) CHF (congestive heart failure)


(2) CHF (congestive heart failure)


(3) CHF exacerbation


(4) NSTEMI (non-ST elevated myocardial infarction)


(5) Acute exacerbation of CHF (congestive heart failure)


(6) Asthma


(7) Hypoxia


(8) Substance abuse


(9) Left flank pain


(10) COPD exacerbation


(11) Bradycardia


(12) Dyspnea


(13) CKD (chronic kidney disease), stage III


(14) LBBB (left bundle branch block)


(15) NICM (nonischemic cardiomyopathy)


(16) HTN (hypertension)


(17) Acute on chronic combined systolic and diastolic heart failure


Chief Complain:


Shortness of breath and cough





History of Present Illness:


HPI:


This is a 59-year-old -American male who appears younger than his stated 

age


I admitted him about once a month


He continues to do cocaine and smoke and has known COPD


Once again he presents with shortness of breath and cough and weakness


Chest x-ray showing CHF and possible pneumonia


Discussed the case with the ER physician


Recurrent admit the patient him IV antibiotics


We will be consulting cardiology and pulmonary as well





Past Medical/Surgical History:


PMH/PSH:


Past Medical History:  Asthma, CHF, COPD, Hypertension, Other


Additional Past Medical Histor:  Drug abuse


Past Surgical History:  Pacemaker, Other


Additional Past Surgical Histo:  HERNIA, L KNEE


Smoking Status:  Current Every Day Smoker


Alcohol Use:  Heavy


Drug Use:  Cocaine, Marijuana





Allergies:


Allergies:  


Coded Allergies:  


     No Known Drug Allergies (Unverified , 10/1/19)





Family History:


Family History:


Hypertension





Social History:


Social History:


He does cocaine


He smokes


I believe he lives alone





Current Medications:


Current Medications





Current Medications


Prednisone (Prednisone) 60 mg 1X  ONCE PO  Last administered on 8/5/20at 17:09; 

Start 8/5/20 at 17:00;  Stop 8/5/20 at 17:01;  Status DC


Albuterol/ Ipratropium (Duoneb) 9 ml 1X  ONCE NEB  Last administered on 8/5/20at

17:28;  Start 8/5/20 at 17:00;  Stop 8/5/20 at 17:01;  Status DC


Sodium Chloride 1,000 ml @  1,000 mls/hr 1X  ONCE IV  Last administered on 8/5 /20at 17:09;  Start 8/5/20 at 17:00;  Stop 8/5/20 at 17:59;  Status DC


Levofloxacin/ Dextrose 150 ml @  100 mls/hr 1X  ONCE IV  Last administered on 

8/5/20at 18:49;  Start 8/5/20 at 18:00;  Stop 8/5/20 at 19:07;  Status DC


Ceftriaxone Sodium (Rocephin) 2 gm 1X  ONCE IVP ;  Start 8/5/20 at 19:15;  Stop 

8/5/20 at 19:16


Azithromycin 250 ml @  250 mls/hr 1X  ONCE IV ;  Start 8/5/20 at 19:15;  Stop 

8/5/20 at 20:14





Active Scripts


Active


Levaquin (Levofloxacin) 750 Mg Tablet 1 Tab PO DAILY 10 Days


Culturelle (Lactobacillus Rhamnosus Gg) 1 Each Cap.sprink 1 Cap PO BID 10 Days


Lasix (Furosemide) 20 Mg Tablet 1 Tab PO DAILY 30 Days


Lisinopril 40 Mg Tablet 20 Mg PO DAILY 30 Days


Proair Hfa (Albuterol Sulfate) 8.5 Gm Hfa.aer.ad 2.5 Mg NEB PRN Q2HR PRN 30 Days


Duoneb 0.5-3(2.5) Mg/3 Ml (Albuterol/Ipratropium) 3 Ml Ampul.neb 3 Ml NEB Q4HRS 

30 Days


Albuterol Sulfate Neb Soln (Albuterol Sulfate) 0.63 Mg/3 Ml Vial.neb 0.63 Mg NEB

PRN Q4HRS PRN 30 Days


     AS NEEDED


Reported


Eliquis (Apixaban) 5 Mg Tablet 5 Mg PO BID


Toprol Xl (Metoprolol Succinate) 25 Mg Tab.er.24h 1 Tab PO DAILY 30 Days


Zocor (Simvastatin) 20 Mg Tablet 20 Mg PO HS


     NEXT DOSE DUE TONIGHT AT BEDTIME


Amlodipine Besylate 10 Mg Tablet 10 Mg PO DAILY


     NEXT DOSE DUE IN AM


Potassium Chloride 10 Meq Tab.er.prt 20 Meq PO DAILY


     NEXT DOSE DUE IN AM





ROS:


Review of Systems


Review of System


REVIEW OF SYSTEMS:


GENERAL:  Denies weakness


SKIN:  No bruising, hair changes or rashes.


EYES:  No blurred, double or loss of vision.


NOSE AND THROAT:  No history of nosebleeds, hoarseness or sore throat.


HEART:  No history of palpitations, chest pain or shortness of breath on


exertion.


LUNGS: Complains of cough and shortness of breath


GASTROINTESTINAL:  Denies changes in appetite, nausea, vomiting, diarrhea or


constipation.


GENITOURINARY:  No history of frequency, urgency, hesitancy or nocturia.


NEUROLOGIC:  Denies history of numbness, tingling, or tremor.


PSYCHIATRIC:  No history of panic, anxiety or depression.


ENDOCRINE:  No history of heat or cold intolerance, polyuria or polydipsia.


EXTREMITIES:  Denies joint pain, pain on walking or stiffness.





Physical Exam:


Vital Signs:





Vital Signs








  Date Time  Temp Pulse Resp B/P (MAP) Pulse Ox O2 Delivery O2 Flow Rate FiO2


 


8/5/20 18:58  66 26 141/89 (106) 77 Venturi Mask 12.0 


 


8/5/20 17:21 98.0       





 98.0       








Physcial Exam:


GEN:   No apparent distress.  Alert and oriented


HEENT:   Normal cephalic, atraumatic, external auditory canals are patent


EYES:   Extraocular muscles are intact, pupil are equally round and reactive to 

light and accommodation


MUSCULOSKELETAL:  Well developed , well nourished, good range of motion


ENDOCRINE:   No thyromegaly was palpated


LYMPHATICS:   No cervical chain or axillary nodes were noted


HEMATOPOIETIC:  No bruising


NECK:   Supple, no JVD, no thyromegaly was noted


LUNGS:   Slight bibasilar crackles


HEART:    RRR, S!, S2 present.  Peripheral pulses intact, no obvious murmurs 

noted


ABDOMEN:   Soft, nontender.  Positive bowel sounds, no organomegaly, normal 

bowel sounds


EXTREMITIES:   Without clubbing, cyanosis, or edema.  Pedal pulses intact.  

Negative Homans sign


NEUROLOGIC:   Normal speech and tone.  A&O x 3, moves all extremities, no 

obvious focal deficits


PSYCHIATRIC:   Normal affect, normal mood. Stable


SKIN:   No ulcerations or rashes, good skin turgor, no jaundice


VASCULAR:   Good capillary refill, neurovascular bundle appears to be intact





Labs:


Labs:





Laboratory Tests








Test


 8/5/20


16:58 8/5/20


17:39


 


White Blood Count


 6.6 x10^3/uL


(4.0-11.0) 





 


Red Blood Count


 4.03 x10^6/uL


(4.30-5.70) 





 


Hemoglobin


 12.4 g/dL


(13.0-17.5) 





 


Hematocrit


 37.4 %


(39.0-53.0) 





 


Mean Corpuscular Volume 93 fL ()  


 


Mean Corpuscular Hemoglobin 31 pg (25-35)  


 


Mean Corpuscular Hemoglobin


Concent 33 g/dL


(31-37) 





 


Red Cell Distribution Width


 16.3 %


(11.5-14.5) 





 


Platelet Count


 121 x10^3/uL


(140-400) 





 


Neutrophils (%) (Auto) 78 % (31-73)  


 


Lymphocytes (%) (Auto) 10 % (24-48)  


 


Monocytes (%) (Auto) 9 % (0-9)  


 


Eosinophils (%) (Auto) 2 % (0-3)  


 


Basophils (%) (Auto) 1 % (0-3)  


 


Neutrophils # (Auto)


 5.2 x10^3/uL


(1.8-7.7) 





 


Lymphocytes # (Auto)


 0.7 x10^3/uL


(1.0-4.8) 





 


Monocytes # (Auto)


 0.6 x10^3/uL


(0.0-1.1) 





 


Eosinophils # (Auto)


 0.1 x10^3/uL


(0.0-0.7) 





 


Basophils # (Auto)


 0.0 x10^3/uL


(0.0-0.2) 





 


Sodium Level


 141 mmol/L


(136-145) 





 


Potassium Level


 3.9 mmol/L


(3.5-5.1) 





 


Chloride Level


 105 mmol/L


() 





 


Carbon Dioxide Level


 35 mmol/L


(21-32) 





 


Anion Gap 1 (6-14)  


 


Blood Urea Nitrogen


 15 mg/dL


(8-26) 





 


Creatinine


 1.3 mg/dL


(0.7-1.3) 





 


Estimated GFR


(Cockcroft-Gault) 67.5 


 





 


Glucose Level


 104 mg/dL


(70-99) 





 


Calcium Level


 9.6 mg/dL


(8.5-10.1) 





 


Troponin I Quantitative


 0.040 ng/mL


(0.000-0.055) 





 


NT-Pro-B-Type Natriuretic


Peptide 4839 pg/mL


(0-124) 





 


Bedside Venous pH


 


 7.30


(7.32-7.42)


 


Bedside Venous pCO2


 


 64 mmHg


(41-51)


 


Bedside Venous pO2


 


 76 mmHg


(20-40)


 


Venous Blood HCO3


 


 32 mmol/L


(24-28)


 


POC Venous O2 Saturation


(Daniel) 


 93 % 





 


Bedside FiO2  21.0 








Laboratory Tests








Test


 8/5/20


16:58 8/5/20


17:39


 


White Blood Count


 6.6 x10^3/uL


(4.0-11.0) 





 


Red Blood Count


 4.03 x10^6/uL


(4.30-5.70) 





 


Hemoglobin


 12.4 g/dL


(13.0-17.5) 





 


Hematocrit


 37.4 %


(39.0-53.0) 





 


Mean Corpuscular Volume 93 fL ()  


 


Mean Corpuscular Hemoglobin 31 pg (25-35)  


 


Mean Corpuscular Hemoglobin


Concent 33 g/dL


(31-37) 





 


Red Cell Distribution Width


 16.3 %


(11.5-14.5) 





 


Platelet Count


 121 x10^3/uL


(140-400) 





 


Neutrophils (%) (Auto) 78 % (31-73)  


 


Lymphocytes (%) (Auto) 10 % (24-48)  


 


Monocytes (%) (Auto) 9 % (0-9)  


 


Eosinophils (%) (Auto) 2 % (0-3)  


 


Basophils (%) (Auto) 1 % (0-3)  


 


Neutrophils # (Auto)


 5.2 x10^3/uL


(1.8-7.7) 





 


Lymphocytes # (Auto)


 0.7 x10^3/uL


(1.0-4.8) 





 


Monocytes # (Auto)


 0.6 x10^3/uL


(0.0-1.1) 





 


Eosinophils # (Auto)


 0.1 x10^3/uL


(0.0-0.7) 





 


Basophils # (Auto)


 0.0 x10^3/uL


(0.0-0.2) 





 


Sodium Level


 141 mmol/L


(136-145) 





 


Potassium Level


 3.9 mmol/L


(3.5-5.1) 





 


Chloride Level


 105 mmol/L


() 





 


Carbon Dioxide Level


 35 mmol/L


(21-32) 





 


Anion Gap 1 (6-14)  


 


Blood Urea Nitrogen


 15 mg/dL


(8-26) 





 


Creatinine


 1.3 mg/dL


(0.7-1.3) 





 


Estimated GFR


(Cockcroft-Gault) 67.5 


 





 


Glucose Level


 104 mg/dL


(70-99) 





 


Calcium Level


 9.6 mg/dL


(8.5-10.1) 





 


Troponin I Quantitative


 0.040 ng/mL


(0.000-0.055) 





 


NT-Pro-B-Type Natriuretic


Peptide 4839 pg/mL


(0-124) 





 


Bedside Venous pH


 


 7.30


(7.32-7.42)


 


Bedside Venous pCO2


 


 64 mmHg


(41-51)


 


Bedside Venous pO2


 


 76 mmHg


(20-40)


 


Venous Blood HCO3


 


 32 mmol/L


(24-28)


 


POC Venous O2 Saturation


(Daniel) 


 93 % 





 


Bedside FiO2  21.0 











Images:


Images


Chest x-ray shows heart failure and/or pneumonia





Assessment/Plan


Assessment/Plan


Respiratory failure


COPD


Drug abuse


CHF (acute on chronic systolic and diastolic)


Probable pneumonia








Plan


IV antibiotics


IV Lasix


Home meds


Metered-dose inhalers


O2 to titrate to keep his sat greater than 92


Consult pulmonary consult cardiology


Trend labs


Cardiac monitoring


Long-term prognosis guarded if he does not quit doing the drugs





Justicifation of Admission Dx:


Justifications for Admission:


Justification of Admission Dx:  N/A


Respiratory Failure:  Severe Resp Distress











ANTONIA LUX III DO            Aug 5, 2020 19:15

## 2020-08-05 NOTE — RAD
EXAM: CHEST AP ONLY

 

INDICATION: Reason: SOB / Spl. Instructions:  / History: .

 

TECHNIQUE: Single view 

 

COMPARISON: 5/23/2020

 

FINDINGS:

 

Left chest multichamber pacemaker is redemonstrated.

 

Heart is enlarged, slightly greater than before.

 

The great vessels show aortic calcification and tortuosity, similar to 

prior.

 

Fullness to the right greater than left bilateral cat is present and 

could reflect enlarged pulmonary arteries.

 

Lungs show worsening aeration bilaterally with ill-defined opacity 

developing most conspicuously in the anterior right upper lobe

 

No evidence of a pneumothorax but small bilateral pleural effusions are 

present, likely

The right.

 

There are no significant osseous abnormalities.

 

IMPRESSION:

 

Findings suggesting CHF exacerbation with developing airspace opacity in 

the lungs, likely alveolar edema. Superimposed pneumonia not excluded.

 

 

Electronically signed by: Nova Zheng MD (8/5/2020 5:35 PM) 

[id number]

## 2020-08-06 VITALS — SYSTOLIC BLOOD PRESSURE: 133 MMHG | DIASTOLIC BLOOD PRESSURE: 84 MMHG

## 2020-08-06 VITALS — SYSTOLIC BLOOD PRESSURE: 111 MMHG | DIASTOLIC BLOOD PRESSURE: 68 MMHG

## 2020-08-06 VITALS — DIASTOLIC BLOOD PRESSURE: 87 MMHG | SYSTOLIC BLOOD PRESSURE: 143 MMHG

## 2020-08-06 VITALS — DIASTOLIC BLOOD PRESSURE: 72 MMHG | SYSTOLIC BLOOD PRESSURE: 107 MMHG

## 2020-08-06 VITALS — SYSTOLIC BLOOD PRESSURE: 125 MMHG | DIASTOLIC BLOOD PRESSURE: 79 MMHG

## 2020-08-06 VITALS — DIASTOLIC BLOOD PRESSURE: 72 MMHG | SYSTOLIC BLOOD PRESSURE: 119 MMHG

## 2020-08-06 VITALS — SYSTOLIC BLOOD PRESSURE: 125 MMHG | DIASTOLIC BLOOD PRESSURE: 94 MMHG

## 2020-08-06 VITALS — SYSTOLIC BLOOD PRESSURE: 139 MMHG | DIASTOLIC BLOOD PRESSURE: 92 MMHG

## 2020-08-06 VITALS — SYSTOLIC BLOOD PRESSURE: 133 MMHG | DIASTOLIC BLOOD PRESSURE: 85 MMHG

## 2020-08-06 VITALS — DIASTOLIC BLOOD PRESSURE: 78 MMHG | SYSTOLIC BLOOD PRESSURE: 127 MMHG

## 2020-08-06 VITALS — SYSTOLIC BLOOD PRESSURE: 124 MMHG | DIASTOLIC BLOOD PRESSURE: 83 MMHG

## 2020-08-06 VITALS — DIASTOLIC BLOOD PRESSURE: 68 MMHG | SYSTOLIC BLOOD PRESSURE: 110 MMHG

## 2020-08-06 VITALS — SYSTOLIC BLOOD PRESSURE: 125 MMHG | DIASTOLIC BLOOD PRESSURE: 84 MMHG

## 2020-08-06 VITALS — DIASTOLIC BLOOD PRESSURE: 83 MMHG | SYSTOLIC BLOOD PRESSURE: 128 MMHG

## 2020-08-06 VITALS — SYSTOLIC BLOOD PRESSURE: 117 MMHG | DIASTOLIC BLOOD PRESSURE: 72 MMHG

## 2020-08-06 VITALS — DIASTOLIC BLOOD PRESSURE: 80 MMHG | SYSTOLIC BLOOD PRESSURE: 115 MMHG

## 2020-08-06 LAB
ANION GAP SERPL CALC-SCNC: 3 MMOL/L (ref 6–14)
BASE EXCESS ABG: 5 MMOL/L (ref -3–3)
BASE EXCESS STD BLDA CALC-SCNC: 0 MMOL/L (ref -3–3)
BUN SERPL-MCNC: 15 MG/DL (ref 8–26)
CALCIUM SERPL-MCNC: 9.6 MG/DL (ref 8.5–10.1)
CHLORIDE SERPL-SCNC: 103 MMOL/L (ref 98–107)
CO2 SERPL-SCNC: 34 MMOL/L (ref 21–32)
CREAT SERPL-MCNC: 1.4 MG/DL (ref 0.7–1.3)
ERYTHROCYTE [DISTWIDTH] IN BLOOD BY AUTOMATED COUNT: 15.8 % (ref 11.5–14.5)
GFR SERPLBLD BASED ON 1.73 SQ M-ARVRAT: 61.9 ML/MIN
GLUCOSE SERPL-MCNC: 214 MG/DL (ref 70–99)
HCO3 BLDA-SCNC: 29 MMOL/L (ref 21–28)
HCO3 BLDA-SCNC: 34 MMOL/L (ref 21–28)
HCT VFR BLD CALC: 38.8 % (ref 39–53)
HGB BLD-MCNC: 12.6 G/DL (ref 13–17.5)
INSPIRATION SETTING TIME VENT: 45
MAGNESIUM SERPL-MCNC: 2.1 MG/DL (ref 1.8–2.4)
MCH RBC QN AUTO: 30 PG (ref 25–35)
MCHC RBC AUTO-ENTMCNC: 32 G/DL (ref 31–37)
MCV RBC AUTO: 93 FL (ref 79–100)
METHGB MFR BLD: 0.4 % (ref 0–1.9)
OXYHGB MFR BLD: 92.8 %
PCO2 BLDA: 67 MMHG (ref 35–46)
PCO2 BLDA: 70 MMHG (ref 35–46)
PLATELET # BLD AUTO: 125 X10^3/UL (ref 140–400)
PO2 BLDA: 78 MMHG (ref 65–108)
PO2 BLDA: 83 MMHG (ref 65–108)
POTASSIUM SERPL-SCNC: 4.1 MMOL/L (ref 3.5–5.1)
RBC # BLD AUTO: 4.17 X10^6/UL (ref 4.3–5.7)
SAO2 % BLDA: 94 % (ref 92–99)
SAO2 % BLDA: 96 % (ref 92–99)
SODIUM SERPL-SCNC: 140 MMOL/L (ref 136–145)
WBC # BLD AUTO: 5.9 X10^3/UL (ref 4–11)

## 2020-08-06 PROCEDURE — 5A09357 ASSISTANCE WITH RESPIRATORY VENTILATION, LESS THAN 24 CONSECUTIVE HOURS, CONTINUOUS POSITIVE AIRWAY PRESSURE: ICD-10-PCS | Performed by: INTERNAL MEDICINE

## 2020-08-06 RX ADMIN — SIMVASTATIN SCH MG: 20 TABLET, FILM COATED ORAL at 21:23

## 2020-08-06 RX ADMIN — LISINOPRIL SCH MG: 20 TABLET ORAL at 08:53

## 2020-08-06 RX ADMIN — DOXYCYCLINE SCH MLS/HR: 100 INJECTION, POWDER, LYOPHILIZED, FOR SOLUTION INTRAVENOUS at 21:21

## 2020-08-06 RX ADMIN — IPRATROPIUM BROMIDE AND ALBUTEROL SULFATE SCH ML: .5; 3 SOLUTION RESPIRATORY (INHALATION) at 20:13

## 2020-08-06 RX ADMIN — APIXABAN SCH MG: 5 TABLET, FILM COATED ORAL at 21:20

## 2020-08-06 RX ADMIN — IPRATROPIUM BROMIDE AND ALBUTEROL SULFATE SCH ML: .5; 3 SOLUTION RESPIRATORY (INHALATION) at 15:43

## 2020-08-06 RX ADMIN — POTASSIUM CHLORIDE SCH MEQ: 750 TABLET, FILM COATED, EXTENDED RELEASE ORAL at 08:52

## 2020-08-06 RX ADMIN — INSULIN LISPRO SCH UNITS: 100 INJECTION, SOLUTION INTRAVENOUS; SUBCUTANEOUS at 17:00

## 2020-08-06 RX ADMIN — IPRATROPIUM BROMIDE AND ALBUTEROL SULFATE SCH ML: .5; 3 SOLUTION RESPIRATORY (INHALATION) at 23:30

## 2020-08-06 RX ADMIN — Medication PRN EACH: at 10:11

## 2020-08-06 RX ADMIN — APIXABAN SCH MG: 5 TABLET, FILM COATED ORAL at 08:52

## 2020-08-06 RX ADMIN — FUROSEMIDE SCH MG: 20 TABLET ORAL at 09:43

## 2020-08-06 RX ADMIN — METOPROLOL SUCCINATE SCH MG: 25 TABLET, EXTENDED RELEASE ORAL at 08:54

## 2020-08-06 RX ADMIN — Medication SCH CAP: at 21:20

## 2020-08-06 RX ADMIN — FUROSEMIDE SCH MG: 20 TABLET ORAL at 08:53

## 2020-08-06 RX ADMIN — CEFTRIAXONE SODIUM SCH GM: 2 INJECTION, POWDER, FOR SOLUTION INTRAMUSCULAR; INTRAVENOUS at 17:13

## 2020-08-06 RX ADMIN — INSULIN LISPRO SCH UNITS: 100 INJECTION, SOLUTION INTRAVENOUS; SUBCUTANEOUS at 21:00

## 2020-08-06 RX ADMIN — DOXYCYCLINE SCH MLS/HR: 100 INJECTION, POWDER, LYOPHILIZED, FOR SOLUTION INTRAVENOUS at 08:55

## 2020-08-06 NOTE — NUR
pt transferred from ICU to room 512. Received report from VENTURA Guzmán. Pt transported via 
wheelchair and all belongings left with patient at the time of transfer. Black phone  
plugged into wall, cell phone handed to patient and bag of clothing in green belonging bag 
placed at the bedside.

## 2020-08-06 NOTE — PDOC2
CARDIAC CONSULT


DATE OF CONSULT


Date of Consult


DATE: 20 


TIME: 07:48





REASON FOR CONSULT


Reason for Consult:


CHF





REFERRING PHYSICIAN


Referring Physician:


Heidi





SOURCE


Source:  Chart review





HISTORY OF PRESENT ILLNESS


HISTORY OF PRESENT ILLNESS


This is a pleasant 64 yo male admitted for complains of shortness of breath. 

Reports that his SOA starting to progress again in the last 3 days. Has some 

coughing but nonproductive. Currently he is a PUI. He lives alone and has not 

complain of significant peripheral edema and no chest pain or palpitations. He 

does use O2 at home and despite that he was still SOA. He continues to use 

cocaine marijuana with last use about 3-4 days ago and also still smokes tobacco

otherwise he complies with his medications. He is very known to me and difficult

to optimize due to continued substance abuse. Denies any fever or chills.





PAST MEDICAL HISTORY


Past Medical History


Cardiovascular:  CHF, HTN, NICM, AI, Chronic LBBB, SSS, mild chronic troponin 

elevation


Pulmonary:  Asthma, COPD, Pneumonia


GI:  Hemorrhoids


Heme/Onc:  No pertinent hx


Hepatobiliary:  No pertinent hx


Psych:  No pertinent hx


Rheumatologic:  No pertinent hx


Infectious disease:  No pertinent hx


ENT:  No pertinent hx


Renal/:  Chronic renal insufficiency


Endocrine:  No pertinent hx


Dermatology:  No pertinent hx





PAST SURGICAL HISTORY


Past Surgical History


hemorrhoidectomy and left inguinal hernia repair; Childhood torn ligament to 

left knee with repair, Pacemaker/AICD (Biotronik CRT-P)





FAMILY HISTORY


Family History


Mother  at 72 with MI and DM


Father  at 80 with MI


Brother 1  with brain tumor at 50


Brother 2 & 3 alive with CHF


Sister alive with CHF as well





SOCIAL HISTORY


Social History


Smoke:  <1 pack per day


ALCOHOL:  occassional


Drugs:  Cocaine, Marijuana


Lives:  Alone





CURRENT MEDICATIONS


CURRENT MEDICATIONS





Current Medications








 Medications


  (Trade)  Dose


 Ordered  Sig/Irvin


 Route


 PRN Reason  Start Time


 Stop Time Status Last Admin


Dose Admin


 


 Prednisone


  (Prednisone)  60 mg  1X  ONCE


 PO


   20 17:00


 20 17:01 DC 20 17:09





 


 Albuterol/


 Ipratropium


  (Duoneb)  9 ml  1X  ONCE


 NEB


   20 17:00


 20 17:01 DC 20 17:28





 


 Sodium Chloride  1,000 ml @ 


 1,000 mls/hr  1X  ONCE


 IV


   20 17:00


 20 17:59 DC 20 17:09





 


 Levofloxacin/


 Dextrose  150 ml @ 


 100 mls/hr  1X  ONCE


 IV


   20 18:00


 20 19:07 DC 20 18:49





 


 Ceftriaxone Sodium


  (Rocephin)  2 gm  1X  ONCE


 IVP


   20 19:15


 20 19:16 DC 20 19:24





 


 Azithromycin  250 ml @ 


 250 mls/hr  1X  ONCE


 IV


   20 19:15


 20 20:14 DC 20 19:24














ALLERGIES


ALLERGIES:  


Coded Allergies:  


     levofloxacin (Verified  Allergy, Unknown, Itching, 20)





ROS


Review of System


14 point ROS evaluated with pertinent positives noted per HPI





PHYSICAL EXAM


General:  Alert, Oriented X3, Cooperative, moderate distress


HEENT:  Atraumatic


Lungs:  Other (Bipap)


Heart:  Regular rate (AV paced)


Abdomen:  No tenderness


Extremities:  No edema


Skin:  No breakdown


Neuro:  Normal speech, Sensation intact


Psych/Mental Status:  Mental status NL, Mood NL


MUSCULOSKELETAL:  Osteoarthritic changes both hands





VITALS/I&O


VITALS/I&O:





                                   Vital Signs








  Date Time  Temp Pulse Resp B/P (MAP) Pulse Ox O2 Delivery O2 Flow Rate FiO2


 


20 07:21     97 BiPAP/CPAP  


 


20 06:00  63 14 127/78 (94)    


 


20 04:00 97.5       





 97.5       


 


20 21:30       15.0 














                                    I & O   


 


 20





 15:00 23:00 07:00


 


Intake Total  1250 ml 50 ml


 


Output Total   300 ml


 


Balance  1250 ml -250 ml











LABS


Lab:





                                Laboratory Tests








Test


 20


16:58 20


17:39 20


18:59 20


07:35


 


White Blood Count


 6.6 x10^3/uL


(4.0-11.0) 


 


 





 


Red Blood Count


 4.03 x10^6/uL


(4.30-5.70)  L 


 


 





 


Hemoglobin


 12.4 g/dL


(13.0-17.5)  L 


 


 





 


Hematocrit


 37.4 %


(39.0-53.0)  L 


 


 





 


Mean Corpuscular Volume


 93 fL ()


 


 


 





 


Mean Corpuscular Hemoglobin 31 pg (25-35)     


 


Mean Corpuscular Hemoglobin


Concent 33 g/dL


(31-37) 


 


 





 


Red Cell Distribution Width


 16.3 %


(11.5-14.5)  H 


 


 





 


Platelet Count


 121 x10^3/uL


(140-400)  L 


 


 





 


Neutrophils (%) (Auto) 78 % (31-73)  H   


 


Lymphocytes (%) (Auto) 10 % (24-48)  L   


 


Monocytes (%) (Auto) 9 % (0-9)     


 


Eosinophils (%) (Auto) 2 % (0-3)     


 


Basophils (%) (Auto) 1 % (0-3)     


 


Neutrophils # (Auto)


 5.2 x10^3/uL


(1.8-7.7) 


 


 





 


Lymphocytes # (Auto)


 0.7 x10^3/uL


(1.0-4.8)  L 


 


 





 


Monocytes # (Auto)


 0.6 x10^3/uL


(0.0-1.1) 


 


 





 


Eosinophils # (Auto)


 0.1 x10^3/uL


(0.0-0.7) 


 


 





 


Basophils # (Auto)


 0.0 x10^3/uL


(0.0-0.2) 


 


 





 


Sodium Level


 141 mmol/L


(136-145) 


 


 





 


Potassium Level


 3.9 mmol/L


(3.5-5.1) 


 


 





 


Chloride Level


 105 mmol/L


() 


 


 





 


Carbon Dioxide Level


 35 mmol/L


(21-32)  H 


 


 





 


Anion Gap 1 (6-14)  L   


 


Blood Urea Nitrogen


 15 mg/dL


(8-26) 


 


 





 


Creatinine


 1.3 mg/dL


(0.7-1.3) 


 


 





 


Estimated GFR


(Cockcroft-Gault) 67.5  


 


 


 





 


Glucose Level


 104 mg/dL


(70-99)  H 


 


 





 


Calcium Level


 9.6 mg/dL


(8.5-10.1) 


 


 





 


Troponin I Quantitative


 0.040 ng/mL


(0.000-0.055) 


 


 





 


NT-Pro-B-Type Natriuretic


Peptide 4839 pg/mL


(0-124)  H 


 


 





 


POC Venous pH


 


 7.30


(7.32-7.42)  L 


 





 


POC Venous pCO2


 


 64 mmHg


(41-51)  H 


 





 


POC Venous pO2


 


 76 mmHg


(20-40)  H 


 





 


Venous Blood HCO3


 


 32 mmol/L


(24-28)  H 


 





 


POC Venous O2 Saturation


(Daniel) 


 93 %  


 


 





 


POC FiO2  21.0    


 


O2 Saturation


 


 


 65 % (92-99)


*L 94 % (92-99)  





 


Arterial Blood pH


 


 


 7.34


(7.35-7.45)  L 7.25


(7.35-7.45)  L


 


Arterial Blood pCO2 at


Patient Temp 


 


 57 mmHg


(35-46)  H 67 mmHg


(35-46)  *H


 


Arterial Blood pO2 at Patient


Temp 


 


 < 42 mmHg


()  *L 78 mmHg


()


 


Arterial Blood HCO3


 


 


 30 mmol/L


(21-28)  H 29 mmol/L


(21-28)  H


 


Arterial Blood Base Excess


 


 


 3 mmol/L


(-3-3) 0 mmol/L


(-3-3)


 


Oxyhemoglobin   64.1 %   92.8 %  


 


Methemoglobin


 


 


 0.4 %


(0.0-1.9) 0.4 %


(0.0-1.9)


 


Carbon Monoxide, Quantitative


 


 


 1.5 %


(0.0-1.9) 0.6 %


(0.0-1.9)


 


FiO2   40   50%  





                                Laboratory Tests


20 16:58








                                Laboratory Tests


20 16:58











IMAGES


IMAGES





IMPRESSION: CTA


1. Right upper lobe pneumonia. Follow-up to resolution is recommended to 


exclude an underlying lesion.


2. Soft tissue density in the right major fissure is decreased in size 


since the prior study, and likely represents complicated loculated 


fissural fluid. There is a trace free right pleural effusion.


3. Moderate centrilobular emphysema. Moderate to severe cardiomegaly. 


Changes of pulmonary arterial hypertension.





DATE:     05/15/20 0926





ECHOCARDIOGRAM


ECHOCARDIOGRAM





<Conclusion>


Limited echo to evaluate LV function.


Left ventricle systolic function is mild to moderately impaired.


The Ejection Fraction is 40%.


Septal motion consistent with conduction abnormality.


There is no evidence of significant pericardial effusion.





DATE:     20 1523





ASSESSMENT/PLAN


ASSESSMENT/PLAN


1.  Acute on chronic respiratory failure, multifactorial secondary to AECOPD, 

CHF


2.  Acute on chronic systolic/diastolic CHF


3.  SSS/ NICM s/p BiV PPM/CRT-P (Biotronik); prior LVEF 40% Device check 

revealed 25% AFIB burden, normal function, BiV pacing 100%. Starting to have 

fluid overload per thoracic impedance. 


5.  PAFIB; presently AV pacing


6.  HTN: controlled


7.  CKD3


8.  Chronic Substance abuse; cocaine and marijuana use.


9.  Tobaccoism 


10. PUI





Recommendations


1. Restart GDMT  Continue toprol, norvasc, lisinopril. Eliquis for stroke 

prophylaxis.


2. Lasix therapy


3. Poor prognosis with high readmission rate due to severe noncompliance 

especially in regards to continued substance abuse. Discussed again cessation


5. O2 to titrate, Bipap. Pulmonary consult











LIZET LLOYD           Aug 6, 2020 08:01

## 2020-08-06 NOTE — EKG
[facility]

              [address] [place] [address]

Test Date:    2020               Test Time:    17:05:39

Pat Name:     CESAR BALDERAS            Department:   

Patient ID:   [id number]           Room:          

Gender:       M                        Technician:   

:          [id number]               Requested By: CAMILLA JEFFERSON

Order Number: [id number]           Reading MD:     

                                 Measurements

Intervals                              Axis          

Rate:         64                       P:            90

SD:           136                      QRS:          -152

QRSD:         182                      T:            33

QT:           470                                    

QTc:          490                                    

                           Interpretive Statements

SINUS RHYTHM

VENTRICULAR PREMATURE COMPLEX(ES)

WPW PATTERN, TYPE A

ABNORMAL RIGHT SUPERIOR AXIS DEVIATION

ABNORMAL ECG

RI6.01

No previous ECG available for comparison

## 2020-08-06 NOTE — PDOC
[facility] [facility] PROGRESS NOTE


Date of Service


DOS:


DATE: 8/6/20 


TIME: 07:53





Chief Complaint


Chief Complaint


A/P:


Acute on chronic respiratory failure, multifactorial secondary to PNA, AECOPD, 

mild acute on chronic systolic CHF


Gram-negative possibly gram-positive pneumonia 


Possible aspiration pneumonia


Mild Acute on Chronic systolic/diastolic CHF


Chronic mild troponin elevation: within his range, no acute changes per EKG. 

demand mediated with continued use of cocaine


SSS


CHF - 40% ejection 


H/o A. fib


HTN


CKD3


Chronic Substance abuse; cocaine and marijuana use. Last use 3 days ago


Tobaccoism 


PUI -SARS-COVID negative. 5/24 and again today negative





CC time 38 minutes





History of Present Illness


History of Present Illness


Mr Colon is a 63 yo M w/ PMHx CHB s/p BiV pacemaker, HTN, CKD2, tobacco abuse, 

COPD on 4-5L home O2, CHF EF 40-45, active cocaine user, ETOH, and marijuana 

abuse who p/w shortness of breath that was fairly sudden onset 8/5/2020. after 

smoking multiple substances. He had some back pain. No anterior chest pain. He 

was trying to use his home nebulizer, states it wasn't helping. He does not know

if he has had weight gain, does not have a scale at home. Has LE edema that is 

much worse as well as orthopnea and PND, not sleeping well. He has a mild cough,

no hemoptysis, no weight loss.  No headaches, no nausea, vomiting or diarrhea. 

He was afebrile, has had no recent travel or sick contacts.


Seen with severe dyspnea and ABG 7.25/67/78. BNP 4839, Trop 0.04, WBC 6.6, Hb 

12.9, Platelets 129, Cr 1.3, K 3.9


He continues to do cocaine and smoke and has known COPD


Chest x-ray showing CHF and possible pneumonia, admitted to ICU on BIPAP for 

respiratory failure.





Seen on BIPAP in ICU, appears comfortable on it. Afebrile. Still hypoxic. COVID 

19 negative





Vitals/I&O


Vitals/I&O:





                                   Vital Signs








  Date Time  Temp Pulse Resp B/P (MAP) Pulse Ox O2 Delivery O2 Flow Rate FiO2


 


8/6/20 07:21     97 BiPAP/CPAP  


 


8/6/20 06:00  63 14 127/78 (94)    


 


8/6/20 04:00 97.5       





 97.5       


 


8/5/20 21:30       15.0 














                                    I & O   


 


 8/5/20 8/5/20 8/6/20





 15:00 23:00 07:00


 


Intake Total  1250 ml 50 ml


 


Output Total   300 ml


 


Balance  1250 ml -250 ml











Physical Exam


General:  Alert, Oriented X3, Cooperative, moderate distress


Heart:  Regular rate, Normal S1, Normal S2


Lungs:  Wheezing


Abdomen:  Normal bowel sounds, Soft





Labs


Labs:





Laboratory Tests








Test


 8/5/20


16:58 8/5/20


17:39 8/5/20


18:59 8/6/20


07:35


 


White Blood Count


 6.6 x10^3/uL


(4.0-11.0) 


 


 





 


Red Blood Count


 4.03 x10^6/uL


(4.30-5.70) 


 


 





 


Hemoglobin


 12.4 g/dL


(13.0-17.5) 


 


 





 


Hematocrit


 37.4 %


(39.0-53.0) 


 


 





 


Mean Corpuscular Volume 93 fL ()    


 


Mean Corpuscular Hemoglobin 31 pg (25-35)    


 


Mean Corpuscular Hemoglobin


Concent 33 g/dL


(31-37) 


 


 





 


Red Cell Distribution Width


 16.3 %


(11.5-14.5) 


 


 





 


Platelet Count


 121 x10^3/uL


(140-400) 


 


 





 


Neutrophils (%) (Auto) 78 % (31-73)    


 


Lymphocytes (%) (Auto) 10 % (24-48)    


 


Monocytes (%) (Auto) 9 % (0-9)    


 


Eosinophils (%) (Auto) 2 % (0-3)    


 


Basophils (%) (Auto) 1 % (0-3)    


 


Neutrophils # (Auto)


 5.2 x10^3/uL


(1.8-7.7) 


 


 





 


Lymphocytes # (Auto)


 0.7 x10^3/uL


(1.0-4.8) 


 


 





 


Monocytes # (Auto)


 0.6 x10^3/uL


(0.0-1.1) 


 


 





 


Eosinophils # (Auto)


 0.1 x10^3/uL


(0.0-0.7) 


 


 





 


Basophils # (Auto)


 0.0 x10^3/uL


(0.0-0.2) 


 


 





 


Sodium Level


 141 mmol/L


(136-145) 


 


 





 


Potassium Level


 3.9 mmol/L


(3.5-5.1) 


 


 





 


Chloride Level


 105 mmol/L


() 


 


 





 


Carbon Dioxide Level


 35 mmol/L


(21-32) 


 


 





 


Anion Gap 1 (6-14)    


 


Blood Urea Nitrogen


 15 mg/dL


(8-26) 


 


 





 


Creatinine


 1.3 mg/dL


(0.7-1.3) 


 


 





 


Estimated GFR


(Cockcroft-Gault) 67.5 


 


 


 





 


Glucose Level


 104 mg/dL


(70-99) 


 


 





 


Calcium Level


 9.6 mg/dL


(8.5-10.1) 


 


 





 


Troponin I Quantitative


 0.040 ng/mL


(0.000-0.055) 


 


 





 


NT-Pro-B-Type Natriuretic


Peptide 4839 pg/mL


(0-124) 


 


 





 


Bedside Venous pH


 


 7.30


(7.32-7.42) 


 





 


Bedside Venous pCO2


 


 64 mmHg


(41-51) 


 





 


Bedside Venous pO2


 


 76 mmHg


(20-40) 


 





 


Venous Blood HCO3


 


 32 mmol/L


(24-28) 


 





 


POC Venous O2 Saturation


(Daniel) 


 93 % 


 


 





 


Bedside FiO2  21.0   


 


O2 Saturation   65 % (92-99)  94 % (92-99) 


 


Arterial Blood pH


 


 


 7.34


(7.35-7.45) 7.25


(7.35-7.45)


 


Arterial Blood pCO2 at


Patient Temp 


 


 57 mmHg


(35-46) 67 mmHg


(35-46)


 


Arterial Blood pO2 at Patient


Temp 


 


 < 42 mmHg


() 78 mmHg


()


 


Arterial Blood HCO3


 


 


 30 mmol/L


(21-28) 29 mmol/L


(21-28)


 


Arterial Blood Base Excess


 


 


 3 mmol/L


(-3-3) 0 mmol/L


(-3-3)


 


Oxyhemoglobin   64.1 %  92.8 % 


 


Methemoglobin


 


 


 0.4 %


(0.0-1.9) 0.4 %


(0.0-1.9)


 


Carbon Monoxide, Quantitative


 


 


 1.5 %


(0.0-1.9) 0.6 %


(0.0-1.9)


 


FiO2   40  50% 











Assessment and Plan


Assessmemt and Plan


Problems


Medical Problems:


(1) Acute respiratory failure with hypoxia


Status: Acute  





(2) Community acquired pneumonia


Status: Acute  





(3) COPD exacerbation


Status: Acute  











Comment


Review of Relevant


I have reviewed the following items brittany (where applicable) has been applied.


Medications:





Current Medications








 Medications


  (Trade)  Dose


 Ordered  Sig/Irvin


 Route


 PRN Reason  Start Time


 Stop Time Status Last Admin


Dose Admin


 


 Prednisone


  (Prednisone)  60 mg  1X  ONCE


 PO


   8/5/20 17:00


 8/5/20 17:01 DC 8/5/20 17:09





 


 Albuterol/


 Ipratropium


  (Duoneb)  9 ml  1X  ONCE


 NEB


   8/5/20 17:00


 8/5/20 17:01 DC 8/5/20 17:28





 


 Sodium Chloride  1,000 ml @ 


 1,000 mls/hr  1X  ONCE


 IV


   8/5/20 17:00


 8/5/20 17:59 DC 8/5/20 17:09





 


 Levofloxacin/


 Dextrose  150 ml @ 


 100 mls/hr  1X  ONCE


 IV


   8/5/20 18:00


 8/5/20 19:07 DC 8/5/20 18:49





 


 Ceftriaxone Sodium


  (Rocephin)  2 gm  1X  ONCE


 IVP


   8/5/20 19:15


 8/5/20 19:16 DC 8/5/20 19:24





 


 Azithromycin  250 ml @ 


 250 mls/hr  1X  ONCE


 IV


   8/5/20 19:15


 8/5/20 20:14 DC 8/5/20 19:24














Justicifation of Admission Dx:


Justifications for Admission:


Justification of Admission Dx:  N/A


Respiratory Failure:  Severe Resp Distress











TONY MENDES MD         Aug 6, 2020 07:54

## 2020-08-06 NOTE — CONS
DATE OF CONSULTATION:  



PULMONARY CONSULTATION 



ATTENDING PHYSICIAN:  Dr. Gordon.



REASON FOR CONSULTATION:  Respiratory failure.



HISTORY OF PRESENT ILLNESS:  The patient is very well known to us.  He is a

65-year-old male who has a past medical history of hypertension, CKD, long

history of tobacco use and also ongoing marijuana and cocaine use and along with

cardiomyopathy with an EF of 40-45%.



He was brought into the hospital with complaint of shortness of breath.  He has

some lower extremity edema.  He said he has not quit cigarettes and also has not

quit cocaine.  He was noted to be in worsening congestive heart failure compared

to previous chest x-ray.  His ABG showed a pH of 7.34, pCO2 of 57 and a pO2 of

less than 42.  He was placed on BiPAP.  This morning, ABG showed a pH of 7.25,

pCO2 of 67, a pO2 of 78.  He does not appear to be in any obvious respiratory

distress.  Denies any headaches.  No nausea, vomiting.  No diarrhea.  No chest

pain.



PAST MEDICAL HISTORY:  Extensive includin.  History of COPD, likely severe with ongoing tobaccoism.

2.  History of cardiomyopathy, EF of 40-45%.

3.  History of congestive heart failure.

4.  History of ongoing marijuana and cocaine abuse.

5.  History of complete heart block, status post BIV pacemaker.



PAST SURGICAL HISTORY:  Status post pacemaker, appendectomy, and hernia repair.



FAMILY HISTORY:  Coronary artery disease, diabetes, and hypertension.



SOCIAL HISTORY:  Ongoing tobaccoism, alcohol, marijuana and cocaine use.



ALLERGIES:  LEVAQUIN.



REVIEW OF SYSTEMS:  A 12-point system obtained.  Pertinent positives discussed

in my history of present illness, otherwise noncontributory.  All systems that

were negative were reviewed as well.



PHYSICAL EXAMINATION:

VITAL SIGNS:  Reviewed.  Blood pressure stable, pulse ox 97% on BiPAP.

HEENT:  Sclerae nonicteric.

NECK:  Supple.

LUNGS:  With diminished breath sounds.

CARDIOVASCULAR:  With a regular rate.

ABDOMEN:  Soft.

EXTREMITIES:  With pitting edema.



LABORATORY DATA:  Labs were reviewed.  ABG discussed in my history of present

illness.  BUN 15, creatinine 1.3.  ProBNP 4839.  White cell count 6.6,

hemoglobin 12.4, and platelets are 121.



IMPRESSION:

1.  Acute on chronic hypoxic and hypercapnic respiratory failure secondary to

acute on chronic systolic and diastolic heart failure in addition acute

exacerbation of chronic obstructive pulmonary disease.

2.  The patient with nonischemic cardiomyopathy.

3.  The patient with ongoing tobacco use and likely severe chronic obstructive

pulmonary disease.

4.  Prior history of pneumonias.

5.  History of marijuana and cocaine abuse.  Continues to do cocaine.

6.  Prior history of Enterobacter bacteremia.



RECOMMENDATIONS:

1.  I have discussed with the patient regarding smoking cessation as well as

cocaine cessation.  He does not appear to be motivated to do that.

2.  I have made adjustments on the BiPAP.  Avoid hyperoxia.  I have increased

IPAP and follow ABGs in few hours.

3.  Diuresis.

4.  BiPAP p.r.n. during the day and bedtime.

5.  Follow cardiology recommendations.

6.  Monitor chest x-ray.

7.  DVT prophylaxis, the patient is currently on Eliquis.

8.  Empiric antibiotic were initiated.  Clinically, less likely pneumonia.  We

will deescalate soon.

9.  Discussed with RN and RT.



Critical care time 37 minutes.

 



______________________________

JESSE DOVE MD DR:  TYSON/coby  JOB#:  [id number] / [id number]

DD:  2020 09:32  DT:  2020 09:51

## 2020-08-06 NOTE — CONS
DATE OF CONSULTATION:  08/06/2020



REQUESTING PHYSICIAN:  Yoel Gordon DO



REASON FOR CONSULTATION:  Pulmonary infection, COPD exacerbation and antibiotic

management.



HISTORY OF PRESENT ILLNESS:  This is a 65-year-old -American gentleman

with history of multiple medical problems and multiple admissions, who came in

with shortness of breath.  The patient is noted to have pulmonary infiltrate. 

The patient apparently had been short of breath for several days.  Denies any

fever, denies any nausea, vomiting, or diarrhea, though has been doing drugs

including cocaine.



The patient is requiring BiPAP support right now.



PAST MEDICAL HISTORY:  Positive for COPD, CHF, hypertension, pacemaker in place,

cardiomyopathy, hyperlipidemia, hypertension, has had ventral hernia surgery,

appendicectomy, AICD in place, and knee surgery.



SOCIAL HISTORY:  Positive for smoking, positive for alcohol use as well as

positive for drug use.



ALLERGIES:  No known drug allergies.



CURRENT MEDICATIONS:  Reviewed.



REVIEW OF SYSTEMS:  As per HPI, all other systems reviewed and are negative.



PHYSICAL EXAMINATION:

GENERAL:  Alert gentleman who is in mild respiratory distress, on a BiPAP.

VITAL SIGNS:  Stable, afebrile.

HEENT:  NAD.

NECK:  Supple, no JVP, no lymphadenopathy.

LUNGS:  Clear.

HEART:  S1, S2 regular.

ABDOMEN:  Soft, nontender, no organomegaly.

EXTREMITIES:  No edema, cyanosis.

SKIN:  Unremarkable.

NEUROLOGIC:  The patient is alert, awake and appropriate.  No focal neurologic

deficit.



LABORATORY DATA:  White count is 6.6, hemoglobin 12.4, and platelets are

121,000.  BUN and creatinine is 15 and 1.3.  BNP is 4839.  Urinalysis

unremarkable.  COVID-19 is pending.



Chest x-ray showed pulmonary infiltrate.   CHF versus infection cannot be ruled

out.



IMPRESSION:

1.  Respiratory failure.

2.  Pulmonary infiltrate/pneumonia.

3.  Congestive heart failure.

4.  COVID-19 pending.

5.  Drug use.



RECOMMENDATIONS:  We would use Rocephin and doxycycline.  Supportive care.  Wait

for the COVID-19, diuresis and we will continue to follow.



Thank you very much, Dr. Gordon, for giving me the opportunity to participate in

this patient's care.

 



______________________________

DEMETRIS CARTWRIGHT MD



DR:  ANDREA/coby  JOB#:  [id number] / [id number]

DD:  08/06/2020 08:20  DT:  08/06/2020 08:33

## 2020-08-06 NOTE — PDOC
Infectious Disease Note


Vital Sign


Vital Signs





Vital Signs








  Date Time  Temp Pulse Resp B/P (MAP) Pulse Ox O2 Delivery O2 Flow Rate FiO2


 


8/6/20 07:21     97 BiPAP/CPAP  


 


8/6/20 06:00  63 14 127/78 (94)    


 


8/6/20 04:00 97.5       





 97.5       


 


8/5/20 21:30       15.0 











Labs


Lab





Laboratory Tests








Test


 8/5/20


16:58 8/5/20


17:39 8/5/20


18:59 8/6/20


07:35


 


White Blood Count


 6.6 x10^3/uL


(4.0-11.0) 


 


 





 


Red Blood Count


 4.03 x10^6/uL


(4.30-5.70) 


 


 





 


Hemoglobin


 12.4 g/dL


(13.0-17.5) 


 


 





 


Hematocrit


 37.4 %


(39.0-53.0) 


 


 





 


Mean Corpuscular Volume 93 fL ()    


 


Mean Corpuscular Hemoglobin 31 pg (25-35)    


 


Mean Corpuscular Hemoglobin


Concent 33 g/dL


(31-37) 


 


 





 


Red Cell Distribution Width


 16.3 %


(11.5-14.5) 


 


 





 


Platelet Count


 121 x10^3/uL


(140-400) 


 


 





 


Neutrophils (%) (Auto) 78 % (31-73)    


 


Lymphocytes (%) (Auto) 10 % (24-48)    


 


Monocytes (%) (Auto) 9 % (0-9)    


 


Eosinophils (%) (Auto) 2 % (0-3)    


 


Basophils (%) (Auto) 1 % (0-3)    


 


Neutrophils # (Auto)


 5.2 x10^3/uL


(1.8-7.7) 


 


 





 


Lymphocytes # (Auto)


 0.7 x10^3/uL


(1.0-4.8) 


 


 





 


Monocytes # (Auto)


 0.6 x10^3/uL


(0.0-1.1) 


 


 





 


Eosinophils # (Auto)


 0.1 x10^3/uL


(0.0-0.7) 


 


 





 


Basophils # (Auto)


 0.0 x10^3/uL


(0.0-0.2) 


 


 





 


Sodium Level


 141 mmol/L


(136-145) 


 


 





 


Potassium Level


 3.9 mmol/L


(3.5-5.1) 


 


 





 


Chloride Level


 105 mmol/L


() 


 


 





 


Carbon Dioxide Level


 35 mmol/L


(21-32) 


 


 





 


Anion Gap 1 (6-14)    


 


Blood Urea Nitrogen


 15 mg/dL


(8-26) 


 


 





 


Creatinine


 1.3 mg/dL


(0.7-1.3) 


 


 





 


Estimated GFR


(Cockcroft-Gault) 67.5 


 


 


 





 


Glucose Level


 104 mg/dL


(70-99) 


 


 





 


Calcium Level


 9.6 mg/dL


(8.5-10.1) 


 


 





 


Troponin I Quantitative


 0.040 ng/mL


(0.000-0.055) 


 


 





 


NT-Pro-B-Type Natriuretic


Peptide 4839 pg/mL


(0-124) 


 


 





 


Bedside Venous pH


 


 7.30


(7.32-7.42) 


 





 


Bedside Venous pCO2


 


 64 mmHg


(41-51) 


 





 


Bedside Venous pO2


 


 76 mmHg


(20-40) 


 





 


Venous Blood HCO3


 


 32 mmol/L


(24-28) 


 





 


POC Venous O2 Saturation


(Daniel) 


 93 % 


 


 





 


Bedside FiO2  21.0   


 


O2 Saturation   65 % (92-99)  94 % (92-99) 


 


Arterial Blood pH


 


 


 7.34


(7.35-7.45) 7.25


(7.35-7.45)


 


Arterial Blood pCO2 at


Patient Temp 


 


 57 mmHg


(35-46) 67 mmHg


(35-46)


 


Arterial Blood pO2 at Patient


Temp 


 


 < 42 mmHg


() 78 mmHg


()


 


Arterial Blood HCO3


 


 


 30 mmol/L


(21-28) 29 mmol/L


(21-28)


 


Arterial Blood Base Excess


 


 


 3 mmol/L


(-3-3) 0 mmol/L


(-3-3)


 


Oxyhemoglobin   64.1 %  92.8 % 


 


Methemoglobin


 


 


 0.4 %


(0.0-1.9) 0.4 %


(0.0-1.9)


 


Carbon Monoxide, Quantitative


 


 


 1.5 %


(0.0-1.9) 0.6 %


(0.0-1.9)


 


FiO2   40  50% 











Objective


Assessment


pt seen, consult dictated





Plan


Plan of Care


/











DEMETRIS CARTWRIGHT MD                Aug 6, 2020 08:12

## 2020-08-06 NOTE — NUR
SS following for discharge planning. SS reviewed pt chart and discussed with pt RN. Pt is 
from home and is currently on the BIPAP. Pt has home oxygen at home. Pt on IV Rocephin and 
IV Doxycycline. COVID19 pending. SS will continue to follow for discharge planning.

## 2020-08-07 VITALS — DIASTOLIC BLOOD PRESSURE: 89 MMHG | SYSTOLIC BLOOD PRESSURE: 133 MMHG

## 2020-08-07 VITALS — DIASTOLIC BLOOD PRESSURE: 90 MMHG | SYSTOLIC BLOOD PRESSURE: 141 MMHG

## 2020-08-07 VITALS — DIASTOLIC BLOOD PRESSURE: 91 MMHG | SYSTOLIC BLOOD PRESSURE: 143 MMHG

## 2020-08-07 VITALS — DIASTOLIC BLOOD PRESSURE: 78 MMHG | SYSTOLIC BLOOD PRESSURE: 120 MMHG

## 2020-08-07 VITALS — SYSTOLIC BLOOD PRESSURE: 133 MMHG | DIASTOLIC BLOOD PRESSURE: 89 MMHG

## 2020-08-07 VITALS — SYSTOLIC BLOOD PRESSURE: 123 MMHG | DIASTOLIC BLOOD PRESSURE: 72 MMHG

## 2020-08-07 LAB
ANION GAP SERPL CALC-SCNC: 1 MMOL/L (ref 6–14)
BUN SERPL-MCNC: 19 MG/DL (ref 8–26)
CALCIUM SERPL-MCNC: 9.6 MG/DL (ref 8.5–10.1)
CHLORIDE SERPL-SCNC: 105 MMOL/L (ref 98–107)
CO2 SERPL-SCNC: 36 MMOL/L (ref 21–32)
CREAT SERPL-MCNC: 1.6 MG/DL (ref 0.7–1.3)
GFR SERPLBLD BASED ON 1.73 SQ M-ARVRAT: 53.1 ML/MIN
GLUCOSE SERPL-MCNC: 99 MG/DL (ref 70–99)
MAGNESIUM SERPL-MCNC: 2.2 MG/DL (ref 1.8–2.4)
POTASSIUM SERPL-SCNC: 4.2 MMOL/L (ref 3.5–5.1)
SODIUM SERPL-SCNC: 142 MMOL/L (ref 136–145)

## 2020-08-07 PROCEDURE — 5A09357 ASSISTANCE WITH RESPIRATORY VENTILATION, LESS THAN 24 CONSECUTIVE HOURS, CONTINUOUS POSITIVE AIRWAY PRESSURE: ICD-10-PCS | Performed by: INTERNAL MEDICINE

## 2020-08-07 RX ADMIN — INSULIN LISPRO SCH UNITS: 100 INJECTION, SOLUTION INTRAVENOUS; SUBCUTANEOUS at 21:00

## 2020-08-07 RX ADMIN — DOXYCYCLINE SCH MLS/HR: 100 INJECTION, POWDER, LYOPHILIZED, FOR SOLUTION INTRAVENOUS at 20:58

## 2020-08-07 RX ADMIN — SIMVASTATIN SCH MG: 20 TABLET, FILM COATED ORAL at 20:58

## 2020-08-07 RX ADMIN — FUROSEMIDE SCH MG: 20 TABLET ORAL at 09:24

## 2020-08-07 RX ADMIN — DOXYCYCLINE SCH MLS/HR: 100 INJECTION, POWDER, LYOPHILIZED, FOR SOLUTION INTRAVENOUS at 09:27

## 2020-08-07 RX ADMIN — INSULIN LISPRO SCH UNITS: 100 INJECTION, SOLUTION INTRAVENOUS; SUBCUTANEOUS at 12:00

## 2020-08-07 RX ADMIN — INSULIN LISPRO SCH UNITS: 100 INJECTION, SOLUTION INTRAVENOUS; SUBCUTANEOUS at 17:00

## 2020-08-07 RX ADMIN — LISINOPRIL SCH MG: 20 TABLET ORAL at 09:24

## 2020-08-07 RX ADMIN — Medication SCH CAP: at 09:24

## 2020-08-07 RX ADMIN — IPRATROPIUM BROMIDE AND ALBUTEROL SULFATE SCH ML: .5; 3 SOLUTION RESPIRATORY (INHALATION) at 07:24

## 2020-08-07 RX ADMIN — IPRATROPIUM BROMIDE AND ALBUTEROL SULFATE SCH ML: .5; 3 SOLUTION RESPIRATORY (INHALATION) at 04:00

## 2020-08-07 RX ADMIN — IPRATROPIUM BROMIDE AND ALBUTEROL SULFATE SCH ML: .5; 3 SOLUTION RESPIRATORY (INHALATION) at 23:41

## 2020-08-07 RX ADMIN — IPRATROPIUM BROMIDE AND ALBUTEROL SULFATE SCH ML: .5; 3 SOLUTION RESPIRATORY (INHALATION) at 11:30

## 2020-08-07 RX ADMIN — APIXABAN SCH MG: 5 TABLET, FILM COATED ORAL at 20:58

## 2020-08-07 RX ADMIN — IPRATROPIUM BROMIDE AND ALBUTEROL SULFATE SCH ML: .5; 3 SOLUTION RESPIRATORY (INHALATION) at 15:33

## 2020-08-07 RX ADMIN — IPRATROPIUM BROMIDE AND ALBUTEROL SULFATE SCH ML: .5; 3 SOLUTION RESPIRATORY (INHALATION) at 19:51

## 2020-08-07 RX ADMIN — Medication SCH CAP: at 20:58

## 2020-08-07 RX ADMIN — APIXABAN SCH MG: 5 TABLET, FILM COATED ORAL at 09:26

## 2020-08-07 RX ADMIN — INSULIN LISPRO SCH UNITS: 100 INJECTION, SOLUTION INTRAVENOUS; SUBCUTANEOUS at 08:00

## 2020-08-07 RX ADMIN — POTASSIUM CHLORIDE SCH MEQ: 750 TABLET, FILM COATED, EXTENDED RELEASE ORAL at 09:24

## 2020-08-07 RX ADMIN — METOPROLOL SUCCINATE SCH MG: 25 TABLET, EXTENDED RELEASE ORAL at 09:26

## 2020-08-07 RX ADMIN — CEFTRIAXONE SODIUM SCH GM: 2 INJECTION, POWDER, FOR SOLUTION INTRAMUSCULAR; INTRAVENOUS at 18:11

## 2020-08-07 NOTE — PDOC
Infectious Disease Note


Subjective


Subjective


Patient is feeling better





ROS


ROS


No nausea vomiting diarrhea chest pain shortness of breath





Vital Sign


Vital Signs





Vital Signs








  Date Time  Temp Pulse Resp B/P (MAP) Pulse Ox O2 Delivery O2 Flow Rate FiO2


 


8/7/20 09:26  71  133/89    


 


8/7/20 08:30      Nasal Cannula 6.0 


 


8/7/20 07:24     95   


 


8/7/20 07:00 98.3  18     





 98.3       











Physical Exam


PHYSICAL EXAM


GENERAL:  Alert gentleman not in any distress


VITAL SIGNS:  Stable, afebrile.


HEENT:  NAD.


NECK:  Supple, no JVP, no lymphadenopathy.


LUNGS:  Clear.


HEART:  S1, S2 regular.


ABDOMEN:  Soft, nontender, no organomegaly.


EXTREMITIES:  No edema, cyanosis.


SKIN:  Unremarkable.


NEUROLOGIC:  The patient is alert, awake and appropriate.  No focal neurologic


deficit.


.





Labs


Lab





Laboratory Tests








Test


 8/6/20


12:30 8/6/20


17:16 8/6/20


20:55 8/7/20


08:49


 


O2 Saturation 96 % (92-99)    


 


Arterial Blood pH


 7.31


(7.35-7.45) 


 


 





 


Arterial Blood pCO2 at


Patient Temp 70 mmHg


(35-46) 


 


 





 


Arterial Blood pO2 at Patient


Temp 83 mmHg


() 


 


 





 


Arterial Blood HCO3


 34 mmol/L


(21-28) 


 


 





 


Arterial Blood Base Excess


 5 mmol/L


(-3-3) 


 


 





 


FiO2 45    


 


Glucose (Fingerstick)


 


 117 mg/dL


(70-99) 125 mg/dL


(70-99) 148 mg/dL


(70-99)











Objective


Assessment





IMPRESSION:


1.  Respiratory failure.


2.  Pulmonary infiltrate/pneumonia.


3.  Congestive heart failure.


4.  COVID-19 pending.


5.  Drug use.





Plan


Plan of Care


Advised against drug use


Patient can be discharged on p.o. antibiotics, Augmentin











DEMETRIS CARTWRIGHT MD                Aug 7, 2020 10:54

## 2020-08-07 NOTE — PDOC
[facility] [facility] PROGRESS NOTE


Date of Service


DOS:


DATE: 8/7/20 


TIME: 11:05





Chief Complaint


Chief Complaint


Acute on chronic respiratory failure, multifactorial secondary to PNA, AECOPD, 

mild acute on chronic systolic CHF


Gram-negative possibly gram-positive pneumonia 


Possible aspiration pneumonia


Mild Acute on Chronic systolic/diastolic CHF


Chronic mild troponin elevation: within his range, no acute changes per EKG. 

demand mediated with continued use of cocaine


SSS


CHF - 40% ejection 


H/o A. fib


HTN


CKD3


Chronic Substance abuse; cocaine and marijuana use. Last use 3 days ago


Tobaccoism 


PUI -SARS-COVID negative. 5/24 and again today negative





History of Present Illness


History of Present Illness


8/7/2020


Patient seen and examined


Patient is resting comfortably in NAD


Discussed with RN


Charts reviewed





Mr Colon is a 65 yo M w/ PMHx CHB s/p BiV pacemaker, HTN, CKD2, tobacco abuse, 

COPD on 4-5L home O2, CHF EF 40-45, active cocaine user, ETOH, and marijuana 

abuse who p/w shortness of breath that was fairly sudden onset 8/5/2020. after 

smoking multiple substances. He had some back pain. No anterior chest pain. He 

was trying to use his home nebulizer, states it wasn't helping. He does not know

if he has had weight gain, does not have a scale at home. Has LE edema that is 

much worse as well as orthopnea and PND, not sleeping well. He has a mild cough,

no hemoptysis, no weight loss.  No headaches, no nausea, vomiting or diarrhea. 

He was afebrile, has had no recent travel or sick contacts.


Seen with severe dyspnea and ABG 7.25/67/78. BNP 4839, Trop 0.04, WBC 6.6, Hb 

12.9, Platelets 129, Cr 1.3, K 3.9


He continues to do cocaine and smoke and has known COPD


Chest x-ray showing CHF and possible pneumonia, admitted to ICU on BIPAP for 

respiratory failure.





Seen on BIPAP in ICU, appears comfortable on it. Afebrile. Still hypoxic. COVID 

19 negative





Vitals/I&O


Vitals/I&O:





                                   Vital Signs








  Date Time  Temp Pulse Resp B/P (MAP) Pulse Ox O2 Delivery O2 Flow Rate FiO2


 


8/7/20 09:26  71  133/89    


 


8/7/20 08:30      Nasal Cannula 6.0 


 


8/7/20 07:24     95   


 


8/7/20 07:00 98.3  18     





 98.3       














                                    I & O   


 


 8/6/20 8/6/20 8/7/20





 15:00 23:00 07:00


 


Intake Total 700 ml 240 ml 240 ml


 


Output Total 500 ml 200 ml 400 ml


 


Balance 200 ml 40 ml -160 ml











Physical Exam


Physical Exam:


GENERAL:  Alert gentleman not in any distress


VITAL SIGNS:  Stable, afebrile.


HEENT:  NAD.


NECK:  Supple, no JVP, no lymphadenopathy.


LUNGS:  Clear.


HEART:  S1, S2 regular.


ABDOMEN:  Soft, nontender, no organomegaly.


EXTREMITIES:  No edema, cyanosis.


SKIN:  Unremarkable.


NEUROLOGIC:  The patient is alert, awake and appropriate.  No focal neurologic


deficit.


.


General:  Alert, Oriented X3, Cooperative, No acute distress


Heart:  Regular rate, Normal S1, Normal S2


Lungs:  Clear


Abdomen:  Normal bowel sounds, Soft


Extremities:  No edema


Skin:  No breakdown





Labs


Labs:





Laboratory Tests








Test


 8/6/20


12:30 8/6/20


17:16 8/6/20


20:55 8/7/20


08:49


 


O2 Saturation 96 % (92-99)    


 


Arterial Blood pH


 7.31


(7.35-7.45) 


 


 





 


Arterial Blood pCO2 at


Patient Temp 70 mmHg


(35-46) 


 


 





 


Arterial Blood pO2 at Patient


Temp 83 mmHg


() 


 


 





 


Arterial Blood HCO3


 34 mmol/L


(21-28) 


 


 





 


Arterial Blood Base Excess


 5 mmol/L


(-3-3) 


 


 





 


FiO2 45    


 


Glucose (Fingerstick)


 


 117 mg/dL


(70-99) 125 mg/dL


(70-99) 148 mg/dL


(70-99)











Review of Systems


Review of Systems:


All systems are otherwise negative





Assessment and Plan


Assessmemt and Plan


Problems


Medical Problems:


(1) Acute respiratory failure with hypoxia


Status: Acute  





(2) Community acquired pneumonia


Status: Acute  





(3) COPD exacerbation


Status: Acute  





Assessment


Acute on chronic respiratory failure, multifactorial secondary to PNA, AECOPD, 

mild acute on chronic systolic CHF


Gram-negative possibly gram-positive pneumonia 


Possible aspiration pneumonia


Mild Acute on Chronic systolic/diastolic CHF


Chronic mild troponin elevation: within his range, no acute changes per EKG. 

demand mediated with continued use of cocaine


SSS


CHF - 40% ejection 


H/o A. fib


HTN


CKD3


Chronic Substance abuse; cocaine and marijuana use. Last use 3 days ago


Tobaccoism 


PUI -SARS-COVID negative. 5/24 and again today negative





Plan


Continue oxygen supplementation


IV antibiotics


Breathing treatments


DVT prophylaxis


Home Meds


Continued discussion on drug use cessation - counseling provided


Appreciate subspecialists input








Comment


Review of Relevant


I have reviewed the following items brittany (where applicable) has been applied.


Medications:





Current Medications








 Medications


  (Trade)  Dose


 Ordered  Sig/Irvin


 Route


 PRN Reason  Start Time


 Stop Time Status Last Admin


Dose Admin


 


 Simvastatin


  (Zocor)  20 mg  HS


 PO


   8/6/20 21:00


    8/6/20 21:23





 


 Ceftriaxone Sodium


  (Rocephin)  2 gm  Q24H


 IVP


   8/6/20 17:00


    8/6/20 17:13





 


 Lactobacillus


 Rhamnosus


  (Culturelle)  1 cap  BID


 PO


   8/6/20 21:00


    8/7/20 09:24





 


 Potassium Chloride


  (Klor-Con)  40 meq  1X  ONCE


 PO


   8/6/20 13:00


 8/6/20 13:01 DC 8/6/20 13:30





 


 Albuterol/


 Ipratropium


  (Duoneb)  3 ml  Q4HRS


 NEB


   8/6/20 16:00


    8/7/20 07:24














Justicifation of Admission Dx:


Justifications for Admission:


Justification of Admission Dx:  N/A


Respiratory Failure:  Severe Resp Distress











ANTONIA LUX III DO            Aug 7, 2020 11:13

## 2020-08-07 NOTE — NUR
SS following up with discharge planning. SS reviewed pt chart and discussed with pt RN. Pt 
is currently requiring oxygen at 6 Liters NC. Pt on IV Rocephin and IV Doxycycline. Pt has 
home oxygen at home at 6 Liters NC. COVID19 negative. Discharge plan is to home when ready. 
SS will continue to follow for discharge planning.

## 2020-08-07 NOTE — PDOC
PULMONARY PROGRESS NOTES


DATE: 8/7/20 


TIME: 10:54


Subjective


fully awake, no ivy


used BIPAP qhs


Vitals





Vital Signs








  Date Time  Temp Pulse Resp B/P (MAP) Pulse Ox O2 Delivery O2 Flow Rate FiO2


 


8/7/20 09:26  71  133/89    


 


8/7/20 08:30      Nasal Cannula 6.0 


 


8/7/20 07:24     95   


 


8/7/20 07:00 98.3  18     





 98.3       








General:  Alert, Oriented X4, No acute distress


Lungs:  Clear


Cardiovascular:  S1, S2


Abdomen:  Soft, Non-tender, Other


Extremities:  Other (trace edema)


Labs





Laboratory Tests








Test


 8/5/20


16:58 8/5/20


17:05 8/5/20


17:39 8/5/20


18:59


 


White Blood Count


 6.6 x10^3/uL


(4.0-11.0) 


 


 





 


Red Blood Count


 4.03 x10^6/uL


(4.30-5.70) 


 


 





 


Hemoglobin


 12.4 g/dL


(13.0-17.5) 


 


 





 


Hematocrit


 37.4 %


(39.0-53.0) 


 


 





 


Mean Corpuscular Volume 93 fL ()    


 


Mean Corpuscular Hemoglobin 31 pg (25-35)    


 


Mean Corpuscular Hemoglobin


Concent 33 g/dL


(31-37) 


 


 





 


Red Cell Distribution Width


 16.3 %


(11.5-14.5) 


 


 





 


Platelet Count


 121 x10^3/uL


(140-400) 


 


 





 


Neutrophils (%) (Auto) 78 % (31-73)    


 


Lymphocytes (%) (Auto) 10 % (24-48)    


 


Monocytes (%) (Auto) 9 % (0-9)    


 


Eosinophils (%) (Auto) 2 % (0-3)    


 


Basophils (%) (Auto) 1 % (0-3)    


 


Neutrophils # (Auto)


 5.2 x10^3/uL


(1.8-7.7) 


 


 





 


Lymphocytes # (Auto)


 0.7 x10^3/uL


(1.0-4.8) 


 


 





 


Monocytes # (Auto)


 0.6 x10^3/uL


(0.0-1.1) 


 


 





 


Eosinophils # (Auto)


 0.1 x10^3/uL


(0.0-0.7) 


 


 





 


Basophils # (Auto)


 0.0 x10^3/uL


(0.0-0.2) 


 


 





 


Sodium Level


 141 mmol/L


(136-145) 


 


 





 


Potassium Level


 3.9 mmol/L


(3.5-5.1) 


 


 





 


Chloride Level


 105 mmol/L


() 


 


 





 


Carbon Dioxide Level


 35 mmol/L


(21-32) 


 


 





 


Anion Gap 1 (6-14)    


 


Blood Urea Nitrogen


 15 mg/dL


(8-26) 


 


 





 


Creatinine


 1.3 mg/dL


(0.7-1.3) 


 


 





 


Estimated GFR


(Cockcroft-Gault) 67.5 


 


 


 





 


Glucose Level


 104 mg/dL


(70-99) 


 


 





 


Calcium Level


 9.6 mg/dL


(8.5-10.1) 


 


 





 


Troponin I Quantitative


 0.040 ng/mL


(0.000-0.055) 


 


 





 


NT-Pro-B-Type Natriuretic


Peptide 4839 pg/mL


(0-124) 


 


 





 


Coronavirus (PCR)


 


 Not detected


(Not Detected) 


 





 


Bedside Venous pH


 


 


 7.30


(7.32-7.42) 





 


Bedside Venous pCO2


 


 


 64 mmHg


(41-51) 





 


Bedside Venous pO2


 


 


 76 mmHg


(20-40) 





 


Venous Blood HCO3


 


 


 32 mmol/L


(24-28) 





 


POC Venous O2 Saturation


(Daniel) 


 


 93 % 


 





 


Bedside FiO2   21.0  


 


O2 Saturation    65 % (92-99) 


 


Arterial Blood pH


 


 


 


 7.34


(7.35-7.45)


 


Arterial Blood pCO2 at


Patient Temp 


 


 


 57 mmHg


(35-46)


 


Arterial Blood pO2 at Patient


Temp 


 


 


 < 42 mmHg


()


 


Arterial Blood HCO3


 


 


 


 30 mmol/L


(21-28)


 


Arterial Blood Base Excess


 


 


 


 3 mmol/L


(-3-3)


 


Oxyhemoglobin    64.1 % 


 


Methemoglobin


 


 


 


 0.4 %


(0.0-1.9)


 


Carbon Monoxide, Quantitative


 


 


 


 1.5 %


(0.0-1.9)


 


FiO2    40 


 


Test


 8/6/20


07:35 8/6/20


10:30 8/6/20


12:30 8/6/20


17:16


 


O2 Saturation 94 % (92-99)   96 % (92-99)  


 


Arterial Blood pH


 7.25


(7.35-7.45) 


 7.31


(7.35-7.45) 





 


Arterial Blood pCO2 at


Patient Temp 67 mmHg


(35-46) 


 70 mmHg


(35-46) 





 


Arterial Blood pO2 at Patient


Temp 78 mmHg


() 


 83 mmHg


() 





 


Arterial Blood HCO3


 29 mmol/L


(21-28) 


 34 mmol/L


(21-28) 





 


Arterial Blood Base Excess


 0 mmol/L


(-3-3) 


 5 mmol/L


(-3-3) 





 


Oxyhemoglobin 92.8 %    


 


Methemoglobin


 0.4 %


(0.0-1.9) 


 


 





 


Carbon Monoxide, Quantitative


 0.6 %


(0.0-1.9) 


 


 





 


FiO2 50%   45  


 


White Blood Count


 


 5.9 x10^3/uL


(4.0-11.0) 


 





 


Red Blood Count


 


 4.17 x10^6/uL


(4.30-5.70) 


 





 


Hemoglobin


 


 12.6 g/dL


(13.0-17.5) 


 





 


Hematocrit


 


 38.8 %


(39.0-53.0) 


 





 


Mean Corpuscular Volume  93 fL ()   


 


Mean Corpuscular Hemoglobin  30 pg (25-35)   


 


Mean Corpuscular Hemoglobin


Concent 


 32 g/dL


(31-37) 


 





 


Red Cell Distribution Width


 


 15.8 %


(11.5-14.5) 


 





 


Platelet Count


 


 125 x10^3/uL


(140-400) 


 





 


Sodium Level


 


 140 mmol/L


(136-145) 


 





 


Potassium Level


 


 4.1 mmol/L


(3.5-5.1) 


 





 


Chloride Level


 


 103 mmol/L


() 


 





 


Carbon Dioxide Level


 


 34 mmol/L


(21-32) 


 





 


Anion Gap  3 (6-14)   


 


Blood Urea Nitrogen


 


 15 mg/dL


(8-26) 


 





 


Creatinine


 


 1.4 mg/dL


(0.7-1.3) 


 





 


Estimated GFR


(Cockcroft-Gault) 


 61.9 


 


 





 


Glucose Level


 


 214 mg/dL


(70-99) 


 





 


Calcium Level


 


 9.6 mg/dL


(8.5-10.1) 


 





 


Magnesium Level


 


 2.1 mg/dL


(1.8-2.4) 


 





 


Procalcitonin


 


 < 0.10 ng/mL


(0.00-0.10) 


 





 


Glucose (Fingerstick)


 


 


 


 117 mg/dL


(70-99)


 


Test


 8/6/20


20:55 8/7/20


08:49 


 





 


Glucose (Fingerstick)


 125 mg/dL


(70-99) 148 mg/dL


(70-99) 


 











Laboratory Tests








Test


 8/6/20


12:30 8/6/20


17:16 8/6/20


20:55 8/7/20


08:49


 


O2 Saturation 96 % (92-99)    


 


Arterial Blood pH


 7.31


(7.35-7.45) 


 


 





 


Arterial Blood pCO2 at


Patient Temp 70 mmHg


(35-46) 


 


 





 


Arterial Blood pO2 at Patient


Temp 83 mmHg


() 


 


 





 


Arterial Blood HCO3


 34 mmol/L


(21-28) 


 


 





 


Arterial Blood Base Excess


 5 mmol/L


(-3-3) 


 


 





 


FiO2 45    


 


Glucose (Fingerstick)


 


 117 mg/dL


(70-99) 125 mg/dL


(70-99) 148 mg/dL


(70-99)








Medications





Active Scripts








 Medications  Dose


 Route/Sig


 Max Daily Dose Days Date Category Dose


Instructions


 


 Levaquin


  (Levofloxacin)


 750 Mg Tablet  1 Tab


 PO DAILY


  10 5/28/20 Rx 


 


 Culturelle


  (Lactobacillus


 Rhamnosus Gg) 1


 Each Cap.sprink  1 Cap


 PO BID


  10 5/28/20 Rx 


 


 Eliquis


  (Apixaban) 5 Mg


 Tablet  5 Mg


 PO BID


   4/14/20 Reported 


 


 Toprol Xl


  (Metoprolol


 Succinate) 25 Mg


 Tab.er.24h  1 Tab


 PO DAILY


  30 4/14/20 Reported 


 


 Lasix


  (Furosemide) 20


 Mg Tablet  1 Tab


 PO DAILY


  30 1/21/20 Rx 


 


 Lisinopril 40 Mg


 Tablet  20 Mg


 PO DAILY


  30 1/21/20 Rx 


 


 Proair Hfa


  (Albuterol


 Sulfate) 8.5 Gm


 Hfa.aer.ad  2.5 Mg


 NEB PRN Q2HR PRN


  30 1/21/20 Rx 


 


 Duoneb 0.5-3(2.5)


 Mg/3 Ml


  (Albuterol/Ipratropium)


 3 Ml Ampul.neb  3 Ml


 NEB Q4HRS


  30 1/20/20 Rx 


 


 Albuterol Sulfate


 Neb Soln


  (Albuterol


 Sulfate) 0.63


 Mg/3 Ml Vial.neb  0.63 Mg


 NEB PRN Q4HRS PRN


  30 6/26/17 Rx  AS NEEDED


 


 Zocor


  (Simvastatin) 20


 Mg Tablet  20 Mg


 PO HS


   7/10/15 Reported  NEXT DOSE DUE TONIGHT AT BEDTIME


 


 Amlodipine


 Besylate 10 Mg


 Tablet  10 Mg


 PO DAILY


   2/24/14 Reported  NEXT DOSE DUE IN AM


 


 Potassium


 Chloride 10 Meq


 Tab.er.prt  20 Meq


 PO DAILY


   2/24/14 Reported  NEXT DOSE DUE IN AM











Impression


.


1.  Acute on chronic hypoxic and hypercapnic respiratory failure secondary to


acute on chronic systolic and diastolic heart failure in addition acute


exacerbation of chronic obstructive pulmonary disease.


2.  The patient with nonischemic cardiomyopathy.


3.  The patient with ongoing tobacco use and likely severe chronic obstructive


pulmonary disease.


4.  Prior history of pneumonias.


5.  History of marijuana and cocaine abuse.  Continues to do cocaine.


6.  Prior history of Enterobacter bacteremia.





Plan


.





1.  I have discussed with the patient regarding smoking cessation as well as


cocaine cessation.  He does not appear to be motivated to do that.


2. QHS BiPAP.  Avoid hyperoxia.


3.  Diuresis per cardiology


4.  BiPAP p.r.n. during the day 


5.  Follow cardiology recommendations.


6.   chest x-ray 8/7 mild improvement left base/ CHF


7.  DVT prophylaxis, the patient is currently on Eliquis.


8.  Empiric antibiotic were initiated.  Clinically, less likely pneumonia.  We


will deescalate soon.


9.  Discussed with JESSE ZAPATA MD                  Aug 7, 2020 10:57

## 2020-08-07 NOTE — RAD
PORTABLE CHEST 1V

 

History: CHF

 

Comparison: August 5, 2020

 

Findings:

Single view of the chest is submitted. 

There is again scattered airspace opacity of the right hemithorax other 

than some sparing near the apex, increased at the right lung base in the 

interval. There is a very small right pleural effusion as seen previously.

There is improved aeration of the left lung base, some hazy airspace and 

interstitial opacity of the left similar. Pericardial cardiac silhouette 

is again enlarged. There is again left electronic cardiac device. There is

atherosclerotic calcification near aortic arch. There is no pneumothorax. 

There is again fullness of the right hilum.

 

Impression: 

 

1.  There is persistent airspace opacity bilaterally greater on the right,

somewhat increased at the right lung base. Findings could be due to edema 

and/or infiltrate. There is again very small right pleural effusion. There

is somewhat improved aeration of the left lung base.

2. There is again enlargement of the pericardial cardiac silhouette.

 

Electronically signed by: Dheeraj Dickinson MD (8/7/2020 8:23 AM) [id number]

## 2020-08-07 NOTE — PDOC
CARDIO Progress Notes


Date and Time


Date of Service


8/7/2020


Time of Evaluation


0930





Subjective


Subjective:  No Chest Pain, No shortness of breath, No Palpitations





Vitals


Vitals





Vital Signs








  Date Time  Temp Pulse Resp B/P (MAP) Pulse Ox O2 Delivery O2 Flow Rate FiO2


 


8/7/20 09:26  71  133/89    


 


8/7/20 08:30      Nasal Cannula 6.0 


 


8/7/20 07:24     95   


 


8/7/20 07:00 98.3  18     





 98.3       








Weight


Weight [ ]





Input and Output


Intake and Output











Intake and Output 


 


 8/7/20





 06:59


 


Intake Total 1180 ml


 


Output Total 1100 ml


 


Balance 80 ml


 


 


 


Intake Oral 1080 ml


 


IV Total 100 ml


 


Output Urine Total 1100 ml











Laboratory


Labs





Laboratory Tests








Test


 8/6/20


12:30 8/6/20


17:16 8/6/20


20:55 8/7/20


08:49


 


O2 Saturation 96 % (92-99)    


 


Arterial Blood pH


 7.31


(7.35-7.45) 


 


 





 


Arterial Blood pCO2 at


Patient Temp 70 mmHg


(35-46) 


 


 





 


Arterial Blood pO2 at Patient


Temp 83 mmHg


() 


 


 





 


Arterial Blood HCO3


 34 mmol/L


(21-28) 


 


 





 


Arterial Blood Base Excess


 5 mmol/L


(-3-3) 


 


 





 


FiO2 45    


 


Glucose (Fingerstick)


 


 117 mg/dL


(70-99) 125 mg/dL


(70-99) 148 mg/dL


(70-99)











Physical Exam


HEENT:  Neck Supple W Full Motion


Chest:  Symmetric


LUNGS:  Other (diminished)


Heart:  RRR (V paced with underlying AFIB)


Abdomen:  Soft N/T


Extremities:  Other (2+ pedal edema)


Neurology:  alert, oriented, follow commands





Assessment


Assessment


1.  Acute on chronic respiratory failure, multifactorial secondary to AECOPD, 

CHF, negative for covid


2.  Acute on chronic systolic/diastolic CHF


3.  SSS/ NICM s/p BiV PPM/CRT-P (Biotronik); prior LVEF 40% Device check 

revealed 25% AFIB burden, normal function, BiV pacing 100%. Starting to have 

fluid overload per thoracic impedance. 


5.  PAFIB; presently V pacing


6.  HTN: controlled


7.  CKD3


8.  Chronic Substance abuse; cocaine and marijuana use.


9.  Tobaccoism 








Recommendations


1. Restart GDMT  Continue toprol, norvasc, lisinopril. Eliquis for stroke 

prophylaxis.


2. Lasix therapy


3. Poor long term prognosis with high readmission rate due to severe 

noncompliance especially in regards to continued substance abuse. Discussed 

again cessation


5. O2 to titrate, Bipap PRN





Justicifation of Admission Dx:


Justifications for Admission:


Justification of Admission Dx:  N/A


Respiratory Failure:  Severe Resp Distress











LIZET LLOYD APRN           Aug 7, 2020 10:39

## 2020-08-08 VITALS — SYSTOLIC BLOOD PRESSURE: 125 MMHG | DIASTOLIC BLOOD PRESSURE: 86 MMHG

## 2020-08-08 VITALS — SYSTOLIC BLOOD PRESSURE: 140 MMHG | DIASTOLIC BLOOD PRESSURE: 88 MMHG

## 2020-08-08 VITALS — DIASTOLIC BLOOD PRESSURE: 84 MMHG | SYSTOLIC BLOOD PRESSURE: 141 MMHG

## 2020-08-08 VITALS — DIASTOLIC BLOOD PRESSURE: 97 MMHG | SYSTOLIC BLOOD PRESSURE: 143 MMHG

## 2020-08-08 VITALS — DIASTOLIC BLOOD PRESSURE: 84 MMHG | SYSTOLIC BLOOD PRESSURE: 135 MMHG

## 2020-08-08 VITALS — SYSTOLIC BLOOD PRESSURE: 120 MMHG | DIASTOLIC BLOOD PRESSURE: 88 MMHG

## 2020-08-08 PROCEDURE — 5A09357 ASSISTANCE WITH RESPIRATORY VENTILATION, LESS THAN 24 CONSECUTIVE HOURS, CONTINUOUS POSITIVE AIRWAY PRESSURE: ICD-10-PCS | Performed by: INTERNAL MEDICINE

## 2020-08-08 RX ADMIN — IPRATROPIUM BROMIDE AND ALBUTEROL SULFATE SCH ML: .5; 3 SOLUTION RESPIRATORY (INHALATION) at 16:00

## 2020-08-08 RX ADMIN — IPRATROPIUM BROMIDE AND ALBUTEROL SULFATE SCH ML: .5; 3 SOLUTION RESPIRATORY (INHALATION) at 11:37

## 2020-08-08 RX ADMIN — CEFTRIAXONE SODIUM SCH GM: 2 INJECTION, POWDER, FOR SOLUTION INTRAMUSCULAR; INTRAVENOUS at 16:29

## 2020-08-08 RX ADMIN — APIXABAN SCH MG: 5 TABLET, FILM COATED ORAL at 21:08

## 2020-08-08 RX ADMIN — POTASSIUM CHLORIDE SCH MEQ: 750 TABLET, FILM COATED, EXTENDED RELEASE ORAL at 09:04

## 2020-08-08 RX ADMIN — DOXYCYCLINE SCH MLS/HR: 100 INJECTION, POWDER, LYOPHILIZED, FOR SOLUTION INTRAVENOUS at 21:08

## 2020-08-08 RX ADMIN — LISINOPRIL SCH MG: 20 TABLET ORAL at 09:03

## 2020-08-08 RX ADMIN — IPRATROPIUM BROMIDE AND ALBUTEROL SULFATE SCH ML: .5; 3 SOLUTION RESPIRATORY (INHALATION) at 07:36

## 2020-08-08 RX ADMIN — IPRATROPIUM BROMIDE AND ALBUTEROL SULFATE SCH ML: .5; 3 SOLUTION RESPIRATORY (INHALATION) at 02:39

## 2020-08-08 RX ADMIN — INSULIN LISPRO SCH UNITS: 100 INJECTION, SOLUTION INTRAVENOUS; SUBCUTANEOUS at 21:00

## 2020-08-08 RX ADMIN — Medication SCH CAP: at 21:08

## 2020-08-08 RX ADMIN — IPRATROPIUM BROMIDE AND ALBUTEROL SULFATE SCH ML: .5; 3 SOLUTION RESPIRATORY (INHALATION) at 21:02

## 2020-08-08 RX ADMIN — DOXYCYCLINE SCH MLS/HR: 100 INJECTION, POWDER, LYOPHILIZED, FOR SOLUTION INTRAVENOUS at 09:07

## 2020-08-08 RX ADMIN — INSULIN LISPRO SCH UNITS: 100 INJECTION, SOLUTION INTRAVENOUS; SUBCUTANEOUS at 08:00

## 2020-08-08 RX ADMIN — METOPROLOL SUCCINATE SCH MG: 25 TABLET, EXTENDED RELEASE ORAL at 09:04

## 2020-08-08 RX ADMIN — APIXABAN SCH MG: 5 TABLET, FILM COATED ORAL at 09:04

## 2020-08-08 RX ADMIN — INSULIN LISPRO SCH UNITS: 100 INJECTION, SOLUTION INTRAVENOUS; SUBCUTANEOUS at 11:59

## 2020-08-08 RX ADMIN — SIMVASTATIN SCH MG: 20 TABLET, FILM COATED ORAL at 21:08

## 2020-08-08 RX ADMIN — FUROSEMIDE SCH MG: 20 TABLET ORAL at 09:03

## 2020-08-08 RX ADMIN — Medication SCH CAP: at 09:03

## 2020-08-08 RX ADMIN — INSULIN LISPRO SCH UNITS: 100 INJECTION, SOLUTION INTRAVENOUS; SUBCUTANEOUS at 16:46

## 2020-08-08 NOTE — PDOC
PROGRESS NOTES


Date of Service:


DATE: 8/8/20 


TIME: 10:05





Subjective


Subjective


Dyspnea improved





Objective


Objective





Vital Signs








  Date Time  Temp Pulse Resp B/P (MAP) Pulse Ox O2 Delivery O2 Flow Rate FiO2


 


8/8/20 09:04  69  140/88    


 


8/8/20 07:37     94 Nasal Cannula 5.0 


 


8/8/20 07:00 98.2  18     





 98.2       














Intake and Output 


 


 8/8/20





 07:00


 


Intake Total 1770 ml


 


Output Total 2975 ml


 


Balance -1205 ml


 


 


 


Intake Oral 1770 ml


 


Output Urine Total 2975 ml


 


# Voids 1











Physical Exam


Abdomen:  Normal bowel sounds, Soft


Heart:  Regular rate, Normal S1, Normal S2


Extremities:  No edema


General:  Alert, Oriented X3, Cooperative, No acute distress


HEENT:  Atraumatic


Lungs:  Other (Bilateral expiratory rhonchi)


Neuro:  Normal speech, Sensation intact


Psych/Mental Status:  Mental status NL, Mood NL


Skin:  No breakdown





Assessment


Assessment


1.  Acute on chronic respiratory failure, multifactorial secondary to AECOPD, 

CHF, negative for covid.  Improved since admission.  Pulmonary team following.


2.  Acute on chronic systolic/diastolic CHF, better compensated with diuresis.  

Continue current medical regimen.


3.  SSS/ NICM s/p BiV PPM/CRT-P (Biotronik); prior LVEF 40% recent device check 

revealed 25% AFIB burden, normal function, BiV pacing 100%. 


5.  PAFIB; presently V pacing.  Continue Eliquis for stroke prophylaxis.


6.  HTN: controlled


7.  CKD3


8.  Chronic Substance abuse; cocaine and marijuana use.


9.  Tobaccoism





Plan


Plan of Care


Problems


Medical Problems:


(1) Acute respiratory failure with hypoxia


Status: Acute  





(2) Community acquired pneumonia


Status: Acute  





(3) COPD exacerbation


Status: Acute  











Comment


Review of Relevant


I have reviewed the following items brittany (where applicable) has been applied.


Labs





Laboratory Tests








Test


 8/7/20


10:55 8/7/20


11:56 8/7/20


16:58 8/7/20


20:47


 


Sodium Level


 142 mmol/L


(136-145) 


 


 





 


Potassium Level


 4.2 mmol/L


(3.5-5.1) 


 


 





 


Chloride Level


 105 mmol/L


() 


 


 





 


Carbon Dioxide Level


 36 mmol/L


(21-32) 


 


 





 


Anion Gap 1 (6-14)    


 


Blood Urea Nitrogen


 19 mg/dL


(8-26) 


 


 





 


Creatinine


 1.6 mg/dL


(0.7-1.3) 


 


 





 


Estimated GFR


(Cockcroft-Gault) 53.1 


 


 


 





 


Glucose Level


 99 mg/dL


(70-99) 


 


 





 


Calcium Level


 9.6 mg/dL


(8.5-10.1) 


 


 





 


Magnesium Level


 2.2 mg/dL


(1.8-2.4) 


 


 





 


Glucose (Fingerstick)


 


 119 mg/dL


(70-99) 136 mg/dL


(70-99) 102 mg/dL


(70-99)


 


Test


 8/8/20


07:49 


 


 





 


Glucose (Fingerstick)


 133 mg/dL


(70-99) 


 


 











Medications





Current Medications


Furosemide (Lasix) 40 mg 1X  ONCE IVP  Last administered on 8/7/20at 11:20;  

Start 8/7/20 at 11:00;  Stop 8/7/20 at 11:01;  Status DC


Vitals/I & O





Vital Sign - Last 24 Hours








 8/7/20 8/7/20 8/7/20 8/7/20





 11:00 11:32 15:00 15:35


 


Temp 98.1  98.0 





 98.1  98.0 


 


Pulse 64  74 


 


Resp 18  18 


 


B/P (MAP) 141/90 (107)  143/91 (108) 


 


Pulse Ox 93 93 92 92


 


O2 Delivery Nasal Cannula Nasal Cannula Nasal Cannula Nasal Cannula


 


O2 Flow Rate 6.0 6.0 6.0 6.0


 


    





    





 8/7/20 8/7/20 8/7/20 8/7/20





 19:00 19:52 20:00 20:00


 


Temp 98.2   





 98.2   


 


Pulse 73   


 


Resp 20   


 


B/P (MAP) 120/78 (92)   


 


Pulse Ox 91 94  


 


O2 Delivery Nasal Cannula Nasal Cannula Nasal Cannula 


 


O2 Flow Rate 5.0 6.0 6.0 6.0


 


    





    





 8/7/20 8/7/20 8/8/20 8/8/20





 23:00 23:42 02:39 03:00


 


Temp 98.7   98.0





 98.7   98.0


 


Pulse 73   68


 


Resp 20   20


 


B/P (MAP) 133/89 (104)   120/88 (99)


 


Pulse Ox 92 94  99


 


O2 Delivery Nasal Cannula Nasal Cannula BiPAP/CPAP Nasal Cannula


 


O2 Flow Rate 5.0 6.0  5.0


 


    





    





 8/8/20 8/8/20 8/8/20 8/8/20





 07:00 07:37 09:03 09:03


 


Temp 98.2   





 98.2   


 


Pulse 69  69 69


 


Resp 18   


 


B/P (MAP) 140/88 (105)  140/88 140/88


 


Pulse Ox 94 94  


 


O2 Delivery Nasal Cannula Nasal Cannula  


 


O2 Flow Rate 5.0 5.0  


 


    





    





 8/8/20   





 09:04   


 


Pulse 69   


 


B/P (MAP) 140/88   














Intake and Output   


 


 8/7/20 8/7/20 8/8/20





 15:00 23:00 07:00


 


Intake Total 350 ml 1000 ml 420 ml


 


Output Total 1000 ml 800 ml 1175 ml


 


Balance -650 ml 200 ml -755 ml

















ELIZABETH SELLERS MD            Aug 8, 2020 10:07

## 2020-08-08 NOTE — PDOC
PROGRESS NOTES


Date of Service:


DATE: 20 


TIME: 11:48





Chief Complaint


Chief Complaint


impression ===================











Acute on chronic respiratory failure, multifactorial secondary to PNA, AECOPD, 

mild acute on chronic systolic CHF


Gram-negative possibly gram-positive pneumonia 


Possible aspiration pneumonia


Mild Acute on Chronic systolic/diastolic CHF


Chronic mild troponin elevation: within his range, no acute changes per EKG. 

demand mediated with continued use of cocaine


SSS/ NICM s/p BiV PPM/CRT-P (Biotronik); prior LVEF 40% recent device check 

revealed 25% AFIB burden, normal function, BiV pacing 100%. 


PAFIB; presently V pacing.  Continue Eliquis for stroke prophylaxis.


SSS


CHF - 40% ejection 


H/o A. fib


HTN


CKD3


Chronic Substance abuse; cocaine and marijuana use. Last use 3 days ago


Tobaccoism 


PUI -SARS-COVID negative.  and again   negative


pulmonary arterial hypertension 











38 min pt exam, chart review, > 50% of time spent with exam, chart review, pt 

care coordination





History of Present Illness


History of Present Illness


2020


Patient seen and examined


Patient is resting comfortably in NAD


Discussed with RN


Charts reviewed





Mr Colon is a 65 yo M w/ PMHx CHB s/p BiV pacemaker, HTN, CKD2, tobacco abuse, 

COPD on 4-5L home O2, CHF EF 40-45, active cocaine user, ETOH, and marijuana 

abuse who p/w shortness of breath that was fairly sudden onset 2020. after 

smoking multiple substances. He had some back pain. No anterior chest pain. He 

was trying to use his home nebulizer, states it wasn't helping. He does not know

if he has had weight gain, does not have a scale at home. Has LE edema that is 

much worse as well as orthopnea and PND, not sleeping well. He has a mild cough,

no hemoptysis, no weight loss.  No headaches, no nausea, vomiting or diarrhea. 

He was afebrile, has had no recent travel or sick contacts.


Seen with severe dyspnea and ABG 7.25/67/78. BNP 4839, Trop 0.04, WBC 6.6, Hb 

12.9, Platelets 129, Cr 1.3, K 3.9


He continues to do cocaine and smoke and has known COPD


Chest x-ray showing CHF and possible pneumonia, admitted to ICU on BIPAP for 

respiratory failure.





Seen on BIPAP in ICU, appears comfortable on it. Afebrile. Still hypoxic. COVID 

19 negative





Vitals


Vitals





Vital Signs








  Date Time  Temp Pulse Resp B/P (MAP) Pulse Ox O2 Delivery O2 Flow Rate FiO2


 


20 11:39     95 Nasal Cannula 5.0 


 


20 10:56 98.0 73 18 141/84 (103)    





 98.0       











Physical Exam


Physical Exam


GENERAL:  Alert gentleman not in any distress


VITAL SIGNS:  Stable, afebrile.


HEENT:  NAD.


NECK:  Supple, no JVP, no lymphadenopathy.


LUNGS:  Clear.


HEART:  S1, S2 regular.


ABDOMEN:  Soft, nontender, no organomegaly.


EXTREMITIES:  No edema, cyanosis.


SKIN:  Unremarkable.


NEUROLOGIC:  The patient is alert, awake and appropriate.  No focal neurologic


deficit.


.


General:  Alert, Oriented X3, Cooperative, No acute distress


Heart:  Regular rate, Normal S1, Normal S2


Lungs:  Other (deminished bs)


Abdomen:  Normal bowel sounds, Soft


Extremities:  No edema


Skin:  No breakdown





Labs


LABS





PATIENT: CESAR COLON  ACCOUNT: [id number]     MRN#: [id number]


: 1955           LOCATION: [address] [place] [address]      AGE: 65


SEX: M                    EXAM DT: 05/15/20         ACCESSION#: [id number]


STATUS: ADM IN            ORD. PHYSICIAN: AMADEO ANDERSON MD


REASON: cough soa


PROCEDURE: CT ANGIOGRAPHY CHEST





EXAM: CT ANGIOGRAPHY OF THE CHEST WITH AND WITHOUT CONTRAST.


 


HISTORY: Shortness of breath, cough.


 


TECHNIQUE: Computed tomographic angiography of the chest was performed 


before and after the intravenous administration of iodinated contrast. 3-D


maximum intensity projections were also performed. One or more of the 


following individualized dose reduction techniques were utilized for this 


examination:  


1. Automated exposure control.  


2. Adjustment of the mA and/or kV according to patient size.  


3. Use of iterative reconstruction technique.


 


COMPARISON: 2020.


 


FINDINGS: Images of the upper abdomen reveal no acute abnormality. Bone 


windows reveal no suspicious lesions.


 


No pulmonary emboli are identified. The main pulmonary artery is enlarged 


at 4.7 cm. There is no aortic dissection or aneurysm.


 


There are no pathologically enlarged mediastinal or axillary lymph nodes. 


There is a trace right pleural effusion. There is no pericardial effusion.


The heart is moderately to severely enlarged. A left-sided pacemaker has 


its leads in the right atrium, right ventricle and a left cardiac vein.


 


Consolidation in the right upper lobe is consistent with pneumonia. No 


central obstructing lesion is identified. Previously noted groundglass 


infiltrates in the right lower lobe have resolved. An oval soft tissue 


density in the right major fissure is decreased in size measuring 4.3 x 


2.3 cm as compared with 5.4 cm previously. This may be complicated 


loculated fissural fluid.


 


There is moderate centrilobular and paraseptal emphysema with apical 


predominance. There is mild atelectasis in the bases.


 


IMPRESSION:


1. Right upper lobe pneumonia. Follow-up to resolution is recommended to 


exclude an underlying lesion.


2. Soft tissue density in the right major fissure is decreased in size 


since the prior study, and likely represents complicated loculated 


fissural fluid. There is a trace free right pleural effusion.


3. Moderate centrilobular emphysema. Moderate to severe cardiomegaly. 


Changes of pulmonary arterial hypertension.


 


Electronically signed by: PARISA Dominguez MD (5/15/2020 9:26 AM) 


[id number]














EXAM: LIMITED Two-dimensional echocardiogram with Doppler and color Doppler.





Other Information 


Quality : Good   


Rhythm : CHB   





INDICATION


Cardiomyopathy 


LV Function:Systolic





2D DIMENSIONS 


RVDd   3.8 (2.9-3.5cm)   Left Atrium(2D)   4.4 (1.6-4.0cm)


IVSd   1.4 (0.7-1.1cm)   Aortic Root(2D)   3.3 (2.0-3.7cm)


LVDd   6.3 (3.9-5.9cm)   LVOT Diameter   2.4 (1.8-2.4cm)


PWd   1.4 (0.7-1.1cm)   LVDs      4.7 (2.5-4.0cm)


FS (%)    17.0 %      SV      102.7 ml


LVEF(%)   35.0 (>50%)         





Tricuspid Valve


TR P. Velocity   244cm/s   RAP ESTIMATE   3mmHg


TR Peak Gr.   24mmHg   RVSP      27mmHg





 LEFT VENTRICLE 


The Left Ventricle is mildly dilated. There is mild concentric left ventricular 

hypertrophy. Left ventricle systolic function is mild to moderately impaired. 

The Ejection Fraction is 40%. Septal motion consistent with conduction 

abnormality.





 TRICUSPID VALVE 


The tricuspid valve is normal in structure and function. Doppler and Color Flow 

revealed physiological tricuspid regurgitation. The PA pressure was estimated at

27 mmHg. There is no tricuspid valve stenosis.





 GREAT VESSELS 


na





 PERICARDIAL EFFUSION 


There is no evidence of significant pericardial effusion.





Critical Notification


Critical Value: No





<Conclusion>


Limited echo to evaluate LV function.


Left ventricle systolic function is mild to moderately impaired.


The Ejection Fraction is 40%.


Septal motion consistent with conduction abnormality.


There is no evidence of significant pericardial effusion.





Signed by : Elizabeth Sellers, 


Electronically Approved : 2020 16:49:38














DICTATED and SIGNED BY:     ELIZABETH SELLERS MD


DATE:     20 1523


Laboratory Tests








Test


 20


11:56 20


16:58 20


20:47 20


07:49


 


Glucose (Fingerstick)


 119 mg/dL


(70-99) 136 mg/dL


(70-99) 102 mg/dL


(70-99) 133 mg/dL


(70-99)


 


Test


 20


11:23 


 


 





 


Glucose (Fingerstick)


 110 mg/dL


(70-99) 


 


 














Assessment and Plan


Assessmemt and Plan


Problems


Medical Problems:


(1) Acute respiratory failure with hypoxia


Status: Acute  





(2) Community acquired pneumonia


Status: Acute  





(3) COPD exacerbation


Status: Acute  











Comment


Review of Relevant


I have reviewed the following items brittany (where applicable) has been applied.


Labs





Laboratory Tests








Test


 20


12:30 20


17:16 20


20:55 20


08:49


 


O2 Saturation 96 % (92-99)    


 


Arterial Blood pH


 7.31


(7.35-7.45) 


 


 





 


Arterial Blood pCO2 at


Patient Temp 70 mmHg


(35-46) 


 


 





 


Arterial Blood pO2 at Patient


Temp 83 mmHg


() 


 


 





 


Arterial Blood HCO3


 34 mmol/L


(21-28) 


 


 





 


Arterial Blood Base Excess


 5 mmol/L


(-3-3) 


 


 





 


FiO2 45    


 


Glucose (Fingerstick)


 


 117 mg/dL


(70-99) 125 mg/dL


(70-99) 148 mg/dL


(70-99)


 


Test


 20


10:55 20


11:56 20


16:58 20


20:47


 


Sodium Level


 142 mmol/L


(136-145) 


 


 





 


Potassium Level


 4.2 mmol/L


(3.5-5.1) 


 


 





 


Chloride Level


 105 mmol/L


() 


 


 





 


Carbon Dioxide Level


 36 mmol/L


(21-32) 


 


 





 


Anion Gap 1 (6-14)    


 


Blood Urea Nitrogen


 19 mg/dL


(8-26) 


 


 





 


Creatinine


 1.6 mg/dL


(0.7-1.3) 


 


 





 


Estimated GFR


(Cockcroft-Gault) 53.1 


 


 


 





 


Glucose Level


 99 mg/dL


(70-99) 


 


 





 


Calcium Level


 9.6 mg/dL


(8.5-10.1) 


 


 





 


Magnesium Level


 2.2 mg/dL


(1.8-2.4) 


 


 





 


Glucose (Fingerstick)


 


 119 mg/dL


(70-99) 136 mg/dL


(70-99) 102 mg/dL


(70-99)


 


Test


 20


07:49 20


11:23 


 





 


Glucose (Fingerstick)


 133 mg/dL


(70-99) 110 mg/dL


(70-99) 


 











Laboratory Tests








Test


 20


11:56 20


16:58 20


20:47 20


07:49


 


Glucose (Fingerstick)


 119 mg/dL


(70-99) 136 mg/dL


(70-99) 102 mg/dL


(70-99) 133 mg/dL


(70-99)


 


Test


 20


11:23 


 


 





 


Glucose (Fingerstick)


 110 mg/dL


(70-99) 


 


 











Medications





Current Medications


Prednisone (Prednisone) 60 mg 1X  ONCE PO  Last administered on 20at 17:09; 

Start 20 at 17:00;  Stop 20 at 17:01;  Status DC


Albuterol/ Ipratropium (Duoneb) 9 ml 1X  ONCE NEB  Last administered on 20at

17:28;  Start 20 at 17:00;  Stop 20 at 17:01;  Status DC


Sodium Chloride 1,000 ml @  1,000 mls/hr 1X  ONCE IV  Last administered on 

20at 17:09;  Start 20 at 17:00;  Stop 20 at 17:59;  Status DC


Levofloxacin/ Dextrose 150 ml @  100 mls/hr 1X  ONCE IV  Last administered on 

20at 18:49;  Start 20 at 18:00;  Stop 20 at 19:07;  Status DC


Ceftriaxone Sodium (Rocephin) 2 gm 1X  ONCE IVP  Last administered on  

19:24;  Start 20 at 19:15;  Stop 20 at 19:16;  Status DC


Azithromycin 250 ml @  250 mls/hr 1X  ONCE IV  Last administered on  

19:24;  Start 20 at 19:15;  Stop 20 at 20:14;  Status DC


Amlodipine Besylate (Norvasc) 10 mg DAILY PO  Last administered on  

09:03;  Start 20 at 09:00


Apixaban (Eliquis) 5 mg BID PO  Last administered on  09:04;  Start 

20 at 09:00


Furosemide (Lasix) 20 mg DAILY PO  Last administered on  09:03;  Start 

20 at 09:00


Lisinopril (Prinivil) 20 mg DAILY PO  Last administered on  09:03;  

Start 20 at 09:00


Metoprolol Succinate (Toprol Xl) 25 mg DAILY PO  Last administered on  

09:04;  Start 20 at 09:00


Potassium Chloride (Klor-Con) 20 meq DAILYWBKFT PO  Last administered on 

 09:04;  Start 20 at 08:00


Simvastatin (Zocor) 20 mg HS PO  Last administered on 20at 20:58;  Start 

20 at 21:00


Furosemide (Lasix) 40 mg 1X  ONCE IVP  Last administered on  08:54;  

Start 20 at 09:00;  Stop 20 at 09:01;  Status DC


Ceftriaxone Sodium (Rocephin) 2 gm Q24H IVP  Last administered on 20at 

18:11;  Start 20 at 17:00


Doxycycline Hyclate 100 mg/ Dextrose 100 ml @  50 mls/hr Q12HR IV  Last 

administered on  09:07;  Start 20 at 09:00


Furosemide (Lasix) 40 mg 1X  ONCE IVP  Last administered on 20at 13:30;  

Start 20 at 10:30;  Stop 20 at 10:31;  Status DC


Info (Anti-Coagulation Monitoring By Pharmacy) 1 each PRN DAILY  PRN MC SEE 

COMMENTS Last administered on 20at 10:11;  Start 20 at 10:00


Lactobacillus Rhamnosus (Culturelle) 1 cap BID PO  Last administered on 20at

09:03;  Start 20 at 21:00


Potassium Chloride (Klor-Con) 40 meq 1X  ONCE PO  Last administered on 20at 

13:30;  Start 20 at 13:00;  Stop 20 at 13:01;  Status DC


Insulin Human Lispro (HumaLOG) 0-9 UNITS TIDWMEALHC SQ ;  Start 20 at 17:00


Dextrose (Dextrose 50%-Water Syringe) 12.5 gm PRN Q15MIN  PRN IV SEE COMMENTS;  

Start 20 at 13:45


Albuterol Sulfate (Ventolin Neb Soln) 2.5 mg PRN Q2HR  PRN NEB SHORTNESS OF 

BREATH;  Start 20 at 13:45


Albuterol/ Ipratropium (Duoneb) 3 ml Q4HRS NEB  Last administered on 20at 

11:37;  Start 20 at 16:00


Furosemide (Lasix) 40 mg 1X  ONCE IVP  Last administered on 20at 11:20;  

Start 20 at 11:00;  Stop 20 at 11:01;  Status DC





Active Scripts


Active


Levaquin (Levofloxacin) 750 Mg Tablet 1 Tab PO DAILY 10 Days


Culturelle (Lactobacillus Rhamnosus Gg) 1 Each Cap.sprink 1 Cap PO BID 10 Days


Lasix (Furosemide) 20 Mg Tablet 1 Tab PO DAILY 30 Days


Lisinopril 40 Mg Tablet 20 Mg PO DAILY 30 Days


Proair Hfa (Albuterol Sulfate) 8.5 Gm Hfa.aer.ad 2.5 Mg NEB PRN Q2HR PRN 30 Days


Duoneb 0.5-3(2.5) Mg/3 Ml (Albuterol/Ipratropium) 3 Ml Ampul.neb 3 Ml NEB Q4HRS 

30 Days


Albuterol Sulfate Neb Soln (Albuterol Sulfate) 0.63 Mg/3 Ml Vial.neb 0.63 Mg NEB

PRN Q4HRS PRN 30 Days


     AS NEEDED


Reported


Eliquis (Apixaban) 5 Mg Tablet 5 Mg PO BID


Toprol Xl (Metoprolol Succinate) 25 Mg Tab.er.24h 1 Tab PO DAILY 30 Days


Zocor (Simvastatin) 20 Mg Tablet 20 Mg PO HS


     NEXT DOSE DUE TONIGHT AT BEDTIME


Amlodipine Besylate 10 Mg Tablet 10 Mg PO DAILY


     NEXT DOSE DUE IN AM


Potassium Chloride 10 Meq Tab.er.prt 20 Meq PO DAILY


     NEXT DOSE DUE IN AM


Vitals/I & O





Vital Sign - Last 24 Hours








 20





 15:00 15:35 19:00 19:52


 


Temp 98.0  98.2 





 98.0  98.2 


 


Pulse 74  73 


 


Resp 18  20 


 


B/P (MAP) 143/91 (108)  120/78 (92) 


 


Pulse Ox 92 92 91 94


 


O2 Delivery Nasal Cannula Nasal Cannula Nasal Cannula Nasal Cannula


 


O2 Flow Rate 6.0 6.0 5.0 6.0


 


    





    





 20





 20:00 20:00 23:00 23:42


 


Temp   98.7 





   98.7 


 


Pulse   73 


 


Resp   20 


 


B/P (MAP)   133/89 (104) 


 


Pulse Ox   92 94


 


O2 Delivery Nasal Cannula  Nasal Cannula Nasal Cannula


 


O2 Flow Rate 6.0 6.0 5.0 6.0


 


    





    





 20





 02:39 03:00 07:00 07:37


 


Temp  98.0 98.2 





  98.0 98.2 


 


Pulse  68 69 


 


Resp  20 18 


 


B/P (MAP)  120/88 (99) 140/88 (105) 


 


Pulse Ox  99 94 94


 


O2 Delivery BiPAP/CPAP Nasal Cannula Nasal Cannula Nasal Cannula


 


O2 Flow Rate  5.0 5.0 5.0


 


    





    





 20





 08:20 08:20 09:03 09:03


 


Pulse   69 69


 


B/P (MAP)   140/88 140/88


 


O2 Delivery  Nasal Cannula  


 


O2 Flow Rate 5.0 5.0  





 20 





 09:04 10:56 11:39 


 


Temp  98.0  





  98.0  


 


Pulse 69 73  


 


Resp  18  


 


B/P (MAP) 140/88 141/84 (103)  


 


Pulse Ox  91 95 


 


O2 Delivery  Nasal Cannula Nasal Cannula 


 


O2 Flow Rate  5.0 5.0 














Intake and Output   


 


 20





 15:00 23:00 07:00


 


Intake Total 350 ml 1000 ml 420 ml


 


Output Total 1000 ml 800 ml 1175 ml


 


Balance -650 ml 200 ml -755 ml











Justicifation of Admission Dx:


Justifications for Admission:


Justification of Admission Dx:  N/A


Respiratory Failure:  Severe Resp Distress











SOFIA DAVID MD           Aug 8, 2020 11:49

## 2020-08-08 NOTE — PDOC
PULMONARY PROGRESS NOTES


DATE: 8/8/20 


TIME: 08:02


Subjective


used bipap last night, sob better, has occ cough, is on home 02 6 lpm


Vitals





Vital Signs








  Date Time  Temp Pulse Resp B/P (MAP) Pulse Ox O2 Delivery O2 Flow Rate FiO2


 


8/8/20 07:37     94 Nasal Cannula 5.0 


 


8/8/20 03:00 98.0 68 20 120/88 (99)    





 98.0       








ROS:  No Nausea, No Chest Pain


General:  Alert, Oriented X4, No acute distress


HEENT:  Other (nc at [facility] )


Lungs:  Other (deminished bs)


Cardiovascular:  S1, S2


Abdomen:  Soft, Non-tender, Other


Neuro Exam:  Alert


Extremities:  Other (trace edema)


Skin:  Warm


Labs





Laboratory Tests








Test


 8/6/20


10:30 8/6/20


12:30 8/6/20


17:16 8/6/20


20:55


 


White Blood Count


 5.9 x10^3/uL


(4.0-11.0) 


 


 





 


Red Blood Count


 4.17 x10^6/uL


(4.30-5.70) 


 


 





 


Hemoglobin


 12.6 g/dL


(13.0-17.5) 


 


 





 


Hematocrit


 38.8 %


(39.0-53.0) 


 


 





 


Mean Corpuscular Volume 93 fL ()    


 


Mean Corpuscular Hemoglobin 30 pg (25-35)    


 


Mean Corpuscular Hemoglobin


Concent 32 g/dL


(31-37) 


 


 





 


Red Cell Distribution Width


 15.8 %


(11.5-14.5) 


 


 





 


Platelet Count


 125 x10^3/uL


(140-400) 


 


 





 


Sodium Level


 140 mmol/L


(136-145) 


 


 





 


Potassium Level


 4.1 mmol/L


(3.5-5.1) 


 


 





 


Chloride Level


 103 mmol/L


() 


 


 





 


Carbon Dioxide Level


 34 mmol/L


(21-32) 


 


 





 


Anion Gap 3 (6-14)    


 


Blood Urea Nitrogen


 15 mg/dL


(8-26) 


 


 





 


Creatinine


 1.4 mg/dL


(0.7-1.3) 


 


 





 


Estimated GFR


(Cockcroft-Gault) 61.9 


 


 


 





 


Glucose Level


 214 mg/dL


(70-99) 


 


 





 


Calcium Level


 9.6 mg/dL


(8.5-10.1) 


 


 





 


Magnesium Level


 2.1 mg/dL


(1.8-2.4) 


 


 





 


Procalcitonin


 < 0.10 ng/mL


(0.00-0.10) 


 


 





 


O2 Saturation  96 % (92-99)   


 


Arterial Blood pH


 


 7.31


(7.35-7.45) 


 





 


Arterial Blood pCO2 at


Patient Temp 


 70 mmHg


(35-46) 


 





 


Arterial Blood pO2 at Patient


Temp 


 83 mmHg


() 


 





 


Arterial Blood HCO3


 


 34 mmol/L


(21-28) 


 





 


Arterial Blood Base Excess


 


 5 mmol/L


(-3-3) 


 





 


FiO2  45   


 


Glucose (Fingerstick)


 


 


 117 mg/dL


(70-99) 125 mg/dL


(70-99)


 


Test


 8/7/20


08:49 8/7/20


10:55 8/7/20


11:56 8/7/20


16:58


 


Glucose (Fingerstick)


 148 mg/dL


(70-99) 


 119 mg/dL


(70-99) 136 mg/dL


(70-99)


 


Sodium Level


 


 142 mmol/L


(136-145) 


 





 


Potassium Level


 


 4.2 mmol/L


(3.5-5.1) 


 





 


Chloride Level


 


 105 mmol/L


() 


 





 


Carbon Dioxide Level


 


 36 mmol/L


(21-32) 


 





 


Anion Gap  1 (6-14)   


 


Blood Urea Nitrogen


 


 19 mg/dL


(8-26) 


 





 


Creatinine


 


 1.6 mg/dL


(0.7-1.3) 


 





 


Estimated GFR


(Cockcroft-Gault) 


 53.1 


 


 





 


Glucose Level


 


 99 mg/dL


(70-99) 


 





 


Calcium Level


 


 9.6 mg/dL


(8.5-10.1) 


 





 


Magnesium Level


 


 2.2 mg/dL


(1.8-2.4) 


 





 


Test


 8/7/20


20:47 8/8/20


07:49 


 





 


Glucose (Fingerstick)


 102 mg/dL


(70-99) 133 mg/dL


(70-99) 


 











Laboratory Tests








Test


 8/7/20


08:49 8/7/20


10:55 8/7/20


11:56 8/7/20


16:58


 


Glucose (Fingerstick)


 148 mg/dL


(70-99) 


 119 mg/dL


(70-99) 136 mg/dL


(70-99)


 


Sodium Level


 


 142 mmol/L


(136-145) 


 





 


Potassium Level


 


 4.2 mmol/L


(3.5-5.1) 


 





 


Chloride Level


 


 105 mmol/L


() 


 





 


Carbon Dioxide Level


 


 36 mmol/L


(21-32) 


 





 


Anion Gap  1 (6-14)   


 


Blood Urea Nitrogen


 


 19 mg/dL


(8-26) 


 





 


Creatinine


 


 1.6 mg/dL


(0.7-1.3) 


 





 


Estimated GFR


(Cockcroft-Gault) 


 53.1 


 


 





 


Glucose Level


 


 99 mg/dL


(70-99) 


 





 


Calcium Level


 


 9.6 mg/dL


(8.5-10.1) 


 





 


Magnesium Level


 


 2.2 mg/dL


(1.8-2.4) 


 





 


Test


 8/7/20


20:47 8/8/20


07:49 


 





 


Glucose (Fingerstick)


 102 mg/dL


(70-99) 133 mg/dL


(70-99) 


 











Medications





Active Scripts








 Medications  Dose


 Route/Sig


 Max Daily Dose Days Date Category Dose


Instructions


 


 Levaquin


  (Levofloxacin)


 750 Mg Tablet  1 Tab


 PO DAILY


  10 5/28/20 Rx 


 


 Culturelle


  (Lactobacillus


 Rhamnosus Gg) 1


 Each Cap.sprink  1 Cap


 PO BID


  10 5/28/20 Rx 


 


 Eliquis


  (Apixaban) 5 Mg


 Tablet  5 Mg


 PO BID


   4/14/20 Reported 


 


 Toprol Xl


  (Metoprolol


 Succinate) 25 Mg


 Tab.er.24h  1 Tab


 PO DAILY


  30 4/14/20 Reported 


 


 Lasix


  (Furosemide) 20


 Mg Tablet  1 Tab


 PO DAILY


  30 1/21/20 Rx 


 


 Lisinopril 40 Mg


 Tablet  20 Mg


 PO DAILY


  30 1/21/20 Rx 


 


 Proair Hfa


  (Albuterol


 Sulfate) 8.5 Gm


 Hfa.aer.ad  2.5 Mg


 NEB PRN Q2HR PRN


  30 1/21/20 Rx 


 


 Duoneb 0.5-3(2.5)


 Mg/3 Ml


  (Albuterol/Ipratropium)


 3 Ml Ampul.neb  3 Ml


 NEB Q4HRS


  30 1/20/20 Rx 


 


 Albuterol Sulfate


 Neb Soln


  (Albuterol


 Sulfate) 0.63


 Mg/3 Ml Vial.neb  0.63 Mg


 NEB PRN Q4HRS PRN


  30 6/26/17 Rx  AS NEEDED


 


 Zocor


  (Simvastatin) 20


 Mg Tablet  20 Mg


 PO HS


   7/10/15 Reported  NEXT DOSE DUE TONIGHT AT BEDTIME


 


 Amlodipine


 Besylate 10 Mg


 Tablet  10 Mg


 PO DAILY


   2/24/14 Reported  NEXT DOSE DUE IN AM


 


 Potassium


 Chloride 10 Meq


 Tab.er.prt  20 Meq


 PO DAILY


   2/24/14 Reported  NEXT DOSE DUE IN AM











Impression


.


1.  Acute on chronic hypoxic and hypercapnic respiratory failure secondary to


acute on chronic systolic and diastolic heart failure in addition acute


exacerbation of chronic obstructive pulmonary disease.


2.  The patient with nonischemic cardiomyopathy.


3.  The patient with ongoing tobacco use and likely severe chronic obstructive


pulmonary disease.


4.  Prior history of pneumonias.


5.  History of marijuana and cocaine abuse.  Continues to do cocaine.


6.  Prior history of Enterobacter bacteremia.





Plan


.





1.  I have discussed with the patient regarding smoking cessation as well as


cocaine cessation.  He does not appear to be motivated to do that.


2. QHS BiPAP.  Avoid hyperoxia.


3.  Diuresis per cardiology


4.  BiPAP p.r.n. during the day 


5.  Follow cardiology recommendations.


6.   chest x-ray 8/7 mild improvement left base/ CHF


7.  DVT prophylaxis, the patient is currently on Eliquis.


8.  Empiric antibiotic were initiated. per id


9.  Discussed with pt, rt











AUTUMN WILLIAM MD                Aug 8, 2020 08:03

## 2020-08-09 VITALS — DIASTOLIC BLOOD PRESSURE: 98 MMHG | SYSTOLIC BLOOD PRESSURE: 146 MMHG

## 2020-08-09 VITALS — SYSTOLIC BLOOD PRESSURE: 130 MMHG | DIASTOLIC BLOOD PRESSURE: 77 MMHG

## 2020-08-09 VITALS — SYSTOLIC BLOOD PRESSURE: 136 MMHG | DIASTOLIC BLOOD PRESSURE: 97 MMHG

## 2020-08-09 VITALS — SYSTOLIC BLOOD PRESSURE: 124 MMHG | DIASTOLIC BLOOD PRESSURE: 80 MMHG

## 2020-08-09 VITALS — DIASTOLIC BLOOD PRESSURE: 80 MMHG | SYSTOLIC BLOOD PRESSURE: 119 MMHG

## 2020-08-09 VITALS — DIASTOLIC BLOOD PRESSURE: 94 MMHG | SYSTOLIC BLOOD PRESSURE: 134 MMHG

## 2020-08-09 PROCEDURE — 5A09357 ASSISTANCE WITH RESPIRATORY VENTILATION, LESS THAN 24 CONSECUTIVE HOURS, CONTINUOUS POSITIVE AIRWAY PRESSURE: ICD-10-PCS | Performed by: INTERNAL MEDICINE

## 2020-08-09 RX ADMIN — INSULIN LISPRO SCH UNITS: 100 INJECTION, SOLUTION INTRAVENOUS; SUBCUTANEOUS at 11:37

## 2020-08-09 RX ADMIN — IPRATROPIUM BROMIDE AND ALBUTEROL SULFATE SCH ML: .5; 3 SOLUTION RESPIRATORY (INHALATION) at 00:25

## 2020-08-09 RX ADMIN — APIXABAN SCH MG: 5 TABLET, FILM COATED ORAL at 21:20

## 2020-08-09 RX ADMIN — POTASSIUM CHLORIDE SCH MEQ: 750 TABLET, FILM COATED, EXTENDED RELEASE ORAL at 08:19

## 2020-08-09 RX ADMIN — LISINOPRIL SCH MG: 20 TABLET ORAL at 08:20

## 2020-08-09 RX ADMIN — METOPROLOL SUCCINATE SCH MG: 25 TABLET, EXTENDED RELEASE ORAL at 08:20

## 2020-08-09 RX ADMIN — SIMVASTATIN SCH MG: 20 TABLET, FILM COATED ORAL at 21:20

## 2020-08-09 RX ADMIN — Medication SCH CAP: at 21:20

## 2020-08-09 RX ADMIN — Medication SCH CAP: at 08:20

## 2020-08-09 RX ADMIN — INSULIN LISPRO SCH UNITS: 100 INJECTION, SOLUTION INTRAVENOUS; SUBCUTANEOUS at 16:44

## 2020-08-09 RX ADMIN — IPRATROPIUM BROMIDE AND ALBUTEROL SULFATE SCH ML: .5; 3 SOLUTION RESPIRATORY (INHALATION) at 20:04

## 2020-08-09 RX ADMIN — APIXABAN SCH MG: 5 TABLET, FILM COATED ORAL at 08:19

## 2020-08-09 RX ADMIN — FUROSEMIDE SCH MG: 20 TABLET ORAL at 08:20

## 2020-08-09 RX ADMIN — IPRATROPIUM BROMIDE AND ALBUTEROL SULFATE SCH ML: .5; 3 SOLUTION RESPIRATORY (INHALATION) at 04:10

## 2020-08-09 RX ADMIN — INSULIN LISPRO SCH UNITS: 100 INJECTION, SOLUTION INTRAVENOUS; SUBCUTANEOUS at 21:00

## 2020-08-09 RX ADMIN — IPRATROPIUM BROMIDE AND ALBUTEROL SULFATE SCH ML: .5; 3 SOLUTION RESPIRATORY (INHALATION) at 07:18

## 2020-08-09 RX ADMIN — DOXYCYCLINE HYCLATE SCH MG: 100 TABLET, COATED ORAL at 21:20

## 2020-08-09 RX ADMIN — INSULIN LISPRO SCH UNITS: 100 INJECTION, SOLUTION INTRAVENOUS; SUBCUTANEOUS at 07:45

## 2020-08-09 RX ADMIN — IPRATROPIUM BROMIDE AND ALBUTEROL SULFATE SCH ML: .5; 3 SOLUTION RESPIRATORY (INHALATION) at 11:25

## 2020-08-09 RX ADMIN — IPRATROPIUM BROMIDE AND ALBUTEROL SULFATE SCH ML: .5; 3 SOLUTION RESPIRATORY (INHALATION) at 15:24

## 2020-08-09 RX ADMIN — CEFTRIAXONE SODIUM SCH GM: 2 INJECTION, POWDER, FOR SOLUTION INTRAMUSCULAR; INTRAVENOUS at 16:59

## 2020-08-09 RX ADMIN — DOXYCYCLINE SCH MLS/HR: 100 INJECTION, POWDER, LYOPHILIZED, FOR SOLUTION INTRAVENOUS at 08:19

## 2020-08-09 NOTE — PDOC
PROGRESS NOTES


Date of Service:


DATE: 8/9/20 


TIME: 13:39





Subjective


Subjective


Dyspnea improved





Objective


Objective





Vital Signs








  Date Time  Temp Pulse Resp B/P (MAP) Pulse Ox O2 Delivery O2 Flow Rate FiO2


 


8/9/20 11:26     99 Nasal Cannula 5.0 


 


8/9/20 11:00 98.3 78 18 146/98 (114)    





 98.3       














Intake and Output 


 


 8/9/20





 06:59


 


Intake Total 720 ml


 


Output Total 2550 ml


 


Balance -1830 ml


 


 


 


Intake Oral 720 ml


 


Output Urine Total 2550 ml











Physical Exam


Abdomen:  Normal bowel sounds, Soft


Heart:  Regular rate, Normal S1, Normal S2


Extremities:  No edema


General:  Alert, Oriented X3, Cooperative, No acute distress


HEENT:  Atraumatic


Lungs:  Other (Bilateral expiratory rhonchi)


Neuro:  Normal speech, Sensation intact


Psych/Mental Status:  Mental status NL, Mood NL


Skin:  No breakdown





Assessment


Assessment


1.  Acute on chronic respiratory failure, multifactorial secondary to AECOPD, 

pneumonia CHF, negative for covid.  Improved since admission.  Pulmonary team 

following.  Continue antibiotics per ID team.


2.  Acute on chronic systolic/diastolic CHF, better compensated with diuresis.  

Continue current medical regimen.


3.  SSS/ NICM s/p BiV PPM/CRT-P (Biotronik); prior LVEF 40% recent device check 

revealed 25% AFIB burden, normal function, BiV pacing 100%. 


5.  PAFIB; presently V pacing.  Continue Eliquis for stroke prophylaxis.


6.  HTN: controlled


7.  CKD3


8.  Chronic Substance abuse; cocaine and marijuana use.


9.  Tobaccoism





Plan


Plan of Care


Problems


Medical Problems:


(1) Acute respiratory failure with hypoxia


Status: Acute  





(2) Community acquired pneumonia


Status: Acute  





(3) COPD exacerbation


Status: Acute  











Comment


Review of Relevant


I have reviewed the following items brittany (where applicable) has been applied.


Labs





Laboratory Tests








Test


 8/8/20


16:37 8/8/20


20:50 8/9/20


07:41 8/9/20


11:32


 


Glucose (Fingerstick)


 101 mg/dL


(70-99) 104 mg/dL


(70-99) 97 mg/dL


(70-99) 105 mg/dL


(70-99)








Medications





Current Medications


Doxycycline Hyclate (Vibra-Tab) 100 mg BID PO ;  Start 8/9/20 at 21:00


Vitals/I & O





Vital Sign - Last 24 Hours








 8/8/20 8/8/20 8/8/20 8/8/20





 15:00 16:02 19:00 20:00


 


Temp 98.2  98.4 





 98.2  98.4 


 


Pulse 70  72 


 


Resp 18  18 


 


B/P (MAP) 135/84 (101)  125/86 (99) 


 


Pulse Ox 92  92 


 


O2 Delivery Nasal Cannula Nasal Cannula  


 


O2 Flow Rate 5.0 5.0  5.0


 


    





    





 8/8/20 8/8/20 8/8/20 8/9/20





 20:00 21:02 23:00 00:26


 


Temp   98.2 





   98.2 


 


Pulse   73 


 


Resp   18 


 


B/P (MAP)   143/97 (112) 


 


Pulse Ox  96 92 97


 


O2 Delivery Nasal Cannula Nasal Cannula  Nasal Cannula


 


O2 Flow Rate 6.0 6.0  6.0


 


    





    





 8/9/20 8/9/20 8/9/20 8/9/20





 00:33 01:55 03:00 04:10


 


Temp   97.8 





   97.8 


 


Pulse   69 


 


Resp   18 


 


B/P (MAP)   136/97 (110) 


 


Pulse Ox   94 


 


O2 Delivery BiPAP/CPAP BiPAP/CPAP  BiPAP/CPAP


 


    





    





 8/9/20 8/9/20 8/9/20 8/9/20





 07:00 07:21 08:10 08:10


 


Temp 98.1   





 98.1   


 


Pulse 73   


 


Resp 18   


 


B/P (MAP) 134/94 (107)   


 


Pulse Ox 94 97  


 


O2 Delivery  Nasal Cannula Nasal Cannula 


 


O2 Flow Rate  5.0 5.0 5.0


 


    





    





 8/9/20 8/9/20 8/9/20 8/9/20





 08:20 08:20 08:20 11:00


 


Temp    98.3





    98.3


 


Pulse 69 69 69 78


 


Resp    18


 


B/P (MAP) 136/97 136/97 136/97 146/98 (114)


 


Pulse Ox    93


 


    





    





 8/9/20   





 11:26   


 


Pulse Ox 99   


 


O2 Delivery Nasal Cannula   


 


O2 Flow Rate 5.0   














Intake and Output   


 


 8/8/20 8/8/20 8/9/20





 14:59 22:59 06:59


 


Intake Total 240 ml 480 ml 


 


Output Total 1000 ml 700 ml 850 ml


 


Balance -760 ml -220 ml -850 ml

















ELIZABETH SELLERS MD            Aug 9, 2020 13:39

## 2020-08-09 NOTE — PDOC
Infectious Disease Note


Subjective


Subjective


Comfortable, denies pain


Denies increase SOA/F/C/S 


on 6L O2


Walking some 


Eating 100% meals





ROS


ROS


as mentioned above





Vital Sign


Vital Signs





Vital Signs








  Date Time  Temp Pulse Resp B/P (MAP) Pulse Ox O2 Delivery O2 Flow Rate FiO2


 


8/9/20 11:00 98.3 78 18 146/98 (114) 93   





 98.3       


 


8/9/20 08:10       5.0 


 


8/9/20 08:10      Nasal Cannula  











Physical Exam


PHYSICAL EXAM


GENERAL: Propped up in bed, alert, relaxed 


HEENT:  Oral cavity clear 


NECK:  Supple, no JVP, no lymphadenopathy.


LUNGS:  Mild congestion on the right, nonlabored 


HEART:  S1, S2 regular.


ABDOMEN:  Soft, nontender


EXTREMITIES:  No edema, cyanosis.


SKIN:  No rash 


NEUROLOGIC: Alert, awake and appropriate.  No focal neurologic deficit.


.





Labs


Lab





Laboratory Tests








Test


 8/8/20


11:23 8/8/20


16:37 8/8/20


20:50 8/9/20


07:41


 


Glucose (Fingerstick)


 110 mg/dL


(70-99) 101 mg/dL


(70-99) 104 mg/dL


(70-99) 97 mg/dL


(70-99)











Objective


Assessment


Respiratory failure.


Pulmonary infiltrate/pneumonia.


Congestive heart failure.


COVID-19 negative


Drug use.


ABIEL





Plan


Plan of Care


Change doxycycline to po


Rocephin for now


Advised against drug use


Repeat labs in am


Supportive care


Attending Co-Sign


The patient was seen and interviewed as well as examined at the bedside. The 

chart was reviewed. The case was discussed. Agree with the plan of care.











SALVADOR TERRELL         Aug 9, 2020 11:31


DEMETRIS CARTWRIGHT MD                Aug 9, 2020 12:42

## 2020-08-09 NOTE — PDOC
PULMONARY PROGRESS NOTES


DATE: 8/9/20 


TIME: 08:38


Subjective


used bipap last night, feeling better, sob better, has occ cough, no sputum, is 

on home 02 6 lpm


Vitals





Vital Signs








  Date Time  Temp Pulse Resp B/P (MAP) Pulse Ox O2 Delivery O2 Flow Rate FiO2


 


8/9/20 08:20  69  136/97    


 


8/9/20 07:21     97 Nasal Cannula 5.0 


 


8/9/20 03:00 97.8  18     





 97.8       








ROS:  No Nausea, No Chest Pain


General:  Alert, Oriented X4, No acute distress


HEENT:  Other (nc at [facility] )


Lungs:  Other (deminished bs)


Cardiovascular:  S1, S2


Abdomen:  Soft, Non-tender, Other


Neuro Exam:  Alert


Extremities:  Other (trace edema)


Skin:  Warm


Labs





Laboratory Tests








Test


 8/7/20


08:49 8/7/20


10:55 8/7/20


11:56 8/7/20


16:58


 


Glucose (Fingerstick)


 148 mg/dL


(70-99) 


 119 mg/dL


(70-99) 136 mg/dL


(70-99)


 


Sodium Level


 


 142 mmol/L


(136-145) 


 





 


Potassium Level


 


 4.2 mmol/L


(3.5-5.1) 


 





 


Chloride Level


 


 105 mmol/L


() 


 





 


Carbon Dioxide Level


 


 36 mmol/L


(21-32) 


 





 


Anion Gap  1 (6-14)   


 


Blood Urea Nitrogen


 


 19 mg/dL


(8-26) 


 





 


Creatinine


 


 1.6 mg/dL


(0.7-1.3) 


 





 


Estimated GFR


(Cockcroft-Gault) 


 53.1 


 


 





 


Glucose Level


 


 99 mg/dL


(70-99) 


 





 


Calcium Level


 


 9.6 mg/dL


(8.5-10.1) 


 





 


Magnesium Level


 


 2.2 mg/dL


(1.8-2.4) 


 





 


Test


 8/7/20


20:47 8/8/20


07:49 8/8/20


11:23 8/8/20


16:37


 


Glucose (Fingerstick)


 102 mg/dL


(70-99) 133 mg/dL


(70-99) 110 mg/dL


(70-99) 101 mg/dL


(70-99)


 


Test


 8/8/20


20:50 8/9/20


07:41 


 





 


Glucose (Fingerstick)


 104 mg/dL


(70-99) 97 mg/dL


(70-99) 


 











Laboratory Tests








Test


 8/8/20


11:23 8/8/20


16:37 8/8/20


20:50 8/9/20


07:41


 


Glucose (Fingerstick)


 110 mg/dL


(70-99) 101 mg/dL


(70-99) 104 mg/dL


(70-99) 97 mg/dL


(70-99)








Medications





Active Scripts








 Medications  Dose


 Route/Sig


 Max Daily Dose Days Date Category Dose


Instructions


 


 Levaquin


  (Levofloxacin)


 750 Mg Tablet  1 Tab


 PO DAILY


  10 5/28/20 Rx 


 


 Culturelle


  (Lactobacillus


 Rhamnosus Gg) 1


 Each Cap.sprink  1 Cap


 PO BID


  10 5/28/20 Rx 


 


 Eliquis


  (Apixaban) 5 Mg


 Tablet  5 Mg


 PO BID


   4/14/20 Reported 


 


 Toprol Xl


  (Metoprolol


 Succinate) 25 Mg


 Tab.er.24h  1 Tab


 PO DAILY


  30 4/14/20 Reported 


 


 Lasix


  (Furosemide) 20


 Mg Tablet  1 Tab


 PO DAILY


  30 1/21/20 Rx 


 


 Lisinopril 40 Mg


 Tablet  20 Mg


 PO DAILY


  30 1/21/20 Rx 


 


 Proair Hfa


  (Albuterol


 Sulfate) 8.5 Gm


 Hfa.aer.ad  2.5 Mg


 NEB PRN Q2HR PRN


  30 1/21/20 Rx 


 


 Duoneb 0.5-3(2.5)


 Mg/3 Ml


  (Albuterol/Ipratropium)


 3 Ml Ampul.neb  3 Ml


 NEB Q4HRS


  30 1/20/20 Rx 


 


 Albuterol Sulfate


 Neb Soln


  (Albuterol


 Sulfate) 0.63


 Mg/3 Ml Vial.neb  0.63 Mg


 NEB PRN Q4HRS PRN


  30 6/26/17 Rx  AS NEEDED


 


 Zocor


  (Simvastatin) 20


 Mg Tablet  20 Mg


 PO HS


   7/10/15 Reported  NEXT DOSE DUE TONIGHT AT BEDTIME


 


 Amlodipine


 Besylate 10 Mg


 Tablet  10 Mg


 PO DAILY


   2/24/14 Reported  NEXT DOSE DUE IN AM


 


 Potassium


 Chloride 10 Meq


 Tab.er.prt  20 Meq


 PO DAILY


   2/24/14 Reported  NEXT DOSE DUE IN AM











Impression


.


1.  Acute on chronic hypoxic and hypercapnic respiratory failure secondary to


acute on chronic systolic and diastolic heart failure in addition acute


exacerbation of chronic obstructive pulmonary disease.


2.  The patient with nonischemic cardiomyopathy.


3.  The patient with ongoing tobacco use and likely severe chronic obstructive


pulmonary disease.


4.  Prior history of pneumonias.


5.  History of marijuana and cocaine abuse.  Continues to do cocaine.


6.  Prior history of Enterobacter bacteremia.





Plan


.





1.  I have discussed with the patient regarding smoking cessation as well as


cocaine cessation.  


2. QHS BiPAP.  Avoid hyperoxia.


3.  Diuresis per cardiology


4.  BiPAP p.r.n. during the day 


5.  Follow cardiology recommendations.


6.   chest x-ray 8/7 mild improvement left base/ CHF


7.  DVT prophylaxis, the patient is currently on Eliquis.


8.  Empiric antibiotic were initiated. per id


9.  Discussed with pt, rt











AUTUMN WILLIAM MD                Aug 9, 2020 08:39

## 2020-08-09 NOTE — PDOC
PROGRESS NOTES


Date of Service:


DATE: 8/9/20 


TIME: 10:55





Chief Complaint


Chief Complaint


impression ===================











Acute on chronic respiratory failure, multifactorial secondary to PNA, AECOPD, 

mild acute on chronic systolic CHF


Gram-negative possibly gram-positive pneumonia 


Possible aspiration pneumonia


Mild Acute on Chronic systolic/diastolic CHF


Chronic mild troponin elevation: within his range, no acute changes per EKG. 

demand mediated with continued use of cocaine


SSS/ NICM s/p BiV PPM/CRT-P (Biotronik); prior LVEF 40% recent device check 

revealed 25% AFIB burden, normal function, BiV pacing 100%. 


PAFIB; presently V pacing.  Continue Eliquis for stroke prophylaxis.


SSS


CHF - 40% ejection 


H/o A. fib


HTN


CKD3


Chronic Substance abuse; cocaine and marijuana use. Last use 3 days ago


Tobaccoism 


PUI -SARS-COVID negative. 5/24 and again 8/7  negative


pulmonary arterial hypertension 








cont iv rocephin, po doxy 





cxr in am 8/10











39 min pt exam, chart review, > 50% of time spent with exam, chart review, pt 

care coordination





History of Present Illness


History of Present Illness


8/7/2020


Patient seen and examined


Patient is resting comfortably in NAD


Discussed with RN


Charts reviewed





Mr Colon is a 63 yo M w/ PMHx CHB s/p BiV pacemaker, HTN, CKD2, tobacco abuse, 

COPD on 4-5L home O2, CHF EF 40-45, active cocaine user, ETOH, and marijuana 

abuse who p/w shortness of breath that was fairly sudden onset 8/5/2020. after 

smoking multiple substances. He had some back pain. No anterior chest pain. He 

was trying to use his home nebulizer, states it wasn't helping. He does not know

if he has had weight gain, does not have a scale at home. Has LE edema that is 

much worse as well as orthopnea and PND, not sleeping well. He has a mild cough,

no hemoptysis, no weight loss.  No headaches, no nausea, vomiting or diarrhea. 

He was afebrile, has had no recent travel or sick contacts.


Seen with severe dyspnea and ABG 7.25/67/78. BNP 4839, Trop 0.04, WBC 6.6, Hb 

12.9, Platelets 129, Cr 1.3, K 3.9


He continues to do cocaine and smoke and has known COPD


Chest x-ray showing CHF and possible pneumonia, admitted to ICU on BIPAP for 

respiratory failure.





Seen on BIPAP in ICU, appears comfortable on it. Afebrile. Still hypoxic. COVID 

19 negative





Vitals


Vitals





Vital Signs








  Date Time  Temp Pulse Resp B/P (MAP) Pulse Ox O2 Delivery O2 Flow Rate FiO2


 


8/9/20 08:20  69  136/97    


 


8/9/20 08:10       5.0 


 


8/9/20 08:10      Nasal Cannula  


 


8/9/20 07:21     97   


 


8/9/20 07:00 98.1  18     





 98.1       











Physical Exam


Physical Exam


GENERAL:  Alert gentleman not in any distress


VITAL SIGNS:  Stable, afebrile.


HEENT:  NAD.


NECK:  Supple, no JVP, no lymphadenopathy.


LUNGS:  Clear.


HEART:  S1, S2 regular.


ABDOMEN:  Soft, nontender, no organomegaly.


EXTREMITIES:  No edema, cyanosis.


SKIN:  Unremarkable.


NEUROLOGIC:  The patient is alert, awake and appropriate.  No focal neurologic


deficit.


.


General:  Alert, Oriented X3, Cooperative, No acute distress


Heart:  Regular rate, Normal S1, Normal S2


Lungs:  Other (deminished bs)


Abdomen:  Normal bowel sounds, Soft


Extremities:  No edema


Skin:  No breakdown





Labs


LABS





Exam: CT of chest without contrast


 


INDICATION: Hemoptysis, pneumonia


 


TECHNIQUE: Sequential axial images through the chest obtained without IV 


contrast. Sagittal and coronal reformatted images were reconstructed from 


the axial data and reviewed.


 


Comparisons: Chest x-ray same day


 


FINDINGS:


Visualized portions of the thyroid are unremarkable. No enlarged 


mediastinal lymph nodes are identified.


 


Heart size is enlarged. Pacer with leads terminating the right atrium, 


ventricle and coronary sinus. Thoracic aorta has a normal course and 


caliber. Pulmonary artery is not enlarged.


 


Airways are patent. Ground glass opacity noted throughout the left lower 


lobe. There is a small amount of fluid within the right major fissure as 


well as a small right pleural effusion. Linear bandlike opacity noted at 


the left lung base. No suspicious lung nodules are identified.


 


Visualized upper abdomen is unremarkable.


 


No suspicious osseous lesions or acute fractures.


 


IMPRESSION:


1.  Groundglass opacity throughout the right lower lobe favored represent 


infectious or inflammatory causes.


2.  Trace right-sided pleural effusion with a small amount of fluid in the


right major fissure.


 


 


Exposure: One or more of the following in the visualized dose reduction 


techniques were utilized for this examination:


1.  Automated exposure control


2.  Adjustment of the MA and/or KV according to patient size


3.  Use of iterative of reconstructive technique


 


Electronically signed by: Chinmay Perkins MD (4/11/2020 4:25 PM) [id number]








PORTABLE CHEST 1V


 


History: CHF


 


Comparison: August 5, 2020


 


Findings:


Single view of the chest is submitted. 


There is again scattered airspace opacity of the right hemithorax other 


than some sparing near the apex, increased at the right lung base in the 


interval. There is a very small right pleural effusion as seen previously.


There is improved aeration of the left lung base, some hazy airspace and 


interstitial opacity of the left similar. Pericardial cardiac silhouette 


is again enlarged. There is again left electronic cardiac device. There is


atherosclerotic calcification near aortic arch. There is no pneumothorax. 


There is again fullness of the right hilum.


 


Impression: 


 


1.  There is persistent airspace opacity bilaterally greater on the right,


somewhat increased at the right lung base. Findings could be due to edema 


and/or infiltrate. There is again very small right pleural effusion. There


is somewhat improved aeration of the left lung base.


2. There is again enlargement of the pericardial cardiac silhouette.


 


Electronically signed by: Ade Purcell MD (8/7/2020 8:23 AM) [id number]














DICTATED and SIGNED BY:     ADE PURCELL MD


DATE:     08/07/20 0823





Laboratory Tests








Test


 8/8/20


11:23 8/8/20


16:37 8/8/20


20:50 8/9/20


07:41


 


Glucose (Fingerstick)


 110 mg/dL


(70-99) 101 mg/dL


(70-99) 104 mg/dL


(70-99) 97 mg/dL


(70-99)











Assessment and Plan


Assessmemt and Plan


Problems


Medical Problems:


(1) Acute respiratory failure with hypoxia


Status: Acute  





(2) Community acquired pneumonia


Status: Acute  





(3) COPD exacerbation


Status: Acute  











Comment


Review of Relevant


I have reviewed the following items brittany (where applicable) has been applied.


Labs





Laboratory Tests








Test


 8/7/20


11:56 8/7/20


16:58 8/7/20


20:47 8/8/20


07:49


 


Glucose (Fingerstick)


 119 mg/dL


(70-99) 136 mg/dL


(70-99) 102 mg/dL


(70-99) 133 mg/dL


(70-99)


 


Test


 8/8/20


11:23 8/8/20


16:37 8/8/20


20:50 8/9/20


07:41


 


Glucose (Fingerstick)


 110 mg/dL


(70-99) 101 mg/dL


(70-99) 104 mg/dL


(70-99) 97 mg/dL


(70-99)








Laboratory Tests








Test


 8/8/20


11:23 8/8/20


16:37 8/8/20


20:50 8/9/20


07:41


 


Glucose (Fingerstick)


 110 mg/dL


(70-99) 101 mg/dL


(70-99) 104 mg/dL


(70-99) 97 mg/dL


(70-99)








Medications





Current Medications


Prednisone (Prednisone) 60 mg 1X  ONCE PO  Last administered on 8/5/20at 17:09; 

Start 8/5/20 at 17:00;  Stop 8/5/20 at 17:01;  Status DC


Albuterol/ Ipratropium (Duoneb) 9 ml 1X  ONCE NEB  Last administered on 8/5/20at

17:28;  Start 8/5/20 at 17:00;  Stop 8/5/20 at 17:01;  Status DC


Sodium Chloride 1,000 ml @  1,000 mls/hr 1X  ONCE IV  Last administered on 

8/5/20at 17:09;  Start 8/5/20 at 17:00;  Stop 8/5/20 at 17:59;  Status DC


Levofloxacin/ Dextrose 150 ml @  100 mls/hr 1X  ONCE IV  Last administered on 

8/5/20at 18:49;  Start 8/5/20 at 18:00;  Stop 8/5/20 at 19:07;  Status DC


Ceftriaxone Sodium (Rocephin) 2 gm 1X  ONCE IVP  Last administered on 8/5/20at 

19:24;  Start 8/5/20 at 19:15;  Stop 8/5/20 at 19:16;  Status DC


Azithromycin 250 ml @  250 mls/hr 1X  ONCE IV  Last administered on 8/5/20at 

19:24;  Start 8/5/20 at 19:15;  Stop 8/5/20 at 20:14;  Status DC


Amlodipine Besylate (Norvasc) 10 mg DAILY PO  Last administered on 8/9/20at 

08:20;  Start 8/6/20 at 09:00


Apixaban (Eliquis) 5 mg BID PO  Last administered on 8/9/20at 08:19;  Start 

8/6/20 at 09:00


Furosemide (Lasix) 20 mg DAILY PO  Last administered on 8/9/20at 08:20;  Start 

8/6/20 at 09:00


Lisinopril (Prinivil) 20 mg DAILY PO  Last administered on 8/9/20at 08:20;  

Start 8/6/20 at 09:00


Metoprolol Succinate (Toprol Xl) 25 mg DAILY PO  Last administered on 8/9/20at 

08:20;  Start 8/6/20 at 09:00


Potassium Chloride (Klor-Con) 20 meq DAILYWBKFT PO  Last administered on 

8/9/20at 08:19;  Start 8/6/20 at 08:00


Simvastatin (Zocor) 20 mg HS PO  Last administered on 8/8/20at 21:08;  Start 

8/6/20 at 21:00


Furosemide (Lasix) 40 mg 1X  ONCE IVP  Last administered on 8/6/20at 08:54;  

Start 8/6/20 at 09:00;  Stop 8/6/20 at 09:01;  Status DC


Ceftriaxone Sodium (Rocephin) 2 gm Q24H IVP  Last administered on 8/8/20at 

16:29;  Start 8/6/20 at 17:00


Doxycycline Hyclate 100 mg/ Dextrose 100 ml @  50 mls/hr Q12HR IV  Last 

administered on 8/9/20at 08:19;  Start 8/6/20 at 09:00


Furosemide (Lasix) 40 mg 1X  ONCE IVP  Last administered on 8/6/20at 13:30;  

Start 8/6/20 at 10:30;  Stop 8/6/20 at 10:31;  Status DC


Info (Anti-Coagulation Monitoring By Pharmacy) 1 each PRN DAILY  PRN MC SEE 

COMMENTS Last administered on 8/6/20at 10:11;  Start 8/6/20 at 10:00


Lactobacillus Rhamnosus (Culturelle) 1 cap BID PO  Last administered on 8/9/20at

08:20;  Start 8/6/20 at 21:00


Potassium Chloride (Klor-Con) 40 meq 1X  ONCE PO  Last administered on 8/6/20at 

13:30;  Start 8/6/20 at 13:00;  Stop 8/6/20 at 13:01;  Status DC


Insulin Human Lispro (HumaLOG) 0-9 UNITS [id number] SQ ;  Start 8/6/20 at 17:00


Dextrose (Dextrose 50%-Water Syringe) 12.5 gm PRN Q15MIN  PRN IV SEE COMMENTS;  

Start 8/6/20 at 13:45


Albuterol Sulfate (Ventolin Neb Soln) 2.5 mg PRN Q2HR  PRN NEB SHORTNESS OF 

BREATH;  Start 8/6/20 at 13:45


Albuterol/ Ipratropium (Duoneb) 3 ml Q4HRS NEB  Last administered on 8/9/20at 

07:18;  Start 8/6/20 at 16:00


Furosemide (Lasix) 40 mg 1X  ONCE IVP  Last administered on 8/7/20at 11:20;  

Start 8/7/20 at 11:00;  Stop 8/7/20 at 11:01;  Status DC





Active Scripts


Active


Levaquin (Levofloxacin) 750 Mg Tablet 1 Tab PO DAILY 10 Days


Culturelle (Lactobacillus Rhamnosus Gg) 1 Each Cap.sprink 1 Cap PO BID 10 Days


Lasix (Furosemide) 20 Mg Tablet 1 Tab PO DAILY 30 Days


Lisinopril 40 Mg Tablet 20 Mg PO DAILY 30 Days


Proair Hfa (Albuterol Sulfate) 8.5 Gm Hfa.aer.ad 2.5 Mg NEB PRN Q2HR PRN 30 Days


Duoneb 0.5-3(2.5) Mg/3 Ml (Albuterol/Ipratropium) 3 Ml Ampul.neb 3 Ml NEB Q4HRS 

30 Days


Albuterol Sulfate Neb Soln (Albuterol Sulfate) 0.63 Mg/3 Ml Vial.neb 0.63 Mg NEB

PRN Q4HRS PRN 30 Days


     AS NEEDED


Reported


Eliquis (Apixaban) 5 Mg Tablet 5 Mg PO BID


Toprol Xl (Metoprolol Succinate) 25 Mg Tab.er.24h 1 Tab PO DAILY 30 Days


Zocor (Simvastatin) 20 Mg Tablet 20 Mg PO HS


     NEXT DOSE DUE TONIGHT AT BEDTIME


Amlodipine Besylate 10 Mg Tablet 10 Mg PO DAILY


     NEXT DOSE DUE IN AM


Potassium Chloride 10 Meq Tab.er.prt 20 Meq PO DAILY


     NEXT DOSE DUE IN AM


Vitals/I & O





Vital Sign - Last 24 Hours








 8/8/20 8/8/20 8/8/20 8/8/20





 10:56 11:39 15:00 16:02


 


Temp 98.0  98.2 





 98.0  98.2 


 


Pulse 73  70 


 


Resp 18  18 


 


B/P (MAP) 141/84 (103)  135/84 (101) 


 


Pulse Ox 91 95 92 


 


O2 Delivery Nasal Cannula Nasal Cannula Nasal Cannula Nasal Cannula


 


O2 Flow Rate 5.0 5.0 5.0 5.0


 


    





    





 8/8/20 8/8/20 8/8/20 8/8/20





 19:00 20:00 20:00 21:02


 


Temp 98.4   





 98.4   


 


Pulse 72   


 


Resp 18   


 


B/P (MAP) 125/86 (99)   


 


Pulse Ox 92   96


 


O2 Delivery   Nasal Cannula Nasal Cannula


 


O2 Flow Rate  5.0 6.0 6.0


 


    





    





 8/8/20 8/9/20 8/9/20 8/9/20





 23:00 00:26 00:33 01:55


 


Temp 98.2   





 98.2   


 


Pulse 73   


 


Resp 18   


 


B/P (MAP) 143/97 (112)   


 


Pulse Ox 92 97  


 


O2 Delivery  Nasal Cannula BiPAP/CPAP BiPAP/CPAP


 


O2 Flow Rate  6.0  


 


    





    





 8/9/20 8/9/20 8/9/20 8/9/20





 03:00 04:10 07:00 07:21


 


Temp 97.8  98.1 





 97.8  98.1 


 


Pulse 69  73 


 


Resp 18  18 


 


B/P (MAP) 136/97 (110)  134/94 (107) 


 


Pulse Ox 94  94 97


 


O2 Delivery  BiPAP/CPAP  Nasal Cannula


 


O2 Flow Rate    5.0


 


    





    





 8/9/20 8/9/20 8/9/20 8/9/20





 08:10 08:10 08:20 08:20


 


Pulse   69 69


 


B/P (MAP)   136/97 136/97


 


O2 Delivery Nasal Cannula   


 


O2 Flow Rate 5.0 5.0  





 8/9/20   





 08:20   


 


Pulse 69   


 


B/P (MAP) 136/97   














Intake and Output   


 


 8/8/20 8/8/20 8/9/20





 15:00 23:00 07:00


 


Intake Total 240 ml 480 ml 


 


Output Total 1000 ml 700 ml 850 ml


 


Balance -760 ml -220 ml -850 ml











Justicifation of Admission Dx:


Justifications for Admission:


Justification of Admission Dx:  N/A


Respiratory Failure:  Severe Resp Distress











SOFIA DAVID MD           Aug 9, 2020 10:55

## 2020-08-10 VITALS — DIASTOLIC BLOOD PRESSURE: 79 MMHG | SYSTOLIC BLOOD PRESSURE: 132 MMHG

## 2020-08-10 VITALS — DIASTOLIC BLOOD PRESSURE: 85 MMHG | SYSTOLIC BLOOD PRESSURE: 131 MMHG

## 2020-08-10 VITALS — SYSTOLIC BLOOD PRESSURE: 113 MMHG | DIASTOLIC BLOOD PRESSURE: 89 MMHG

## 2020-08-10 VITALS — DIASTOLIC BLOOD PRESSURE: 94 MMHG | SYSTOLIC BLOOD PRESSURE: 132 MMHG

## 2020-08-10 VITALS — SYSTOLIC BLOOD PRESSURE: 124 MMHG | DIASTOLIC BLOOD PRESSURE: 80 MMHG

## 2020-08-10 VITALS — DIASTOLIC BLOOD PRESSURE: 95 MMHG | SYSTOLIC BLOOD PRESSURE: 145 MMHG

## 2020-08-10 LAB
ANION GAP SERPL CALC-SCNC: 1 MMOL/L (ref 6–14)
BASOPHILS # BLD AUTO: 0.1 X10^3/UL (ref 0–0.2)
BASOPHILS NFR BLD: 1 % (ref 0–3)
BUN SERPL-MCNC: 15 MG/DL (ref 8–26)
CALCIUM SERPL-MCNC: 9.5 MG/DL (ref 8.5–10.1)
CHLORIDE SERPL-SCNC: 105 MMOL/L (ref 98–107)
CO2 SERPL-SCNC: 37 MMOL/L (ref 21–32)
CREAT SERPL-MCNC: 1.1 MG/DL (ref 0.7–1.3)
EOSINOPHIL NFR BLD: 0.2 X10^3/UL (ref 0–0.7)
EOSINOPHIL NFR BLD: 3 % (ref 0–3)
ERYTHROCYTE [DISTWIDTH] IN BLOOD BY AUTOMATED COUNT: 16.1 % (ref 11.5–14.5)
GFR SERPLBLD BASED ON 1.73 SQ M-ARVRAT: 81.8 ML/MIN
GLUCOSE SERPL-MCNC: 93 MG/DL (ref 70–99)
HCT VFR BLD CALC: 38.2 % (ref 39–53)
HGB BLD-MCNC: 12.7 G/DL (ref 13–17.5)
LYMPHOCYTES # BLD: 0.8 X10^3/UL (ref 1–4.8)
LYMPHOCYTES NFR BLD AUTO: 15 % (ref 24–48)
MCH RBC QN AUTO: 30 PG (ref 25–35)
MCHC RBC AUTO-ENTMCNC: 33 G/DL (ref 31–37)
MCV RBC AUTO: 91 FL (ref 79–100)
MONO #: 0.7 X10^3/UL (ref 0–1.1)
MONOCYTES NFR BLD: 12 % (ref 0–9)
NEUT #: 3.8 X10^3/UL (ref 1.8–7.7)
NEUTROPHILS NFR BLD AUTO: 69 % (ref 31–73)
PLATELET # BLD AUTO: 132 X10^3/UL (ref 140–400)
POTASSIUM SERPL-SCNC: 3.9 MMOL/L (ref 3.5–5.1)
RBC # BLD AUTO: 4.19 X10^6/UL (ref 4.3–5.7)
SODIUM SERPL-SCNC: 143 MMOL/L (ref 136–145)
WBC # BLD AUTO: 5.6 X10^3/UL (ref 4–11)

## 2020-08-10 PROCEDURE — 5A09357 ASSISTANCE WITH RESPIRATORY VENTILATION, LESS THAN 24 CONSECUTIVE HOURS, CONTINUOUS POSITIVE AIRWAY PRESSURE: ICD-10-PCS | Performed by: INTERNAL MEDICINE

## 2020-08-10 RX ADMIN — Medication PRN EACH: at 10:47

## 2020-08-10 RX ADMIN — Medication SCH CAP: at 08:32

## 2020-08-10 RX ADMIN — DOXYCYCLINE HYCLATE SCH MG: 100 TABLET, COATED ORAL at 08:32

## 2020-08-10 RX ADMIN — FUROSEMIDE SCH MG: 20 TABLET ORAL at 08:32

## 2020-08-10 RX ADMIN — INSULIN LISPRO SCH UNITS: 100 INJECTION, SOLUTION INTRAVENOUS; SUBCUTANEOUS at 21:00

## 2020-08-10 RX ADMIN — IPRATROPIUM BROMIDE AND ALBUTEROL SULFATE SCH ML: .5; 3 SOLUTION RESPIRATORY (INHALATION) at 04:00

## 2020-08-10 RX ADMIN — INSULIN LISPRO SCH UNITS: 100 INJECTION, SOLUTION INTRAVENOUS; SUBCUTANEOUS at 12:00

## 2020-08-10 RX ADMIN — IPRATROPIUM BROMIDE AND ALBUTEROL SULFATE SCH ML: .5; 3 SOLUTION RESPIRATORY (INHALATION) at 11:16

## 2020-08-10 RX ADMIN — APIXABAN SCH MG: 5 TABLET, FILM COATED ORAL at 21:20

## 2020-08-10 RX ADMIN — POTASSIUM CHLORIDE SCH MEQ: 750 TABLET, FILM COATED, EXTENDED RELEASE ORAL at 08:34

## 2020-08-10 RX ADMIN — INSULIN LISPRO SCH UNITS: 100 INJECTION, SOLUTION INTRAVENOUS; SUBCUTANEOUS at 08:00

## 2020-08-10 RX ADMIN — IPRATROPIUM BROMIDE AND ALBUTEROL SULFATE SCH ML: .5; 3 SOLUTION RESPIRATORY (INHALATION) at 07:48

## 2020-08-10 RX ADMIN — METOPROLOL SUCCINATE SCH MG: 25 TABLET, EXTENDED RELEASE ORAL at 08:33

## 2020-08-10 RX ADMIN — IPRATROPIUM BROMIDE AND ALBUTEROL SULFATE SCH ML: .5; 3 SOLUTION RESPIRATORY (INHALATION) at 15:48

## 2020-08-10 RX ADMIN — IPRATROPIUM BROMIDE AND ALBUTEROL SULFATE SCH ML: .5; 3 SOLUTION RESPIRATORY (INHALATION) at 01:10

## 2020-08-10 RX ADMIN — INSULIN LISPRO SCH UNITS: 100 INJECTION, SOLUTION INTRAVENOUS; SUBCUTANEOUS at 17:00

## 2020-08-10 RX ADMIN — Medication SCH CAP: at 21:19

## 2020-08-10 RX ADMIN — LISINOPRIL SCH MG: 20 TABLET ORAL at 08:33

## 2020-08-10 RX ADMIN — IPRATROPIUM BROMIDE AND ALBUTEROL SULFATE SCH ML: .5; 3 SOLUTION RESPIRATORY (INHALATION) at 20:22

## 2020-08-10 RX ADMIN — SIMVASTATIN SCH MG: 20 TABLET, FILM COATED ORAL at 21:19

## 2020-08-10 RX ADMIN — CEFTRIAXONE SODIUM SCH GM: 2 INJECTION, POWDER, FOR SOLUTION INTRAMUSCULAR; INTRAVENOUS at 18:07

## 2020-08-10 RX ADMIN — APIXABAN SCH MG: 5 TABLET, FILM COATED ORAL at 08:32

## 2020-08-10 RX ADMIN — DOXYCYCLINE HYCLATE SCH MG: 100 TABLET, COATED ORAL at 21:20

## 2020-08-10 NOTE — PDOC
PROGRESS NOTES


Date of Service:


DATE: 8/10/20 


TIME: 10:04





Chief Complaint


Chief Complaint


impression ===================











Acute on chronic respiratory failure, multifactorial secondary to PNA, AECOPD, 

mild acute on chronic systolic CHF


Gram-negative possibly gram-positive pneumonia 


Possible aspiration pneumonia


Mild Acute on Chronic systolic/diastolic CHF


Chronic mild troponin elevation: within his range, no acute changes per EKG. 

demand mediated with continued use of cocaine


SSS/ NICM s/p BiV PPM/CRT-P (Biotronik); prior LVEF 40% recent device check 

revealed 25% AFIB burden, normal function, BiV pacing 100%. 


PAFIB; presently V pacing.  Continue Eliquis for stroke prophylaxis.


SSS


CHF - 40% ejection 


H/o A. fib


HTN


CKD3


Chronic Substance abuse; cocaine and marijuana use. Last use 3 days ago


Tobaccoism 


PUI -SARS-COVID negative. 5/24 and again 8/7  negative


pulmonary arterial hypertension 








cont iv rocephin, po doxy 





ct chest 8/10











39 min pt exam, chart review, > 50% of time spent with exam, chart review, pt 

care coordination





History of Present Illness


History of Present Illness


8/10 /2020





Patient seen and examined


Patient is resting comfortably in NAD


Discussed with RN


Charts reviewed


ct chest today








Mr Colon is a 63 yo M w/ PMHx CHB s/p BiV pacemaker, HTN, CKD2, tobacco abuse, 

COPD on 4-5L home O2, CHF EF 40-45, active cocaine user, ETOH, and marijuana 

abuse who p/w shortness of breath that was fairly sudden onset 8/5/2020. after 

smoking multiple substances. He had some back pain. No anterior chest pain. He 

was trying to use his home nebulizer, states it wasn't helping. He does not know

if he has had weight gain, does not have a scale at home. Has LE edema that is 

much worse as well as orthopnea and PND, not sleeping well. He has a mild cough,

no hemoptysis, no weight loss.  No headaches, no nausea, vomiting or diarrhea. 

He was afebrile, has had no recent travel or sick contacts.


Seen with severe dyspnea and ABG 7.25/67/78. BNP 4839, Trop 0.04, WBC 6.6, Hb 

12.9, Platelets 129, Cr 1.3, K 3.9


He continues to do cocaine and smoke and has known COPD


Chest x-ray showing CHF and possible pneumonia, admitted to ICU on BIPAP for 

respiratory failure.





Seen on BIPAP in ICU, appears comfortable on it. Afebrile. Still hypoxic. COVID 

19 negative





Vitals


Vitals





Vital Signs








  Date Time  Temp Pulse Resp B/P (MAP) Pulse Ox O2 Delivery O2 Flow Rate FiO2


 


8/10/20 08:33  70  145/95    


 


8/10/20 07:48     96 Nasal Cannula 6.0 


 


8/10/20 07:00 98.1  18     





 98.1       











Physical Exam


Physical Exam


GENERAL: Propped up in bed, alert, relaxed 


HEENT:  Oral cavity clear 


NECK:  Supple, no JVP, no lymphadenopathy.


LUNGS:  Mild congestion on the right, nonlabored 


HEART:  S1, S2 regular.


ABDOMEN:  Soft, nontender


EXTREMITIES:  No edema, cyanosis.


SKIN:  No rash 


NEUROLOGIC: Alert, awake and appropriate.  No focal neurologic deficit.


.


General:  Alert, Oriented X3, Cooperative, No acute distress


Heart:  Regular rate, Normal S1, Normal S2


Lungs:  Other (deminished bs)


Abdomen:  Normal bowel sounds, Soft


Extremities:  No edema


Skin:  No breakdown





Labs


LABS





Exam: CT of chest without contrast


 


INDICATION: Hemoptysis, pneumonia


 


TECHNIQUE: Sequential axial images through the chest obtained without IV 


contrast. Sagittal and coronal reformatted images were reconstructed from 


the axial data and reviewed.


 


Comparisons: Chest x-ray same day


 


FINDINGS:


Visualized portions of the thyroid are unremarkable. No enlarged 


mediastinal lymph nodes are identified.


 


Heart size is enlarged. Pacer with leads terminating the right atrium, 


ventricle and coronary sinus. Thoracic aorta has a normal course and 


caliber. Pulmonary artery is not enlarged.


 


Airways are patent. Ground glass opacity noted throughout the left lower 


lobe. There is a small amount of fluid within the right major fissure as 


well as a small right pleural effusion. Linear bandlike opacity noted at 


the left lung base. No suspicious lung nodules are identified.


 


Visualized upper abdomen is unremarkable.


 


No suspicious osseous lesions or acute fractures.


 


IMPRESSION:


1.  Groundglass opacity throughout the right lower lobe favored represent 


infectious or inflammatory causes.


2.  Trace right-sided pleural effusion with a small amount of fluid in the


right major fissure.


 


 


Exposure: One or more of the following in the visualized dose reduction 


techniques were utilized for this examination:


1.  Automated exposure control


2.  Adjustment of the MA and/or KV according to patient size


3.  Use of iterative of reconstructive technique


 


Electronically signed by: Chinmay Perkins MD (4/11/2020 4:25 PM) [id number]


PORTABLE CHEST 1V


 


History: CHF


 


Comparison: August 5, 2020


 


Findings:


Single view of the chest is submitted. 


There is again scattered airspace opacity of the right hemithorax other 


than some sparing near the apex, increased at the right lung base in the 


interval. There is a very small right pleural effusion as seen previously.


There is improved aeration of the left lung base, some hazy airspace and 


interstitial opacity of the left similar. Pericardial cardiac silhouette 


is again enlarged. There is again left electronic cardiac device. There is


atherosclerotic calcification near aortic arch. There is no pneumothorax. 


There is again fullness of the right hilum.


 


Impression: 


 


1.  There is persistent airspace opacity bilaterally greater on the right,


somewhat increased at the right lung base. Findings could be due to edema 


and/or infiltrate. There is again very small right pleural effusion. There


is somewhat improved aeration of the left lung base.


2. There is again enlargement of the pericardial cardiac silhouette.


 


Electronically signed by: Ade Purcell MD (8/7/2020 8:23 AM) [id number]














DICTATED and SIGNED BY:     ADE PURCELL MD


DATE:     08/07/20 0823


Laboratory Tests








Test


 8/9/20


11:32 8/9/20


16:40 8/9/20


21:48 8/10/20


05:15


 


Glucose (Fingerstick)


 105 mg/dL


(70-99) 109 mg/dL


(70-99) 96 mg/dL


(70-99) 





 


White Blood Count


 


 


 


 5.6 x10^3/uL


(4.0-11.0)


 


Red Blood Count


 


 


 


 4.19 x10^6/uL


(4.30-5.70)


 


Hemoglobin


 


 


 


 12.7 g/dL


(13.0-17.5)


 


Hematocrit


 


 


 


 38.2 %


(39.0-53.0)


 


Mean Corpuscular Volume    91 fL () 


 


Mean Corpuscular Hemoglobin    30 pg (25-35) 


 


Mean Corpuscular Hemoglobin


Concent 


 


 


 33 g/dL


(31-37)


 


Red Cell Distribution Width


 


 


 


 16.1 %


(11.5-14.5)


 


Platelet Count


 


 


 


 132 x10^3/uL


(140-400)


 


Neutrophils (%) (Auto)    69 % (31-73) 


 


Lymphocytes (%) (Auto)    15 % (24-48) 


 


Monocytes (%) (Auto)    12 % (0-9) 


 


Eosinophils (%) (Auto)    3 % (0-3) 


 


Basophils (%) (Auto)    1 % (0-3) 


 


Neutrophils # (Auto)


 


 


 


 3.8 x10^3/uL


(1.8-7.7)


 


Lymphocytes # (Auto)


 


 


 


 0.8 x10^3/uL


(1.0-4.8)


 


Monocytes # (Auto)


 


 


 


 0.7 x10^3/uL


(0.0-1.1)


 


Eosinophils # (Auto)


 


 


 


 0.2 x10^3/uL


(0.0-0.7)


 


Basophils # (Auto)


 


 


 


 0.1 x10^3/uL


(0.0-0.2)


 


Sodium Level


 


 


 


 143 mmol/L


(136-145)


 


Potassium Level


 


 


 


 3.9 mmol/L


(3.5-5.1)


 


Chloride Level


 


 


 


 105 mmol/L


()


 


Carbon Dioxide Level


 


 


 


 37 mmol/L


(21-32)


 


Anion Gap    1 (6-14) 


 


Blood Urea Nitrogen


 


 


 


 15 mg/dL


(8-26)


 


Creatinine


 


 


 


 1.1 mg/dL


(0.7-1.3)


 


Estimated GFR


(Cockcroft-Gault) 


 


 


 81.8 





 


Glucose Level


 


 


 


 93 mg/dL


(70-99)


 


Calcium Level


 


 


 


 9.5 mg/dL


(8.5-10.1)


 


Test


 8/10/20


07:54 


 


 





 


Glucose (Fingerstick)


 120 mg/dL


(70-99) 


 


 














Assessment and Plan


Assessmemt and Plan


Problems


Medical Problems:


(1) Acute respiratory failure with hypoxia


Status: Acute  





(2) Community acquired pneumonia


Status: Acute  





(3) COPD exacerbation


Status: Acute  











Comment


Review of Relevant


I have reviewed the following items brittany (where applicable) has been applied.


Labs





Laboratory Tests








Test


 8/8/20


11:23 8/8/20


16:37 8/8/20


20:50 8/9/20


07:41


 


Glucose (Fingerstick)


 110 mg/dL


(70-99) 101 mg/dL


(70-99) 104 mg/dL


(70-99) 97 mg/dL


(70-99)


 


Test


 8/9/20


11:32 8/9/20


16:40 8/9/20


21:48 8/10/20


05:15


 


Glucose (Fingerstick)


 105 mg/dL


(70-99) 109 mg/dL


(70-99) 96 mg/dL


(70-99) 





 


White Blood Count


 


 


 


 5.6 x10^3/uL


(4.0-11.0)


 


Red Blood Count


 


 


 


 4.19 x10^6/uL


(4.30-5.70)


 


Hemoglobin


 


 


 


 12.7 g/dL


(13.0-17.5)


 


Hematocrit


 


 


 


 38.2 %


(39.0-53.0)


 


Mean Corpuscular Volume    91 fL () 


 


Mean Corpuscular Hemoglobin    30 pg (25-35) 


 


Mean Corpuscular Hemoglobin


Concent 


 


 


 33 g/dL


(31-37)


 


Red Cell Distribution Width


 


 


 


 16.1 %


(11.5-14.5)


 


Platelet Count


 


 


 


 132 x10^3/uL


(140-400)


 


Neutrophils (%) (Auto)    69 % (31-73) 


 


Lymphocytes (%) (Auto)    15 % (24-48) 


 


Monocytes (%) (Auto)    12 % (0-9) 


 


Eosinophils (%) (Auto)    3 % (0-3) 


 


Basophils (%) (Auto)    1 % (0-3) 


 


Neutrophils # (Auto)


 


 


 


 3.8 x10^3/uL


(1.8-7.7)


 


Lymphocytes # (Auto)


 


 


 


 0.8 x10^3/uL


(1.0-4.8)


 


Monocytes # (Auto)


 


 


 


 0.7 x10^3/uL


(0.0-1.1)


 


Eosinophils # (Auto)


 


 


 


 0.2 x10^3/uL


(0.0-0.7)


 


Basophils # (Auto)


 


 


 


 0.1 x10^3/uL


(0.0-0.2)


 


Sodium Level


 


 


 


 143 mmol/L


(136-145)


 


Potassium Level


 


 


 


 3.9 mmol/L


(3.5-5.1)


 


Chloride Level


 


 


 


 105 mmol/L


()


 


Carbon Dioxide Level


 


 


 


 37 mmol/L


(21-32)


 


Anion Gap    1 (6-14) 


 


Blood Urea Nitrogen


 


 


 


 15 mg/dL


(8-26)


 


Creatinine


 


 


 


 1.1 mg/dL


(0.7-1.3)


 


Estimated GFR


(Cockcroft-Gault) 


 


 


 81.8 





 


Glucose Level


 


 


 


 93 mg/dL


(70-99)


 


Calcium Level


 


 


 


 9.5 mg/dL


(8.5-10.1)


 


Test


 8/10/20


07:54 


 


 





 


Glucose (Fingerstick)


 120 mg/dL


(70-99) 


 


 











Laboratory Tests








Test


 8/9/20


11:32 8/9/20


16:40 8/9/20


21:48 8/10/20


05:15


 


Glucose (Fingerstick)


 105 mg/dL


(70-99) 109 mg/dL


(70-99) 96 mg/dL


(70-99) 





 


White Blood Count


 


 


 


 5.6 x10^3/uL


(4.0-11.0)


 


Red Blood Count


 


 


 


 4.19 x10^6/uL


(4.30-5.70)


 


Hemoglobin


 


 


 


 12.7 g/dL


(13.0-17.5)


 


Hematocrit


 


 


 


 38.2 %


(39.0-53.0)


 


Mean Corpuscular Volume    91 fL () 


 


Mean Corpuscular Hemoglobin    30 pg (25-35) 


 


Mean Corpuscular Hemoglobin


Concent 


 


 


 33 g/dL


(31-37)


 


Red Cell Distribution Width


 


 


 


 16.1 %


(11.5-14.5)


 


Platelet Count


 


 


 


 132 x10^3/uL


(140-400)


 


Neutrophils (%) (Auto)    69 % (31-73) 


 


Lymphocytes (%) (Auto)    15 % (24-48) 


 


Monocytes (%) (Auto)    12 % (0-9) 


 


Eosinophils (%) (Auto)    3 % (0-3) 


 


Basophils (%) (Auto)    1 % (0-3) 


 


Neutrophils # (Auto)


 


 


 


 3.8 x10^3/uL


(1.8-7.7)


 


Lymphocytes # (Auto)


 


 


 


 0.8 x10^3/uL


(1.0-4.8)


 


Monocytes # (Auto)


 


 


 


 0.7 x10^3/uL


(0.0-1.1)


 


Eosinophils # (Auto)


 


 


 


 0.2 x10^3/uL


(0.0-0.7)


 


Basophils # (Auto)


 


 


 


 0.1 x10^3/uL


(0.0-0.2)


 


Sodium Level


 


 


 


 143 mmol/L


(136-145)


 


Potassium Level


 


 


 


 3.9 mmol/L


(3.5-5.1)


 


Chloride Level


 


 


 


 105 mmol/L


()


 


Carbon Dioxide Level


 


 


 


 37 mmol/L


(21-32)


 


Anion Gap    1 (6-14) 


 


Blood Urea Nitrogen


 


 


 


 15 mg/dL


(8-26)


 


Creatinine


 


 


 


 1.1 mg/dL


(0.7-1.3)


 


Estimated GFR


(Cockcroft-Gault) 


 


 


 81.8 





 


Glucose Level


 


 


 


 93 mg/dL


(70-99)


 


Calcium Level


 


 


 


 9.5 mg/dL


(8.5-10.1)


 


Test


 8/10/20


07:54 


 


 





 


Glucose (Fingerstick)


 120 mg/dL


(70-99) 


 


 











Medications





Current Medications


Prednisone (Prednisone) 60 mg 1X  ONCE PO  Last administered on 8/5/20at 17:09; 

Start 8/5/20 at 17:00;  Stop 8/5/20 at 17:01;  Status DC


Albuterol/ Ipratropium (Duoneb) 9 ml 1X  ONCE NEB  Last administered on 8/5/20at

17:28;  Start 8/5/20 at 17:00;  Stop 8/5/20 at 17:01;  Status DC


Sodium Chloride 1,000 ml @  1,000 mls/hr 1X  ONCE IV  Last administered on 

8/5/20at 17:09;  Start 8/5/20 at 17:00;  Stop 8/5/20 at 17:59;  Status DC


Levofloxacin/ Dextrose 150 ml @  100 mls/hr 1X  ONCE IV  Last administered on 

8/5/20at 18:49;  Start 8/5/20 at 18:00;  Stop 8/5/20 at 19:07;  Status DC


Ceftriaxone Sodium (Rocephin) 2 gm 1X  ONCE IVP  Last administered on 8/5/20at 

19:24;  Start 8/5/20 at 19:15;  Stop 8/5/20 at 19:16;  Status DC


Azithromycin 250 ml @  250 mls/hr 1X  ONCE IV  Last administered on 8/5/20at 

19:24;  Start 8/5/20 at 19:15;  Stop 8/5/20 at 20:14;  Status DC


Amlodipine Besylate (Norvasc) 10 mg DAILY PO  Last administered on 8/10/20at 

08:33;  Start 8/6/20 at 09:00


Apixaban (Eliquis) 5 mg BID PO  Last administered on 8/10/20at 08:32;  Start 

8/6/20 at 09:00


Furosemide (Lasix) 20 mg DAILY PO  Last administered on 8/10/20at 08:32;  Start 

8/6/20 at 09:00


Lisinopril (Prinivil) 20 mg DAILY PO  Last administered on 8/10/20at 08:33;  

Start 8/6/20 at 09:00


Metoprolol Succinate (Toprol Xl) 25 mg DAILY PO  Last administered on 8/10/20at 

08:33;  Start 8/6/20 at 09:00


Potassium Chloride (Klor-Con) 20 meq DAILYWBKFT PO  Last administered on 

8/10/20at 08:34;  Start 8/6/20 at 08:00


Simvastatin (Zocor) 20 mg HS PO  Last administered on 8/9/20at 21:20;  Start 

8/6/20 at 21:00


Furosemide (Lasix) 40 mg 1X  ONCE IVP  Last administered on 8/6/20at 08:54;  

Start 8/6/20 at 09:00;  Stop 8/6/20 at 09:01;  Status DC


Ceftriaxone Sodium (Rocephin) 2 gm Q24H IVP  Last administered on 8/9/20at 

16:59;  Start 8/6/20 at 17:00


Doxycycline Hyclate 100 mg/ Dextrose 100 ml @  50 mls/hr Q12HR IV  Last 

administered on 8/9/20at 08:19;  Start 8/6/20 at 09:00;  Stop 8/9/20 at 11:28;  

Status DC


Furosemide (Lasix) 40 mg 1X  ONCE IVP  Last administered on 8/6/20at 13:30;  

Start 8/6/20 at 10:30;  Stop 8/6/20 at 10:31;  Status DC


Info (Anti-Coagulation Monitoring By Pharmacy) 1 each PRN DAILY  PRN MC SEE 

COMMENTS Last administered on 8/6/20at 10:11;  Start 8/6/20 at 10:00


Lactobacillus Rhamnosus (Culturelle) 1 cap BID PO  Last administered on 

8/10/20at 08:32;  Start 8/6/20 at 21:00


Potassium Chloride (Klor-Con) 40 meq 1X  ONCE PO  Last administered on 8/6/20at 

13:30;  Start 8/6/20 at 13:00;  Stop 8/6/20 at 13:01;  Status DC


Insulin Human Lispro (HumaLOG) 0-9 UNITS TIDWMEALHC SQ ;  Start 8/6/20 at 17:00


Dextrose (Dextrose 50%-Water Syringe) 12.5 gm PRN Q15MIN  PRN IV SEE COMMENTS;  

Start 8/6/20 at 13:45


Albuterol Sulfate (Ventolin Neb Soln) 2.5 mg PRN Q2HR  PRN NEB SHORTNESS OF 

BREATH;  Start 8/6/20 at 13:45


Albuterol/ Ipratropium (Duoneb) 3 ml Q4HRS NEB  Last administered on 8/10/20at 

07:48;  Start 8/6/20 at 16:00


Furosemide (Lasix) 40 mg 1X  ONCE IVP  Last administered on 8/7/20at 11:20;  

Start 8/7/20 at 11:00;  Stop 8/7/20 at 11:01;  Status DC


Doxycycline Hyclate (Vibra-Tab) 100 mg BID PO  Last administered on 8/10/20at 

08:32;  Start 8/9/20 at 21:00





Active Scripts


Active


Levaquin (Levofloxacin) 750 Mg Tablet 1 Tab PO DAILY 10 Days


Culturelle (Lactobacillus Rhamnosus Gg) 1 Each Cap.sprink 1 Cap PO BID 10 Days


Lasix (Furosemide) 20 Mg Tablet 1 Tab PO DAILY 30 Days


Lisinopril 40 Mg Tablet 20 Mg PO DAILY 30 Days


Proair Hfa (Albuterol Sulfate) 8.5 Gm Hfa.aer.ad 2.5 Mg NEB PRN Q2HR PRN 30 Days


Duoneb 0.5-3(2.5) Mg/3 Ml (Albuterol/Ipratropium) 3 Ml Ampul.neb 3 Ml NEB Q4HRS 

30 Days


Albuterol Sulfate Neb Soln (Albuterol Sulfate) 0.63 Mg/3 Ml Vial.neb 0.63 Mg NEB

PRN Q4HRS PRN 30 Days


     AS NEEDED


Reported


Eliquis (Apixaban) 5 Mg Tablet 5 Mg PO BID


Toprol Xl (Metoprolol Succinate) 25 Mg Tab.er.24h 1 Tab PO DAILY 30 Days


Zocor (Simvastatin) 20 Mg Tablet 20 Mg PO HS


     NEXT DOSE DUE TONIGHT AT BEDTIME


Amlodipine Besylate 10 Mg Tablet 10 Mg PO DAILY


     NEXT DOSE DUE IN AM


Potassium Chloride 10 Meq Tab.er.prt 20 Meq PO DAILY


     NEXT DOSE DUE IN AM


Vitals/I & O





Vital Sign - Last 24 Hours








 8/9/20 8/9/20 8/9/20 8/9/20





 11:00 11:26 15:00 15:26


 


Temp 98.3  97.9 





 98.3  97.9 


 


Pulse 78  70 


 


Resp 18  18 


 


B/P (MAP) 146/98 (114)  119/80 (93) 


 


Pulse Ox 93 99 93 94


 


O2 Delivery  Nasal Cannula  Nasal Cannula


 


O2 Flow Rate  5.0  5.0


 


    





    





 8/9/20 8/9/20 8/9/20 8/9/20





 19:00 19:20 19:20 20:06


 


Temp 98.5   





 98.5   


 


Pulse 76   


 


Resp 18   


 


B/P (MAP) 130/77 (94)   


 


Pulse Ox 90   97


 


O2 Delivery   Nasal Cannula Nasal Cannula


 


O2 Flow Rate  5.0 5.0 5.0


 


    





    





 8/9/20 8/10/20 8/10/20 8/10/20





 23:00 01:14 01:15 03:00


 


Temp 98.0   98.0





 98.0   98.0


 


Pulse 71   71


 


Resp 18   18


 


B/P (MAP) 124/80 (95)   124/80 (95)


 


Pulse Ox 92   92


 


O2 Delivery  Nasal Cannula BiPAP/CPAP 


 


O2 Flow Rate  5.0  


 


    





    





 8/10/20 8/10/20 8/10/20 8/10/20





 07:00 07:48 08:33 08:33


 


Temp 98.1   





 98.1   


 


Pulse 70  70 70


 


Resp 18   


 


B/P (MAP) 145/95 (112)  145/95 145/95


 


Pulse Ox 100 96  


 


O2 Delivery  Nasal Cannula  


 


O2 Flow Rate  6.0  


 


    





    





 8/10/20   





 08:33   


 


Pulse 70   


 


B/P (MAP) 145/95   














Intake and Output   


 


 8/9/20 8/9/20 8/10/20





 15:00 23:00 07:00


 


Intake Total 50 ml 240 ml 1090 ml


 


Output Total 900 ml 600 ml 500 ml


 


Balance -850 ml -360 ml 590 ml











Justicifation of Admission Dx:


Justifications for Admission:


Justification of Admission Dx:  N/A


Respiratory Failure:  Severe Resp Distress











SOFIA DAVID MD          Aug 10, 2020 10:05

## 2020-08-10 NOTE — RAD
Two-view chest

 

HISTORY: Pneumonia

 

Comparison August 5

 

FINDINGS:

 

Biventricular pacemaker device appears radiologically unchanged.

 

Heart is mildly enlarged. Pulmonary vasculature appear stable. No new 

infiltrate. Previously noted infiltrates appear mildly improved with 

increased aeration. No significant effusion or pneumothorax.

 

IMPRESSION:

 

Improving aeration the lungs with decreased infiltrates. No new 

infiltrates are present.

 

Electronically signed by: Finesse Wilder MD (8/10/2020 2:58 PM) [id number]

## 2020-08-10 NOTE — PDOC
Infectious Disease Note


Subjective:


Subjective


Comfortable, denies pain


Denies increase SOA/F/C/S /N/V/D


on 6L O2





Vital Signs:


Vital Signs





Vital Signs








  Date Time  Temp Pulse Resp B/P (MAP) Pulse Ox O2 Delivery O2 Flow Rate FiO2


 


8/10/20 08:33  70  145/95    


 


8/10/20 08:00      Nasal Cannula 6.0 


 


8/10/20 07:48     96   


 


8/10/20 07:00 98.1  18     





 98.1       











Physical Exam:


PHYSICAL EXAM


GENERAL: Propped up in bed, alert, relaxed 


HEENT:  Oral cavity clear 


NECK:  Supple, no JVP, no lymphadenopathy.


LUNGS:  Mild congestion on the right, nonlabored 


HEART:  S1, S2 regular.


ABDOMEN:  Soft, nontender


EXTREMITIES:  No edema, cyanosis.


SKIN:  No rash 


NEUROLOGIC: Alert, awake and appropriate.  No focal neurologic deficit.


.





Medications:


Inpatient Meds:





Current Medications








 Medications


  (Trade)  Dose


 Ordered  Sig/Irvin  Start Time


 Stop Time Status Last Admin


Dose Admin


 


 Albuterol Sulfate


  (Ventolin Neb


 Soln)  2.5 mg  PRN Q2HR  PRN  8/6/20 13:45


     





 


 Albuterol/


 Ipratropium


  (Duoneb)  3 ml  Q4HRS  8/6/20 16:00


    8/10/20 07:48





 


 Amlodipine


 Besylate


  (Norvasc)  10 mg  DAILY  8/6/20 09:00


    8/10/20 08:33





 


 Apixaban


  (Eliquis)  5 mg  BID  8/6/20 09:00


    8/10/20 08:32





 


 Azithromycin  250 ml @ 


 250 mls/hr  1X  ONCE  8/5/20 19:15


 8/5/20 20:14 DC 8/5/20 19:24





 


 Ceftriaxone Sodium


  (Rocephin)  2 gm  Q24H  8/6/20 17:00


    8/9/20 16:59





 


 Dextrose


  (Dextrose


 50%-Water Syringe)  12.5 gm  PRN Q15MIN  PRN  8/6/20 13:45


     





 


 Doxycycline


 Hyclate


  (Vibra-Tab)  100 mg  BID  8/9/20 21:00


    8/10/20 08:32





 


 Doxycycline


 Hyclate 100 mg/


 Dextrose  100 ml @ 


 50 mls/hr  Q12HR  8/6/20 09:00


 8/9/20 11:28 DC 8/9/20 08:19





 


 Furosemide


  (Lasix)  40 mg  1X  ONCE  8/7/20 11:00


 8/7/20 11:01 DC 8/7/20 11:20





 


 Info


  (Anti-Coagulation


 Monitoring By


 Pharmacy)  1 each  PRN DAILY  PRN  8/6/20 10:00


    8/6/20 10:11





 


 Insulin Human


 Lispro


  (HumaLOG)  0-9 UNITS  TIDWMEALHC  8/6/20 17:00


     





 


 Lactobacillus


 Rhamnosus


  (Culturelle)  1 cap  BID  8/6/20 21:00


    8/10/20 08:32





 


 Levofloxacin/


 Dextrose  150 ml @ 


 100 mls/hr  1X  ONCE  8/5/20 18:00


 8/5/20 19:07 DC 8/5/20 18:49





 


 Lisinopril


  (Prinivil)  20 mg  DAILY  8/6/20 09:00


    8/10/20 08:33





 


 Metoprolol


 Succinate


  (Toprol Xl)  25 mg  DAILY  8/6/20 09:00


    8/10/20 08:33





 


 Potassium Chloride


  (Klor-Con)  40 meq  1X  ONCE  8/6/20 13:00


 8/6/20 13:01 DC 8/6/20 13:30





 


 Prednisone


  (Prednisone)  60 mg  1X  ONCE  8/5/20 17:00


 8/5/20 17:01 DC 8/5/20 17:09





 


 Simvastatin


  (Zocor)  20 mg  HS  8/6/20 21:00


    8/9/20 21:20





 


 Sodium Chloride  1,000 ml @ 


 1,000 mls/hr  1X  ONCE  8/5/20 17:00


 8/5/20 17:59 DC 8/5/20 17:09














Labs:


Lab





Laboratory Tests








Test


 8/9/20


11:32 8/9/20


16:40 8/9/20


21:48 8/10/20


05:15


 


Glucose (Fingerstick)


 105 mg/dL


(70-99) 109 mg/dL


(70-99) 96 mg/dL


(70-99) 





 


White Blood Count


 


 


 


 5.6 x10^3/uL


(4.0-11.0)


 


Red Blood Count


 


 


 


 4.19 x10^6/uL


(4.30-5.70)


 


Hemoglobin


 


 


 


 12.7 g/dL


(13.0-17.5)


 


Hematocrit


 


 


 


 38.2 %


(39.0-53.0)


 


Mean Corpuscular Volume    91 fL () 


 


Mean Corpuscular Hemoglobin    30 pg (25-35) 


 


Mean Corpuscular Hemoglobin


Concent 


 


 


 33 g/dL


(31-37)


 


Red Cell Distribution Width


 


 


 


 16.1 %


(11.5-14.5)


 


Platelet Count


 


 


 


 132 x10^3/uL


(140-400)


 


Neutrophils (%) (Auto)    69 % (31-73) 


 


Lymphocytes (%) (Auto)    15 % (24-48) 


 


Monocytes (%) (Auto)    12 % (0-9) 


 


Eosinophils (%) (Auto)    3 % (0-3) 


 


Basophils (%) (Auto)    1 % (0-3) 


 


Neutrophils # (Auto)


 


 


 


 3.8 x10^3/uL


(1.8-7.7)


 


Lymphocytes # (Auto)


 


 


 


 0.8 x10^3/uL


(1.0-4.8)


 


Monocytes # (Auto)


 


 


 


 0.7 x10^3/uL


(0.0-1.1)


 


Eosinophils # (Auto)


 


 


 


 0.2 x10^3/uL


(0.0-0.7)


 


Basophils # (Auto)


 


 


 


 0.1 x10^3/uL


(0.0-0.2)


 


Sodium Level


 


 


 


 143 mmol/L


(136-145)


 


Potassium Level


 


 


 


 3.9 mmol/L


(3.5-5.1)


 


Chloride Level


 


 


 


 105 mmol/L


()


 


Carbon Dioxide Level


 


 


 


 37 mmol/L


(21-32)


 


Anion Gap    1 (6-14) 


 


Blood Urea Nitrogen


 


 


 


 15 mg/dL


(8-26)


 


Creatinine


 


 


 


 1.1 mg/dL


(0.7-1.3)


 


Estimated GFR


(Cockcroft-Gault) 


 


 


 81.8 





 


Glucose Level


 


 


 


 93 mg/dL


(70-99)


 


Calcium Level


 


 


 


 9.5 mg/dL


(8.5-10.1)


 


Test


 8/10/20


07:54 


 


 





 


Glucose (Fingerstick)


 120 mg/dL


(70-99) 


 


 














Objective:


Assessment:


Respiratory failure.


Pulmonary infiltrate/pneumonia.


Congestive heart failure.


COVID-19 negative


Drug use.


ABIEL improved





Plan:


Plan of Care


Change doxycycline to po


Rocephin for now


Advised against drug use


Supportive care











NIDA CARTWRIGHT MD           Aug 10, 2020 10:46

## 2020-08-10 NOTE — RAD
CT CHEST WO CONTRAST 

 

INDICATION: pneumonia

 

COMPARISON STUDY: Radiograph 8/7/2020. CT 5/15/2020.

 

TECHNIQUE:  Unenhanced axial images were obtained through the lungs and 

upper abdomen. Coronal and sagittal multiplanar reconstructions were also 

obtained.

 

PQRS compliance statement:

 

One or more of the following individualized dose reduction techniques were

utilized for this examination:

1. Automated exposure control

2. Adjustment of the mA and/or kV according to patient size

3. Use of iterative reconstruction technique

 

FINDINGS:

 

Lungs and Airways: Bibasilar dependent opacities. Previously described 

right upper lobe consolidation has resolved. Paraseptal and centrilobular 

emphysema. Bronchial wall thickening.

 

Pleura: Trace bilateral pleural effusions. Small amount of loculated fluid

in the major fissure measuring 2 cm previously measured 4.6 cm.

 

Heart and Mediastinum: The visualized thyroid gland is normal in size and 

attenuation. No axillary or supraclavicular lymphadenopathy. Few 

conspicuous mediastinal lymph nodes are unchanged. Cardiomegaly. Coronary 

artery atherosclerotic disease. Dilated pulmonary trunk measures 44 mm, 

which can be seen with pulmonary hypertension. Atherosclerosis of the 

thoracic aorta.

 

Abdomen: The visualized abdominal organs demonstrate no abnormality.

 

Bones and Soft Tissues: Degenerative changes of the spine.

 

IMPRESSION:  

1. Bibasilar dependent opacities likely represent subsegmental 

atelectasis, although superimposed infection would be difficult to 

exclude. These appear improved from recent radiograph of 8/7/2020, 

allowing for differences in modality. The right upper lobe consolidation 

seen on exam of 5/15/2020 has resolved.

2. Trace bilateral pleural effusions.

 

Electronically signed by: Dheeraj Eddy MD (8/10/2020 2:27 PM) [id number]

## 2020-08-10 NOTE — NUR
SW following. Discussed with RN, pt from home, cardiac diet, 6L o2 - has oxygen and bipap at 
home. ID note stating change IV abx to oral, Rocephin for now. RN advised no SW needs, 
anticipate discharge in the next day or two.

## 2020-08-10 NOTE — PDOC
PULMONARY PROGRESS NOTES


DATE: 8/10/20 


TIME: 12:14


Subjective


 feeling better, sob better, has occ cough, no sputum, is on home 02 6 lpm


Vitals





Vital Signs








  Date Time  Temp Pulse Resp B/P (MAP) Pulse Ox O2 Delivery O2 Flow Rate FiO2


 


8/10/20 11:16      Nasal Cannula 6.0 


 


8/10/20 11:00 98.1 70 18 131/85 (100) 97   





 98.1       








ROS:  No Nausea, No Chest Pain


General:  Alert, Oriented X4, No acute distress


HEENT:  Other (nc at [facility] )


Lungs:  Other (deminished bs)


Cardiovascular:  S1, S2


Abdomen:  Soft, Non-tender, Other


Neuro Exam:  Alert


Extremities:  Other (trace edema)


Skin:  Warm


Labs





Laboratory Tests








Test


 8/8/20


16:37 8/8/20


20:50 8/9/20


07:41 8/9/20


11:32


 


Glucose (Fingerstick)


 101 mg/dL


(70-99) 104 mg/dL


(70-99) 97 mg/dL


(70-99) 105 mg/dL


(70-99)


 


Test


 8/9/20


16:40 8/9/20


21:48 8/10/20


05:15 8/10/20


07:54


 


Glucose (Fingerstick)


 109 mg/dL


(70-99) 96 mg/dL


(70-99) 


 120 mg/dL


(70-99)


 


White Blood Count


 


 


 5.6 x10^3/uL


(4.0-11.0) 





 


Red Blood Count


 


 


 4.19 x10^6/uL


(4.30-5.70) 





 


Hemoglobin


 


 


 12.7 g/dL


(13.0-17.5) 





 


Hematocrit


 


 


 38.2 %


(39.0-53.0) 





 


Mean Corpuscular Volume   91 fL ()  


 


Mean Corpuscular Hemoglobin   30 pg (25-35)  


 


Mean Corpuscular Hemoglobin


Concent 


 


 33 g/dL


(31-37) 





 


Red Cell Distribution Width


 


 


 16.1 %


(11.5-14.5) 





 


Platelet Count


 


 


 132 x10^3/uL


(140-400) 





 


Neutrophils (%) (Auto)   69 % (31-73)  


 


Lymphocytes (%) (Auto)   15 % (24-48)  


 


Monocytes (%) (Auto)   12 % (0-9)  


 


Eosinophils (%) (Auto)   3 % (0-3)  


 


Basophils (%) (Auto)   1 % (0-3)  


 


Neutrophils # (Auto)


 


 


 3.8 x10^3/uL


(1.8-7.7) 





 


Lymphocytes # (Auto)


 


 


 0.8 x10^3/uL


(1.0-4.8) 





 


Monocytes # (Auto)


 


 


 0.7 x10^3/uL


(0.0-1.1) 





 


Eosinophils # (Auto)


 


 


 0.2 x10^3/uL


(0.0-0.7) 





 


Basophils # (Auto)


 


 


 0.1 x10^3/uL


(0.0-0.2) 





 


Sodium Level


 


 


 143 mmol/L


(136-145) 





 


Potassium Level


 


 


 3.9 mmol/L


(3.5-5.1) 





 


Chloride Level


 


 


 105 mmol/L


() 





 


Carbon Dioxide Level


 


 


 37 mmol/L


(21-32) 





 


Anion Gap   1 (6-14)  


 


Blood Urea Nitrogen


 


 


 15 mg/dL


(8-26) 





 


Creatinine


 


 


 1.1 mg/dL


(0.7-1.3) 





 


Estimated GFR


(Cockcroft-Gault) 


 


 81.8 


 





 


Glucose Level


 


 


 93 mg/dL


(70-99) 





 


Calcium Level


 


 


 9.5 mg/dL


(8.5-10.1) 





 


Test


 8/10/20


11:20 


 


 





 


Glucose (Fingerstick)


 111 mg/dL


(70-99) 


 


 











Laboratory Tests








Test


 8/9/20


16:40 8/9/20


21:48 8/10/20


05:15 8/10/20


07:54


 


Glucose (Fingerstick)


 109 mg/dL


(70-99) 96 mg/dL


(70-99) 


 120 mg/dL


(70-99)


 


White Blood Count


 


 


 5.6 x10^3/uL


(4.0-11.0) 





 


Red Blood Count


 


 


 4.19 x10^6/uL


(4.30-5.70) 





 


Hemoglobin


 


 


 12.7 g/dL


(13.0-17.5) 





 


Hematocrit


 


 


 38.2 %


(39.0-53.0) 





 


Mean Corpuscular Volume   91 fL ()  


 


Mean Corpuscular Hemoglobin   30 pg (25-35)  


 


Mean Corpuscular Hemoglobin


Concent 


 


 33 g/dL


(31-37) 





 


Red Cell Distribution Width


 


 


 16.1 %


(11.5-14.5) 





 


Platelet Count


 


 


 132 x10^3/uL


(140-400) 





 


Neutrophils (%) (Auto)   69 % (31-73)  


 


Lymphocytes (%) (Auto)   15 % (24-48)  


 


Monocytes (%) (Auto)   12 % (0-9)  


 


Eosinophils (%) (Auto)   3 % (0-3)  


 


Basophils (%) (Auto)   1 % (0-3)  


 


Neutrophils # (Auto)


 


 


 3.8 x10^3/uL


(1.8-7.7) 





 


Lymphocytes # (Auto)


 


 


 0.8 x10^3/uL


(1.0-4.8) 





 


Monocytes # (Auto)


 


 


 0.7 x10^3/uL


(0.0-1.1) 





 


Eosinophils # (Auto)


 


 


 0.2 x10^3/uL


(0.0-0.7) 





 


Basophils # (Auto)


 


 


 0.1 x10^3/uL


(0.0-0.2) 





 


Sodium Level


 


 


 143 mmol/L


(136-145) 





 


Potassium Level


 


 


 3.9 mmol/L


(3.5-5.1) 





 


Chloride Level


 


 


 105 mmol/L


() 





 


Carbon Dioxide Level


 


 


 37 mmol/L


(21-32) 





 


Anion Gap   1 (6-14)  


 


Blood Urea Nitrogen


 


 


 15 mg/dL


(8-26) 





 


Creatinine


 


 


 1.1 mg/dL


(0.7-1.3) 





 


Estimated GFR


(Cockcroft-Gault) 


 


 81.8 


 





 


Glucose Level


 


 


 93 mg/dL


(70-99) 





 


Calcium Level


 


 


 9.5 mg/dL


(8.5-10.1) 





 


Test


 8/10/20


11:20 


 


 





 


Glucose (Fingerstick)


 111 mg/dL


(70-99) 


 


 











Medications





Active Scripts








 Medications  Dose


 Route/Sig


 Max Daily Dose Days Date Category Dose


Instructions


 


 Levaquin


  (Levofloxacin)


 750 Mg Tablet  1 Tab


 PO DAILY


  10 5/28/20 Rx 


 


 Culturelle


  (Lactobacillus


 Rhamnosus Gg) 1


 Each Cap.sprink  1 Cap


 PO BID


  10 5/28/20 Rx 


 


 Eliquis


  (Apixaban) 5 Mg


 Tablet  5 Mg


 PO BID


   4/14/20 Reported 


 


 Toprol Xl


  (Metoprolol


 Succinate) 25 Mg


 Tab.er.24h  1 Tab


 PO DAILY


  30 4/14/20 Reported 


 


 Lasix


  (Furosemide) 20


 Mg Tablet  1 Tab


 PO DAILY


  30 1/21/20 Rx 


 


 Lisinopril 40 Mg


 Tablet  20 Mg


 PO DAILY


  30 1/21/20 Rx 


 


 Proair Hfa


  (Albuterol


 Sulfate) 8.5 Gm


 Hfa.aer.ad  2.5 Mg


 NEB PRN Q2HR PRN


  30 1/21/20 Rx 


 


 Duoneb 0.5-3(2.5)


 Mg/3 Ml


  (Albuterol/Ipratropium)


 3 Ml Ampul.neb  3 Ml


 NEB Q4HRS


  30 1/20/20 Rx 


 


 Albuterol Sulfate


 Neb Soln


  (Albuterol


 Sulfate) 0.63


 Mg/3 Ml Vial.neb  0.63 Mg


 NEB PRN Q4HRS PRN


  30 6/26/17 Rx  AS NEEDED


 


 Zocor


  (Simvastatin) 20


 Mg Tablet  20 Mg


 PO HS


   7/10/15 Reported  NEXT DOSE DUE TONIGHT AT BEDTIME


 


 Amlodipine


 Besylate 10 Mg


 Tablet  10 Mg


 PO DAILY


   2/24/14 Reported  NEXT DOSE DUE IN AM


 


 Potassium


 Chloride 10 Meq


 Tab.er.prt  20 Meq


 PO DAILY


   2/24/14 Reported  NEXT DOSE DUE IN AM











Impression


.


1.  Acute on chronic hypoxic and hypercapnic respiratory failure secondary to


acute on chronic systolic and diastolic heart failure in addition acute


exacerbation of chronic obstructive pulmonary disease.


2.  The patient with nonischemic cardiomyopathy.


3.  The patient with ongoing tobacco use and likely severe chronic obstructive


pulmonary disease.


4.  Prior history of pneumonias.


5.  History of marijuana and cocaine abuse.  Continues to do cocaine.


6.  Prior history of Enterobacter bacteremia.





Plan


.





1.  I have discussed with the patient regarding smoking cessation as well as


cocaine cessation.  


2. prn BiPAP.  Avoid hyperoxia.


3.  Diuresis per cardiology


4.  BiPAP p.r.n. during the day 


5.  Follow cardiology recommendations.


6.   chest x-ray 8/7 mild improvement left base/ CHF


7.  DVT prophylaxis, the patient is currently on Eliquis.


8.  Empiric antibiotic were initiated. per id


9.  Discussed with pt, 


     ok with JESSE Freedman MD                 Aug 10, 2020 12:15

## 2020-08-10 NOTE — PDOC
PROGRESS NOTES


Date of Service


DATE: 8/10/20 


TIME: 16:36





Subjective


Subjective


Patient seen and examined





Objective


Objective





Vital Signs








  Date Time  Temp Pulse Resp B/P (MAP) Pulse Ox O2 Delivery O2 Flow Rate FiO2


 


8/10/20 15:48      Nasal Cannula 6.0 


 


8/10/20 15:00 97.9 66 18 132/79 (96) 94   





 97.9       














Intake and Output 


 


 8/10/20





 07:00


 


Intake Total 1380 ml


 


Output Total 2000 ml


 


Balance -620 ml


 


 


 


Intake Oral 1380 ml


 


Output Urine Total 2000 ml











Physical Exam


Abdomen:  Normal bowel sounds


Heart:  Regular rate


General:  No acute distress


Lungs:  Other (Mildly decreased breath sounds)





Assessment


Assessment


Problems


Medical Problems:


(1) Acute respiratory failure with hypoxia


Status: Acute  





(2) Community acquired pneumonia


Status: Acute  





(3) COPD exacerbation


Status: Acute  





1.  Acute on chronic respiratory failure, multifactorial secondary to AECOPD, 

pneumonia CHF, negative for covid.  Patient continues to improve.  Followed by 

pulmonary and ID. 


2.  Acute on chronic systolic/diastolic CHF, better compensated with diuresis.  

Continue current medical regimen.


3.  SSS/ NICM s/p BiV PPM/CRT-P (Biotronik); prior LVEF 40% recent device check 

revealed 25% AFIB burden, normal function, BiV pacing 100%. 


5.  PAFIB; presently V pacing.  Continue Eliquis for stroke prophylaxis.


6.  HTN: controlled


7.  CKD3


8.  Chronic Substance abuse; cocaine and marijuana use.





Comment


Review of Relevant


I have reviewed the following items brittany (where applicable) has been applied.


Labs





Laboratory Tests








Test


 8/8/20


16:37 8/8/20


20:50 8/9/20


07:41 8/9/20


11:32


 


Glucose (Fingerstick)


 101 mg/dL


(70-99) 104 mg/dL


(70-99) 97 mg/dL


(70-99) 105 mg/dL


(70-99)


 


Test


 8/9/20


16:40 8/9/20


21:48 8/10/20


05:15 8/10/20


07:54


 


Glucose (Fingerstick)


 109 mg/dL


(70-99) 96 mg/dL


(70-99) 


 120 mg/dL


(70-99)


 


White Blood Count


 


 


 5.6 x10^3/uL


(4.0-11.0) 





 


Red Blood Count


 


 


 4.19 x10^6/uL


(4.30-5.70) 





 


Hemoglobin


 


 


 12.7 g/dL


(13.0-17.5) 





 


Hematocrit


 


 


 38.2 %


(39.0-53.0) 





 


Mean Corpuscular Volume   91 fL ()  


 


Mean Corpuscular Hemoglobin   30 pg (25-35)  


 


Mean Corpuscular Hemoglobin


Concent 


 


 33 g/dL


(31-37) 





 


Red Cell Distribution Width


 


 


 16.1 %


(11.5-14.5) 





 


Platelet Count


 


 


 132 x10^3/uL


(140-400) 





 


Neutrophils (%) (Auto)   69 % (31-73)  


 


Lymphocytes (%) (Auto)   15 % (24-48)  


 


Monocytes (%) (Auto)   12 % (0-9)  


 


Eosinophils (%) (Auto)   3 % (0-3)  


 


Basophils (%) (Auto)   1 % (0-3)  


 


Neutrophils # (Auto)


 


 


 3.8 x10^3/uL


(1.8-7.7) 





 


Lymphocytes # (Auto)


 


 


 0.8 x10^3/uL


(1.0-4.8) 





 


Monocytes # (Auto)


 


 


 0.7 x10^3/uL


(0.0-1.1) 





 


Eosinophils # (Auto)


 


 


 0.2 x10^3/uL


(0.0-0.7) 





 


Basophils # (Auto)


 


 


 0.1 x10^3/uL


(0.0-0.2) 





 


Sodium Level


 


 


 143 mmol/L


(136-145) 





 


Potassium Level


 


 


 3.9 mmol/L


(3.5-5.1) 





 


Chloride Level


 


 


 105 mmol/L


() 





 


Carbon Dioxide Level


 


 


 37 mmol/L


(21-32) 





 


Anion Gap   1 (6-14)  


 


Blood Urea Nitrogen


 


 


 15 mg/dL


(8-26) 





 


Creatinine


 


 


 1.1 mg/dL


(0.7-1.3) 





 


Estimated GFR


(Cockcroft-Gault) 


 


 81.8 


 





 


Glucose Level


 


 


 93 mg/dL


(70-99) 





 


Calcium Level


 


 


 9.5 mg/dL


(8.5-10.1) 





 


Test


 8/10/20


11:20 8/10/20


16:34 


 





 


Glucose (Fingerstick)


 111 mg/dL


(70-99) 106 mg/dL


(70-99) 


 











Laboratory Tests








Test


 8/9/20


16:40 8/9/20


21:48 8/10/20


05:15 8/10/20


07:54


 


Glucose (Fingerstick)


 109 mg/dL


(70-99) 96 mg/dL


(70-99) 


 120 mg/dL


(70-99)


 


White Blood Count


 


 


 5.6 x10^3/uL


(4.0-11.0) 





 


Red Blood Count


 


 


 4.19 x10^6/uL


(4.30-5.70) 





 


Hemoglobin


 


 


 12.7 g/dL


(13.0-17.5) 





 


Hematocrit


 


 


 38.2 %


(39.0-53.0) 





 


Mean Corpuscular Volume   91 fL ()  


 


Mean Corpuscular Hemoglobin   30 pg (25-35)  


 


Mean Corpuscular Hemoglobin


Concent 


 


 33 g/dL


(31-37) 





 


Red Cell Distribution Width


 


 


 16.1 %


(11.5-14.5) 





 


Platelet Count


 


 


 132 x10^3/uL


(140-400) 





 


Neutrophils (%) (Auto)   69 % (31-73)  


 


Lymphocytes (%) (Auto)   15 % (24-48)  


 


Monocytes (%) (Auto)   12 % (0-9)  


 


Eosinophils (%) (Auto)   3 % (0-3)  


 


Basophils (%) (Auto)   1 % (0-3)  


 


Neutrophils # (Auto)


 


 


 3.8 x10^3/uL


(1.8-7.7) 





 


Lymphocytes # (Auto)


 


 


 0.8 x10^3/uL


(1.0-4.8) 





 


Monocytes # (Auto)


 


 


 0.7 x10^3/uL


(0.0-1.1) 





 


Eosinophils # (Auto)


 


 


 0.2 x10^3/uL


(0.0-0.7) 





 


Basophils # (Auto)


 


 


 0.1 x10^3/uL


(0.0-0.2) 





 


Sodium Level


 


 


 143 mmol/L


(136-145) 





 


Potassium Level


 


 


 3.9 mmol/L


(3.5-5.1) 





 


Chloride Level


 


 


 105 mmol/L


() 





 


Carbon Dioxide Level


 


 


 37 mmol/L


(21-32) 





 


Anion Gap   1 (6-14)  


 


Blood Urea Nitrogen


 


 


 15 mg/dL


(8-26) 





 


Creatinine


 


 


 1.1 mg/dL


(0.7-1.3) 





 


Estimated GFR


(Cockcroft-Gault) 


 


 81.8 


 





 


Glucose Level


 


 


 93 mg/dL


(70-99) 





 


Calcium Level


 


 


 9.5 mg/dL


(8.5-10.1) 





 


Test


 8/10/20


11:20 8/10/20


16:34 


 





 


Glucose (Fingerstick)


 111 mg/dL


(70-99) 106 mg/dL


(70-99) 


 











Medications





Current Medications


Prednisone (Prednisone) 60 mg 1X  ONCE PO  Last administered on 8/5/20at 17:09; 

Start 8/5/20 at 17:00;  Stop 8/5/20 at 17:01;  Status DC


Albuterol/ Ipratropium (Duoneb) 9 ml 1X  ONCE NEB  Last administered on 8/5/20at

17:28;  Start 8/5/20 at 17:00;  Stop 8/5/20 at 17:01;  Status DC


Sodium Chloride 1,000 ml @  1,000 mls/hr 1X  ONCE IV  Last administered on 

8/5/20at 17:09;  Start 8/5/20 at 17:00;  Stop 8/5/20 at 17:59;  Status DC


Levofloxacin/ Dextrose 150 ml @  100 mls/hr 1X  ONCE IV  Last administered on 

8/5/20at 18:49;  Start 8/5/20 at 18:00;  Stop 8/5/20 at 19:07;  Status DC


Ceftriaxone Sodium (Rocephin) 2 gm 1X  ONCE IVP  Last administered on 8/5/20at 

19:24;  Start 8/5/20 at 19:15;  Stop 8/5/20 at 19:16;  Status DC


Azithromycin 250 ml @  250 mls/hr 1X  ONCE IV  Last administered on 8/5/20at 

19:24;  Start 8/5/20 at 19:15;  Stop 8/5/20 at 20:14;  Status DC


Amlodipine Besylate (Norvasc) 10 mg DAILY PO  Last administered on 8/10/20at 

08:33;  Start 8/6/20 at 09:00


Apixaban (Eliquis) 5 mg BID PO  Last administered on 8/10/20at 08:32;  Start 

8/6/20 at 09:00


Furosemide (Lasix) 20 mg DAILY PO  Last administered on 8/10/20at 08:32;  Start 

8/6/20 at 09:00


Lisinopril (Prinivil) 20 mg DAILY PO  Last administered on 8/10/20at 08:33;  

Start 8/6/20 at 09:00


Metoprolol Succinate (Toprol Xl) 25 mg DAILY PO  Last administered on 8/10/20at 

08:33;  Start 8/6/20 at 09:00


Potassium Chloride (Klor-Con) 20 meq DAILYWBKFT PO  Last administered on 

8/10/20at 08:34;  Start 8/6/20 at 08:00


Simvastatin (Zocor) 20 mg HS PO  Last administered on 8/9/20at 21:20;  Start 

8/6/20 at 21:00


Furosemide (Lasix) 40 mg 1X  ONCE IVP  Last administered on 8/6/20at 08:54;  

Start 8/6/20 at 09:00;  Stop 8/6/20 at 09:01;  Status DC


Ceftriaxone Sodium (Rocephin) 2 gm Q24H IVP  Last administered on 8/9/20at 

16:59;  Start 8/6/20 at 17:00


Doxycycline Hyclate 100 mg/ Dextrose 100 ml @  50 mls/hr Q12HR IV  Last ad

ministered on 8/9/20at 08:19;  Start 8/6/20 at 09:00;  Stop 8/9/20 at 11:28;  

Status DC


Furosemide (Lasix) 40 mg 1X  ONCE IVP  Last administered on 8/6/20at 13:30;  

Start 8/6/20 at 10:30;  Stop 8/6/20 at 10:31;  Status DC


Info (Anti-Coagulation Monitoring By Pharmacy) 1 each PRN DAILY  PRN MC SEE 

COMMENTS Last administered on 8/10/20at 10:47;  Start 8/6/20 at 10:00


Lactobacillus Rhamnosus (Culturelle) 1 cap BID PO  Last administered on 

8/10/20at 08:32;  Start 8/6/20 at 21:00


Potassium Chloride (Klor-Con) 40 meq 1X  ONCE PO  Last administered on 8/6/20at 

13:30;  Start 8/6/20 at 13:00;  Stop 8/6/20 at 13:01;  Status DC


Insulin Human Lispro (HumaLOG) 0-9 UNITS [id number] SQ ;  Start 8/6/20 at 17:00


Dextrose (Dextrose 50%-Water Syringe) 12.5 gm PRN Q15MIN  PRN IV SEE COMMENTS;  

Start 8/6/20 at 13:45


Albuterol Sulfate (Ventolin Neb Soln) 2.5 mg PRN Q2HR  PRN NEB SHORTNESS OF 

BREATH;  Start 8/6/20 at 13:45


Albuterol/ Ipratropium (Duoneb) 3 ml Q4HRS NEB  Last administered on 8/10/20at 

15:48;  Start 8/6/20 at 16:00


Furosemide (Lasix) 40 mg 1X  ONCE IVP  Last administered on 8/7/20at 11:20;  

Start 8/7/20 at 11:00;  Stop 8/7/20 at 11:01;  Status DC


Doxycycline Hyclate (Vibra-Tab) 100 mg BID PO  Last administered on 8/10/20at 

08:32;  Start 8/9/20 at 21:00





Active Scripts


Active


Levaquin (Levofloxacin) 750 Mg Tablet 1 Tab PO DAILY 10 Days


Culturelle (Lactobacillus Rhamnosus Gg) 1 Each Cap.sprink 1 Cap PO BID 10 Days


Lasix (Furosemide) 20 Mg Tablet 1 Tab PO DAILY 30 Days


Lisinopril 40 Mg Tablet 20 Mg PO DAILY 30 Days


Proair Hfa (Albuterol Sulfate) 8.5 Gm Hfa.aer.ad 2.5 Mg NEB PRN Q2HR PRN 30 Days


Duoneb 0.5-3(2.5) Mg/3 Ml (Albuterol/Ipratropium) 3 Ml Ampul.neb 3 Ml NEB Q4HRS 

30 Days


Albuterol Sulfate Neb Soln (Albuterol Sulfate) 0.63 Mg/3 Ml Vial.neb 0.63 Mg NEB

PRN Q4HRS PRN 30 Days


     AS NEEDED


Reported


Eliquis (Apixaban) 5 Mg Tablet 5 Mg PO BID


Toprol Xl (Metoprolol Succinate) 25 Mg Tab.er.24h 1 Tab PO DAILY 30 Days


Zocor (Simvastatin) 20 Mg Tablet 20 Mg PO HS


     NEXT DOSE DUE TONIGHT AT BEDTIME


Amlodipine Besylate 10 Mg Tablet 10 Mg PO DAILY


     NEXT DOSE DUE IN AM


Potassium Chloride 10 Meq Tab.er.prt 20 Meq PO DAILY


     NEXT DOSE DUE IN AM


Vitals/I & O





Vital Sign - Last 24 Hours








 8/9/20 8/9/20 8/9/20 8/9/20





 19:00 19:20 19:20 20:06


 


Temp 98.5   





 98.5   


 


Pulse 76   


 


Resp 18   


 


B/P (MAP) 130/77 (94)   


 


Pulse Ox 90   97


 


O2 Delivery   Nasal Cannula Nasal Cannula


 


O2 Flow Rate  5.0 5.0 5.0


 


    





    





 8/9/20 8/10/20 8/10/20 8/10/20





 23:00 01:14 01:15 03:00


 


Temp 98.0   98.0





 98.0   98.0


 


Pulse 71   71


 


Resp 18   18


 


B/P (MAP) 124/80 (95)   124/80 (95)


 


Pulse Ox 92   92


 


O2 Delivery  Nasal Cannula BiPAP/CPAP 


 


O2 Flow Rate  5.0  


 


    





    





 8/10/20 8/10/20 8/10/20 8/10/20





 07:00 07:48 08:00 08:33


 


Temp 98.1   





 98.1   


 


Pulse 70   70


 


Resp 18   


 


B/P (MAP) 145/95 (112)   145/95


 


Pulse Ox 100 96  


 


O2 Delivery  Nasal Cannula Nasal Cannula 


 


O2 Flow Rate  6.0 6.0 


 


    





    





 8/10/20 8/10/20 8/10/20 8/10/20





 08:33 08:33 11:00 11:16


 


Temp   98.1 





   98.1 


 


Pulse 70 70 70 


 


Resp   18 


 


B/P (MAP) 145/95 145/95 131/85 (100) 


 


Pulse Ox   97 


 


O2 Delivery   Nasal Cannula Nasal Cannula


 


O2 Flow Rate    6.0


 


    





    





 8/10/20 8/10/20  





 15:00 15:48  


 


Temp 97.9   





 97.9   


 


Pulse 66   


 


Resp 18   


 


B/P (MAP) 132/79 (96)   


 


Pulse Ox 94   


 


O2 Delivery Nasal Cannula Nasal Cannula  


 


O2 Flow Rate  6.0  














Intake and Output   


 


 8/9/20 8/9/20 8/10/20





 15:00 23:00 07:00


 


Intake Total 50 ml 240 ml 1090 ml


 


Output Total 900 ml 600 ml 500 ml


 


Balance -850 ml -360 ml 590 ml











Justicifation of Admission Dx:


Justifications for Admission:


Justification of Admission Dx:  N/A


Respiratory Failure:  Severe Resp Distress











LING KRISHNAMURTHY MD            Aug 10, 2020 16:38

## 2020-08-11 VITALS — DIASTOLIC BLOOD PRESSURE: 90 MMHG | SYSTOLIC BLOOD PRESSURE: 138 MMHG

## 2020-08-11 VITALS
SYSTOLIC BLOOD PRESSURE: 117 MMHG | DIASTOLIC BLOOD PRESSURE: 89 MMHG | SYSTOLIC BLOOD PRESSURE: 117 MMHG | DIASTOLIC BLOOD PRESSURE: 89 MMHG

## 2020-08-11 VITALS — SYSTOLIC BLOOD PRESSURE: 132 MMHG | DIASTOLIC BLOOD PRESSURE: 94 MMHG

## 2020-08-11 VITALS — SYSTOLIC BLOOD PRESSURE: 131 MMHG | DIASTOLIC BLOOD PRESSURE: 78 MMHG

## 2020-08-11 LAB
BASE EXCESS STD BLDA CALC-SCNC: 6 MMOL/L (ref -3–3)
HCO3 BLDA-SCNC: 33 MMOL/L (ref 21–28)
METHGB MFR BLD: 0.5 % (ref 0–1.9)
OXYHGB MFR BLD: 89.6 %
PCO2 BLDA: 56 MMHG (ref 35–46)
PO2 BLDA: 60 MMHG (ref 65–108)
SAO2 % BLDA: 90 % (ref 92–99)

## 2020-08-11 RX ADMIN — METOPROLOL SUCCINATE SCH MG: 25 TABLET, EXTENDED RELEASE ORAL at 09:03

## 2020-08-11 RX ADMIN — INSULIN LISPRO SCH UNITS: 100 INJECTION, SOLUTION INTRAVENOUS; SUBCUTANEOUS at 17:00

## 2020-08-11 RX ADMIN — POTASSIUM CHLORIDE SCH MEQ: 750 TABLET, FILM COATED, EXTENDED RELEASE ORAL at 09:05

## 2020-08-11 RX ADMIN — LISINOPRIL SCH MG: 20 TABLET ORAL at 09:03

## 2020-08-11 RX ADMIN — Medication SCH CAP: at 09:04

## 2020-08-11 RX ADMIN — IPRATROPIUM BROMIDE AND ALBUTEROL SULFATE SCH ML: .5; 3 SOLUTION RESPIRATORY (INHALATION) at 03:55

## 2020-08-11 RX ADMIN — Medication PRN EACH: at 12:59

## 2020-08-11 RX ADMIN — APIXABAN SCH MG: 5 TABLET, FILM COATED ORAL at 09:04

## 2020-08-11 RX ADMIN — IPRATROPIUM BROMIDE AND ALBUTEROL SULFATE SCH ML: .5; 3 SOLUTION RESPIRATORY (INHALATION) at 00:51

## 2020-08-11 RX ADMIN — INSULIN LISPRO SCH UNITS: 100 INJECTION, SOLUTION INTRAVENOUS; SUBCUTANEOUS at 08:00

## 2020-08-11 RX ADMIN — IPRATROPIUM BROMIDE AND ALBUTEROL SULFATE SCH ML: .5; 3 SOLUTION RESPIRATORY (INHALATION) at 12:28

## 2020-08-11 RX ADMIN — FUROSEMIDE SCH MG: 20 TABLET ORAL at 09:04

## 2020-08-11 RX ADMIN — DOXYCYCLINE HYCLATE SCH MG: 100 TABLET, COATED ORAL at 09:04

## 2020-08-11 RX ADMIN — IPRATROPIUM BROMIDE AND ALBUTEROL SULFATE SCH ML: .5; 3 SOLUTION RESPIRATORY (INHALATION) at 07:31

## 2020-08-11 RX ADMIN — IPRATROPIUM BROMIDE AND ALBUTEROL SULFATE SCH ML: .5; 3 SOLUTION RESPIRATORY (INHALATION) at 16:16

## 2020-08-11 RX ADMIN — INSULIN LISPRO SCH UNITS: 100 INJECTION, SOLUTION INTRAVENOUS; SUBCUTANEOUS at 12:00

## 2020-08-11 NOTE — PDOC
PULMONARY PROGRESS NOTES


DATE: 8/11/20 


TIME: 11:11


Subjective


 feeling better, sob better, has occ cough, no sputum, is on home 02 6 lpm


Vitals





Vital Signs








  Date Time  Temp Pulse Resp B/P (MAP) Pulse Ox O2 Delivery O2 Flow Rate FiO2


 


8/11/20 10:56 98.7 71 16 131/78 (95) 96 Nasal Cannula 6.0 





 98.7       








ROS:  No Nausea, No Chest Pain


General:  Alert, Oriented X4, No acute distress


HEENT:  Other (nc at perrl )


Lungs:  Other (deminished bs)


Cardiovascular:  S1, S2


Abdomen:  Soft, Non-tender, Other


Neuro Exam:  Alert


Extremities:  Other (trace edema)


Skin:  Warm


Labs





Laboratory Tests








Test


 8/9/20


11:32 8/9/20


16:40 8/9/20


21:48 8/10/20


05:15


 


Glucose (Fingerstick)


 105 mg/dL


(70-99) 109 mg/dL


(70-99) 96 mg/dL


(70-99) 





 


White Blood Count


 


 


 


 5.6 x10^3/uL


(4.0-11.0)


 


Red Blood Count


 


 


 


 4.19 x10^6/uL


(4.30-5.70)


 


Hemoglobin


 


 


 


 12.7 g/dL


(13.0-17.5)


 


Hematocrit


 


 


 


 38.2 %


(39.0-53.0)


 


Mean Corpuscular Volume    91 fL () 


 


Mean Corpuscular Hemoglobin    30 pg (25-35) 


 


Mean Corpuscular Hemoglobin


Concent 


 


 


 33 g/dL


(31-37)


 


Red Cell Distribution Width


 


 


 


 16.1 %


(11.5-14.5)


 


Platelet Count


 


 


 


 132 x10^3/uL


(140-400)


 


Neutrophils (%) (Auto)    69 % (31-73) 


 


Lymphocytes (%) (Auto)    15 % (24-48) 


 


Monocytes (%) (Auto)    12 % (0-9) 


 


Eosinophils (%) (Auto)    3 % (0-3) 


 


Basophils (%) (Auto)    1 % (0-3) 


 


Neutrophils # (Auto)


 


 


 


 3.8 x10^3/uL


(1.8-7.7)


 


Lymphocytes # (Auto)


 


 


 


 0.8 x10^3/uL


(1.0-4.8)


 


Monocytes # (Auto)


 


 


 


 0.7 x10^3/uL


(0.0-1.1)


 


Eosinophils # (Auto)


 


 


 


 0.2 x10^3/uL


(0.0-0.7)


 


Basophils # (Auto)


 


 


 


 0.1 x10^3/uL


(0.0-0.2)


 


Sodium Level


 


 


 


 143 mmol/L


(136-145)


 


Potassium Level


 


 


 


 3.9 mmol/L


(3.5-5.1)


 


Chloride Level


 


 


 


 105 mmol/L


()


 


Carbon Dioxide Level


 


 


 


 37 mmol/L


(21-32)


 


Anion Gap    1 (6-14) 


 


Blood Urea Nitrogen


 


 


 


 15 mg/dL


(8-26)


 


Creatinine


 


 


 


 1.1 mg/dL


(0.7-1.3)


 


Estimated GFR


(Cockcroft-Gault) 


 


 


 81.8 





 


Glucose Level


 


 


 


 93 mg/dL


(70-99)


 


Calcium Level


 


 


 


 9.5 mg/dL


(8.5-10.1)


 


Test


 8/10/20


07:54 8/10/20


11:20 8/10/20


16:34 8/10/20


21:12


 


Glucose (Fingerstick)


 120 mg/dL


(70-99) 111 mg/dL


(70-99) 106 mg/dL


(70-99) 109 mg/dL


(70-99)


 


Test


 8/11/20


07:44 


 


 





 


Glucose (Fingerstick)


 88 mg/dL


(70-99) 


 


 











Laboratory Tests








Test


 8/10/20


11:20 8/10/20


16:34 8/10/20


21:12 8/11/20


07:44


 


Glucose (Fingerstick)


 111 mg/dL


(70-99) 106 mg/dL


(70-99) 109 mg/dL


(70-99) 88 mg/dL


(70-99)








Medications





Active Scripts








 Medications  Dose


 Route/Sig


 Max Daily Dose Days Date Category Dose


Instructions


 


 Levaquin


  (Levofloxacin)


 750 Mg Tablet  1 Tab


 PO DAILY


  10 5/28/20 Rx 


 


 Culturelle


  (Lactobacillus


 Rhamnosus Gg) 1


 Each Cap.sprink  1 Cap


 PO BID


  10 5/28/20 Rx 


 


 Eliquis


  (Apixaban) 5 Mg


 Tablet  5 Mg


 PO BID


   4/14/20 Reported 


 


 Toprol Xl


  (Metoprolol


 Succinate) 25 Mg


 Tab.er.24h  1 Tab


 PO DAILY


  30 4/14/20 Reported 


 


 Lasix


  (Furosemide) 20


 Mg Tablet  1 Tab


 PO DAILY


  30 1/21/20 Rx 


 


 Lisinopril 40 Mg


 Tablet  20 Mg


 PO DAILY


  30 1/21/20 Rx 


 


 Proair Hfa


  (Albuterol


 Sulfate) 8.5 Gm


 Hfa.aer.ad  2.5 Mg


 NEB PRN Q2HR PRN


  30 1/21/20 Rx 


 


 Duoneb 0.5-3(2.5)


 Mg/3 Ml


  (Albuterol/Ipratropium)


 3 Ml Ampul.neb  3 Ml


 NEB Q4HRS


  30 1/20/20 Rx 


 


 Albuterol Sulfate


 Neb Soln


  (Albuterol


 Sulfate) 0.63


 Mg/3 Ml Vial.neb  0.63 Mg


 NEB PRN Q4HRS PRN


  30 6/26/17 Rx  AS NEEDED


 


 Zocor


  (Simvastatin) 20


 Mg Tablet  20 Mg


 PO HS


   7/10/15 Reported  NEXT DOSE DUE TONIGHT AT BEDTIME


 


 Amlodipine


 Besylate 10 Mg


 Tablet  10 Mg


 PO DAILY


   2/24/14 Reported  NEXT DOSE DUE IN AM


 


 Potassium


 Chloride 10 Meq


 Tab.er.prt  20 Meq


 PO DAILY


   2/24/14 Reported  NEXT DOSE DUE IN AM











Impression


.


1.  Acute on chronic hypoxic and hypercapnic respiratory failure secondary to


acute on chronic systolic and diastolic heart failure in addition acute


exacerbation of chronic obstructive pulmonary disease.


2.  The patient with nonischemic cardiomyopathy.


3.  The patient with ongoing tobacco use and likely severe chronic obstructive


pulmonary disease.


4.  Prior history of pneumonias.


5.  History of marijuana and cocaine abuse.  Continues to do cocaine.


6.  Prior history of Enterobacter bacteremia.





Plan


.





1.  I have discussed with the patient regarding smoking cessation as well as


cocaine cessation.  


2. prn BiPAP.  Avoid hyperoxia.


3.  Diuresis per cardiology


4.  BiPAP p.r.n. during the day 


5.  Follow cardiology recommendations.


6.   chest x-ray 8/7 mild improvement left base/ CHF


7.  DVT prophylaxis, the patient is currently on Eliquis.


8.  Empiric antibiotic were initiated. per id


9.  Discussed with pt, 


     ok with JESSE Freedman MD                 Aug 11, 2020 11:11

## 2020-08-11 NOTE — PDOC
Infectious Disease Note


Subjective:


Subjective


Comfortable, 


denies any complaints


Denies worsening SOA/F/C/S /N/V/D


on 6L O2





Vital Signs:


Vital Signs





Vital Signs








  Date Time  Temp Pulse Resp B/P (MAP) Pulse Ox O2 Delivery O2 Flow Rate FiO2


 


8/11/20 07:33     94 Nasal Cannula 6.0 


 


8/11/20 03:00 98.4 71 20 138/90 (106)    





 98.4       











Physical Exam:


PHYSICAL EXAM


GENERAL: Propped up in bed, alert, relaxed 


HEENT:  Oral cavity clear 


NECK:  Supple, no JVP, no lymphadenopathy.


LUNGS:  Mild congestion on the right, nonlabored 


HEART:  S1, S2 regular.


ABDOMEN:  Soft, nontender


EXTREMITIES:  No edema, cyanosis.


SKIN:  No rash 


NEUROLOGIC: Alert, awake and appropriate.  No focal neurologic deficit.


.





Medications:


Inpatient Meds:





Current Medications








 Medications


  (Trade)  Dose


 Ordered  Sig/Irvin  Start Time


 Stop Time Status Last Admin


Dose Admin


 


 Albuterol Sulfate


  (Ventolin Neb


 Soln)  2.5 mg  PRN Q2HR  PRN  8/6/20 13:45


     





 


 Albuterol/


 Ipratropium


  (Duoneb)  3 ml  Q4HRS  8/6/20 16:00


    8/11/20 07:31


3 ML


 


 Amlodipine


 Besylate


  (Norvasc)  10 mg  DAILY  8/6/20 09:00


    8/10/20 08:33


10 MG


 


 Apixaban


  (Eliquis)  5 mg  BID  8/6/20 09:00


    8/10/20 21:20


5 MG


 


 Azithromycin  250 ml @ 


 250 mls/hr  1X  ONCE  8/5/20 19:15


 8/5/20 20:14 DC 8/5/20 19:24


250 MLS/HR


 


 Ceftriaxone Sodium


  (Rocephin)  2 gm  Q24H  8/6/20 17:00


    8/10/20 18:07


2 GM


 


 Dextrose


  (Dextrose


 50%-Water Syringe)  12.5 gm  PRN Q15MIN  PRN  8/6/20 13:45


     





 


 Doxycycline


 Hyclate


  (Vibra-Tab)  100 mg  BID  8/9/20 21:00


    8/10/20 21:20


100 MG


 


 Doxycycline


 Hyclate 100 mg/


 Dextrose  100 ml @ 


 50 mls/hr  Q12HR  8/6/20 09:00


 8/9/20 11:28 DC 8/9/20 08:19


50 MLS/HR


 


 Furosemide


  (Lasix)  40 mg  1X  ONCE  8/7/20 11:00


 8/7/20 11:01 DC 8/7/20 11:20


40 MG


 


 Info


  (Anti-Coagulation


 Monitoring By


 Pharmacy)  1 each  PRN DAILY  PRN  8/6/20 10:00


    8/10/20 10:47


1 EACH


 


 Insulin Human


 Lispro


  (HumaLOG)  0-9 UNITS  TIDWMEALHC  8/6/20 17:00


     





 


 Lactobacillus


 Rhamnosus


  (Culturelle)  1 cap  BID  8/6/20 21:00


    8/10/20 21:19


1 CAP


 


 Levofloxacin/


 Dextrose  150 ml @ 


 100 mls/hr  1X  ONCE  8/5/20 18:00


 8/5/20 19:07 DC 8/5/20 18:49


100 MLS/HR


 


 Lisinopril


  (Prinivil)  20 mg  DAILY  8/6/20 09:00


    8/10/20 08:33


20 MG


 


 Metoprolol


 Succinate


  (Toprol Xl)  25 mg  DAILY  8/6/20 09:00


    8/10/20 08:33


25 MG


 


 Potassium Chloride


  (Klor-Con)  40 meq  1X  ONCE  8/6/20 13:00


 8/6/20 13:01 DC 8/6/20 13:30


40 MEQ


 


 Prednisone


  (Prednisone)  60 mg  1X  ONCE  8/5/20 17:00


 8/5/20 17:01 DC 8/5/20 17:09


60 MG


 


 Simvastatin


  (Zocor)  20 mg  HS  8/6/20 21:00


    8/10/20 21:19


20 MG


 


 Sodium Chloride  1,000 ml @ 


 1,000 mls/hr  1X  ONCE  8/5/20 17:00


 8/5/20 17:59 DC 8/5/20 17:09


1,000 MLS/HR











Labs:


Lab





Laboratory Tests








Test


 8/10/20


11:20 8/10/20


16:34 8/10/20


21:12 8/11/20


07:44


 


Glucose (Fingerstick)


 111 mg/dL


(70-99) 106 mg/dL


(70-99) 109 mg/dL


(70-99) 88 mg/dL


(70-99)











Objective:


Assessment:


Respiratory failure.


Pulmonary infiltrate/pneumonia.


Congestive heart failure.


COVID-19 negative


Drug use.


ABIEL improved





Plan:


Plan of Care


DC Ceftriaxone


cont doxycycline


Supportive care











NIDA CARTWRIGHT MD           Aug 11, 2020 08:06

## 2020-08-11 NOTE — DISCH
DISCHARGE INSTRUCTIONS


Condition on Discharge


Condition on Discharge:  Guarded





Activity After Discharge


Activity Instructions for Disc:  Activity as tolerated


Lifting Instructions after Dis:  No pulling or pushing


Exercise Instruction after Dis:  Progress as tolerated


Driving Instructions after Dis:  Do not drive, Do not drive today


Weight Bearing Status after Di:  As tolerated





Diet after Discharge


Diet after Discharge:  Cardiac





Wound Incision Care


Wound/Incision Care:  No wound care needed





Checks after Discharge


Checks after discharge:  Check blood press - daily, Weigh Yourself Daily





Contacting the DR. after DC


Call your doctor for:  Concerns you may have





Follow-Up


Follow up with:  PCP IN 3-8 DAYS





Treatment/Equipment after DC


Adaptive Equipment Issued:  None


Discharge Respiratory Equipmen:  Oxygen, Nebulizer, SOFIA HARDIN MD          Aug 11, 2020 14:09

## 2020-08-11 NOTE — PDOC3
Discharge Summary


Date of Admission:  Aug 5, 2020


Date of Discharge:  Aug 11, 2020


Follow-Up:  3-5 days


Admitting Diagnosis comment:


discharge dx  ===================











Acute on chronic respiratory failure, multifactorial secondary to PNA, AECOPD, 

mild acute on chronic systolic CHF


Gram-negative possibly gram-positive pneumonia 


Possible aspiration pneumonia


Mild Acute on Chronic systolic/diastolic CHF


Chronic mild troponin elevation: within his range, no acute changes per EKG. 

demand mediated with continued use of cocaine


SSS/ NICM s/p BiV PPM/CRT-P (Biotronik); prior LVEF 40% recent device check 

revealed 25% AFIB burden, normal function, BiV pacing 100%. 


PAFIB; presently V pacing.  Continue Eliquis for stroke prophylaxis.


SSS


CHF - 40% ejection 


H/o A. fib


HTN


CKD3


Chronic Substance abuse; cocaine and marijuana use. Last use 3 days ago


Tobaccoism 


[facility]I -SARS-COVID negative. 5/24 and again 8/7  negative


pulmonary arterial hypertension 








po doxy 





ct chest 8/10





8/11 d/w dr marcano in [place], ok to d/c, poor prognosis due to cocaine dependency, 

noncompliance 











39 min pt exam, chart review d/c planning , > 50% of time spent with exam, chart

review, pt care coordination





History of Present Illness


History of Present Illness


8/11 /2020





Patient seen and examined


Patient is resting comfortably in NAD


Discussed with RN


Charts reviewed


ct chest  The right upper lobe consolidation  seen on exam of 5/15/2020 has 

resolved.








Mr Colon is a 63 yo M w/ PMHx CHB s/p BiV pacemaker, HTN, CKD2, tobacco abuse, 

COPD on 4-5L home O2, CHF EF 40-45, active cocaine user, ETOH, and marijuana 

abuse who p/w shortness of breath that was fairly sudden onset 8/5/2020. after 

smoking multiple substances. He had some back pain. No anterior chest pain. He 

was trying to use his home nebulizer, states it wasn't helping. He does not know

if he has had weight gain, does not have a scale at home. Has LE edema that is 

much worse as well as orthopnea and PND, not sleeping well. He has a mild cough,

no hemoptysis, no weight loss.  No headaches, no nausea, vomiting or diarrhea. 

He was afebrile, has had no recent travel or sick contacts.


Seen with severe dyspnea and ABG 7.25/67/78. BNP 4839, Trop 0.04, WBC 6.6, Hb 

12.9, Platelets 129, Cr 1.3, K 3.9


He continues to do cocaine and smoke and has known COPD


Chest x-ray showing CHF and possible pneumonia, admitted to ICU on BIPAP for 

respiratory failure.





Seen on BIPAP in ICU, appears comfortable on it. Afebrile. Still hypoxic. COVID 

19 negative





Vitals


Vitals





Vital Signs








  Date Time  Temp Pulse Resp B/P (MAP) Pulse Ox O2 Delivery O2 Flow Rate FiO2


 


8/11/20 09:05  66  132/94    


 


8/11/20 07:33     94 Nasal Cannula 6.0 


 


8/11/20 07:00 98.1  18     





 98.1       











Physical Exam


Physical Exam


GENERAL: Propped up in bed, alert, relaxed 


HEENT:  Oral cavity clear 


NECK:  Supple, no JVP, no lymphadenopathy.


LUNGS:  Mild congestion on the right, nonlabored 


HEART:  S1, S2 regular.


ABDOMEN:  Soft, nontender


EXTREMITIES:  No edema, cyanosis.


SKIN:  No rash 


NEUROLOGIC: Alert, awake and appropriate.  No focal neurologic deficit.


.


General:  Alert, Oriented X3, Cooperative, No acute distress


Heart:  Regular rate


Lungs:  Clear, Other (deminished bs)


Abdomen:  Normal bowel sounds


Extremities:  No edema


Skin:  No breakdown


FINAL DIAGNOSIS


Problems


Medical Problems:


(1) Acute respiratory failure with hypoxia


Status: Acute  





(2) Community acquired pneumonia


Status: Acute  





(3) COPD exacerbation


Status: Acute  








Brief Hospital Course


Mr. Colon  is a 65 old [sex] who presented with [  acute hypoxic, hypercapnic 

resp failure]


CONDITION AT DISCHARGE:  Improved


Discharge Medications





Current Medications


Prednisone (Prednisone) 60 mg 1X  ONCE PO  Last administered on 8/5/20at 17:09; 

Start 8/5/20 at 17:00;  Stop 8/5/20 at 17:01;  Status DC


Albuterol/ Ipratropium (Duoneb) 9 ml 1X  ONCE NEB  Last administered on 8/5/20at

17:28;  Start 8/5/20 at 17:00;  Stop 8/5/20 at 17:01;  Status DC


Sodium Chloride 1,000 ml @  1,000 mls/hr 1X  ONCE IV  Last administered on 

8/5/20at 17:09;  Start 8/5/20 at 17:00;  Stop 8/5/20 at 17:59;  Status DC


Levofloxacin/ Dextrose 150 ml @  100 mls/hr 1X  ONCE IV  Last administered on 

8/5/20at 18:49;  Start 8/5/20 at 18:00;  Stop 8/5/20 at 19:07;  Status DC


Ceftriaxone Sodium (Rocephin) 2 gm 1X  ONCE IVP  Last administered on 8/5/20at 

19:24;  Start 8/5/20 at 19:15;  Stop 8/5/20 at 19:16;  Status DC


Azithromycin 250 ml @  250 mls/hr 1X  ONCE IV  Last administered on 8/5/20at 

19:24;  Start 8/5/20 at 19:15;  Stop 8/5/20 at 20:14;  Status DC


Amlodipine Besylate (Norvasc) 10 mg DAILY PO  Last administered on 8/11/20at 

09:05;  Start 8/6/20 at 09:00


Apixaban (Eliquis) 5 mg BID PO  Last administered on 8/11/20at 09:04;  Start 

8/6/20 at 09:00


Furosemide (Lasix) 20 mg DAILY PO  Last administered on 8/11/20at 09:04;  Start 

8/6/20 at 09:00


Lisinopril (Prinivil) 20 mg DAILY PO  Last administered on 8/11/20at 09:03;  

Start 8/6/20 at 09:00


Metoprolol Succinate (Toprol Xl) 25 mg DAILY PO  Last administered on 8/11/20at 

09:03;  Start 8/6/20 at 09:00


Potassium Chloride (Klor-Con) 20 meq DAILYWBKFT PO  Last administered on 

8/11/20at 09:05;  Start 8/6/20 at 08:00


Simvastatin (Zocor) 20 mg HS PO  Last administered on 8/10/20at 21:19;  Start 

8/6/20 at 21:00


Furosemide (Lasix) 40 mg 1X  ONCE IVP  Last administered on 8/6/20at 08:54;  

Start 8/6/20 at 09:00;  Stop 8/6/20 at 09:01;  Status DC


Ceftriaxone Sodium (Rocephin) 2 gm Q24H IVP  Last administered on 8/10/20at 

18:07;  Start 8/6/20 at 17:00;  Stop 8/11/20 at 09:11;  Status DC


Doxycycline Hyclate 100 mg/ Dextrose 100 ml @  50 mls/hr Q12HR IV  Last 

administered on 8/9/20at 08:19;  Start 8/6/20 at 09:00;  Stop 8/9/20 at 11:28;  

Status DC


Furosemide (Lasix) 40 mg 1X  ONCE IVP  Last administered on 8/6/20at 13:30;  

Start 8/6/20 at 10:30;  Stop 8/6/20 at 10:31;  Status DC


Info (Anti-Coagulation Monitoring By Pharmacy) 1 each PRN DAILY  PRN MC SEE 

COMMENTS Last administered on 8/11/20at 12:59;  Start 8/6/20 at 10:00


Lactobacillus Rhamnosus (Culturelle) 1 cap BID PO  Last administered on 

8/11/20at 09:04;  Start 8/6/20 at 21:00


Potassium Chloride (Klor-Con) 40 meq 1X  ONCE PO  Last administered on 8/6/20at 

13:30;  Start 8/6/20 at 13:00;  Stop 8/6/20 at 13:01;  Status DC


Insulin Human Lispro (HumaLOG) 0-9 UNITS TIDWMEALHC SQ ;  Start 8/6/20 at 17:00


Dextrose (Dextrose 50%-Water Syringe) 12.5 gm PRN Q15MIN  PRN IV SEE COMMENTS;  

Start 8/6/20 at 13:45


Albuterol Sulfate (Ventolin Neb Soln) 2.5 mg PRN Q2HR  PRN NEB SHORTNESS OF 

BREATH;  Start 8/6/20 at 13:45


Albuterol/ Ipratropium (Duoneb) 3 ml Q4HRS NEB  Last administered on 8/11/20at 

12:28;  Start 8/6/20 at 16:00


Furosemide (Lasix) 40 mg 1X  ONCE IVP  Last administered on 8/7/20at 11:20;  

Start 8/7/20 at 11:00;  Stop 8/7/20 at 11:01;  Status DC


Doxycycline Hyclate (Vibra-Tab) 100 mg BID PO  Last administered on 8/11/20at 

09:04;  Start 8/9/20 at 21:00





Active Scripts


Active


Levaquin (Levofloxacin) 750 Mg Tablet 1 Tab PO DAILY 10 Days


Culturelle (Lactobacillus Rhamnosus Gg) 1 Each Cap.sprink 1 Cap PO BID 10 Days


Lasix (Furosemide) 20 Mg Tablet 1 Tab PO DAILY 30 Days


Lisinopril 40 Mg Tablet 20 Mg PO DAILY 30 Days


Proair Hfa (Albuterol Sulfate) 8.5 Gm Hfa.aer.ad 2.5 Mg NEB PRN Q2HR PRN 30 Days


Duoneb 0.5-3(2.5) Mg/3 Ml (Albuterol/Ipratropium) 3 Ml Ampul.neb 3 Ml NEB Q4HRS 

30 Days


Albuterol Sulfate Neb Soln (Albuterol Sulfate) 0.63 Mg/3 Ml Vial.neb 0.63 Mg NEB

PRN Q4HRS PRN 30 Days


     AS NEEDED


Reported


Eliquis (Apixaban) 5 Mg Tablet 5 Mg PO BID


Toprol Xl (Metoprolol Succinate) 25 Mg Tab.er.24h 1 Tab PO DAILY 30 Days


Zocor (Simvastatin) 20 Mg Tablet 20 Mg PO HS


     NEXT DOSE DUE TONIGHT AT BEDTIME


Amlodipine Besylate 10 Mg Tablet 10 Mg PO DAILY


     NEXT DOSE DUE IN AM


Potassium Chloride 10 Meq Tab.er.prt 20 Meq PO DAILY


     NEXT DOSE DUE IN AM


Vital Signs





Vital Signs








  Date Time  Temp Pulse Resp B/P (MAP) Pulse Ox O2 Delivery O2 Flow Rate FiO2


 


8/11/20 12:29      Nasal Cannula 6.0 


 


8/11/20 10:56 98.7 71 16 131/78 (95) 96   





 98.7       








Labs





Laboratory Tests








Test


 8/9/20


16:40 8/9/20


21:48 8/10/20


05:15 8/10/20


07:54


 


Glucose (Fingerstick)


 109 mg/dL


(70-99) 96 mg/dL


(70-99) 


 120 mg/dL


(70-99)


 


White Blood Count


 


 


 5.6 x10^3/uL


(4.0-11.0) 





 


Red Blood Count


 


 


 4.19 x10^6/uL


(4.30-5.70) 





 


Hemoglobin


 


 


 12.7 g/dL


(13.0-17.5) 





 


Hematocrit


 


 


 38.2 %


(39.0-53.0) 





 


Mean Corpuscular Volume   91 fL ()  


 


Mean Corpuscular Hemoglobin   30 pg (25-35)  


 


Mean Corpuscular Hemoglobin


Concent 


 


 33 g/dL


(31-37) 





 


Red Cell Distribution Width


 


 


 16.1 %


(11.5-14.5) 





 


Platelet Count


 


 


 132 x10^3/uL


(140-400) 





 


Neutrophils (%) (Auto)   69 % (31-73)  


 


Lymphocytes (%) (Auto)   15 % (24-48)  


 


Monocytes (%) (Auto)   12 % (0-9)  


 


Eosinophils (%) (Auto)   3 % (0-3)  


 


Basophils (%) (Auto)   1 % (0-3)  


 


Neutrophils # (Auto)


 


 


 3.8 x10^3/uL


(1.8-7.7) 





 


Lymphocytes # (Auto)


 


 


 0.8 x10^3/uL


(1.0-4.8) 





 


Monocytes # (Auto)


 


 


 0.7 x10^3/uL


(0.0-1.1) 





 


Eosinophils # (Auto)


 


 


 0.2 x10^3/uL


(0.0-0.7) 





 


Basophils # (Auto)


 


 


 0.1 x10^3/uL


(0.0-0.2) 





 


Sodium Level


 


 


 143 mmol/L


(136-145) 





 


Potassium Level


 


 


 3.9 mmol/L


(3.5-5.1) 





 


Chloride Level


 


 


 105 mmol/L


() 





 


Carbon Dioxide Level


 


 


 37 mmol/L


(21-32) 





 


Anion Gap   1 (6-14)  


 


Blood Urea Nitrogen


 


 


 15 mg/dL


(8-26) 





 


Creatinine


 


 


 1.1 mg/dL


(0.7-1.3) 





 


Estimated GFR


(Cockcroft-Gault) 


 


 81.8 


 





 


Glucose Level


 


 


 93 mg/dL


(70-99) 





 


Calcium Level


 


 


 9.5 mg/dL


(8.5-10.1) 





 


Test


 8/10/20


11:20 8/10/20


16:34 8/10/20


21:12 8/11/20


07:44


 


Glucose (Fingerstick)


 111 mg/dL


(70-99) 106 mg/dL


(70-99) 109 mg/dL


(70-99) 88 mg/dL


(70-99)


 


Test


 8/11/20


11:29 


 


 





 


Glucose (Fingerstick)


 80 mg/dL


(70-99) 


 


 











Laboratory Tests








Test


 8/10/20


16:34 8/10/20


21:12 8/11/20


07:44 8/11/20


11:29


 


Glucose (Fingerstick)


 106 mg/dL


(70-99) 109 mg/dL


(70-99) 88 mg/dL


(70-99) 80 mg/dL


(70-99)








Allergies





                                    Allergies








Coded Allergies Type Severity Reaction Last Updated Verified


 


  levofloxacin Allergy Unknown Itching 8/6/20 Yes








Disposition/Orders:  D/C to Home





Justicifation of Admission Dx:


Justifications for Admission:


Justification of Admission Dx:  N/A


Respiratory Failure:  Severe Resp Distress











SOFIA DAVID MD          Aug 11, 2020 14:05

## 2020-08-11 NOTE — PDOC
CARDIO Progress Notes


Date and Time


Date of Service


8/11/20


Time of Evaluation


1340





Subjective


Subjective:  No Chest Pain, No shortness of breath, No Palpitations





Vitals


Vitals





Vital Signs








  Date Time  Temp Pulse Resp B/P (MAP) Pulse Ox O2 Delivery O2 Flow Rate FiO2


 


8/11/20 12:29      Nasal Cannula 6.0 


 


8/11/20 10:56 98.7 71 16 131/78 (95) 96   





 98.7       








Weight


Weight [ ]





Input and Output


Intake and Output











Intake and Output 


 


 8/11/20





 07:00


 


Intake Total 1660 ml


 


Output Total 3100 ml


 


Balance -1440 ml


 


 


 


Intake Oral 1660 ml


 


Output Urine Total 3100 ml











Laboratory


Labs





Laboratory Tests








Test


 8/10/20


16:34 8/10/20


21:12 8/11/20


07:44 8/11/20


11:29


 


Glucose (Fingerstick)


 106 mg/dL


(70-99) 109 mg/dL


(70-99) 88 mg/dL


(70-99) 80 mg/dL


(70-99)











Physical Exam


HEENT:  Neck Supple W Full Motion


Chest:  Symmetric


LUNGS:  Other (diminished bases)


Heart:  RRR


Abdomen:  Soft N/T


Extremities:  No Calf Tenderness


Neurology:  alert, oriented, follow commands





Assessment


Assessment


1.  Acute on chronic respiratory failure, multifactorial secondary to AECOPD, 

pneumonia CHF, negative for covid.  Improved since admission.  Pulmonary team 

following.  Continue antibiotics per ID team.


2.  Acute on chronic systolic/diastolic CHF, better compensated with diuresis.  

Continue current medical regimen. Follow up with Dr. Rodriguez as scheduled.


3.  SSS/ NICM s/p BiV PPM/CRT-P (Biotronik); prior LVEF 40% recent device check 

revealed 25% AFIB burden, normal function, BiV pacing 100%. 


5.  PAFIB; presently V pacing.  Continue Eliquis for stroke prophylaxis.


6.  HTN: controlled


7.  CKD3


8.  Chronic Substance abuse; cocaine and marijuana use.


9.  Tobaccoism





Justicifation of Admission Dx:


Justifications for Admission:


Justification of Admission Dx:  N/A


Respiratory Failure:  Severe Resp Distress











YEIMY FLORES           Aug 11, 2020 13:59

## 2020-08-11 NOTE — PDOC
PROGRESS NOTES


Date of Service:


DATE: 8/11/20 


TIME: 10:33





Chief Complaint


Chief Complaint


discharge dx  ===================











Acute on chronic respiratory failure, multifactorial secondary to PNA, AECOPD, 

mild acute on chronic systolic CHF


Gram-negative possibly gram-positive pneumonia 


Possible aspiration pneumonia


Mild Acute on Chronic systolic/diastolic CHF


Chronic mild troponin elevation: within his range, no acute changes per EKG. de

azul mediated with continued use of cocaine


SSS/ NICM s/p BiV PPM/CRT-P (Biotronik); prior LVEF 40% recent device check 

revealed 25% AFIB burden, normal function, BiV pacing 100%. 


PAFIB; presently V pacing.  Continue Eliquis for stroke prophylaxis.


SSS


CHF - 40% ejection 


H/o A. fib


HTN


CKD3


Chronic Substance abuse; cocaine and marijuana use. Last use 3 days ago


Tobaccoism 


PUI -SARS-COVID negative. 5/24 and again 8/7  negative


pulmonary arterial hypertension 








po doxy 





ct chest 8/10





8/11 d/w dr marcano in [place], ok to d/c, poor prognosis due to cocaine dependency, 

noncompliance 











39 min pt exam, chart review d/c planning , > 50% of time spent with exam, chart

review, pt care coordination





History of Present Illness


History of Present Illness


8/11 /2020





Patient seen and examined


Patient is resting comfortably in NAD


Discussed with RN


Charts reviewed


ct chest  The right upper lobe consolidation  seen on exam of 5/15/2020 has 

resolved.








Mr Colon is a 63 yo M w/ PMHx CHB s/p BiV pacemaker, HTN, CKD2, tobacco abuse, 

COPD on 4-5L home O2, CHF EF 40-45, active cocaine user, ETOH, and marijuana 

abuse who p/w shortness of breath that was fairly sudden onset 8/5/2020. after 

smoking multiple substances. He had some back pain. No anterior chest pain. He 

was trying to use his home nebulizer, states it wasn't helping. He does not know

if he has had weight gain, does not have a scale at home. Has LE edema that is 

much worse as well as orthopnea and PND, not sleeping well. He has a mild cough,

no hemoptysis, no weight loss.  No headaches, no nausea, vomiting or diarrhea. 

He was afebrile, has had no recent travel or sick contacts.


Seen with severe dyspnea and ABG 7.25/67/78. BNP 4839, Trop 0.04, WBC 6.6, Hb 

12.9, Platelets 129, Cr 1.3, K 3.9


He continues to do cocaine and smoke and has known COPD


Chest x-ray showing CHF and possible pneumonia, admitted to ICU on BIPAP for 

respiratory failure.





Seen on BIPAP in ICU, appears comfortable on it. Afebrile. Still hypoxic. COVID 

19 negative





Vitals


Vitals





Vital Signs








  Date Time  Temp Pulse Resp B/P (MAP) Pulse Ox O2 Delivery O2 Flow Rate FiO2


 


8/11/20 09:05  66  132/94    


 


8/11/20 07:33     94 Nasal Cannula 6.0 


 


8/11/20 07:00 98.1  18     





 98.1       











Physical Exam


Physical Exam


GENERAL: Propped up in bed, alert, relaxed 


HEENT:  Oral cavity clear 


NECK:  Supple, no JVP, no lymphadenopathy.


LUNGS:  Mild congestion on the right, nonlabored 


HEART:  S1, S2 regular.


ABDOMEN:  Soft, nontender


EXTREMITIES:  No edema, cyanosis.


SKIN:  No rash 


NEUROLOGIC: Alert, awake and appropriate.  No focal neurologic deficit.


.


General:  Alert, Oriented X3, Cooperative, No acute distress


Heart:  Regular rate


Lungs:  Clear, Other (deminished bs)


Abdomen:  Normal bowel sounds


Extremities:  No edema


Skin:  No breakdown





Labs


LABS





CT CHEST WO CONTRAST 


 


INDICATION: pneumonia


 


COMPARISON STUDY: Radiograph 8/7/2020. CT 5/15/2020.


 


TECHNIQUE:  Unenhanced axial images were obtained through the lungs and 


upper abdomen. Coronal and sagittal multiplanar reconstructions were also 


obtained.


 


PQRS compliance statement:


 


One or more of the following individualized dose reduction techniques were


utilized for this examination:


1. Automated exposure control


2. Adjustment of the mA and/or kV according to patient size


3. Use of iterative reconstruction technique


 


FINDINGS:


 


Lungs and Airways: Bibasilar dependent opacities. Previously described 


right upper lobe consolidation has resolved. Paraseptal and centrilobular 


emphysema. Bronchial wall thickening.


 


Pleura: Trace bilateral pleural effusions. Small amount of loculated fluid


in the major fissure measuring 2 cm previously measured 4.6 cm.


 


Heart and Mediastinum: The visualized thyroid gland is normal in size and 


attenuation. No axillary or supraclavicular lymphadenopathy. Few 


conspicuous mediastinal lymph nodes are unchanged. Cardiomegaly. Coronary 


artery atherosclerotic disease. Dilated pulmonary trunk measures 44 mm, 


which can be seen with pulmonary hypertension. Atherosclerosis of the 


thoracic aorta.


 


Abdomen: The visualized abdominal organs demonstrate no abnormality.


 


Bones and Soft Tissues: Degenerative changes of the spine.


 


IMPRESSION:  


1. Bibasilar dependent opacities likely represent subsegmental 


atelectasis, although superimposed infection would be difficult to 


exclude. These appear improved from recent radiograph of 8/7/2020, 


allowing for differences in modality. The right upper lobe consolidation 


seen on exam of 5/15/2020 has resolved.


2. Trace bilateral pleural effusions.


 


Electronically signed by: Ade Lacy MD (8/10/2020 2:27 PM) [id number]














DICTATED and SIGNED BY:     ADE LACY MD


DATE:     08/10/20 1427





Laboratory Tests








Test


 8/10/20


11:20 8/10/20


16:34 8/10/20


21:12 8/11/20


07:44


 


Glucose (Fingerstick)


 111 mg/dL


(70-99) 106 mg/dL


(70-99) 109 mg/dL


(70-99) 88 mg/dL


(70-99)











Assessment and Plan


Assessmemt and Plan


Problems


Medical Problems:


(1) Acute respiratory failure with hypoxia


Status: Acute  





(2) Community acquired pneumonia


Status: Acute  





(3) COPD exacerbation


Status: Acute  











Comment


Review of Relevant


I have reviewed the following items brittany (where applicable) has been applied.


Labs





Laboratory Tests








Test


 8/9/20


11:32 8/9/20


16:40 8/9/20


21:48 8/10/20


05:15


 


Glucose (Fingerstick)


 105 mg/dL


(70-99) 109 mg/dL


(70-99) 96 mg/dL


(70-99) 





 


White Blood Count


 


 


 


 5.6 x10^3/uL


(4.0-11.0)


 


Red Blood Count


 


 


 


 4.19 x10^6/uL


(4.30-5.70)


 


Hemoglobin


 


 


 


 12.7 g/dL


(13.0-17.5)


 


Hematocrit


 


 


 


 38.2 %


(39.0-53.0)


 


Mean Corpuscular Volume    91 fL () 


 


Mean Corpuscular Hemoglobin    30 pg (25-35) 


 


Mean Corpuscular Hemoglobin


Concent 


 


 


 33 g/dL


(31-37)


 


Red Cell Distribution Width


 


 


 


 16.1 %


(11.5-14.5)


 


Platelet Count


 


 


 


 132 x10^3/uL


(140-400)


 


Neutrophils (%) (Auto)    69 % (31-73) 


 


Lymphocytes (%) (Auto)    15 % (24-48) 


 


Monocytes (%) (Auto)    12 % (0-9) 


 


Eosinophils (%) (Auto)    3 % (0-3) 


 


Basophils (%) (Auto)    1 % (0-3) 


 


Neutrophils # (Auto)


 


 


 


 3.8 x10^3/uL


(1.8-7.7)


 


Lymphocytes # (Auto)


 


 


 


 0.8 x10^3/uL


(1.0-4.8)


 


Monocytes # (Auto)


 


 


 


 0.7 x10^3/uL


(0.0-1.1)


 


Eosinophils # (Auto)


 


 


 


 0.2 x10^3/uL


(0.0-0.7)


 


Basophils # (Auto)


 


 


 


 0.1 x10^3/uL


(0.0-0.2)


 


Sodium Level


 


 


 


 143 mmol/L


(136-145)


 


Potassium Level


 


 


 


 3.9 mmol/L


(3.5-5.1)


 


Chloride Level


 


 


 


 105 mmol/L


()


 


Carbon Dioxide Level


 


 


 


 37 mmol/L


(21-32)


 


Anion Gap    1 (6-14) 


 


Blood Urea Nitrogen


 


 


 


 15 mg/dL


(8-26)


 


Creatinine


 


 


 


 1.1 mg/dL


(0.7-1.3)


 


Estimated GFR


(Cockcroft-Gault) 


 


 


 81.8 





 


Glucose Level


 


 


 


 93 mg/dL


(70-99)


 


Calcium Level


 


 


 


 9.5 mg/dL


(8.5-10.1)


 


Test


 8/10/20


07:54 8/10/20


11:20 8/10/20


16:34 8/10/20


21:12


 


Glucose (Fingerstick)


 120 mg/dL


(70-99) 111 mg/dL


(70-99) 106 mg/dL


(70-99) 109 mg/dL


(70-99)


 


Test


 8/11/20


07:44 


 


 





 


Glucose (Fingerstick)


 88 mg/dL


(70-99) 


 


 











Laboratory Tests








Test


 8/10/20


11:20 8/10/20


16:34 8/10/20


21:12 8/11/20


07:44


 


Glucose (Fingerstick)


 111 mg/dL


(70-99) 106 mg/dL


(70-99) 109 mg/dL


(70-99) 88 mg/dL


(70-99)








Medications





Current Medications


Prednisone (Prednisone) 60 mg 1X  ONCE PO  Last administered on 8/5/20at 17:09; 

Start 8/5/20 at 17:00;  Stop 8/5/20 at 17:01;  Status DC


Albuterol/ Ipratropium (Duoneb) 9 ml 1X  ONCE NEB  Last administered on 8/5/20at

17:28;  Start 8/5/20 at 17:00;  Stop 8/5/20 at 17:01;  Status DC


Sodium Chloride 1,000 ml @  1,000 mls/hr 1X  ONCE IV  Last administered on 

8/5/20at 17:09;  Start 8/5/20 at 17:00;  Stop 8/5/20 at 17:59;  Status DC


Levofloxacin/ Dextrose 150 ml @  100 mls/hr 1X  ONCE IV  Last administered on 

8/5/20at 18:49;  Start 8/5/20 at 18:00;  Stop 8/5/20 at 19:07;  Status DC


Ceftriaxone Sodium (Rocephin) 2 gm 1X  ONCE IVP  Last administered on 8/5/20at 

19:24;  Start 8/5/20 at 19:15;  Stop 8/5/20 at 19:16;  Status DC


Azithromycin 250 ml @  250 mls/hr 1X  ONCE IV  Last administered on 8/5/20at 

19:24;  Start 8/5/20 at 19:15;  Stop 8/5/20 at 20:14;  Status DC


Amlodipine Besylate (Norvasc) 10 mg DAILY PO  Last administered on 8/11/20at 09

:05;  Start 8/6/20 at 09:00


Apixaban (Eliquis) 5 mg BID PO  Last administered on 8/11/20at 09:04;  Start 

8/6/20 at 09:00


Furosemide (Lasix) 20 mg DAILY PO  Last administered on 8/11/20at 09:04;  Start 

8/6/20 at 09:00


Lisinopril (Prinivil) 20 mg DAILY PO  Last administered on 8/11/20at 09:03;  

Start 8/6/20 at 09:00


Metoprolol Succinate (Toprol Xl) 25 mg DAILY PO  Last administered on 8/11/20at 

09:03;  Start 8/6/20 at 09:00


Potassium Chloride (Klor-Con) 20 meq DAILYWBKFT PO  Last administered on 

8/11/20at 09:05;  Start 8/6/20 at 08:00


Simvastatin (Zocor) 20 mg HS PO  Last administered on 8/10/20at 21:19;  Start 

8/6/20 at 21:00


Furosemide (Lasix) 40 mg 1X  ONCE IVP  Last administered on 8/6/20at 08:54;  

Start 8/6/20 at 09:00;  Stop 8/6/20 at 09:01;  Status DC


Ceftriaxone Sodium (Rocephin) 2 gm Q24H IVP  Last administered on 8/10/20at 

18:07;  Start 8/6/20 at 17:00;  Stop 8/11/20 at 09:11;  Status DC


Doxycycline Hyclate 100 mg/ Dextrose 100 ml @  50 mls/hr Q12HR IV  Last 

administered on 8/9/20at 08:19;  Start 8/6/20 at 09:00;  Stop 8/9/20 at 11:28;  

Status DC


Furosemide (Lasix) 40 mg 1X  ONCE IVP  Last administered on 8/6/20at 13:30;  

Start 8/6/20 at 10:30;  Stop 8/6/20 at 10:31;  Status DC


Info (Anti-Coagulation Monitoring By Pharmacy) 1 each PRN DAILY  PRN MC SEE 

COMMENTS Last administered on 8/10/20at 10:47;  Start 8/6/20 at 10:00


Lactobacillus Rhamnosus (Culturelle) 1 cap BID PO  Last administered on 

8/11/20at 09:04;  Start 8/6/20 at 21:00


Potassium Chloride (Klor-Con) 40 meq 1X  ONCE PO  Last administered on 8/6/20at 

13:30;  Start 8/6/20 at 13:00;  Stop 8/6/20 at 13:01;  Status DC


Insulin Human Lispro (HumaLOG) 0-9 UNITS TIDWMEALHC SQ ;  Start 8/6/20 at 17:00


Dextrose (Dextrose 50%-Water Syringe) 12.5 gm PRN Q15MIN  PRN IV SEE COMMENTS;  

Start 8/6/20 at 13:45


Albuterol Sulfate (Ventolin Neb Soln) 2.5 mg PRN Q2HR  PRN NEB SHORTNESS OF 

BREATH;  Start 8/6/20 at 13:45


Albuterol/ Ipratropium (Duoneb) 3 ml Q4HRS NEB  Last administered on 8/11/20at 

07:31;  Start 8/6/20 at 16:00


Furosemide (Lasix) 40 mg 1X  ONCE IVP  Last administered on 8/7/20at 11:20;  

Start 8/7/20 at 11:00;  Stop 8/7/20 at 11:01;  Status DC


Doxycycline Hyclate (Vibra-Tab) 100 mg BID PO  Last administered on 8/11/20at 

09:04;  Start 8/9/20 at 21:00





Active Scripts


Active


Levaquin (Levofloxacin) 750 Mg Tablet 1 Tab PO DAILY 10 Days


Culturelle (Lactobacillus Rhamnosus Gg) 1 Each Cap.sprink 1 Cap PO BID 10 Days


Lasix (Furosemide) 20 Mg Tablet 1 Tab PO DAILY 30 Days


Lisinopril 40 Mg Tablet 20 Mg PO DAILY 30 Days


Proair Hfa (Albuterol Sulfate) 8.5 Gm Hfa.aer.ad 2.5 Mg NEB PRN Q2HR PRN 30 Days


Duoneb 0.5-3(2.5) Mg/3 Ml (Albuterol/Ipratropium) 3 Ml Ampul.neb 3 Ml NEB Q4HRS 

30 Days


Albuterol Sulfate Neb Soln (Albuterol Sulfate) 0.63 Mg/3 Ml Vial.neb 0.63 Mg NEB

PRN Q4HRS PRN 30 Days


     AS NEEDED


Reported


Eliquis (Apixaban) 5 Mg Tablet 5 Mg PO BID


Toprol Xl (Metoprolol Succinate) 25 Mg Tab.er.24h 1 Tab PO DAILY 30 Days


Zocor (Simvastatin) 20 Mg Tablet 20 Mg PO HS


     NEXT DOSE DUE TONIGHT AT BEDTIME


Amlodipine Besylate 10 Mg Tablet 10 Mg PO DAILY


     NEXT DOSE DUE IN AM


Potassium Chloride 10 Meq Tab.er.prt 20 Meq PO DAILY


     NEXT DOSE DUE IN AM


Vitals/I & O





Vital Sign - Last 24 Hours








 8/10/20 8/10/20 8/10/20 8/10/20





 11:00 11:16 15:00 15:48


 


Temp 98.1  97.9 





 98.1  97.9 


 


Pulse 70  66 


 


Resp 18  18 


 


B/P (MAP) 131/85 (100)  132/79 (96) 


 


Pulse Ox 97  94 


 


O2 Delivery Nasal Cannula Nasal Cannula Nasal Cannula Nasal Cannula


 


O2 Flow Rate  6.0  6.0


 


    





    





 8/10/20 8/10/20 8/10/20 8/10/20





 19:00 20:00 20:22 21:00


 


Temp 97.5   





 97.5   


 


Pulse 70   


 


Resp 20   


 


B/P (MAP) 113/89 (97)   


 


Pulse Ox 92  97 


 


O2 Delivery Room Air Nasal Cannula Nasal Cannula 


 


O2 Flow Rate  6.0 6.0 5.0


 


    





    





 8/10/20 8/11/20 8/11/20 8/11/20





 23:00 00:51 03:00 03:54


 


Temp 98.5  98.4 





 98.5  98.4 


 


Pulse 59  71 


 


Resp 20  20 


 


B/P (MAP) 132/94 (107)  138/90 (106) 


 


Pulse Ox 92 97 91 


 


O2 Delivery Room Air Nasal Cannula Room Air Nasal Cannula


 


O2 Flow Rate  6.0  6.0


 


    





    





 8/11/20 8/11/20 8/11/20 8/11/20





 07:00 07:33 09:03 09:03


 


Temp 98.1   





 98.1   


 


Pulse 66  66 66


 


Resp 18   


 


B/P (MAP) 132/94 (107)  132/94 132/94


 


Pulse Ox 100 94  


 


O2 Delivery Nasal Cannula Nasal Cannula  


 


O2 Flow Rate 6.0 6.0  


 


    





    





 8/11/20   





 09:05   


 


Pulse 66   


 


B/P (MAP) 132/94   














Intake and Output   


 


 8/10/20 8/10/20 8/11/20





 15:00 23:00 07:00


 


Intake Total 580 ml 540 ml 540 ml


 


Output Total 400 ml 1000 ml 700 ml


 


Balance 180 ml -460 ml -160 ml











Justicifation of Admission Dx:


Justifications for Admission:


Justification of Admission Dx:  N/A


Respiratory Failure:  Severe Resp Distress











SOFIA DAVID MD          Aug 11, 2020 10:36

## 2020-08-11 NOTE — NUR
JAYLA following. Discussed with RN, pt to continue doxycycline, has oxygen at home. Anticipate 
possible discharge home today. J[facility]LA will continue to follow. 

-------------------------------------------------------------------------------

Addendum: 08/11/20 at 1651 by MIRTA DICKERSON

-------------------------------------------------------------------------------

RN called to say that pt is on 6l 02 and that his tank from home is empty. Pt uses 
[employer]. Spoke with Winifred at [facility] who confirmed pt does have home 02 concentrator and 
Winifred stated it is okay to discharge pt with one of their 02 tanks for transport. Pt 
provided with tank. will ff up with PMD

## 2020-08-13 ENCOUNTER — HOSPITAL ENCOUNTER (OUTPATIENT)
Dept: HOSPITAL 61 - US | Age: 65
Discharge: HOME | End: 2020-08-13
Attending: NURSE PRACTITIONER
Payer: MEDICARE

## 2020-08-13 DIAGNOSIS — D41.00: Primary | ICD-10-CM

## 2020-08-13 DIAGNOSIS — N28.1: ICD-10-CM

## 2020-08-13 PROCEDURE — 76770 US EXAM ABDO BACK WALL COMP: CPT

## 2020-08-13 NOTE — RAD
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 

/ Spl. Instructions:  / History: 

 

COMPARISON: July 2015

 

TECHNIQUE: Grayscale and color ultrasound images obtained of the bilateral

kidneys and bladder.

 

FINDINGS: 

 

Right Kidney: 108 mm. No hydronephrosis.

Left Kidney: 96 mm. No hydronephrosis.

Bladder: 111 cc prevoid

Prostate measures approximately 38 x 42 x 43 mm.

There is a couple right renal cysts measuring up to 33 and 23 mm.

 

IMPRESSION: 

 

*  There is a couple of right renal cysts identified.

 

Electronically signed by: Brayden Flores MD (8/13/2020 5:37 PM) 

[id number]

## 2020-09-06 ENCOUNTER — HOSPITAL ENCOUNTER (INPATIENT)
Dept: HOSPITAL 61 - ER | Age: 65
LOS: 2 days | Discharge: HOME HEALTH | DRG: 189 | End: 2020-09-08
Attending: STUDENT IN AN ORGANIZED HEALTH CARE EDUCATION/TRAINING PROGRAM | Admitting: STUDENT IN AN ORGANIZED HEALTH CARE EDUCATION/TRAINING PROGRAM
Payer: MEDICARE

## 2020-09-06 VITALS — DIASTOLIC BLOOD PRESSURE: 66 MMHG | SYSTOLIC BLOOD PRESSURE: 105 MMHG

## 2020-09-06 VITALS — DIASTOLIC BLOOD PRESSURE: 87 MMHG | SYSTOLIC BLOOD PRESSURE: 145 MMHG

## 2020-09-06 VITALS — DIASTOLIC BLOOD PRESSURE: 75 MMHG | SYSTOLIC BLOOD PRESSURE: 121 MMHG

## 2020-09-06 VITALS — SYSTOLIC BLOOD PRESSURE: 135 MMHG | DIASTOLIC BLOOD PRESSURE: 85 MMHG

## 2020-09-06 VITALS — SYSTOLIC BLOOD PRESSURE: 153 MMHG | DIASTOLIC BLOOD PRESSURE: 93 MMHG

## 2020-09-06 VITALS — SYSTOLIC BLOOD PRESSURE: 138 MMHG | DIASTOLIC BLOOD PRESSURE: 92 MMHG

## 2020-09-06 VITALS — SYSTOLIC BLOOD PRESSURE: 100 MMHG | DIASTOLIC BLOOD PRESSURE: 58 MMHG

## 2020-09-06 VITALS — SYSTOLIC BLOOD PRESSURE: 132 MMHG | DIASTOLIC BLOOD PRESSURE: 82 MMHG

## 2020-09-06 VITALS — SYSTOLIC BLOOD PRESSURE: 149 MMHG | DIASTOLIC BLOOD PRESSURE: 95 MMHG

## 2020-09-06 VITALS — HEIGHT: 73 IN | BODY MASS INDEX: 24.31 KG/M2 | WEIGHT: 183.42 LBS

## 2020-09-06 VITALS — SYSTOLIC BLOOD PRESSURE: 122 MMHG | DIASTOLIC BLOOD PRESSURE: 77 MMHG

## 2020-09-06 DIAGNOSIS — Z83.3: ICD-10-CM

## 2020-09-06 DIAGNOSIS — J18.9: ICD-10-CM

## 2020-09-06 DIAGNOSIS — E78.5: ICD-10-CM

## 2020-09-06 DIAGNOSIS — Z91.19: ICD-10-CM

## 2020-09-06 DIAGNOSIS — I42.8: ICD-10-CM

## 2020-09-06 DIAGNOSIS — Z79.01: ICD-10-CM

## 2020-09-06 DIAGNOSIS — J43.9: ICD-10-CM

## 2020-09-06 DIAGNOSIS — N18.3: ICD-10-CM

## 2020-09-06 DIAGNOSIS — Z99.81: ICD-10-CM

## 2020-09-06 DIAGNOSIS — I48.0: ICD-10-CM

## 2020-09-06 DIAGNOSIS — J96.21: Primary | ICD-10-CM

## 2020-09-06 DIAGNOSIS — Z82.49: ICD-10-CM

## 2020-09-06 DIAGNOSIS — Z88.8: ICD-10-CM

## 2020-09-06 DIAGNOSIS — I50.43: ICD-10-CM

## 2020-09-06 DIAGNOSIS — Z71.51: ICD-10-CM

## 2020-09-06 DIAGNOSIS — F12.90: ICD-10-CM

## 2020-09-06 DIAGNOSIS — Z20.828: ICD-10-CM

## 2020-09-06 DIAGNOSIS — I13.0: ICD-10-CM

## 2020-09-06 DIAGNOSIS — Z71.6: ICD-10-CM

## 2020-09-06 DIAGNOSIS — I21.A1: ICD-10-CM

## 2020-09-06 DIAGNOSIS — F14.10: ICD-10-CM

## 2020-09-06 DIAGNOSIS — F17.210: ICD-10-CM

## 2020-09-06 DIAGNOSIS — J96.22: ICD-10-CM

## 2020-09-06 LAB
ALBUMIN SERPL-MCNC: 3.3 G/DL (ref 3.4–5)
ALBUMIN/GLOB SERPL: 0.8 {RATIO} (ref 1–1.7)
ALP SERPL-CCNC: 70 U/L (ref 46–116)
ALT SERPL-CCNC: 15 U/L (ref 16–63)
ANION GAP SERPL CALC-SCNC: 6 MMOL/L (ref 6–14)
APTT BLD: 30 SEC (ref 24–38)
AST SERPL-CCNC: 14 U/L (ref 15–37)
BASE EXCESS STD BLDA CALC-SCNC: 2 MMOL/L (ref -3–3)
BASOPHILS # BLD AUTO: 0.1 X10^3/UL (ref 0–0.2)
BASOPHILS NFR BLD: 1 % (ref 0–3)
BILIRUB SERPL-MCNC: 1.3 MG/DL (ref 0.2–1)
BUN SERPL-MCNC: 17 MG/DL (ref 8–26)
BUN/CREAT SERPL: 11 (ref 6–20)
CALCIUM SERPL-MCNC: 9.7 MG/DL (ref 8.5–10.1)
CHLORIDE SERPL-SCNC: 101 MMOL/L (ref 98–107)
CK SERPL-CCNC: 99 U/L (ref 39–308)
CO2 SERPL-SCNC: 33 MMOL/L (ref 21–32)
CREAT SERPL-MCNC: 1.5 MG/DL (ref 0.7–1.3)
EOSINOPHIL NFR BLD: 0.1 X10^3/UL (ref 0–0.7)
EOSINOPHIL NFR BLD: 2 % (ref 0–3)
ERYTHROCYTE [DISTWIDTH] IN BLOOD BY AUTOMATED COUNT: 16.3 % (ref 11.5–14.5)
GFR SERPLBLD BASED ON 1.73 SQ M-ARVRAT: 57.2 ML/MIN
GLOBULIN SER-MCNC: 4.1 G/DL (ref 2.2–3.8)
GLUCOSE SERPL-MCNC: 91 MG/DL (ref 70–99)
HCO3 BLDA-SCNC: 29 MMOL/L (ref 21–28)
HCT VFR BLD CALC: 38.4 % (ref 39–53)
HGB BLD-MCNC: 12.4 G/DL (ref 13–17.5)
LYMPHOCYTES # BLD: 0.7 X10^3/UL (ref 1–4.8)
LYMPHOCYTES NFR BLD AUTO: 11 % (ref 24–48)
MCH RBC QN AUTO: 30 PG (ref 25–35)
MCHC RBC AUTO-ENTMCNC: 32 G/DL (ref 31–37)
MCV RBC AUTO: 93 FL (ref 79–100)
METHGB MFR BLD: 0.3 % (ref 0–1.9)
MONO #: 0.8 X10^3/UL (ref 0–1.1)
MONOCYTES NFR BLD: 11 % (ref 0–9)
NEUT #: 5 X10^3/UL (ref 1.8–7.7)
NEUTROPHILS NFR BLD AUTO: 75 % (ref 31–73)
OXYHGB MFR BLD: 88.9 %
PCO2 BLDA: 56 MMHG (ref 35–46)
PLATELET # BLD AUTO: 124 X10^3/UL (ref 140–400)
PO2 BLDA: 66 MMHG (ref 65–108)
POTASSIUM SERPL-SCNC: 4 MMOL/L (ref 3.5–5.1)
PROT SERPL-MCNC: 7.4 G/DL (ref 6.4–8.2)
PROTHROMBIN TIME: 14.2 SEC (ref 11.7–14)
RBC # BLD AUTO: 4.12 X10^6/UL (ref 4.3–5.7)
SAO2 % BLDA: 92 % (ref 92–99)
SODIUM SERPL-SCNC: 140 MMOL/L (ref 136–145)
WBC # BLD AUTO: 6.7 X10^3/UL (ref 4–11)

## 2020-09-06 PROCEDURE — 94760 N-INVAS EAR/PLS OXIMETRY 1: CPT

## 2020-09-06 PROCEDURE — 80053 COMPREHEN METABOLIC PANEL: CPT

## 2020-09-06 PROCEDURE — 94640 AIRWAY INHALATION TREATMENT: CPT

## 2020-09-06 PROCEDURE — 94660 CPAP INITIATION&MGMT: CPT

## 2020-09-06 PROCEDURE — 93306 TTE W/DOPPLER COMPLETE: CPT

## 2020-09-06 PROCEDURE — 85730 THROMBOPLASTIN TIME PARTIAL: CPT

## 2020-09-06 PROCEDURE — 82805 BLOOD GASES W/O2 SATURATION: CPT

## 2020-09-06 PROCEDURE — 85610 PROTHROMBIN TIME: CPT

## 2020-09-06 PROCEDURE — 96374 THER/PROPH/DIAG INJ IV PUSH: CPT

## 2020-09-06 PROCEDURE — 80061 LIPID PANEL: CPT

## 2020-09-06 PROCEDURE — G0378 HOSPITAL OBSERVATION PER HR: HCPCS

## 2020-09-06 PROCEDURE — 83605 ASSAY OF LACTIC ACID: CPT

## 2020-09-06 PROCEDURE — 96375 TX/PRO/DX INJ NEW DRUG ADDON: CPT

## 2020-09-06 PROCEDURE — 36415 COLL VENOUS BLD VENIPUNCTURE: CPT

## 2020-09-06 PROCEDURE — 83880 ASSAY OF NATRIURETIC PEPTIDE: CPT

## 2020-09-06 PROCEDURE — 71045 X-RAY EXAM CHEST 1 VIEW: CPT

## 2020-09-06 PROCEDURE — 36600 WITHDRAWAL OF ARTERIAL BLOOD: CPT

## 2020-09-06 PROCEDURE — 85025 COMPLETE CBC W/AUTO DIFF WBC: CPT

## 2020-09-06 PROCEDURE — 84484 ASSAY OF TROPONIN QUANT: CPT

## 2020-09-06 PROCEDURE — 82962 GLUCOSE BLOOD TEST: CPT

## 2020-09-06 PROCEDURE — 5A09357 ASSISTANCE WITH RESPIRATORY VENTILATION, LESS THAN 24 CONSECUTIVE HOURS, CONTINUOUS POSITIVE AIRWAY PRESSURE: ICD-10-PCS | Performed by: STUDENT IN AN ORGANIZED HEALTH CARE EDUCATION/TRAINING PROGRAM

## 2020-09-06 PROCEDURE — 87040 BLOOD CULTURE FOR BACTERIA: CPT

## 2020-09-06 PROCEDURE — 99291 CRITICAL CARE FIRST HOUR: CPT

## 2020-09-06 PROCEDURE — 93005 ELECTROCARDIOGRAM TRACING: CPT

## 2020-09-06 PROCEDURE — 82553 CREATINE MB FRACTION: CPT

## 2020-09-06 RX ADMIN — METHYLPREDNISOLONE SODIUM SUCCINATE SCH MG: 40 INJECTION, POWDER, FOR SOLUTION INTRAMUSCULAR; INTRAVENOUS at 21:29

## 2020-09-06 RX ADMIN — METHYLPREDNISOLONE SODIUM SUCCINATE SCH MG: 40 INJECTION, POWDER, FOR SOLUTION INTRAMUSCULAR; INTRAVENOUS at 17:20

## 2020-09-06 NOTE — NUR
Pt admitted to room 112 from the ED via stretcher @ 1605. Pt ambulated from the stretcher to 
the bed without difficulties, gait steady. Attached to monitoring equipment, AV paced with 
PVC's on monitor. O2 sat 83-84% on NC at 6 Liters. Pt placed on Bipap and O2 sats increased 
to 97%. Saline lock to left wrist flushed and has good blood return. Pt is A/Ox4, no c/o 
pain. See assessment for further details.

## 2020-09-06 NOTE — PDOC1
History and Physical


Date of Admission


Date of Admission


DATE: 9/6/20 


TIME: 17:19





Identification/Chief Complaint


Chief Complaint


SOB





Source


Source:  Patient





History of Present Illness


History of Present Illness


Patient is a 66 yo male who presents with complaint of worsening shortness of 

breath for the past 2 days. He states his symptoms started 3 days ago after s

moking crack cocaine. He has had associated cough productive of yellow sputum. 

His symptoms are aggravated by exertion and alleviated by sitting up. He denies 

any fever. 








Cr 1.5, Blood Glucose 208, BNP 6,323





Past Medical History


Cardiovascular:  CHF, HTN, Hyperlipidemia


Pulmonary:  Asthma, COPD


GI:  Other


Heme/Onc:  No pertinent hx


Hepatobiliary:  No pertinent hx


Psych:  No pertinent hx


Rheumatologic:  No pertinent hx


Infectious disease:  No pertinent hx


Renal/:  Chronic renal insuff


Endocrine:  No pertinent hx





Past Surgical History


Past Surgical History:  Appendectomy, Hernia Repair, Other





Family History


Family History:  Coronary Artery Disease, Diabetes, Hypertension





Social History


Smoke:  1 pack per day


ALCOHOL:  heavy


Drugs:  Cocaine, Marijuana





Current Problem List


Problem List


Problems


Medical Problems:


(1) Acute respiratory failure with hypoxia


Status: Acute  





(2) CHF exacerbation


Status: Acute  





(3) Suspected 2019 novel coronavirus infection


Status: Acute  











Current Medications


Current Medications





Current Medications


Albuterol/ Ipratropium (Duoneb) 3 ml 1X  ONCE NEB  Last administered on 9/6/20at

11:32;  Start 9/6/20 at 11:00;  Stop 9/6/20 at 11:03;  Status DC


Dexamethasone Sodium Phosphate (Decadron) 10 mg 1X  ONCE IVP  Last administered 

on 9/6/20at 13:33;  Start 9/6/20 at 11:00;  Stop 9/6/20 at 11:03;  Status DC


Bumetanide (Bumex) 1 mg 1X  ONCE IV  Last administered on 9/6/20at 13:33;  Start

9/6/20 at 12:45;  Stop 9/6/20 at 12:46;  Status DC


Ondansetron HCl (Zofran) 4 mg PRN Q8HRS  PRN IV NAUSEA/VOMITING Last 

administered on 9/6/20at 13:32;  Start 9/6/20 at 13:15;  Stop 9/7/20 at 13:14


Albuterol/ Ipratropium (Duoneb) 3 ml RTQID NEB ;  Start 9/6/20 at 16:00;  Stop 

9/6/20 at 14:14;  Status DC


Albuterol/ Ipratropium (Duoneb) 3 ml RTQID NEB ;  Start 9/7/20 at 16:00;  Stop 

9/8/20 at 15:59


Methylprednisolone Sodium Succinate (SOLU-Medrol 40MG VIAL) 40 mg Q8HRS IV ;  

Start 9/6/20 at 17:00


Bumetanide (Bumex) 1 mg 1X  ONCE IV ;  Start 9/6/20 at 20:00;  Stop 9/6/20 at 

20:01;  Status UNV





Active Scripts


Active


Culturelle (Lactobacillus Rhamnosus Gg) 1 Each Cap.sprink 1 Cap PO BID 30 Days


Doxycycline Hyclate 100 Mg Tablet 100 Mg PO BID 10 Days


Culturelle (Lactobacillus Rhamnosus Gg) 1 Each Cap.sprink 1 Cap PO BID 10 Days


Lasix (Furosemide) 20 Mg Tablet 1 Tab PO DAILY 30 Days


Lisinopril 40 Mg Tablet 20 Mg PO DAILY 30 Days


Proair Hfa (Albuterol Sulfate) 8.5 Gm Hfa.aer.ad 2.5 Mg NEB PRN Q2HR PRN 30 Days


Duoneb 0.5-3(2.5) Mg/3 Ml (Albuterol/Ipratropium) 3 Ml Ampul.neb 3 Ml NEB Q4HRS 

30 Days


Albuterol Sulfate Neb Soln (Albuterol Sulfate) 0.63 Mg/3 Ml Vial.neb 0.63 Mg NEB

PRN Q4HRS PRN 30 Days


     AS NEEDED


Reported


Eliquis (Apixaban) 5 Mg Tablet 5 Mg PO BID


Toprol Xl (Metoprolol Succinate) 25 Mg Tab.er.24h 1 Tab PO DAILY 30 Days


Zocor (Simvastatin) 20 Mg Tablet 20 Mg PO HS


     NEXT DOSE DUE TONIGHT AT BEDTIME


Amlodipine Besylate 10 Mg Tablet 10 Mg PO DAILY


     NEXT DOSE DUE IN AM


Potassium Chloride 10 Meq Tab.er.prt 20 Meq PO DAILY


     NEXT DOSE DUE IN AM





Allergies


Allergies:  


Coded Allergies:  


     levofloxacin (Verified  Allergy, Unknown, Itching, 8/6/20)





ROS


General:  No: Chills, Night Sweats


PSYCHOLOGICAL ROS:  No: Hallucinations, Suicidal ideation


Eyes:  No Blurry vision, No Loss of vision


HEENT:  No: Nasal discharge, Sore Throat


ALLERGY AND IMMUNOLOGY:  No: Hives, Nasal Congestion


Hematological and Lymphatic:  No: Bleeding Problems, Brusing


Respiratory:  YES: Cough, Shortness of breath


Cardiovascular:  yes Orthopnea; 


   No Chest Pain


Gastrointestinal:  No Nausea, No Vomiting, No Abdominal Pain


Genitourinary:  No Dysuria, No Frequency


Musculoskeletal:  No Joint Stiffness, No Joint Swelling


Neurological:  No Headaches, No Numbness/Tingling


Skin:  No Dry Skin, No Rash





Physical Exam


General:  Alert, Oriented X3, Cooperative, mild distress


HEENT:  Atraumatic, EOMI


Lungs:  Other (bilateral wheezing, bibasilar rales)


Heart:  RRR, no rubs


Cardiovascular:  S1, S2


Abdomen:  Normal bowel sounds, Soft, No tenderness


Extremities:  Other (2+ bilateral leg edema)


Skin:  No breakdown, No significant lesion


Neuro:  Normal speech, Sensation intact


Psych/Mental Status:  Mental status NL, Mood NL





Vitals


Vitals





Vital Signs








  Date Time  Temp Pulse Resp B/P (MAP) Pulse Ox O2 Delivery O2 Flow Rate FiO2


 


9/6/20 16:45  72 22 138/92 (107) 98 BiPAP/CPAP  


 


9/6/20 16:30 98.3       





 98.3       











Labs


Labs





Laboratory Tests








Test


 9/6/20


10:55 9/6/20


13:08 9/6/20


16:30


 


White Blood Count


 6.7 x10^3/uL


(4.0-11.0) 


 





 


Red Blood Count


 4.12 x10^6/uL


(4.30-5.70) 


 





 


Hemoglobin


 12.4 g/dL


(13.0-17.5) 


 





 


Hematocrit


 38.4 %


(39.0-53.0) 


 





 


Mean Corpuscular Volume 93 fL ()   


 


Mean Corpuscular Hemoglobin 30 pg (25-35)   


 


Mean Corpuscular Hemoglobin


Concent 32 g/dL


(31-37) 


 





 


Red Cell Distribution Width


 16.3 %


(11.5-14.5) 


 





 


Platelet Count


 124 x10^3/uL


(140-400) 


 





 


Neutrophils (%) (Auto) 75 % (31-73)   


 


Lymphocytes (%) (Auto) 11 % (24-48)   


 


Monocytes (%) (Auto) 11 % (0-9)   


 


Eosinophils (%) (Auto) 2 % (0-3)   


 


Basophils (%) (Auto) 1 % (0-3)   


 


Neutrophils # (Auto)


 5.0 x10^3/uL


(1.8-7.7) 


 





 


Lymphocytes # (Auto)


 0.7 x10^3/uL


(1.0-4.8) 


 





 


Monocytes # (Auto)


 0.8 x10^3/uL


(0.0-1.1) 


 





 


Eosinophils # (Auto)


 0.1 x10^3/uL


(0.0-0.7) 


 





 


Basophils # (Auto)


 0.1 x10^3/uL


(0.0-0.2) 


 





 


Prothrombin Time


 14.2 SEC


(11.7-14.0) 


 





 


Prothromb Time International


Ratio 1.1 (0.8-1.1) 


 


 





 


Activated Partial


Thromboplast Time 30 SEC (24-38) 


 


 





 


Sodium Level


 140 mmol/L


(136-145) 


 





 


Potassium Level


 4.0 mmol/L


(3.5-5.1) 


 





 


Chloride Level


 101 mmol/L


() 


 





 


Carbon Dioxide Level


 33 mmol/L


(21-32) 


 





 


Anion Gap 6 (6-14)   


 


Blood Urea Nitrogen


 17 mg/dL


(8-26) 


 





 


Creatinine


 1.5 mg/dL


(0.7-1.3) 


 





 


Estimated GFR


(Cockcroft-Gault) 57.2 


 


 





 


BUN/Creatinine Ratio 11 (6-20)   


 


Glucose Level


 91 mg/dL


(70-99) 


 





 


Lactic Acid Level


 0.8 mmol/L


(0.4-2.0) 


 





 


Calcium Level


 9.7 mg/dL


(8.5-10.1) 


 





 


Total Bilirubin


 1.3 mg/dL


(0.2-1.0) 


 





 


Aspartate Amino Transf


(AST/SGOT) 14 U/L (15-37) 


 


 





 


Alanine Aminotransferase


(ALT/SGPT) 15 U/L (16-63) 


 


 





 


Alkaline Phosphatase


 70 U/L


() 


 





 


Creatine Kinase


 99 U/L


() 


 





 


Creatine Kinase MB (Mass)


 2.0 ng/mL


(0.0-3.6) 


 





 


Creatine Kinase MB Relative


Index 2.0 % (0-4) 


 


 





 


Troponin I Quantitative


 0.070 ng/mL


(0.000-0.055) 


 0.057 ng/mL


(0.000-0.055)


 


NT-Pro-B-Type Natriuretic


Peptide 6323 pg/mL


(0-124) 


 





 


Total Protein


 7.4 g/dL


(6.4-8.2) 


 





 


Albumin


 3.3 g/dL


(3.4-5.0) 


 





 


Albumin/Globulin Ratio 0.8 (1.0-1.7)   


 


Ethyl Alcohol Level


 < 10 mg/dL


(0-10) 


 





 


O2 Saturation  92 % (92-99)  


 


Arterial Blood pH


 


 7.33


(7.35-7.45) 





 


Arterial Blood pCO2 at


Patient Temp 


 56 mmHg


(35-46) 





 


Arterial Blood pO2 at Patient


Temp 


 66 mmHg


() 





 


Arterial Blood HCO3


 


 29 mmol/L


(21-28) 





 


Arterial Blood Base Excess


 


 2 mmol/L


(-3-3) 





 


Oxyhemoglobin  88.9 %  


 


Methemoglobin


 


 0.3 %


(0.0-1.9) 





 


Carbon Monoxide, Quantitative


 


 2.7 %


(0.0-1.9) 





 


FiO2  40  








Laboratory Tests








Test


 9/6/20


10:55 9/6/20


13:08 9/6/20


16:30


 


White Blood Count


 6.7 x10^3/uL


(4.0-11.0) 


 





 


Red Blood Count


 4.12 x10^6/uL


(4.30-5.70) 


 





 


Hemoglobin


 12.4 g/dL


(13.0-17.5) 


 





 


Hematocrit


 38.4 %


(39.0-53.0) 


 





 


Mean Corpuscular Volume 93 fL ()   


 


Mean Corpuscular Hemoglobin 30 pg (25-35)   


 


Mean Corpuscular Hemoglobin


Concent 32 g/dL


(31-37) 


 





 


Red Cell Distribution Width


 16.3 %


(11.5-14.5) 


 





 


Platelet Count


 124 x10^3/uL


(140-400) 


 





 


Neutrophils (%) (Auto) 75 % (31-73)   


 


Lymphocytes (%) (Auto) 11 % (24-48)   


 


Monocytes (%) (Auto) 11 % (0-9)   


 


Eosinophils (%) (Auto) 2 % (0-3)   


 


Basophils (%) (Auto) 1 % (0-3)   


 


Neutrophils # (Auto)


 5.0 x10^3/uL


(1.8-7.7) 


 





 


Lymphocytes # (Auto)


 0.7 x10^3/uL


(1.0-4.8) 


 





 


Monocytes # (Auto)


 0.8 x10^3/uL


(0.0-1.1) 


 





 


Eosinophils # (Auto)


 0.1 x10^3/uL


(0.0-0.7) 


 





 


Basophils # (Auto)


 0.1 x10^3/uL


(0.0-0.2) 


 





 


Prothrombin Time


 14.2 SEC


(11.7-14.0) 


 





 


Prothromb Time International


Ratio 1.1 (0.8-1.1) 


 


 





 


Activated Partial


Thromboplast Time 30 SEC (24-38) 


 


 





 


Sodium Level


 140 mmol/L


(136-145) 


 





 


Potassium Level


 4.0 mmol/L


(3.5-5.1) 


 





 


Chloride Level


 101 mmol/L


() 


 





 


Carbon Dioxide Level


 33 mmol/L


(21-32) 


 





 


Anion Gap 6 (6-14)   


 


Blood Urea Nitrogen


 17 mg/dL


(8-26) 


 





 


Creatinine


 1.5 mg/dL


(0.7-1.3) 


 





 


Estimated GFR


(Cockcroft-Gault) 57.2 


 


 





 


BUN/Creatinine Ratio 11 (6-20)   


 


Glucose Level


 91 mg/dL


(70-99) 


 





 


Lactic Acid Level


 0.8 mmol/L


(0.4-2.0) 


 





 


Calcium Level


 9.7 mg/dL


(8.5-10.1) 


 





 


Total Bilirubin


 1.3 mg/dL


(0.2-1.0) 


 





 


Aspartate Amino Transf


(AST/SGOT) 14 U/L (15-37) 


 


 





 


Alanine Aminotransferase


(ALT/SGPT) 15 U/L (16-63) 


 


 





 


Alkaline Phosphatase


 70 U/L


() 


 





 


Creatine Kinase


 99 U/L


() 


 





 


Creatine Kinase MB (Mass)


 2.0 ng/mL


(0.0-3.6) 


 





 


Creatine Kinase MB Relative


Index 2.0 % (0-4) 


 


 





 


Troponin I Quantitative


 0.070 ng/mL


(0.000-0.055) 


 0.057 ng/mL


(0.000-0.055)


 


NT-Pro-B-Type Natriuretic


Peptide 6323 pg/mL


(0-124) 


 





 


Total Protein


 7.4 g/dL


(6.4-8.2) 


 





 


Albumin


 3.3 g/dL


(3.4-5.0) 


 





 


Albumin/Globulin Ratio 0.8 (1.0-1.7)   


 


Ethyl Alcohol Level


 < 10 mg/dL


(0-10) 


 





 


O2 Saturation  92 % (92-99)  


 


Arterial Blood pH


 


 7.33


(7.35-7.45) 





 


Arterial Blood pCO2 at


Patient Temp 


 56 mmHg


(35-46) 





 


Arterial Blood pO2 at Patient


Temp 


 66 mmHg


() 





 


Arterial Blood HCO3


 


 29 mmol/L


(21-28) 





 


Arterial Blood Base Excess


 


 2 mmol/L


(-3-3) 





 


Oxyhemoglobin  88.9 %  


 


Methemoglobin


 


 0.3 %


(0.0-1.9) 





 


Carbon Monoxide, Quantitative


 


 2.7 %


(0.0-1.9) 





 


FiO2  40  











Images


Images


AP chest x-ray


 


HISTORY: Shortness of breath and cough.


 


COMPARISON: Chest x-ray August 10, 2020 and priors.


 


FINDINGS: 3-lead cardiac pacemaker. Cardiomegaly stable. Apical emphysema.


Prominence of the pulmonary vasculature particularly at the right lower 


hilum although this is similar to prior CT imaging. The right upper lobe 


fissural nodular density on the prior CT study is difficult to visualize 


on this x-ray. There are increased perihilar lower lobe interstitial and 


alveolar opacities since the prior exam. Limited visualization of the 


right diaphragm a small right pleural effusion is not excluded.


 


IMPRESSION: Right hilar and lower lobe interstitial and alveolar 


infiltrates have increased since prior x-rays from August 2020 may 


represent pulmonary edema or multilobar pneumonia. There may be a small 


right pleural effusion. See above.





VTE Prophylaxis Ordered


VTE Prophylaxis Devices:  No


VTE Pharmacological Prophylaxi:  Yes





Assessment/Plan


Assessment/Plan


Severe CHF Exacerbation


Severe COPD Exacerbation


Respiratory Failure with hypoxia and hypercapnia


Drug Abuse





A/P: BiPAP prn, Doxycycline 100 mg, SoluMedrol 40 mg q8hr, Albuterol HFA q2hr 

prn, DuoNeb q4 hr prn, LAsix 20 mg PO qd, cardiac monitor, I&O's. Echo pending. 

Resume home heart failure medication once stable. Consult to Cardiology and 

Pulmonology. Continue home Eliquis 5 mg bid. Counseled on tobacco and drug 

cessation. Full Code.





Justifications for Admission


Other Justification














LISS ANNA MD             Sep 6, 2020 17:20

## 2020-09-06 NOTE — PHYS DOC
Past Medical History


Past Medical History:  Alcoholism, CHF, COPD, Hypertension


Additional Past Medical Histor:  CRACK, COCAINE, MARIJUANA LAST USE ABOUT 3 DAYS

AGO


Past Surgical History:  Other


Additional Past Surgical Histo:  HERNIA SURGERY REPAIR.


Smoking Status:  Current Every Day Smoker


Alcohol Use:  Heavy


Additional Information:  


PER PATIENT DRINKS A SIX PACK OF BEER EVERY OTHER DAY.


Drug Use:  Cocaine, Marijuana





General Adult


EDM:


Chief Complaint:  SHORTNESS OF BREATH





HPI:


HPI:





Patient is a 65  year old [f__sex] who presents with []





Review of Systems:


Review of Systems:


Constitutional:   Denies fever or chills. []


Eyes:   Denies change in visual acuity. []


HENT:   Denies nasal congestion or sore throat. [] 


Respiratory:   Denies cough or shortness of breath. [] 


Cardiovascular:   Denies chest pain or edema. [] 


GI:   Denies abdominal pain, nausea, vomiting, bloody stools or diarrhea. [] 


:  Denies dysuria. [] 


Musculoskeletal:   Denies back pain or joint pain. [] 


Integument:   Denies rash. [] 


Neurologic:   Denies headache, focal weakness or sensory changes. [] 


Endocrine:   Denies polyuria or polydipsia. [] 


Lymphatic:  Denies swollen glands. [] 


Psychiatric:  Denies depression or anxiety. []





Heart Score:


Risk Factors:


Risk Factors:  DM, Current or recent (<one month) smoker, HTN, HLP, family 

history of CAD, obesity.


Risk Scores:


Score 0 - 3:  2.5% MACE over next 6 weeks - Discharge Home


Score 4 - 6:  20.3% MACE over next 6 weeks - Admit for Clinical Observation


Score 7 - 10:  72.7% MACE over next 6 weeks - Early Invasive Strategies





Current Medications:





Current Medications








 Medications


  (Trade)  Dose


 Ordered  Sig/Irvin  Start Time


 Stop Time Status Last Admin


Dose Admin


 


 Albuterol/


 Ipratropium


  (Duoneb)  3 ml  1X  ONCE  9/6/20 11:00


 9/6/20 11:03 DC  





 


 Dexamethasone


 Sodium Phosphate


  (Decadron)  10 mg  1X  ONCE  9/6/20 11:00


 9/6/20 11:03 DC  














Allergies:


Allergies:





Allergies








Coded Allergies Type Severity Reaction Last Updated Verified


 


  levofloxacin Allergy Unknown Itching 8/6/20 Yes











Physical Exam:


PE:





Constitutional: Well developed, well nourished, no acute distress, non-toxic 

appearance. []


HENT: Normocephalic, atraumatic, bilateral external ears normal, oropharynx 

moist, no oral exudates, nose normal. []


Eyes: PERRLA, EOMI, conjunctiva normal, no discharge. [] 


Neck: Normal range of motion, no tenderness, supple, no stridor. [] 


Cardiovascular:Heart rate regular rhythm, no murmur []


Lungs & Thorax:  Bilateral breath sounds clear to auscultation []


Abdomen: Bowel sounds normal, soft, no tenderness, no masses, no pulsatile mas

ses. [] 


Skin: Warm, dry, no erythema, no rash. [] 


Back: No tenderness, no CVA tenderness. [] 


Extremities: No tenderness, no cyanosis, no clubbing, ROM intact, no edema. [] 


Neurologic: Alert and oriented X 3, normal motor function, normal sensory 

function, no focal deficits noted. []


Psychologic: Affect normal, judgement normal, mood normal. []





Current Patient Data:


Labs:





                                Laboratory Tests








Test


 9/6/20


10:55


 


White Blood Count


 6.7 x10^3/uL


(4.0-11.0)


 


Red Blood Count


 4.12 x10^6/uL


(4.30-5.70)  L


 


Hemoglobin


 12.4 g/dL


(13.0-17.5)  L


 


Hematocrit


 38.4 %


(39.0-53.0)  L


 


Mean Corpuscular Volume


 93 fL ()





 


Mean Corpuscular Hemoglobin 30 pg (25-35)  


 


Mean Corpuscular Hemoglobin


Concent 32 g/dL


(31-37)


 


Red Cell Distribution Width


 16.3 %


(11.5-14.5)  H


 


Platelet Count


 124 x10^3/uL


(140-400)  L


 


Neutrophils (%) (Auto) 75 % (31-73)  H


 


Lymphocytes (%) (Auto) 11 % (24-48)  L


 


Monocytes (%) (Auto) 11 % (0-9)  H


 


Eosinophils (%) (Auto) 2 % (0-3)  


 


Basophils (%) (Auto) 1 % (0-3)  


 


Neutrophils # (Auto)


 5.0 x10^3/uL


(1.8-7.7)


 


Lymphocytes # (Auto)


 0.7 x10^3/uL


(1.0-4.8)  L


 


Monocytes # (Auto)


 0.8 x10^3/uL


(0.0-1.1)


 


Eosinophils # (Auto)


 0.1 x10^3/uL


(0.0-0.7)


 


Basophils # (Auto)


 0.1 x10^3/uL


(0.0-0.2)


 


Prothrombin Time


 14.2 SEC


(11.7-14.0)  H


 


Prothrombin Time INR 1.1 (0.8-1.1)  


 


Activated Partial


Thromboplast Time 30 SEC (24-38)





 


Sodium Level


 140 mmol/L


(136-145)


 


Potassium Level


 4.0 mmol/L


(3.5-5.1)


 


Chloride Level


 101 mmol/L


()


 


Carbon Dioxide Level


 33 mmol/L


(21-32)  H


 


Anion Gap 6 (6-14)  


 


Blood Urea Nitrogen


 17 mg/dL


(8-26)


 


Creatinine


 1.5 mg/dL


(0.7-1.3)  H


 


Estimated GFR


(Cockcroft-Gault) 57.2  





 


BUN/Creatinine Ratio 11 (6-20)  


 


Glucose Level


 91 mg/dL


(70-99)


 


Calcium Level


 9.7 mg/dL


(8.5-10.1)


 


Total Bilirubin


 1.3 mg/dL


(0.2-1.0)  H


 


Aspartate Amino Transferase


(AST) 14 U/L (15-37)


L


 


Alanine Aminotransferase (ALT)


 15 U/L (16-63)


L


 


Alkaline Phosphatase


 70 U/L


()


 


Total Protein


 7.4 g/dL


(6.4-8.2)


 


Albumin


 3.3 g/dL


(3.4-5.0)  L


 


Albumin/Globulin Ratio


 0.8 (1.0-1.7)


L


 


Ethyl Alcohol Level


 < 10 mg/dL


(0-10)





                                Laboratory Tests


9/6/20 10:55








                                Laboratory Tests


9/6/20 10:55








Vital Signs:





                                   Vital Signs








  Date Time  Temp Pulse Resp B/P (MAP) Pulse Ox O2 Delivery O2 Flow Rate FiO2


 


9/6/20 11:13     93 BiPAP/CPAP  


 


9/6/20 11:07 99.0 67 26 163/106 (125)    





 99.0       











EKG:


EKG:


@1057 Paced at 77bpm,





Radiology/Procedures:


Radiology/Procedures:


PROCEDURE: PORTABLE CHEST 1V





AP chest x-ray


 


HISTORY: Shortness of breath and cough.


 


COMPARISON: Chest x-ray August 10, 2020 and priors.


 


FINDINGS: 3-lead cardiac pacemaker. Cardiomegaly stable. Apical emphysema.


Prominence of the pulmonary vasculature particularly at the right lower 


hilum although this is similar to prior CT imaging. The right upper lobe 


fissural nodular density on the prior CT study is difficult to visualize 


on this x-ray. There are increased perihilar lower lobe interstitial and 


alveolar opacities since the prior exam. Limited visualization of the 


right diaphragm a small right pleural effusion is not excluded.


 


IMPRESSION: Right hilar and lower lobe interstitial and alveolar 


infiltrates have increased since prior x-rays from August 2020 may 


represent pulmonary edema or multilobar pneumonia. There may be a small 


right pleural effusion. See above.


 


Electronically signed by: Bill Davies MD (9/6/2020 11:22 AM) 


[id number]





Course & Med Decision Making:


Course & Med Decision Making


Pertinent Labs and Imaging studies reviewed. (See chart for details)





[]





Dragon Disclaimer:


Dragon Disclaimer:


This electronic medical record was generated, in whole or in part, using a voice

 recognition dictation system.





Departure


Departure


Impression:  


   Primary Impression:  


   Acute respiratory failure with hypoxia


   Additional Impressions:  


   Cocaine abuse


   CHF exacerbation


   Qualified Codes:  I50.9 - Heart failure, unspecified


   Suspected 2019 novel coronavirus infection


Disposition:  09 ADMITTED AS INPATIENT


Admitting Physician:  HIMS ([place])


Condition:  GUARDED


Referrals:  


HIEN ALFONSO (PCP)





Justicifation of Admission Dx:


Justifications for Admission:


Justification of Admission Dx:  Yes


Respiratory Failure:  Severe Resp Distress


Comments:


Respiratory Failure, CHF, Hypoxia, COVID PUI, Cocaine abuse





COVID-19 Assessment:


COVID-19 Patient Risks:


Age 65 or older:  No


Sign of co-morbidity:  Yes


Exp to person + for COVID:  No


Exp to PUI:  No


Travel from affected area:  No


Lower respiratory symptoms:  Yes


Fever:  No





PPE Use:


Full PPE with N95 mask or PAPR:  Yes





Critical Care Time


Critical care time was 30 minutes which includes time at bedside, spent in 

discussion of patient's care with specialists and/or family members, with 

interpretation of laboratory and/or radiological studies and is exclusive of 

procedures.











AIDAN RODRIGUEZ DO              Sep 6, 2020 11:31

## 2020-09-06 NOTE — EKG
[facility]

              [address] [place] [address]

Test Date:    2020               Test Time:    10:57:32

Pat Name:     CESAR BALDERAS            Department:   

Patient ID:   [id number]           Room:          

Gender:       M                        Technician:   

:          [id number]               Requested By: AIDAN RODRIGUEZ

Order Number: [id number]           Reading MD:   Armando Melgoza MD

                                 Measurements

Intervals                              Axis          

Rate:         77                       P:            90

GA:           116                      QRS:          234

QRSD:         186                      T:            86

QT:           450                                    

QTc:          511                                    

                           Interpretive Statements

SINUS RHYTHM

V-PACED

PVC'S

Electronically Signed On 2020 9:19:55 CDT by Armando Melgoza MD

## 2020-09-07 VITALS — SYSTOLIC BLOOD PRESSURE: 143 MMHG | DIASTOLIC BLOOD PRESSURE: 85 MMHG

## 2020-09-07 VITALS — DIASTOLIC BLOOD PRESSURE: 100 MMHG | SYSTOLIC BLOOD PRESSURE: 152 MMHG

## 2020-09-07 VITALS — SYSTOLIC BLOOD PRESSURE: 122 MMHG | DIASTOLIC BLOOD PRESSURE: 47 MMHG

## 2020-09-07 VITALS — SYSTOLIC BLOOD PRESSURE: 97 MMHG | DIASTOLIC BLOOD PRESSURE: 60 MMHG

## 2020-09-07 VITALS — SYSTOLIC BLOOD PRESSURE: 134 MMHG | DIASTOLIC BLOOD PRESSURE: 89 MMHG

## 2020-09-07 VITALS — DIASTOLIC BLOOD PRESSURE: 76 MMHG | SYSTOLIC BLOOD PRESSURE: 158 MMHG

## 2020-09-07 VITALS — DIASTOLIC BLOOD PRESSURE: 74 MMHG | SYSTOLIC BLOOD PRESSURE: 126 MMHG

## 2020-09-07 VITALS — DIASTOLIC BLOOD PRESSURE: 82 MMHG | SYSTOLIC BLOOD PRESSURE: 138 MMHG

## 2020-09-07 VITALS — SYSTOLIC BLOOD PRESSURE: 167 MMHG | DIASTOLIC BLOOD PRESSURE: 91 MMHG

## 2020-09-07 VITALS — DIASTOLIC BLOOD PRESSURE: 87 MMHG | SYSTOLIC BLOOD PRESSURE: 109 MMHG

## 2020-09-07 VITALS — SYSTOLIC BLOOD PRESSURE: 135 MMHG | DIASTOLIC BLOOD PRESSURE: 79 MMHG

## 2020-09-07 VITALS — DIASTOLIC BLOOD PRESSURE: 91 MMHG | SYSTOLIC BLOOD PRESSURE: 142 MMHG

## 2020-09-07 VITALS — SYSTOLIC BLOOD PRESSURE: 169 MMHG | DIASTOLIC BLOOD PRESSURE: 90 MMHG

## 2020-09-07 VITALS — SYSTOLIC BLOOD PRESSURE: 119 MMHG | DIASTOLIC BLOOD PRESSURE: 69 MMHG

## 2020-09-07 VITALS — SYSTOLIC BLOOD PRESSURE: 129 MMHG | DIASTOLIC BLOOD PRESSURE: 82 MMHG

## 2020-09-07 VITALS — SYSTOLIC BLOOD PRESSURE: 139 MMHG | DIASTOLIC BLOOD PRESSURE: 97 MMHG

## 2020-09-07 VITALS — DIASTOLIC BLOOD PRESSURE: 71 MMHG | SYSTOLIC BLOOD PRESSURE: 119 MMHG

## 2020-09-07 VITALS — DIASTOLIC BLOOD PRESSURE: 72 MMHG | SYSTOLIC BLOOD PRESSURE: 113 MMHG

## 2020-09-07 VITALS — SYSTOLIC BLOOD PRESSURE: 142 MMHG | DIASTOLIC BLOOD PRESSURE: 83 MMHG

## 2020-09-07 VITALS — DIASTOLIC BLOOD PRESSURE: 72 MMHG | SYSTOLIC BLOOD PRESSURE: 114 MMHG

## 2020-09-07 VITALS — DIASTOLIC BLOOD PRESSURE: 89 MMHG | SYSTOLIC BLOOD PRESSURE: 128 MMHG

## 2020-09-07 LAB
BASE EXCESS ABG: 2 MMOL/L (ref -3–3)
CHOLEST SERPL-MCNC: 156 MG/DL (ref 0–200)
CHOLEST/HDLC SERPL: 2.3 {RATIO}
HCO3 BLDA-SCNC: 29 MMOL/L (ref 21–28)
HDLC SERPL-MCNC: 67 MG/DL (ref 40–60)
INSPIRATION SETTING TIME VENT: 40
LDLC: 82 MG/DL (ref 0–100)
PCO2 BLDA: 59 MMHG (ref 35–46)
PO2 BLDA: 69 MMHG (ref 65–108)
SAO2 % BLDA: 93 % (ref 92–99)
TRIGL SERPL-MCNC: 34 MG/DL (ref 0–150)
VLDLC: 7 MG/DL (ref 0–40)

## 2020-09-07 PROCEDURE — 5A09357 ASSISTANCE WITH RESPIRATORY VENTILATION, LESS THAN 24 CONSECUTIVE HOURS, CONTINUOUS POSITIVE AIRWAY PRESSURE: ICD-10-PCS | Performed by: STUDENT IN AN ORGANIZED HEALTH CARE EDUCATION/TRAINING PROGRAM

## 2020-09-07 RX ADMIN — DOXYCYCLINE HYCLATE SCH MG: 100 TABLET, COATED ORAL at 17:52

## 2020-09-07 RX ADMIN — METHYLPREDNISOLONE SODIUM SUCCINATE SCH MG: 40 INJECTION, POWDER, FOR SOLUTION INTRAMUSCULAR; INTRAVENOUS at 21:53

## 2020-09-07 RX ADMIN — NICOTINE SCH PATCH: 21 PATCH, EXTENDED RELEASE TOPICAL at 08:43

## 2020-09-07 RX ADMIN — LISINOPRIL SCH MG: 20 TABLET ORAL at 13:56

## 2020-09-07 RX ADMIN — IPRATROPIUM BROMIDE AND ALBUTEROL SULFATE SCH ML: .5; 3 SOLUTION RESPIRATORY (INHALATION) at 20:00

## 2020-09-07 RX ADMIN — Medication SCH CAP: at 21:52

## 2020-09-07 RX ADMIN — DOXYCYCLINE HYCLATE SCH MG: 100 TABLET, COATED ORAL at 08:41

## 2020-09-07 RX ADMIN — APIXABAN SCH MG: 5 TABLET, FILM COATED ORAL at 21:52

## 2020-09-07 RX ADMIN — METHYLPREDNISOLONE SODIUM SUCCINATE SCH MG: 40 INJECTION, POWDER, FOR SOLUTION INTRAMUSCULAR; INTRAVENOUS at 06:18

## 2020-09-07 RX ADMIN — IPRATROPIUM BROMIDE AND ALBUTEROL SULFATE SCH ML: .5; 3 SOLUTION RESPIRATORY (INHALATION) at 16:00

## 2020-09-07 RX ADMIN — FUROSEMIDE SCH MG: 20 TABLET ORAL at 08:43

## 2020-09-07 RX ADMIN — METOPROLOL SUCCINATE SCH MG: 25 TABLET, EXTENDED RELEASE ORAL at 08:47

## 2020-09-07 RX ADMIN — Medication SCH CAP: at 08:54

## 2020-09-07 RX ADMIN — METHYLPREDNISOLONE SODIUM SUCCINATE SCH MG: 40 INJECTION, POWDER, FOR SOLUTION INTRAMUSCULAR; INTRAVENOUS at 13:53

## 2020-09-07 RX ADMIN — APIXABAN SCH MG: 5 TABLET, FILM COATED ORAL at 08:41

## 2020-09-07 NOTE — PDOC2
CARDIOLOGY CONSULT NOTE


DATE OF SERVICE:


DATE: 9/7/20 


TIME: 09:20





CHIEF COMPLAINT:


Shortness of breath





HPI:


Patient is a 65-year-old male who comes into the hospital in the setting of 

worsening respiratory failure.  He unfortunately has history of polysubstance 

abuse.  In the setting after smoking crack cocaine he began to develop 

respiratory distress.  He has been admitted and has been initiated on BiPAP 

therapy.  He has a longstanding history of nonischemic cardiomyopathy status 

post biventricular pacemaker for a left bundle branch block.  He also has 

underlying history of atrial fibrillation.





PMHX:


1.  Nonischemic cardiomyopathy, status post by V ICD


2.  Hypertension


3.  Atrial fibrillation, paroxysmal on anticoagulation


4.  Polysubstance abuse





SOCHX:


As noted above





FAMHX:


Noncontributory





CURRENT MEDS:





Current Medications








 Medications


  (Trade)  Dose


 Ordered  Sig/Irvin


 Route


 PRN Reason  Start Time


 Stop Time Status Last Admin


Dose Admin


 


 Albuterol/


 Ipratropium


  (Duoneb)  3 ml  1X  ONCE


 NEB


   9/6/20 11:00


 9/6/20 11:03 DC 9/6/20 11:32





 


 Dexamethasone


 Sodium Phosphate


  (Decadron)  10 mg  1X  ONCE


 IVP


   9/6/20 11:00


 9/6/20 11:03 DC 9/6/20 13:33





 


 Bumetanide


  (Bumex)  1 mg  1X  ONCE


 IV


   9/6/20 12:45


 9/6/20 12:46 DC 9/6/20 13:33





 


 Ondansetron HCl


  (Zofran)  4 mg  PRN Q8HRS  PRN


 IV


 NAUSEA/VOMITING  9/6/20 13:15


 9/7/20 13:14  9/6/20 13:32





 


 Methylprednisolone


 Sodium Succinate


  (SOLU-Medrol


 40MG VIAL)  40 mg  Q8HRS


 IV


   9/6/20 17:00


    9/7/20 06:18





 


 Bumetanide


  (Bumex)  1 mg  1X  ONCE


 IV


   9/6/20 20:00


 9/6/20 20:01 DC 9/6/20 19:46





 


 Amlodipine


 Besylate


  (Norvasc)  10 mg  DAILY


 PO


   9/7/20 09:00


    9/7/20 08:43





 


 Apixaban


  (Eliquis)  5 mg  BID


 PO


   9/7/20 09:00


    9/7/20 08:41





 


 Furosemide


  (Lasix)  20 mg  DAILY


 PO


   9/7/20 09:00


    9/7/20 08:43





 


 Metoprolol


 Succinate


  (Toprol Xl)  25 mg  DAILY


 PO


   9/7/20 09:00


    9/7/20 08:47





 


 Nicotine


  (Nicoderm Cq


 21mg)  1 patch  DAILY


 TD


   9/7/20 09:00


    9/7/20 08:43





 


 Doxycycline


 Hyclate


  (Vibra-Tab)  100 mg  BIDBFRMEAL


 PO


   9/7/20 07:30


 9/12/20 07:29  9/7/20 08:41





 


 Lactobacillus


 Rhamnosus


  (Culturelle)  1 cap  BID


 PO


   9/7/20 09:00


    9/7/20 08:54





 


 Info


  (Anti-Coagulation


 Monitoring By


 Pharmacy)  1 each  PRN DAILY  PRN


 [facility]


 SEE COMMENTS  9/7/20 08:45


    9/7/20 08:45














ALLERGIES:





Allergies








Coded Allergies Type Severity Reaction Last Updated Verified


 


  levofloxacin Allergy Unknown Itching 8/6/20 Yes











ROS:


Negative unless otherwise mentioned above in HPI





PHYSICAL EXAM:


Vital Signs/I&O:





                                   Vital Signs








  Date Time  Temp Pulse Resp B/P (MAP) Pulse Ox O2 Delivery O2 Flow Rate FiO2


 


9/7/20 08:47  86  139/84    


 


9/7/20 07:25     100 BiPAP/CPAP  


 


9/7/20 06:00   24     


 


9/7/20 04:00       6.0 


 


9/7/20 04:00 98.1       





 98.1       














                                    I & O   


 


 9/6/20 9/6/20 9/7/20





 15:00 23:00 07:00


 


Intake Total  480 ml 400 ml


 


Output Total  1200 ml 400 ml


 


Balance  -720 ml 0 ml








Physical Exam:


GEN.:    Moderate distress due to dyspnea


HEENT:    Head is normocephalic, atraumatic


NECK:    Supple.  


LUNGS:    Bilateral rhonchi


HEART:    RRR, S1, S2 present.  Peripheral pulses intact


ABDOMEN:    Soft, nontender.  Positive bowel sounds.


EXTREMITIES:    2+ edema


SKIN:   No ulcerations





DIAGNOSTIC TESTING:


Labs reviewed notable for creatinine of 1.5


EKG reviewed and presents with sinus rhythm and PVCs and biventricular pacing





ASSESSMENT:


1.  Acute on chronic respiratory failure, multifactorial secondary to AECOPD, 

pneumonia, CHF, negative for covid. 


2.  Acute on chronic systolic/diastolic CHF, 


3.  SSS/ NICM s/p BiV PPM/CRT-P (Biotronik); prior LVEF 40% recent device check 

revealed 25% AFIB burden, normal function, BiV pacing 100%. 


5.  PAFIB; presently V pacing. 


6.  HTN: controlled


7.  CKD3


8.  Chronic Substance abuse; cocaine and marijuana use.


9.  Tobaccoism


10. NSTEMI - Type 2.





PLAN:


1. Continue home asa, eliquis, statin therapy


2. Continue b-blocker. 


3. Hold lisinopril given acute renal injury, continue amlodipine


4. IV diuresis for decompensation. 


5. Supportive care for type 2 NSTEMI. 


6. Consider outpt echocardiogram





Supportive care. Thanks











ALEXANDRA DAWKINS MD        Sep 7, 2020 09:25

## 2020-09-07 NOTE — CONS
DATE OF CONSULTATION:  



PULMONARY CONSULTATION



ATTENDING PHYSICIAN:  Dr. Sotero Hamilton.



REASON FOR CONSULTATION:  Respiratory failure.



HISTORY OF PRESENT ILLNESS:  The patient is very well known to us.  He is a

65-year-old male who has history of chronic hypoxic respiratory failure, on home

oxygen at 6 liters.  He has a history of COPD, history of cardiomyopathy with an

EF of 40% and recurrent admissions for congestive heart failure.  He is

noncompliant.  He continues to smoke cigarettes and continues to do crack

cocaine.  He was brought into the hospital with complaint of shortness of

breath.  He had a mild cough.  No fever, no chills, no chest pains.  No

increased lower extremity edema.



Chest x-ray was reviewed and it showed more increase in interstitial markings

consistent with CHF.  There is a small right pleural effusion.  The patient's

arterial blood gases were abnormal on admission.  The pH of 7.33, pCO2 of 56 and

a pO2 of 66 on 40% FiO2.  The pH of 7.31, pCO2 of 59 and pO2 of 69 on 40% FiO2

this morning.  He is awake, following commands.  I have been asked to see him

for further evaluation.



PAST MEDICAL HISTORY:  History of congestive heart failure, history of

cardiomyopathy with an EF of 40%, history of COPD, history of ongoing tobaccoism

and cocaine use, hyperlipidemia and chronic renal insufficiency.



PAST SURGICAL HISTORY:  Appendectomy and hernia repair.



FAMILY HISTORY:  Coronary artery disease.



SOCIAL HISTORY:  Smoker 1 pack per day for 30+ years.  Continues to do alcohol,

marijuana and cocaine.



REVIEW OF SYSTEMS:  Ten-point system obtained.  Pertinent positives discussed in

my history of present illness, otherwise noncontributory.  All systems that were

negative were reviewed as well.



ALLERGIES:  LEVAQUIN.



MEDICATIONS:  Reviewed as listed in the MRAD including furosemide and IV

Solu-Medrol and doxycycline.



PHYSICAL EXAMINATION:  On examination, which was done visually due to COVID

suspicion.  He is in no obvious respiratory distress.  He is currently on nasal

cannula.  Saturations are in the mid 90s.  He is afebrile.  Pulse ox 97%.  He is

in no obvious respiratory distress.  No paradoxical breathing.  Visual exam

done.  No rash or edema.



LABORATORY DATA:  Reviewed.  ABGs as discussed in my history of present illness.

 His BUN is 17, creatinine 1.5.  ProBNP 6323.  Troponin is 0.07.  Albumin is

3.3.  Urine drug screen negative for alcohol.  Other drugs were not checked. 

White cell count 6.7.



IMPRESSION:

1.  Acute on chronic hypoxic and hypercapnic respiratory failure secondary to

acute on chronic systolic heart failure in addition to acute exacerbation of

chronic obstructive pulmonary disease.

2.  The patient with ongoing tobaccoism and cocaine use.  The congestive heart

failure may have been triggered by cocaine as well.

3.  Underlying chronic obstructive pulmonary disease, on home oxygen at 6 liters

on a 24-hour basis.

4.  Cardiomyopathy with an ejection fraction of 40%.

5.  Chronic mild chronic kidney disease.

6.  Mildly increased troponin level consistent with congestive heart failure.



RECOMMENDATIONS:

1.  Continue present nasal cannula with baseline at 6 liters and BiPAP at

bedtime.

2.  Aggressive diuresis.

3.  IV Solu-Medrol.

4.  Eliquis per PCP.

5.  Empiric antibiotics.

6.  Nebulizers.

7.  Rule out COVID.

8.  He was again counseled regarding cocaine cessation and tobacco cessation,

but he is noncompliant.

9.  We will follow along with you.



Discussed with RN and RT.  Labs are reviewed.  Imaging studies reviewed. 

Critical care time 36 minutes.

 



______________________________

JESSE DOVE MD DR:  TYSON/coby  JOB#:  [id number] / [id number]

DD:  09/07/2020 09:41  DT:  09/07/2020 10:22

## 2020-09-07 NOTE — PDOC
PROGRESS NOTES


Date of Service:


DATE: 9/7/20 


TIME: 09:30





Chief Complaint


Chief Complaint


Non ischemic cardiomyopathy


Severe CHF Exacerbation


Severe COPD Exacerbation


Respiratory Failure with hypoxia and hypercapnia


Drug Abuse (crack cocaine)


SSS/ NICM s/p BiV PPM/CRT-P (Biotronik); prior LVEF 40% recent device check 

revealed 25% AFIB burden, normal function, BiV pacing 100%. 





Plan:


Continue to provide supportive measures


May continue aspirin Eliquis and statin therapy


Continue beta-blocker


Continue amlodipine but hold lisinopril


Supportive care for demand ischemia secondary to cocaine abuse





History of Present Illness


History of Present Illness


9/7/2020


Patient laying in bed in no acute distress.  Patient denies any chest pain or 

shortness of breath has been reported


We will follow recommendations for close welted


No longer requiring BiPAP at the present time


Hopefully move to a regular floor today





Vitals


Vitals





Vital Signs








  Date Time  Temp Pulse Resp B/P (MAP) Pulse Ox O2 Delivery O2 Flow Rate FiO2


 


9/7/20 08:47  86  139/84    


 


9/7/20 07:25     100 BiPAP/CPAP  


 


9/7/20 06:00   24     


 


9/7/20 04:00       6.0 


 


9/7/20 04:00 98.1       





 98.1       











Physical Exam


General:  Alert, Oriented X3, Cooperative, mild distress


Lungs:  Clear, Other


Abdomen:  Normal bowel sounds, Soft, No tenderness


Extremities:  Other (2+ bilateral leg edema)


Skin:  No breakdown, No significant lesion





Labs


LABS





Laboratory Tests








Test


 9/6/20


10:55 9/6/20


13:08 9/6/20


16:30 9/6/20


19:45


 


White Blood Count


 6.7 x10^3/uL


(4.0-11.0) 


 


 





 


Red Blood Count


 4.12 x10^6/uL


(4.30-5.70) 


 


 





 


Hemoglobin


 12.4 g/dL


(13.0-17.5) 


 


 





 


Hematocrit


 38.4 %


(39.0-53.0) 


 


 





 


Mean Corpuscular Volume 93 fL ()    


 


Mean Corpuscular Hemoglobin 30 pg (25-35)    


 


Mean Corpuscular Hemoglobin


Concent 32 g/dL


(31-37) 


 


 





 


Red Cell Distribution Width


 16.3 %


(11.5-14.5) 


 


 





 


Platelet Count


 124 x10^3/uL


(140-400) 


 


 





 


Neutrophils (%) (Auto) 75 % (31-73)    


 


Lymphocytes (%) (Auto) 11 % (24-48)    


 


Monocytes (%) (Auto) 11 % (0-9)    


 


Eosinophils (%) (Auto) 2 % (0-3)    


 


Basophils (%) (Auto) 1 % (0-3)    


 


Neutrophils # (Auto)


 5.0 x10^3/uL


(1.8-7.7) 


 


 





 


Lymphocytes # (Auto)


 0.7 x10^3/uL


(1.0-4.8) 


 


 





 


Monocytes # (Auto)


 0.8 x10^3/uL


(0.0-1.1) 


 


 





 


Eosinophils # (Auto)


 0.1 x10^3/uL


(0.0-0.7) 


 


 





 


Basophils # (Auto)


 0.1 x10^3/uL


(0.0-0.2) 


 


 





 


Prothrombin Time


 14.2 SEC


(11.7-14.0) 


 


 





 


Prothromb Time International


Ratio 1.1 (0.8-1.1) 


 


 


 





 


Activated Partial


Thromboplast Time 30 SEC (24-38) 


 


 


 





 


Sodium Level


 140 mmol/L


(136-145) 


 


 





 


Potassium Level


 4.0 mmol/L


(3.5-5.1) 


 


 





 


Chloride Level


 101 mmol/L


() 


 


 





 


Carbon Dioxide Level


 33 mmol/L


(21-32) 


 


 





 


Anion Gap 6 (6-14)    


 


Blood Urea Nitrogen


 17 mg/dL


(8-26) 


 


 





 


Creatinine


 1.5 mg/dL


(0.7-1.3) 


 


 





 


Estimated GFR


(Cockcroft-Gault) 57.2 


 


 


 





 


BUN/Creatinine Ratio 11 (6-20)    


 


Glucose Level


 91 mg/dL


(70-99) 


 


 





 


Lactic Acid Level


 0.8 mmol/L


(0.4-2.0) 


 


 





 


Calcium Level


 9.7 mg/dL


(8.5-10.1) 


 


 





 


Total Bilirubin


 1.3 mg/dL


(0.2-1.0) 


 


 





 


Aspartate Amino Transf


(AST/SGOT) 14 U/L (15-37) 


 


 


 





 


Alanine Aminotransferase


(ALT/SGPT) 15 U/L (16-63) 


 


 


 





 


Alkaline Phosphatase


 70 U/L


() 


 


 





 


Creatine Kinase


 99 U/L


() 


 


 





 


Creatine Kinase MB (Mass)


 2.0 ng/mL


(0.0-3.6) 


 


 





 


Creatine Kinase MB Relative


Index 2.0 % (0-4) 


 


 


 





 


Troponin I Quantitative


 0.070 ng/mL


(0.000-0.055) 


 0.057 ng/mL


(0.000-0.055) 0.056 ng/mL


(0.000-0.055)


 


NT-Pro-B-Type Natriuretic


Peptide 6323 pg/mL


(0-124) 


 


 





 


Total Protein


 7.4 g/dL


(6.4-8.2) 


 


 





 


Albumin


 3.3 g/dL


(3.4-5.0) 


 


 





 


Albumin/Globulin Ratio 0.8 (1.0-1.7)    


 


Ethyl Alcohol Level


 < 10 mg/dL


(0-10) 


 


 





 


O2 Saturation  92 % (92-99)   


 


Arterial Blood pH


 


 7.33


(7.35-7.45) 


 





 


Arterial Blood pCO2 at


Patient Temp 


 56 mmHg


(35-46) 


 





 


Arterial Blood pO2 at Patient


Temp 


 66 mmHg


() 


 





 


Arterial Blood HCO3


 


 29 mmol/L


(21-28) 


 





 


Arterial Blood Base Excess


 


 2 mmol/L


(-3-3) 


 





 


Oxyhemoglobin  88.9 %   


 


Methemoglobin


 


 0.3 %


(0.0-1.9) 


 





 


Carbon Monoxide, Quantitative


 


 2.7 %


(0.0-1.9) 


 





 


FiO2  40   


 


Test


 9/6/20


21:33 9/7/20


04:30 9/7/20


07:25 





 


Glucose (Fingerstick)


 208 mg/dL


(70-99) 


 


 





 


Triglycerides Level


 


 34 mg/dL


(0-150) 


 





 


Cholesterol Level


 


 156 mg/dL


(0-200) 


 





 


LDL Cholesterol, Calculated


 


 82 mg/dL


(0-100) 


 





 


VLDL Cholesterol, Calculated  7 mg/dL (0-40)   


 


Non-HDL Cholesterol Calculated


 


 89 mg/dL


(0-129) 


 





 


HDL Cholesterol


 


 67 mg/dL


(40-60) 


 





 


Cholesterol/HDL Ratio  2.3   


 


O2 Saturation   93 % (92-99)  


 


Arterial Blood pH


 


 


 7.31


(7.35-7.45) 





 


Arterial Blood pCO2 at


Patient Temp 


 


 59 mmHg


(35-46) 





 


Arterial Blood pO2 at Patient


Temp 


 


 69 mmHg


() 





 


Arterial Blood HCO3


 


 


 29 mmol/L


(21-28) 





 


Arterial Blood Base Excess


 


 


 2 mmol/L


(-3-3) 





 


FiO2   40  











Assessment and Plan


Assessmemt and Plan


Problems


Medical Problems:


(1) Acute on chronic combined systolic and diastolic heart failure


Status: Acute  





(2) Acute respiratory failure with hypoxia


Status: Acute  





(3) CHF exacerbation


Status: Acute  





(4) Suspected 2019 novel coronavirus infection


Status: Acute  











Comment


Review of Relevant


I have reviewed the following items brittany (where applicable) has been applied.


Labs





Laboratory Tests








Test


 9/6/20


10:55 9/6/20


13:08 9/6/20


16:30 9/6/20


19:45


 


White Blood Count


 6.7 x10^3/uL


(4.0-11.0) 


 


 





 


Red Blood Count


 4.12 x10^6/uL


(4.30-5.70) 


 


 





 


Hemoglobin


 12.4 g/dL


(13.0-17.5) 


 


 





 


Hematocrit


 38.4 %


(39.0-53.0) 


 


 





 


Mean Corpuscular Volume 93 fL ()    


 


Mean Corpuscular Hemoglobin 30 pg (25-35)    


 


Mean Corpuscular Hemoglobin


Concent 32 g/dL


(31-37) 


 


 





 


Red Cell Distribution Width


 16.3 %


(11.5-14.5) 


 


 





 


Platelet Count


 124 x10^3/uL


(140-400) 


 


 





 


Neutrophils (%) (Auto) 75 % (31-73)    


 


Lymphocytes (%) (Auto) 11 % (24-48)    


 


Monocytes (%) (Auto) 11 % (0-9)    


 


Eosinophils (%) (Auto) 2 % (0-3)    


 


Basophils (%) (Auto) 1 % (0-3)    


 


Neutrophils # (Auto)


 5.0 x10^3/uL


(1.8-7.7) 


 


 





 


Lymphocytes # (Auto)


 0.7 x10^3/uL


(1.0-4.8) 


 


 





 


Monocytes # (Auto)


 0.8 x10^3/uL


(0.0-1.1) 


 


 





 


Eosinophils # (Auto)


 0.1 x10^3/uL


(0.0-0.7) 


 


 





 


Basophils # (Auto)


 0.1 x10^3/uL


(0.0-0.2) 


 


 





 


Prothrombin Time


 14.2 SEC


(11.7-14.0) 


 


 





 


Prothromb Time International


Ratio 1.1 (0.8-1.1) 


 


 


 





 


Activated Partial


Thromboplast Time 30 SEC (24-38) 


 


 


 





 


Sodium Level


 140 mmol/L


(136-145) 


 


 





 


Potassium Level


 4.0 mmol/L


(3.5-5.1) 


 


 





 


Chloride Level


 101 mmol/L


() 


 


 





 


Carbon Dioxide Level


 33 mmol/L


(21-32) 


 


 





 


Anion Gap 6 (6-14)    


 


Blood Urea Nitrogen


 17 mg/dL


(8-26) 


 


 





 


Creatinine


 1.5 mg/dL


(0.7-1.3) 


 


 





 


Estimated GFR


(Cockcroft-Gault) 57.2 


 


 


 





 


BUN/Creatinine Ratio 11 (6-20)    


 


Glucose Level


 91 mg/dL


(70-99) 


 


 





 


Lactic Acid Level


 0.8 mmol/L


(0.4-2.0) 


 


 





 


Calcium Level


 9.7 mg/dL


(8.5-10.1) 


 


 





 


Total Bilirubin


 1.3 mg/dL


(0.2-1.0) 


 


 





 


Aspartate Amino Transf


(AST/SGOT) 14 U/L (15-37) 


 


 


 





 


Alanine Aminotransferase


(ALT/SGPT) 15 U/L (16-63) 


 


 


 





 


Alkaline Phosphatase


 70 U/L


() 


 


 





 


Creatine Kinase


 99 U/L


() 


 


 





 


Creatine Kinase MB (Mass)


 2.0 ng/mL


(0.0-3.6) 


 


 





 


Creatine Kinase MB Relative


Index 2.0 % (0-4) 


 


 


 





 


Troponin I Quantitative


 0.070 ng/mL


(0.000-0.055) 


 0.057 ng/mL


(0.000-0.055) 0.056 ng/mL


(0.000-0.055)


 


NT-Pro-B-Type Natriuretic


Peptide 6323 pg/mL


(0-124) 


 


 





 


Total Protein


 7.4 g/dL


(6.4-8.2) 


 


 





 


Albumin


 3.3 g/dL


(3.4-5.0) 


 


 





 


Albumin/Globulin Ratio 0.8 (1.0-1.7)    


 


Ethyl Alcohol Level


 < 10 mg/dL


(0-10) 


 


 





 


O2 Saturation  92 % (92-99)   


 


Arterial Blood pH


 


 7.33


(7.35-7.45) 


 





 


Arterial Blood pCO2 at


Patient Temp 


 56 mmHg


(35-46) 


 





 


Arterial Blood pO2 at Patient


Temp 


 66 mmHg


() 


 





 


Arterial Blood HCO3


 


 29 mmol/L


(21-28) 


 





 


Arterial Blood Base Excess


 


 2 mmol/L


(-3-3) 


 





 


Oxyhemoglobin  88.9 %   


 


Methemoglobin


 


 0.3 %


(0.0-1.9) 


 





 


Carbon Monoxide, Quantitative


 


 2.7 %


(0.0-1.9) 


 





 


FiO2  40   


 


Test


 9/6/20


21:33 9/7/20


04:30 9/7/20


07:25 





 


Glucose (Fingerstick)


 208 mg/dL


(70-99) 


 


 





 


Triglycerides Level


 


 34 mg/dL


(0-150) 


 





 


Cholesterol Level


 


 156 mg/dL


(0-200) 


 





 


LDL Cholesterol, Calculated


 


 82 mg/dL


(0-100) 


 





 


VLDL Cholesterol, Calculated  7 mg/dL (0-40)   


 


Non-HDL Cholesterol Calculated


 


 89 mg/dL


(0-129) 


 





 


HDL Cholesterol


 


 67 mg/dL


(40-60) 


 





 


Cholesterol/HDL Ratio  2.3   


 


O2 Saturation   93 % (92-99)  


 


Arterial Blood pH


 


 


 7.31


(7.35-7.45) 





 


Arterial Blood pCO2 at


Patient Temp 


 


 59 mmHg


(35-46) 





 


Arterial Blood pO2 at Patient


Temp 


 


 69 mmHg


() 





 


Arterial Blood HCO3


 


 


 29 mmol/L


(21-28) 





 


Arterial Blood Base Excess


 


 


 2 mmol/L


(-3-3) 





 


FiO2   40  








Laboratory Tests








Test


 9/6/20


10:55 9/6/20


13:08 9/6/20


16:30 9/6/20


19:45


 


White Blood Count


 6.7 x10^3/uL


(4.0-11.0) 


 


 





 


Red Blood Count


 4.12 x10^6/uL


(4.30-5.70) 


 


 





 


Hemoglobin


 12.4 g/dL


(13.0-17.5) 


 


 





 


Hematocrit


 38.4 %


(39.0-53.0) 


 


 





 


Mean Corpuscular Volume 93 fL ()    


 


Mean Corpuscular Hemoglobin 30 pg (25-35)    


 


Mean Corpuscular Hemoglobin


Concent 32 g/dL


(31-37) 


 


 





 


Red Cell Distribution Width


 16.3 %


(11.5-14.5) 


 


 





 


Platelet Count


 124 x10^3/uL


(140-400) 


 


 





 


Neutrophils (%) (Auto) 75 % (31-73)    


 


Lymphocytes (%) (Auto) 11 % (24-48)    


 


Monocytes (%) (Auto) 11 % (0-9)    


 


Eosinophils (%) (Auto) 2 % (0-3)    


 


Basophils (%) (Auto) 1 % (0-3)    


 


Neutrophils # (Auto)


 5.0 x10^3/uL


(1.8-7.7) 


 


 





 


Lymphocytes # (Auto)


 0.7 x10^3/uL


(1.0-4.8) 


 


 





 


Monocytes # (Auto)


 0.8 x10^3/uL


(0.0-1.1) 


 


 





 


Eosinophils # (Auto)


 0.1 x10^3/uL


(0.0-0.7) 


 


 





 


Basophils # (Auto)


 0.1 x10^3/uL


(0.0-0.2) 


 


 





 


Prothrombin Time


 14.2 SEC


(11.7-14.0) 


 


 





 


Prothromb Time International


Ratio 1.1 (0.8-1.1) 


 


 


 





 


Activated Partial


Thromboplast Time 30 SEC (24-38) 


 


 


 





 


Sodium Level


 140 mmol/L


(136-145) 


 


 





 


Potassium Level


 4.0 mmol/L


(3.5-5.1) 


 


 





 


Chloride Level


 101 mmol/L


() 


 


 





 


Carbon Dioxide Level


 33 mmol/L


(21-32) 


 


 





 


Anion Gap 6 (6-14)    


 


Blood Urea Nitrogen


 17 mg/dL


(8-26) 


 


 





 


Creatinine


 1.5 mg/dL


(0.7-1.3) 


 


 





 


Estimated GFR


(Cockcroft-Gault) 57.2 


 


 


 





 


BUN/Creatinine Ratio 11 (6-20)    


 


Glucose Level


 91 mg/dL


(70-99) 


 


 





 


Lactic Acid Level


 0.8 mmol/L


(0.4-2.0) 


 


 





 


Calcium Level


 9.7 mg/dL


(8.5-10.1) 


 


 





 


Total Bilirubin


 1.3 mg/dL


(0.2-1.0) 


 


 





 


Aspartate Amino Transf


(AST/SGOT) 14 U/L (15-37) 


 


 


 





 


Alanine Aminotransferase


(ALT/SGPT) 15 U/L (16-63) 


 


 


 





 


Alkaline Phosphatase


 70 U/L


() 


 


 





 


Creatine Kinase


 99 U/L


() 


 


 





 


Creatine Kinase MB (Mass)


 2.0 ng/mL


(0.0-3.6) 


 


 





 


Creatine Kinase MB Relative


Index 2.0 % (0-4) 


 


 


 





 


Troponin I Quantitative


 0.070 ng/mL


(0.000-0.055) 


 0.057 ng/mL


(0.000-0.055) 0.056 ng/mL


(0.000-0.055)


 


NT-Pro-B-Type Natriuretic


Peptide 6323 pg/mL


(0-124) 


 


 





 


Total Protein


 7.4 g/dL


(6.4-8.2) 


 


 





 


Albumin


 3.3 g/dL


(3.4-5.0) 


 


 





 


Albumin/Globulin Ratio 0.8 (1.0-1.7)    


 


Ethyl Alcohol Level


 < 10 mg/dL


(0-10) 


 


 





 


O2 Saturation  92 % (92-99)   


 


Arterial Blood pH


 


 7.33


(7.35-7.45) 


 





 


Arterial Blood pCO2 at


Patient Temp 


 56 mmHg


(35-46) 


 





 


Arterial Blood pO2 at Patient


Temp 


 66 mmHg


() 


 





 


Arterial Blood HCO3


 


 29 mmol/L


(21-28) 


 





 


Arterial Blood Base Excess


 


 2 mmol/L


(-3-3) 


 





 


Oxyhemoglobin  88.9 %   


 


Methemoglobin


 


 0.3 %


(0.0-1.9) 


 





 


Carbon Monoxide, Quantitative


 


 2.7 %


(0.0-1.9) 


 





 


FiO2  40   


 


Test


 9/6/20


21:33 9/7/20


04:30 9/7/20


07:25 





 


Glucose (Fingerstick)


 208 mg/dL


(70-99) 


 


 





 


Triglycerides Level


 


 34 mg/dL


(0-150) 


 





 


Cholesterol Level


 


 156 mg/dL


(0-200) 


 





 


LDL Cholesterol, Calculated


 


 82 mg/dL


(0-100) 


 





 


VLDL Cholesterol, Calculated  7 mg/dL (0-40)   


 


Non-HDL Cholesterol Calculated


 


 89 mg/dL


(0-129) 


 





 


HDL Cholesterol


 


 67 mg/dL


(40-60) 


 





 


Cholesterol/HDL Ratio  2.3   


 


O2 Saturation   93 % (92-99)  


 


Arterial Blood pH


 


 


 7.31


(7.35-7.45) 





 


Arterial Blood pCO2 at


Patient Temp 


 


 59 mmHg


(35-46) 





 


Arterial Blood pO2 at Patient


Temp 


 


 69 mmHg


() 





 


Arterial Blood HCO3


 


 


 29 mmol/L


(21-28) 





 


Arterial Blood Base Excess


 


 


 2 mmol/L


(-3-3) 





 


FiO2   40  








Medications





Current Medications


Albuterol/ Ipratropium (Duoneb) 3 ml 1X  ONCE NEB  Last administered on 9/6/20at

11:32;  Start 9/6/20 at 11:00;  Stop 9/6/20 at 11:03;  Status DC


Dexamethasone Sodium Phosphate (Decadron) 10 mg 1X  ONCE IVP  Last administered 

on 9/6/20at 13:33;  Start 9/6/20 at 11:00;  Stop 9/6/20 at 11:03;  Status DC


Bumetanide (Bumex) 1 mg 1X  ONCE IV  Last administered on 9/6/20at 13:33;  Start

9/6/20 at 12:45;  Stop 9/6/20 at 12:46;  Status DC


Ondansetron HCl (Zofran) 4 mg PRN Q8HRS  PRN IV NAUSEA/VOMITING Last 

administered on 9/6/20at 13:32;  Start 9/6/20 at 13:15;  Stop 9/7/20 at 13:14


Albuterol/ Ipratropium (Duoneb) 3 ml RTQID NEB ;  Start 9/6/20 at 16:00;  Stop 

9/6/20 at 14:14;  Status DC


Albuterol/ Ipratropium (Duoneb) 3 ml RTQID NEB ;  Start 9/7/20 at 16:00;  Stop 

9/8/20 at 15:59


Methylprednisolone Sodium Succinate (SOLU-Medrol 40MG VIAL) 40 mg Q8HRS IV  Last

administered on 9/7/20at 06:18;  Start 9/6/20 at 17:00


Bumetanide (Bumex) 1 mg 1X  ONCE IV  Last administered on 9/6/20at 19:46;  Start

9/6/20 at 20:00;  Stop 9/6/20 at 20:01;  Status DC


Albuterol Sulfate (Ventolin Neb Soln) 2.5 mg PRN Q2HR  PRN NEB SHORTNESS OF 

BREATH;  Start 9/6/20 at 23:30;  Stop 9/7/20 at 00:40;  Status DC


Amlodipine Besylate (Norvasc) 10 mg DAILY PO  Last administered on 9/7/20at 

08:43;  Start 9/7/20 at 09:00


Apixaban (Eliquis) 5 mg BID PO  Last administered on 9/7/20at 08:41;  Start 

9/7/20 at 09:00


Furosemide (Lasix) 20 mg DAILY PO  Last administered on 9/7/20at 08:43;  Start 

9/7/20 at 09:00


Lisinopril (Prinivil) 20 mg DAILY PO ;  Start 9/7/20 at 09:00


Metoprolol Succinate (Toprol Xl) 25 mg DAILY PO  Last administered on 9/7/20at 

08:47;  Start 9/7/20 at 09:00


Simvastatin (Zocor) 20 mg HS PO ;  Start 9/7/20 at 21:00


Non-Formulary Medication (Albuterol Sulfate (Albuterol Sulfate Neb Soln)) 0.63 

mg PRN Q4HRS  PRN NEB SHORTNESS OF BREATH;  Start 9/6/20 at 23:30;  Status UNV


Albuterol Sulfate (Ventolin Neb Soln) 2.5 mg PRN Q4HRS  PRN NEB SHORTNESS OF 

BREATH;  Start 9/6/20 at 23:45


Albuterol/ Ipratropium (Duoneb) 3 ml Q4HRS W/A  PRN NEB WHEEZING;  Start 9/6/20 

at 23:30;  Status UNV


Nicotine (Nicoderm Cq 21mg) 1 patch DAILY TD  Last administered on 9/7/20at 

08:43;  Start 9/7/20 at 09:00


Doxycycline Hyclate (Vibra-Tab) 100 mg BIDBFRMEAL PO  Last administered on 

9/7/20at 08:41;  Start 9/7/20 at 07:30;  Stop 9/12/20 at 07:29


Prednisone (Prednisone) 40 mg DAILY PO ;  Start 9/7/20 at 09:00;  Stop 9/7/20 at

00:07;  Status DC


Sodium Chloride (Normal Saline Flush) 3 ml QSHIFT  PRN IV AFTER MEDS AND BLOOD 

DRAWS;  Start 9/6/20 at 23:45


Lactobacillus Rhamnosus (Culturelle) 1 cap BID PO  Last administered on 9/7/20at

08:54;  Start 9/7/20 at 09:00


Info (Anti-Coagulation Monitoring By Pharmacy) 1 each PRN DAILY  PRN [facility] SEE 

COMMENTS Last administered on 9/7/20at 08:45;  Start 9/7/20 at 08:45





Active Scripts


Active


Culturelle (Lactobacillus Rhamnosus Gg) 1 Each Cap.sprink 1 Cap PO BID 30 Days


Doxycycline Hyclate 100 Mg Tablet 100 Mg PO BID 10 Days


Culturelle (Lactobacillus Rhamnosus Gg) 1 Each Cap.sprink 1 Cap PO BID 10 Days


Lasix (Furosemide) 20 Mg Tablet 1 Tab PO DAILY 30 Days


Lisinopril 40 Mg Tablet 20 Mg PO DAILY 30 Days


Proair Hfa (Albuterol Sulfate) 8.5 Gm Hfa.aer.ad 2.5 Mg NEB PRN Q2HR PRN 30 Days


Duoneb 0.5-3(2.5) Mg/3 Ml (Albuterol/Ipratropium) 3 Ml Ampul.neb 3 Ml NEB Q4HRS 

30 Days


Albuterol Sulfate Neb Soln (Albuterol Sulfate) 0.63 Mg/3 Ml Vial.neb 0.63 Mg NEB

PRN Q4HRS PRN 30 Days


     AS NEEDED


Reported


Eliquis (Apixaban) 5 Mg Tablet 5 Mg PO BID


Toprol Xl (Metoprolol Succinate) 25 Mg Tab.er.24h 1 Tab PO DAILY 30 Days


Zocor (Simvastatin) 20 Mg Tablet 20 Mg PO HS


     NEXT DOSE DUE TONIGHT AT BEDTIME


Amlodipine Besylate 10 Mg Tablet 10 Mg PO DAILY


     NEXT DOSE DUE IN AM


Potassium Chloride 10 Meq Tab.er.prt 20 Meq PO DAILY


     NEXT DOSE DUE IN AM


Vitals/I & O





Vital Sign - Last 24 Hours








 9/6/20 9/6/20 9/6/20 9/6/20





 11:00 11:07 11:13 11:30


 


Temp  99.0  





  99.0  


 


Pulse 76 67  64


 


Resp 18 26  


 


B/P (MAP) 173/106 (128) 163/106 (125)  128/80 (96)


 


Pulse Ox  91 93 


 


O2 Delivery Room Air BiPAP/CPAP BiPAP/CPAP Room Air


 


    





    





 9/6/20 9/6/20 9/6/20 9/6/20





 11:34 12:00 12:30 13:00


 


Pulse  68 70 64


 


B/P (MAP)  143/78 (99) 130/73 (92) 132/77 (95)


 


Pulse Ox 96   


 


O2 Delivery BiPAP/CPAP Room Air Room Air Room Air





 9/6/20 9/6/20 9/6/20 9/6/20





 13:04 13:30 16:00 16:15


 


Pulse  62  76


 


Resp    17


 


B/P (MAP)  138/83 (101)  149/95 (113)


 


Pulse Ox 96   98


 


O2 Delivery BiPAP/CPAP Room Air Bi-pap BiPAP/CPAP





 9/6/20 9/6/20 9/6/20 9/6/20





 16:20 16:30 16:45 17:00


 


Temp  98.3  





  98.3  


 


Pulse  74 72 70


 


Resp  28 22 22


 


B/P (MAP)  145/87 (106) 138/92 (107) 153/93 (113)


 


Pulse Ox 98 97 98 97


 


O2 Delivery BiPAP/CPAP BiPAP/CPAP BiPAP/CPAP BiPAP/CPAP


 


    





    





 9/6/20 9/6/20 9/6/20 9/6/20





 18:00 19:00 20:00 20:00


 


Temp   97.7 





   97.7 


 


Pulse 83 72 68 


 


Resp 25 26 20 


 


B/P (MAP) 135/85 (102) 100/58 (72) 122/77 (92) 


 


Pulse Ox 96 95 91 


 


O2 Delivery Nasal Cannula Nasal Cannula BiPAP/CPAP 


 


O2 Flow Rate 6.0 6.0  6.0


 


    





    





 9/6/20 9/6/20 9/6/20 9/6/20





 20:00 20:24 21:00 22:00


 


Pulse   66 63


 


Resp   20 20


 


B/P (MAP)   121/75 (90) 105/66 (79)


 


Pulse Ox  94 97 96


 


O2 Delivery Bi-pap BiPAP/CPAP BiPAP/CPAP BiPAP/CPAP





 9/6/20 9/7/20 9/7/20 9/7/20





 23:00 00:00 00:00 00:00


 


Temp   97.4 





   97.4 


 


Pulse 70  66 


 


Resp 20  22 


 


B/P (MAP) 132/82 (99)  129/82 (98) 


 


Pulse Ox 96  100 


 


O2 Delivery BiPAP/CPAP Bi-pap BiPAP/CPAP 


 


O2 Flow Rate    6.0


 


    





    





 9/7/20 9/7/20 9/7/20 9/7/20





 00:15 01:00 02:00 03:00


 


Pulse  74 67 68


 


Resp  18 20 22


 


B/P (MAP)  113/72 (86) 143/85 (104) 119/71 (87)


 


Pulse Ox 91 91 98 97


 


O2 Delivery BiPAP/CPAP BiPAP/CPAP BiPAP/CPAP BiPAP/CPAP





 9/7/20 9/7/20 9/7/20 9/7/20





 04:00 04:00 04:00 04:40


 


Temp  98.1  





  98.1  


 


Pulse  73  


 


Resp  22  


 


B/P (MAP)  142/91 (108)  


 


Pulse Ox  97  98


 


O2 Delivery Bi-pap BiPAP/CPAP  BiPAP/CPAP


 


O2 Flow Rate   6.0 


 


    





    





 9/7/20 9/7/20 9/7/20 9/7/20





 05:00 06:00 07:25 08:43


 


Pulse 64 62  88


 


Resp 20 24  


 


B/P (MAP) 126/74 (91) 114/72 (86)  139/97


 


Pulse Ox 93 97 100 


 


O2 Delivery BiPAP/CPAP BiPAP/CPAP BiPAP/CPAP 





 9/7/20   





 08:47   


 


Pulse 86   


 


B/P (MAP) 139/84   














Intake and Output   


 


 9/6/20 9/6/20 9/7/20





 15:00 23:00 07:00


 


Intake Total  480 ml 400 ml


 


Output Total  1200 ml 400 ml


 


Balance  -720 ml 0 ml











Justicifation of Admission Dx:


Justifications for Admission:


Justification of Admission Dx:  Yes


Respiratory Failure:  Severe Resp Distress











MARY LOU PETERSEN MD              Sep 7, 2020 09:35

## 2020-09-08 VITALS — DIASTOLIC BLOOD PRESSURE: 68 MMHG | SYSTOLIC BLOOD PRESSURE: 115 MMHG

## 2020-09-08 VITALS — DIASTOLIC BLOOD PRESSURE: 86 MMHG | SYSTOLIC BLOOD PRESSURE: 139 MMHG

## 2020-09-08 VITALS — DIASTOLIC BLOOD PRESSURE: 97 MMHG | SYSTOLIC BLOOD PRESSURE: 140 MMHG

## 2020-09-08 VITALS — DIASTOLIC BLOOD PRESSURE: 70 MMHG | SYSTOLIC BLOOD PRESSURE: 114 MMHG

## 2020-09-08 VITALS — DIASTOLIC BLOOD PRESSURE: 97 MMHG | SYSTOLIC BLOOD PRESSURE: 157 MMHG

## 2020-09-08 VITALS — SYSTOLIC BLOOD PRESSURE: 136 MMHG | DIASTOLIC BLOOD PRESSURE: 89 MMHG

## 2020-09-08 VITALS — SYSTOLIC BLOOD PRESSURE: 155 MMHG | DIASTOLIC BLOOD PRESSURE: 91 MMHG

## 2020-09-08 VITALS — DIASTOLIC BLOOD PRESSURE: 90 MMHG | SYSTOLIC BLOOD PRESSURE: 147 MMHG

## 2020-09-08 VITALS — DIASTOLIC BLOOD PRESSURE: 82 MMHG | SYSTOLIC BLOOD PRESSURE: 138 MMHG

## 2020-09-08 VITALS — SYSTOLIC BLOOD PRESSURE: 136 MMHG | DIASTOLIC BLOOD PRESSURE: 91 MMHG

## 2020-09-08 VITALS — DIASTOLIC BLOOD PRESSURE: 87 MMHG | SYSTOLIC BLOOD PRESSURE: 138 MMHG

## 2020-09-08 VITALS — SYSTOLIC BLOOD PRESSURE: 133 MMHG | DIASTOLIC BLOOD PRESSURE: 80 MMHG

## 2020-09-08 VITALS — DIASTOLIC BLOOD PRESSURE: 104 MMHG | SYSTOLIC BLOOD PRESSURE: 148 MMHG

## 2020-09-08 VITALS — DIASTOLIC BLOOD PRESSURE: 77 MMHG | SYSTOLIC BLOOD PRESSURE: 143 MMHG

## 2020-09-08 VITALS — DIASTOLIC BLOOD PRESSURE: 71 MMHG | SYSTOLIC BLOOD PRESSURE: 137 MMHG

## 2020-09-08 PROCEDURE — 5A09357 ASSISTANCE WITH RESPIRATORY VENTILATION, LESS THAN 24 CONSECUTIVE HOURS, CONTINUOUS POSITIVE AIRWAY PRESSURE: ICD-10-PCS | Performed by: STUDENT IN AN ORGANIZED HEALTH CARE EDUCATION/TRAINING PROGRAM

## 2020-09-08 RX ADMIN — IPRATROPIUM BROMIDE AND ALBUTEROL SULFATE SCH ML: .5; 3 SOLUTION RESPIRATORY (INHALATION) at 08:19

## 2020-09-08 RX ADMIN — METHYLPREDNISOLONE SODIUM SUCCINATE SCH MG: 40 INJECTION, POWDER, FOR SOLUTION INTRAMUSCULAR; INTRAVENOUS at 14:00

## 2020-09-08 RX ADMIN — Medication SCH CAP: at 08:08

## 2020-09-08 RX ADMIN — NICOTINE SCH PATCH: 21 PATCH, EXTENDED RELEASE TOPICAL at 08:08

## 2020-09-08 RX ADMIN — APIXABAN SCH MG: 5 TABLET, FILM COATED ORAL at 08:09

## 2020-09-08 RX ADMIN — FUROSEMIDE SCH MG: 20 TABLET ORAL at 08:09

## 2020-09-08 RX ADMIN — METHYLPREDNISOLONE SODIUM SUCCINATE SCH MG: 40 INJECTION, POWDER, FOR SOLUTION INTRAMUSCULAR; INTRAVENOUS at 07:01

## 2020-09-08 RX ADMIN — LISINOPRIL SCH MG: 20 TABLET ORAL at 08:08

## 2020-09-08 RX ADMIN — METOPROLOL SUCCINATE SCH MG: 25 TABLET, EXTENDED RELEASE ORAL at 08:08

## 2020-09-08 RX ADMIN — IPRATROPIUM BROMIDE AND ALBUTEROL SULFATE SCH ML: .5; 3 SOLUTION RESPIRATORY (INHALATION) at 12:54

## 2020-09-08 RX ADMIN — DOXYCYCLINE HYCLATE SCH MG: 100 TABLET, COATED ORAL at 08:08

## 2020-09-08 NOTE — NUR
SS following for discharge planning. SS reviewed pt chart and discussed with pt RN. Pt is 
from home and is currently requiring oxygen. Pt has home oxygen. COVID19 negative. Discharge 
orders received for home with home healthcare. SS phoned and faxed discharge orders and 
referral to [facility], [phone]; fax [phone]. Pt's RN notified.

## 2020-09-08 NOTE — PDOC
PULMONARY PROGRESS NOTES


DATE: 9/8/20 


TIME: 08:46


Subjective


Patient feels better no increasing shortness of air


Vitals





Vital Signs








  Date Time  Temp Pulse Resp B/P (MAP) Pulse Ox O2 Delivery O2 Flow Rate FiO2


 


9/8/20 08:21     95 Nasal Cannula 3.0 


 


9/8/20 08:08  77  140/97    


 


9/8/20 08:00   16     


 


9/8/20 07:00 98.8       





 98.8       








ROS:  No Nausea, No Chest Pain, No Abdominal Pain, No Increase Cough


General:  Alert, Oriented X4, No acute distress


HEENT:  Other


Lungs:  Clear


Cardiovascular:  S1, S2


Abdomen:  Soft, Non-tender, Other


Extremities:  Other


Labs





Laboratory Tests








Test


 9/6/20


10:55 9/6/20


11:15 9/6/20


13:08 9/6/20


16:30


 


White Blood Count


 6.7 x10^3/uL


(4.0-11.0) 


 


 





 


Red Blood Count


 4.12 x10^6/uL


(4.30-5.70) 


 


 





 


Hemoglobin


 12.4 g/dL


(13.0-17.5) 


 


 





 


Hematocrit


 38.4 %


(39.0-53.0) 


 


 





 


Mean Corpuscular Volume 93 fL ()    


 


Mean Corpuscular Hemoglobin 30 pg (25-35)    


 


Mean Corpuscular Hemoglobin


Concent 32 g/dL


(31-37) 


 


 





 


Red Cell Distribution Width


 16.3 %


(11.5-14.5) 


 


 





 


Platelet Count


 124 x10^3/uL


(140-400) 


 


 





 


Neutrophils (%) (Auto) 75 % (31-73)    


 


Lymphocytes (%) (Auto) 11 % (24-48)    


 


Monocytes (%) (Auto) 11 % (0-9)    


 


Eosinophils (%) (Auto) 2 % (0-3)    


 


Basophils (%) (Auto) 1 % (0-3)    


 


Neutrophils # (Auto)


 5.0 x10^3/uL


(1.8-7.7) 


 


 





 


Lymphocytes # (Auto)


 0.7 x10^3/uL


(1.0-4.8) 


 


 





 


Monocytes # (Auto)


 0.8 x10^3/uL


(0.0-1.1) 


 


 





 


Eosinophils # (Auto)


 0.1 x10^3/uL


(0.0-0.7) 


 


 





 


Basophils # (Auto)


 0.1 x10^3/uL


(0.0-0.2) 


 


 





 


Prothrombin Time


 14.2 SEC


(11.7-14.0) 


 


 





 


Prothromb Time International


Ratio 1.1 (0.8-1.1) 


 


 


 





 


Activated Partial


Thromboplast Time 30 SEC (24-38) 


 


 


 





 


Sodium Level


 140 mmol/L


(136-145) 


 


 





 


Potassium Level


 4.0 mmol/L


(3.5-5.1) 


 


 





 


Chloride Level


 101 mmol/L


() 


 


 





 


Carbon Dioxide Level


 33 mmol/L


(21-32) 


 


 





 


Anion Gap 6 (6-14)    


 


Blood Urea Nitrogen


 17 mg/dL


(8-26) 


 


 





 


Creatinine


 1.5 mg/dL


(0.7-1.3) 


 


 





 


Estimated GFR


(Cockcroft-Gault) 57.2 


 


 


 





 


BUN/Creatinine Ratio 11 (6-20)    


 


Glucose Level


 91 mg/dL


(70-99) 


 


 





 


Lactic Acid Level


 0.8 mmol/L


(0.4-2.0) 


 


 





 


Calcium Level


 9.7 mg/dL


(8.5-10.1) 


 


 





 


Total Bilirubin


 1.3 mg/dL


(0.2-1.0) 


 


 





 


Aspartate Amino Transf


(AST/SGOT) 14 U/L (15-37) 


 


 


 





 


Alanine Aminotransferase


(ALT/SGPT) 15 U/L (16-63) 


 


 


 





 


Alkaline Phosphatase


 70 U/L


() 


 


 





 


Creatine Kinase


 99 U/L


() 


 


 





 


Creatine Kinase MB (Mass)


 2.0 ng/mL


(0.0-3.6) 


 


 





 


Creatine Kinase MB Relative


Index 2.0 % (0-4) 


 


 


 





 


Troponin I Quantitative


 0.070 ng/mL


(0.000-0.055) 


 


 0.057 ng/mL


(0.000-0.055)


 


NT-Pro-B-Type Natriuretic


Peptide 6323 pg/mL


(0-124) 


 


 





 


Total Protein


 7.4 g/dL


(6.4-8.2) 


 


 





 


Albumin


 3.3 g/dL


(3.4-5.0) 


 


 





 


Albumin/Globulin Ratio 0.8 (1.0-1.7)    


 


Ethyl Alcohol Level


 < 10 mg/dL


(0-10) 


 


 





 


Coronavirus (PCR)


 


 Not detected


(Not Detected) 


 





 


O2 Saturation   92 % (92-99)  


 


Arterial Blood pH


 


 


 7.33


(7.35-7.45) 





 


Arterial Blood pCO2 at


Patient Temp 


 


 56 mmHg


(35-46) 





 


Arterial Blood pO2 at Patient


Temp 


 


 66 mmHg


() 





 


Arterial Blood HCO3


 


 


 29 mmol/L


(21-28) 





 


Arterial Blood Base Excess


 


 


 2 mmol/L


(-3-3) 





 


Oxyhemoglobin   88.9 %  


 


Methemoglobin


 


 


 0.3 %


(0.0-1.9) 





 


Carbon Monoxide, Quantitative


 


 


 2.7 %


(0.0-1.9) 





 


FiO2   40  


 


Test


 9/6/20


19:45 9/6/20


21:33 9/7/20


04:30 9/7/20


07:25


 


Troponin I Quantitative


 0.056 ng/mL


(0.000-0.055) 


 


 





 


Glucose (Fingerstick)


 


 208 mg/dL


(70-99) 


 





 


Triglycerides Level


 


 


 34 mg/dL


(0-150) 





 


Cholesterol Level


 


 


 156 mg/dL


(0-200) 





 


LDL Cholesterol, Calculated


 


 


 82 mg/dL


(0-100) 





 


VLDL Cholesterol, Calculated   7 mg/dL (0-40)  


 


Non-HDL Cholesterol Calculated


 


 


 89 mg/dL


(0-129) 





 


HDL Cholesterol


 


 


 67 mg/dL


(40-60) 





 


Cholesterol/HDL Ratio   2.3  


 


O2 Saturation    93 % (92-99) 


 


Arterial Blood pH


 


 


 


 7.31


(7.35-7.45)


 


Arterial Blood pCO2 at


Patient Temp 


 


 


 59 mmHg


(35-46)


 


Arterial Blood pO2 at Patient


Temp 


 


 


 69 mmHg


()


 


Arterial Blood HCO3


 


 


 


 29 mmol/L


(21-28)


 


Arterial Blood Base Excess


 


 


 


 2 mmol/L


(-3-3)


 


FiO2    40 








Medications





Active Scripts








 Medications  Dose


 Route/Sig


 Max Daily Dose Days Date Category Dose


Instructions


 


 Culturelle


  (Lactobacillus


 Rhamnosus Gg) 1


 Each Cap.sprink  1 Cap


 PO BID


  30 8/11/20 Rx 


 


 Doxycycline


 Hyclate 100 Mg


 Tablet  100 Mg


 PO BID


  10 8/11/20 Rx 


 


 Culturelle


  (Lactobacillus


 Rhamnosus Gg) 1


 Each Cap.sprink  1 Cap


 PO BID


  10 5/28/20 Rx 


 


 Eliquis


  (Apixaban) 5 Mg


 Tablet  5 Mg


 PO BID


   4/14/20 Reported 


 


 Toprol Xl


  (Metoprolol


 Succinate) 25 Mg


 Tab.er.24h  1 Tab


 PO DAILY


  30 4/14/20 Reported 


 


 Lasix


  (Furosemide) 20


 Mg Tablet  1 Tab


 PO DAILY


  30 1/21/20 Rx 


 


 Lisinopril 40 Mg


 Tablet  20 Mg


 PO DAILY


  30 1/21/20 Rx 


 


 Proair Hfa


  (Albuterol


 Sulfate) 8.5 Gm


 Hfa.aer.ad  2.5 Mg


 NEB PRN Q2HR PRN


  30 1/21/20 Rx 


 


 Duoneb 0.5-3(2.5)


 Mg/3 Ml


  (Albuterol/Ipratropium)


 3 Ml Ampul.neb  3 Ml


 NEB Q4HRS


  30 1/20/20 Rx 


 


 Albuterol Sulfate


 Neb Soln


  (Albuterol


 Sulfate) 0.63


 Mg/3 Ml Vial.neb  0.63 Mg


 NEB PRN Q4HRS PRN


  30 6/26/17 Rx  AS NEEDED


 


 Zocor


  (Simvastatin) 20


 Mg Tablet  20 Mg


 PO HS


   7/10/15 Reported  NEXT DOSE DUE TONIGHT AT BEDTIME


 


 Amlodipine


 Besylate 10 Mg


 Tablet  10 Mg


 PO DAILY


   2/24/14 Reported  NEXT DOSE DUE IN AM


 


 Potassium


 Chloride 10 Meq


 Tab.er.prt  20 Meq


 PO DAILY


   2/24/14 Reported  NEXT DOSE DUE IN AM











Impression


.


IMPRESSION:


1.  Acute on chronic hypoxic and hypercapnic respiratory failure secondary to


acute on chronic systolic heart failure in addition to acute exacerbation of


chronic obstructive pulmonary disease.


2.  The patient with ongoing tobaccoism and cocaine use.  The congestive heart


failure may have been triggered by cocaine as well.


3.  Underlying chronic obstructive pulmonary disease, on home oxygen at 6 liters


on a 24-hour basis.


4.  Cardiomyopathy with an ejection fraction of 40%.


5.  Chronic mild chronic kidney disease.


6.  Mildly increased troponin level consistent with congestive heart failure.





Plan


.


Patient doing better, currently back on baseline O2


Okay to discharge today


Taper prednisone


Patient instructed on the importance of discontinuing all substance abuse











FIORDALIZA RANGEL MD               Sep 8, 2020 08:46

## 2020-09-08 NOTE — PDOC3
Discharge Summary


Visit Information


Date of Admission:  Sep 6, 2020


Date of Discharge:  Sep 8, 2020


Admitting Diagnosis Comment:


Severe CHF Exacerbation


Severe COPD Exacerbation


Respiratory Failure with hypoxia and hypercapnia


Drug Abuse


Final Diagnosis


Problems


Medical Problems:


(1) Acute on chronic combined systolic and diastolic heart failure


Status: Acute  





(2) Acute respiratory failure with hypoxia


Status: Acute  





(3) CHF exacerbation secondary to crack cocaine abuse, counseling done. 


Status: Acute  





(4) ruled out   novel coronavirus infection


Status: Acute  








Brief Hospital Course


Allergies





                                    Allergies








Coded Allergies Type Severity Reaction Last Updated Verified


 


  levofloxacin Allergy Unknown Itching 20 Yes








Vital Signs





Vital Signs








  Date Time  Temp Pulse Resp B/P (MAP) Pulse Ox O2 Delivery O2 Flow Rate FiO2


 


20 08:21     95 Nasal Cannula 3.0 


 


20 08:08  77  140/97    


 


20 08:00   16     


 


20 07:00 98.8       





 98.8       








Lab Results





Laboratory Tests








Test


 20


10:55 20


11:15 20


13:08 20


16:30


 


White Blood Count


 6.7 x10^3/uL


(4.0-11.0) 


 


 





 


Red Blood Count


 4.12 x10^6/uL


(4.30-5.70) 


 


 





 


Hemoglobin


 12.4 g/dL


(13.0-17.5) 


 


 





 


Hematocrit


 38.4 %


(39.0-53.0) 


 


 





 


Mean Corpuscular Volume 93 fL ()    


 


Mean Corpuscular Hemoglobin 30 pg (25-35)    


 


Mean Corpuscular Hemoglobin


Concent 32 g/dL


(31-37) 


 


 





 


Red Cell Distribution Width


 16.3 %


(11.5-14.5) 


 


 





 


Platelet Count


 124 x10^3/uL


(140-400) 


 


 





 


Neutrophils (%) (Auto) 75 % (31-73)    


 


Lymphocytes (%) (Auto) 11 % (24-48)    


 


Monocytes (%) (Auto) 11 % (0-9)    


 


Eosinophils (%) (Auto) 2 % (0-3)    


 


Basophils (%) (Auto) 1 % (0-3)    


 


Neutrophils # (Auto)


 5.0 x10^3/uL


(1.8-7.7) 


 


 





 


Lymphocytes # (Auto)


 0.7 x10^3/uL


(1.0-4.8) 


 


 





 


Monocytes # (Auto)


 0.8 x10^3/uL


(0.0-1.1) 


 


 





 


Eosinophils # (Auto)


 0.1 x10^3/uL


(0.0-0.7) 


 


 





 


Basophils # (Auto)


 0.1 x10^3/uL


(0.0-0.2) 


 


 





 


Prothrombin Time


 14.2 SEC


(11.7-14.0) 


 


 





 


Prothromb Time International


Ratio 1.1 (0.8-1.1) 


 


 


 





 


Activated Partial


Thromboplast Time 30 SEC (24-38) 


 


 


 





 


Sodium Level


 140 mmol/L


(136-145) 


 


 





 


Potassium Level


 4.0 mmol/L


(3.5-5.1) 


 


 





 


Chloride Level


 101 mmol/L


() 


 


 





 


Carbon Dioxide Level


 33 mmol/L


(21-32) 


 


 





 


Anion Gap 6 (6-14)    


 


Blood Urea Nitrogen


 17 mg/dL


(8-26) 


 


 





 


Creatinine


 1.5 mg/dL


(0.7-1.3) 


 


 





 


Estimated GFR


(Cockcroft-Gault) 57.2 


 


 


 





 


BUN/Creatinine Ratio 11 (6-20)    


 


Glucose Level


 91 mg/dL


(70-99) 


 


 





 


Lactic Acid Level


 0.8 mmol/L


(0.4-2.0) 


 


 





 


Calcium Level


 9.7 mg/dL


(8.5-10.1) 


 


 





 


Total Bilirubin


 1.3 mg/dL


(0.2-1.0) 


 


 





 


Aspartate Amino Transf


(AST/SGOT) 14 U/L (15-37) 


 


 


 





 


Alanine Aminotransferase


(ALT/SGPT) 15 U/L (16-63) 


 


 


 





 


Alkaline Phosphatase


 70 U/L


() 


 


 





 


Creatine Kinase


 99 U/L


() 


 


 





 


Creatine Kinase MB (Mass)


 2.0 ng/mL


(0.0-3.6) 


 


 





 


Creatine Kinase MB Relative


Index 2.0 % (0-4) 


 


 


 





 


Troponin I Quantitative


 0.070 ng/mL


(0.000-0.055) 


 


 0.057 ng/mL


(0.000-0.055)


 


NT-Pro-B-Type Natriuretic


Peptide 6323 pg/mL


(0-124) 


 


 





 


Total Protein


 7.4 g/dL


(6.4-8.2) 


 


 





 


Albumin


 3.3 g/dL


(3.4-5.0) 


 


 





 


Albumin/Globulin Ratio 0.8 (1.0-1.7)    


 


Ethyl Alcohol Level


 < 10 mg/dL


(0-10) 


 


 





 


Coronavirus (PCR)


 


 Not detected


(Not Detected) 


 





 


O2 Saturation   92 % (92-99)  


 


Arterial Blood pH


 


 


 7.33


(7.35-7.45) 





 


Arterial Blood pCO2 at


Patient Temp 


 


 56 mmHg


(35-46) 





 


Arterial Blood pO2 at Patient


Temp 


 


 66 mmHg


() 





 


Arterial Blood HCO3


 


 


 29 mmol/L


(21-28) 





 


Arterial Blood Base Excess


 


 


 2 mmol/L


(-3-3) 





 


Oxyhemoglobin   88.9 %  


 


Methemoglobin


 


 


 0.3 %


(0.0-1.9) 





 


Carbon Monoxide, Quantitative


 


 


 2.7 %


(0.0-1.9) 





 


FiO2   40  


 


Test


 20


19:45 20


21:33 20


04:30 20


07:25


 


Troponin I Quantitative


 0.056 ng/mL


(0.000-0.055) 


 


 





 


Glucose (Fingerstick)


 


 208 mg/dL


(70-99) 


 





 


Triglycerides Level


 


 


 34 mg/dL


(0-150) 





 


Cholesterol Level


 


 


 156 mg/dL


(0-200) 





 


LDL Cholesterol, Calculated


 


 


 82 mg/dL


(0-100) 





 


VLDL Cholesterol, Calculated   7 mg/dL (0-40)  


 


Non-HDL Cholesterol Calculated


 


 


 89 mg/dL


(0-129) 





 


HDL Cholesterol


 


 


 67 mg/dL


(40-60) 





 


Cholesterol/HDL Ratio   2.3  


 


O2 Saturation    93 % (92-99) 


 


Arterial Blood pH


 


 


 


 7.31


(7.35-7.45)


 


Arterial Blood pCO2 at


Patient Temp 


 


 


 59 mmHg


(35-46)


 


Arterial Blood pO2 at Patient


Temp 


 


 


 69 mmHg


()


 


Arterial Blood HCO3


 


 


 


 29 mmol/L


(21-28)


 


Arterial Blood Base Excess


 


 


 


 2 mmol/L


(-3-3)


 


FiO2    40 








Brief Hospital Course


Patient is a 65-year-old male who comes into the hospital in the setting of 

worsening respiratory failure.  He unfortunately has history of polysubstance 

abuse.  In the setting after smoking crack cocaine he began to develop 

respiratory distress.  He has been admitted and has been initiated on BiPAP 

therapy.  He has a longstanding history of nonischemic cardiomyopathy status 

post biventricular pacemaker for a left bundle branch block.  He also has 

underlying history of atrial fibrillation.  Patient admitted to the intensive 

care unit initially since he required BiPAP support for his acute respiratory 

failure.  Patient was seen in consultation by pulmonology as well who 

recommended Empiric antibiotics nebulizers and diuresis.  Patient was 

transitioned to a nasal cannula and his COVID-19 testing was also negative fo

rtunately.





Patient will be discharged with home health noted to try to prevent readmissions

as he is a high risk for readmission given his lifestyle.  Counseling regarding 

drug abuse took place again, he acknowledged understanding of all the 

instructions and he voiced understanding and will try to do better





Greater than 35 minutes were spent in the discharge process with the patient in 

counseling coordination of care and arrangements for a safe discharge, case 

management help in the discharge process was greatly appreciate





Physical exam:





Lungs with good inspiratory effort and clear to auscultation


Cardiovascular exam S1-S2 regular rhythm no murmurs gallops or rubs


Neurological exam alert awake oriented in person time place and situation 

cranial nerves II to XII intact no motor or sensory deficits appreciated





Assessment


Assessment


                                 IMAGING REPORT





                                     Signed





PATIENT: CESAR BALDERAS  ACCOUNT: [id number]     MRN#: [id number]


: 1955           LOCATION: ER              AGE: 65


SEX: M                    EXAM DT: 20         ACCESSION#: [id number]


STATUS: PRE ER            ORD. PHYSICIAN: AIDAN RODRIGUEZ DO


REASON: SOA, cough


PROCEDURE: PORTABLE CHEST 1V





AP chest x-ray


 


HISTORY: Shortness of breath and cough.


 


COMPARISON: Chest x-ray August 10, 2020 and priors.


 


FINDINGS: 3-lead cardiac pacemaker. Cardiomegaly stable. Apical emphysema.


Prominence of the pulmonary vasculature particularly at the right lower 


hilum although this is similar to prior CT imaging. The right upper lobe 


fissural nodular density on the prior CT study is difficult to visualize 


on this x-ray. There are increased perihilar lower lobe interstitial and 


alveolar opacities since the prior exam. Limited visualization of the 


right diaphragm a small right pleural effusion is not excluded.


 


IMPRESSION: Right hilar and lower lobe interstitial and alveolar 


infiltrates have increased since prior x-rays from 2020 may 


represent pulmonary edema or multilobar pneumonia. There may be a small 


right pleural effusion. See above.


 


Electronically signed by: Bill Davies MD (2020 11:22 AM) 


[id number]





Discharge Information


Condition at Discharge:  Improved


Follow Up:  Weeks


Disposition/Orders:  D/C to Home w/ HH


Scheduled


Amlodipine Besylate (Amlodipine Besylate) 10 Mg Tablet, 10 MG PO DAILY, 

(Reported)


   NEXT DOSE DUE IN AM 


   Entered as Reported by: LUDWIG ARIAS on 14 1232


   Last Action: Continued on 20 by LISS ANNA MD


Apixaban (Eliquis) 5 Mg Tablet, 5 MG PO BID for blood thinner, (Reported)


   Entered as Reported by: TOVA SEGUNDO on 20 1459


   Last Action: Continued on 20 by LISS ANNA MD


Doxycycline Hyclate (Doxycycline Hyclate) 100 Mg Tablet, 100 MG PO BID for 

pneumonia for 10 Days, #20


   Prescribed by: SOFIA DAVID MD on 20 1408


   Last Action: HELD on 20 by LISS ANNA MD


Furosemide (Lasix) 20 Mg Tablet, 1 TAB PO DAILY for increase of weight/ dyspnea 

for 30 Days, #30 Ref 0


   Prescribed by: MARY LOU PETERSEN MD on 20 1511


   Last Action: Continued on 20 by LISS ANNA MD


Ipratropium/Albuterol Sulfate (Duoneb 0.5-3(2.5) Mg/3 Ml) 3 Ml Ampul.neb, 3 ML 

NEB Q4HRS for COPD for 30 Days, #180


   Prescribed by: MARY LOU PETERSEN MD on 20 1420


   Last Action: HELD on 20 by LISS ANNA MD


Lactobacillus Rhamnosus Gg (Culturelle) 1 Each Cap.sprink, 1 CAP PO BID for 

Diarrhea for 10 Days, #20


   Prescribed by: TONY MENDES MD on 20 1527


   Last Action: HELD on 20 by LISS ANNA MD


Lactobacillus Rhamnosus Gg (Culturelle) 1 Each Cap.sprink, 1 CAP PO BID for 

supplement for 30 Days, #60


   Prescribed by: SOFIA DAVID MD on 20 1408


   Last Action: HELD on 20 by LISS ANNA MD


Lisinopril (Lisinopril) 40 Mg Tablet, 20 MG PO DAILY for HTN for 30 Days, #15


   Prescribed by: MARY LOU PETERSEN MD on 20 1155


   Last Action: Continued on 20 by LISS ANNA MD


Metoprolol Succinate (Toprol Xl) 25 Mg Tab.er.24h, 1 TAB PO DAILY for blood 

pressure for 30 Days, #30 Ref 0 (Reported)


   Entered as Reported by: TOVA SEGUNDO on 20 1458


   Last Action: Continued on 20 by LISS ANNA MD


Potassium Chloride (Potassium Chloride) 10 Meq Tab.er.prt, 20 MEQ PO DAILY, 

(Reported)


   NEXT DOSE DUE IN AM 


   Entered as Reported by: LUDWIG ARIAS on 14 1232


   Last Action: HELD on 20 by LISS ANNA MD


Prednisone (Prednisone) 50 Mg Tablet, 1 TAB PO DAILY for COPD, #5


   Prescribed by: MARY LOU PETERSEN MD on 20 0853


Simvastatin (Zocor) 20 Mg Tablet, 20 MG PO HS for FOR CHOLESTEROL, #30 Ref 0 

(Reported)


   NEXT DOSE DUE TONIGHT AT BEDTIME 


   Entered as Reported by: Hiren Tong on 7/10/15 1329


   Last Action: Continued on 20 by LISS ANNA MD





Scheduled PRN


Albuterol Sulfate (Albuterol Sulfate Neb Soln) 0.63 Mg/3 Ml Vial.neb, 0.63 MG 

NEB PRN Q4HRS PRN for SHORTNESS OF BREATH for 30 Days, #100 Ref 2


   AS NEEDED 


   Prescribed by: SONU LEE on 17 0932


   Last Action: Converted on 20 by LISS ANNA MD


Albuterol Sulfate (Proair Hfa) 8.5 Gm Hfa.aer.ad, 2.5 MG NEB PRN Q2HR PRN for 

SHORTNESS OF BREATH for 30 Days, #1


   Prescribed by: MARY LOU PETERSEN MD on 20 1155


   Last Action: Continued on 20 by LISS ANNA MD





Justicifation of Admission Dx:


Justifications for Admission:


Justification of Admission Dx:  Yes


Respiratory Failure:  Severe Resp Distress











MARY LOU PETERSEN MD              Sep 8, 2020 09:00

## 2020-09-08 NOTE — CARD
MR#: [id number]

Account#: [id number]

Accession#: [id number]

Date of Study: 09/08/2020

Ordering Physician: LISS ANNA, 

Referring Physician: LISS ANNA, 

Tech: Diandra Valle RDCS





--------------- APPROVED REPORT --------------





EXAM: Two-dimensional and M-mode echocardiogram with Doppler and color Doppler.



Other Information 

Quality : Good



INDICATION

Congestive Heart Failure 

Non STEMI

Bi-V ICD, History NICM



2D DIMENSIONS 

RVDd3.6 (2.9-3.5cm)Left Atrium(2D)5.2 (1.6-4.0cm)

IVSd1.5 (0.7-1.1cm)Aortic Root(2D)4.8 (2.0-3.7cm)

LVDd6.8 (3.9-5.9cm)LVOT Diameter2.3 (1.8-2.4cm)

PWd1.5 (0.7-1.1cm)LVDs6.0 (2.5-4.0cm)

FS (%) 10.5 %SV52.4 ml

LVEF(%)22.3 (>50%)



Aortic Valve

AoV Peak Richard.159.6cm/sAoV VTI26.4cm

AO Peak GR.10.2mmHgLVOT  VTI 22.32cm

AO Mean GR.6mmHgAVA   (VTI)3.60cm2

AI P 1/2 Xsct071bd



Mitral Valve

MV E Mmzgvoqv903.4cm/sMV DECEL [id number]

MV A Yzroxcdh81.1cm/sE/A  Ratio2.1



TDI

Lateral E' P. V3.35cm/sMedial E' P. V3.15cm/s

E/Lateral E'33.3E/Medial E'35.4



Tricuspid Valve

TR P. [id number]/sRAP [id number]

TR Peak Gr.33hvDvHSPH25hkWc



 LEFT VENTRICLE 

The Left Ventricle is moderately dilated. There is mild concentric left ventricular hypertrophy. Left
 ventricle systolic function is severely impaired. The Ejection Fraction is 20-25%. There is global h
ypokinesis of the left ventricle. Apical motion consistent with pacemaker activation. Tissue Doppler 
imaging reveals severe left ventricular diastolic dysfunction. No left ventricle thrombus noted on 
is study.



 RIGHT VENTRICLE 

The right ventricle is mildly dilated. The right ventricular systolic function is normal. There is a 
pacemaker lead in the right ventricle.



 ATRIA 

The left atrium is moderately dilated. The right atrium is moderately dilated. A pacemaker is seen in
 the right atrium consistent with history. The interatrial septum is intact with no evidence for an a
trial septal defect or patent foramen ovale as noted on 2-D or Doppler imaging.



 AORTIC VALVE 

The aortic valve is calcified but opens well. Doppler and Color Flow revealed mild aortic regurgitati
on. There is no significant aortic valvular stenosis.



 MITRAL VALVE 

The mitral valve is thickened but opens well. There is no evidence of mitral valve prolapse. There is
 no mitral valve stenosis. Doppler and Color-flow revealed mild mitral regurgitation.



 TRICUSPID VALVE 

The tricuspid valve is normal in structure and function. Doppler and Color Flow revealed trace tricus
pid regurgitation. There is moderate pulmonary hypertension. The PA pressure was estimated at 51 mmHg
. There is no tricuspid valve stenosis.



 PULMONIC VALVE 

The pulmonary valve is normal in structure and function. Doppler and Color Flow revealed mild pulmoni
c valvular regurgitation. There is no pulmonic valvular stenosis.



 GREAT VESSELS 

The aortic root is normal in size. The IVC is dilated and collapses >50% with inspiration.



 PERICARDIAL EFFUSION 

There is no evidence of significant pericardial effusion.



Critical Notification

Critical Value: No



<Conclusion>

Left ventricle systolic function is severely impaired. The Ejection Fraction is 20-25%.

There is global hypokinesis of the left ventricle. Apical motion consistent with pacemaker activation
.

There is a pacemaker lead in the right ventricle.

Doppler and Color Flow revealed mild aortic regurgitation.

Doppler and Color Flow revealed trace tricuspid regurgitation. There is moderate pulmonary hypertensi
on. The PA pressure was estimated at 51 mmHg.



Signed by : Armando Megloza, 

Electronically Approved : 09/08/2020 11:12:20

## 2020-09-08 NOTE — SNU/HH DC
DISCHARGE WITH HOME HEALTH


DISCHARGE INFORMATION:


Discharge Date:  Sep 8, 2020


Final Diagnosis:


Problems


Medical Problems:


(1) Acute on chronic combined systolic and diastolic heart failure


Status: Acute  





(2) Acute respiratory failure with hypoxia most likely exacerbated by illegal 

drug abuse


Status: Acute  





(3) CHF exacerbation


Status: Acute  





(4) ruled out  2019 novel coronavirus infection


Status: Acute  





Condition on Discharge:  Stable





CODE STATUS:


Code Status:  Full





HOME HEALTH:


Face to Face:


I certify this patient is under my care and that I, or a nurse practitioner or 

physician's assistant working with me, had a face to face encounter that meets 

the physician face to face encounter requirements with this patient on [].


Medical Complications:  CHF


RN For Eval/Treatment:  Yes


Home Health Aide For:  Self-care


Pt Meets Homebound Status:  Limited distance walking





POST DISCHARGE ORDERS:


Activity Instructions for Disc:  Activity as tolerated


Weight Bearing Status after Di:  As tolerated


DIET AFTER DISCHARGE:  Cardiac


Wound/Incision Care:  No wound care needed





CHECKS AFTER DISCHARGE:


Checks after discharge:  Check blood press - daily, Weigh Yourself Daily





TREATMENT/EQUIPMENT ORDERS:


Adaptive Equipment Issued:  None


Discharge Respiratory Equipmen:  Oxygen





CERTIFICATION STATEMENT:


Certification Statement:


Certification Statement: Based on the above finding, I certify that this patient

 is confined to the home and needs intermittent skilled nursing care, physical 

therapy and/or speech therapy, or continues to need occupational therapy.~ This 

patient is under my care, and I have initiated the establishment of the plan of 

care.~ This patient will be followed by myself or a community physician who will

 periodically review the plan of care.


Home Meds


Active Scripts


Prednisone (PREDNISONE) 50 Mg Tablet, 1 TAB PO DAILY for COPD, #5 TAB


   Prov:MARY LOU PETERSEN MD         9/8/20


Lactobacillus Rhamnosus Gg (CULTURELLE) 1 Each Cap.sprink, 1 CAP PO BID for 

supplement for 30 Days, #60 CAP


   Prov:SOFIA DAVID MD         8/11/20


Doxycycline Hyclate (DOXYCYCLINE HYCLATE) 100 Mg Tablet, 100 MG PO BID for 

pneumonia for 10 Days, #20 TAB


   Prov:SOFIA DAVID MD         8/11/20


Lactobacillus Rhamnosus Gg (CULTURELLE) 1 Each Cap.sprink, 1 CAP PO BID for 

Diarrhea for 10 Days, #20 CAP


   Prov:TORIFETONY SANTAMARIA MD         5/28/20


Furosemide (LASIX) 20 Mg Tablet, 1 TAB PO DAILY for increase of weight/ dyspnea 

for 30 Days, #30 TAB 0 Refills


   Prov:MARY LOU PETERSEN MD         1/21/20


Lisinopril (LISINOPRIL) 40 Mg Tablet, 20 MG PO DAILY for HTN for 30 Days, #15 

TAB


   Prov:MARY LOU PETERSEN MD         1/21/20


Albuterol Sulfate (Proair Hfa) 8.5 Gm Hfa.aer.ad, 2.5 MG NEB PRN Q2HR PRN for 

SHORTNESS OF BREATH for 30 Days, #1 INHALER


   Prov:MARY LOU PETERSEN MD         1/21/20


Ipratropium/Albuterol Sulfate (DUONEB 0.5-3(2.5) MG/3 ML) 3 Ml Ampul.neb, 3 ML 

NEB Q4HRS for COPD for 30 Days, #180 EACH


   Prov:MARY LOU PETERSEN MD         1/20/20


Albuterol Sulfate (ALBUTEROL SULFATE NEB SOLN) 0.63 Mg/3 Ml Vial.neb, 0.63 MG 

NEB PRN Q4HRS PRN for SHORTNESS OF BREATH for 30 Days, #100 EACH 2 Refills


   AS NEEDED


   Prov:SONU LEE MD         6/26/17


Reported Medications


Apixaban (ELIQUIS) 5 Mg Tablet, 5 MG PO BID for blood thinner, TAB


   4/14/20


Metoprolol Succinate (TOPROL XL) 25 Mg Tab.er.24h, 1 TAB PO DAILY for blood 

pressure for 30 Days, #30 TAB 0 Refills


   4/14/20


Simvastatin (ZOCOR) 20 Mg Tablet, 20 MG PO HS for FOR CHOLESTEROL, #30 TAB 0 

Refills


   NEXT DOSE DUE TONIGHT AT BEDTIME


   7/10/15


Amlodipine Besylate (AMLODIPINE BESYLATE) 10 Mg Tablet, 10 MG PO DAILY


   NEXT DOSE DUE IN AM


   2/24/14


Potassium Chloride (POTASSIUM CHLORIDE) 10 Meq Tab.er.prt, 20 MEQ PO DAILY


   NEXT DOSE DUE IN AM


   2/24/14











MARY LOU PETERSEN MD              Sep 8, 2020 08:55

## 2020-11-17 ENCOUNTER — HOSPITAL ENCOUNTER (INPATIENT)
Dept: HOSPITAL 61 - ER | Age: 65
LOS: 6 days | Discharge: NURSING, REHAB OR LONG-TERM CARE | DRG: 682 | End: 2020-11-23
Attending: FAMILY MEDICINE | Admitting: FAMILY MEDICINE
Payer: MEDICARE

## 2020-11-17 VITALS — DIASTOLIC BLOOD PRESSURE: 92 MMHG | SYSTOLIC BLOOD PRESSURE: 130 MMHG

## 2020-11-17 VITALS — HEIGHT: 73 IN | BODY MASS INDEX: 26.73 KG/M2 | WEIGHT: 201.72 LBS

## 2020-11-17 VITALS — DIASTOLIC BLOOD PRESSURE: 88 MMHG | SYSTOLIC BLOOD PRESSURE: 140 MMHG

## 2020-11-17 VITALS — DIASTOLIC BLOOD PRESSURE: 99 MMHG | SYSTOLIC BLOOD PRESSURE: 138 MMHG

## 2020-11-17 DIAGNOSIS — I48.0: ICD-10-CM

## 2020-11-17 DIAGNOSIS — Z95.0: ICD-10-CM

## 2020-11-17 DIAGNOSIS — I47.2: ICD-10-CM

## 2020-11-17 DIAGNOSIS — N17.9: Primary | ICD-10-CM

## 2020-11-17 DIAGNOSIS — E87.6: ICD-10-CM

## 2020-11-17 DIAGNOSIS — J44.9: ICD-10-CM

## 2020-11-17 DIAGNOSIS — I13.0: ICD-10-CM

## 2020-11-17 DIAGNOSIS — Z90.49: ICD-10-CM

## 2020-11-17 DIAGNOSIS — F17.211: ICD-10-CM

## 2020-11-17 DIAGNOSIS — I50.43: ICD-10-CM

## 2020-11-17 DIAGNOSIS — R54: ICD-10-CM

## 2020-11-17 DIAGNOSIS — I21.4: ICD-10-CM

## 2020-11-17 DIAGNOSIS — Z83.3: ICD-10-CM

## 2020-11-17 DIAGNOSIS — D64.9: ICD-10-CM

## 2020-11-17 DIAGNOSIS — I42.8: ICD-10-CM

## 2020-11-17 DIAGNOSIS — N18.30: ICD-10-CM

## 2020-11-17 DIAGNOSIS — Z71.6: ICD-10-CM

## 2020-11-17 DIAGNOSIS — J96.21: ICD-10-CM

## 2020-11-17 DIAGNOSIS — Z82.49: ICD-10-CM

## 2020-11-17 DIAGNOSIS — Z88.8: ICD-10-CM

## 2020-11-17 DIAGNOSIS — F14.90: ICD-10-CM

## 2020-11-17 DIAGNOSIS — Z20.828: ICD-10-CM

## 2020-11-17 DIAGNOSIS — Z91.19: ICD-10-CM

## 2020-11-17 DIAGNOSIS — E78.00: ICD-10-CM

## 2020-11-17 DIAGNOSIS — F12.90: ICD-10-CM

## 2020-11-17 DIAGNOSIS — E78.5: ICD-10-CM

## 2020-11-17 LAB
% ATYL: 1 % (ref 0–0)
% BANDS: 8 % (ref 0–9)
% LYMPHS: 10 % (ref 24–48)
% MONOS: 2 % (ref 0–10)
% SEGS: 79 % (ref 35–66)
ALBUMIN SERPL-MCNC: 3.5 G/DL (ref 3.4–5)
ALBUMIN/GLOB SERPL: 1 {RATIO} (ref 1–1.7)
ALP SERPL-CCNC: 47 U/L (ref 46–116)
ALT SERPL-CCNC: 19 U/L (ref 16–63)
ANION GAP SERPL CALC-SCNC: 5 MMOL/L (ref 6–14)
ANISOCYTOSIS BLD QL SMEAR: SLIGHT
APTT PPP: YELLOW S
AST SERPL-CCNC: 21 U/L (ref 15–37)
BACTERIA #/AREA URNS HPF: 0 /HPF
BASE EXCESS STD BLDA CALC-SCNC: 2 MMOL/L (ref -3–3)
BASOPHILS # BLD AUTO: 0.1 X10^3/UL (ref 0–0.2)
BASOPHILS NFR BLD: 1 % (ref 0–3)
BILIRUB SERPL-MCNC: 1.3 MG/DL (ref 0.2–1)
BILIRUB UR QL STRIP: NEGATIVE
BUN SERPL-MCNC: 25 MG/DL (ref 8–26)
BUN/CREAT SERPL: 18 (ref 6–20)
CALCIUM SERPL-MCNC: 9.7 MG/DL (ref 8.5–10.1)
CHLORIDE SERPL-SCNC: 104 MMOL/L (ref 98–107)
CK SERPL-CCNC: 166 U/L (ref 39–308)
CO2 SERPL-SCNC: 31 MMOL/L (ref 21–32)
CREAT SERPL-MCNC: 1.4 MG/DL (ref 0.7–1.3)
EOSINOPHIL NFR BLD: 0 X10^3/UL (ref 0–0.7)
EOSINOPHIL NFR BLD: 1 % (ref 0–3)
ERYTHROCYTE [DISTWIDTH] IN BLOOD BY AUTOMATED COUNT: 16.8 % (ref 11.5–14.5)
FIBRINOGEN PPP-MCNC: CLEAR MG/DL
GFR SERPLBLD BASED ON 1.73 SQ M-ARVRAT: 61.9 ML/MIN
GLUCOSE SERPL-MCNC: 103 MG/DL (ref 70–99)
HCO3 BLDA-SCNC: 25 MMOL/L (ref 21–28)
HCT VFR BLD CALC: 34.9 % (ref 39–53)
HGB BLD-MCNC: 11.4 G/DL (ref 13–17.5)
INFLUENZA A PATIENT: NEGATIVE
INFLUENZA B PATIENT: NEGATIVE
LIPASE: 37 U/L (ref 73–393)
LYMPHOCYTES # BLD: 0.5 X10^3/UL (ref 1–4.8)
LYMPHOCYTES NFR BLD AUTO: 6 % (ref 24–48)
MAGNESIUM SERPL-MCNC: 2.2 MG/DL (ref 1.8–2.4)
MCH RBC QN AUTO: 29 PG (ref 25–35)
MCHC RBC AUTO-ENTMCNC: 33 G/DL (ref 31–37)
MCV RBC AUTO: 87 FL (ref 79–100)
METHGB MFR BLD: 0.4 % (ref 0–1.9)
MONO #: 0.5 X10^3/UL (ref 0–1.1)
MONOCYTES NFR BLD: 7 % (ref 0–9)
NEUT #: 6.7 X10^3/UL (ref 1.8–7.7)
NEUTROPHILS NFR BLD AUTO: 86 % (ref 31–73)
NITRITE UR QL STRIP: NEGATIVE
OVALOCYTES BLD QL SMEAR: (no result)
OXYHGB MFR BLD: 96.1 %
PCO2 BLDA: 35 MMHG (ref 35–46)
PH UR STRIP: 7 [PH]
PLATELET # BLD AUTO: 117 X10^3/UL (ref 140–400)
PLATELET # BLD EST: (no result) 10*3/UL
PO2 BLDA: 93 MMHG (ref 65–108)
POTASSIUM SERPL-SCNC: 3.3 MMOL/L (ref 3.5–5.1)
PROT SERPL-MCNC: 7.1 G/DL (ref 6.4–8.2)
PROT UR STRIP-MCNC: 30 MG/DL
RBC # BLD AUTO: 4 X10^6/UL (ref 4.3–5.7)
RBC #/AREA URNS HPF: (no result) /HPF (ref 0–2)
SAO2 % BLDA: 97 % (ref 92–99)
SODIUM SERPL-SCNC: 140 MMOL/L (ref 136–145)
UROBILINOGEN UR-MCNC: 0.2 MG/DL
WBC # BLD AUTO: 7.8 X10^3/UL (ref 4–11)
WBC #/AREA URNS HPF: 0 /HPF (ref 0–4)

## 2020-11-17 PROCEDURE — 82805 BLOOD GASES W/O2 SATURATION: CPT

## 2020-11-17 PROCEDURE — 80307 DRUG TEST PRSMV CHEM ANLYZR: CPT

## 2020-11-17 PROCEDURE — 5A09357 ASSISTANCE WITH RESPIRATORY VENTILATION, LESS THAN 24 CONSECUTIVE HOURS, CONTINUOUS POSITIVE AIRWAY PRESSURE: ICD-10-PCS | Performed by: FAMILY MEDICINE

## 2020-11-17 PROCEDURE — 85025 COMPLETE CBC W/AUTO DIFF WBC: CPT

## 2020-11-17 PROCEDURE — 82553 CREATINE MB FRACTION: CPT

## 2020-11-17 PROCEDURE — 80053 COMPREHEN METABOLIC PANEL: CPT

## 2020-11-17 PROCEDURE — 87426 SARSCOV CORONAVIRUS AG IA: CPT

## 2020-11-17 PROCEDURE — 96374 THER/PROPH/DIAG INJ IV PUSH: CPT

## 2020-11-17 PROCEDURE — 83690 ASSAY OF LIPASE: CPT

## 2020-11-17 PROCEDURE — 80048 BASIC METABOLIC PNL TOTAL CA: CPT

## 2020-11-17 PROCEDURE — G0378 HOSPITAL OBSERVATION PER HR: HCPCS

## 2020-11-17 PROCEDURE — 81001 URINALYSIS AUTO W/SCOPE: CPT

## 2020-11-17 PROCEDURE — 94640 AIRWAY INHALATION TREATMENT: CPT

## 2020-11-17 PROCEDURE — P9046 ALBUMIN (HUMAN), 25%, 20 ML: HCPCS

## 2020-11-17 PROCEDURE — 87804 INFLUENZA ASSAY W/OPTIC: CPT

## 2020-11-17 PROCEDURE — 36415 COLL VENOUS BLD VENIPUNCTURE: CPT

## 2020-11-17 PROCEDURE — U0003 INFECTIOUS AGENT DETECTION BY NUCLEIC ACID (DNA OR RNA); SEVERE ACUTE RESPIRATORY SYNDROME CORONAVIRUS 2 (SARS-COV-2) (CORONAVIRUS DISEASE [COVID-19]), AMPLIFIED PROBE TECHNIQUE, MAKING USE OF HIGH THROUGHPUT TECHNOLOGIES AS DESCRIBED BY CMS-2020-01-R: HCPCS

## 2020-11-17 PROCEDURE — 71045 X-RAY EXAM CHEST 1 VIEW: CPT

## 2020-11-17 PROCEDURE — 85007 BL SMEAR W/DIFF WBC COUNT: CPT

## 2020-11-17 PROCEDURE — 99285 EMERGENCY DEPT VISIT HI MDM: CPT

## 2020-11-17 PROCEDURE — 94660 CPAP INITIATION&MGMT: CPT

## 2020-11-17 PROCEDURE — 36600 WITHDRAWAL OF ARTERIAL BLOOD: CPT

## 2020-11-17 PROCEDURE — 83880 ASSAY OF NATRIURETIC PEPTIDE: CPT

## 2020-11-17 PROCEDURE — 83735 ASSAY OF MAGNESIUM: CPT

## 2020-11-17 PROCEDURE — 94760 N-INVAS EAR/PLS OXIMETRY 1: CPT

## 2020-11-17 PROCEDURE — 84484 ASSAY OF TROPONIN QUANT: CPT

## 2020-11-17 RX ADMIN — SIMVASTATIN SCH MG: 20 TABLET, FILM COATED ORAL at 21:30

## 2020-11-17 RX ADMIN — IPRATROPIUM BROMIDE AND ALBUTEROL SULFATE SCH ML: .5; 3 SOLUTION RESPIRATORY (INHALATION) at 20:00

## 2020-11-17 RX ADMIN — BUMETANIDE SCH MG: 1 TABLET ORAL at 21:31

## 2020-11-17 RX ADMIN — APIXABAN SCH MG: 5 TABLET, FILM COATED ORAL at 21:30

## 2020-11-17 NOTE — RAD
Single AP view of the chest.

 

Comparison:  11/9/2020.

 

Indication: Vomiting and diarrhea

 

Findings: 

 

3-lead left subclavian pacemaker is reidentified and is stable. The heart 

is enlarged but stable.  There is no pneumothorax or effusion. Persistent 

patchy airspace disease and trace effusions.

 

Impression: 

 

1. Findings again suggest volume overload with cardiomegaly. Trace 

effusions are reidentified.

 

 

Electronically signed by: Jorge Wild MD (11/17/2020 12:41 PM) [id number]

## 2020-11-17 NOTE — ED.ADGEN
Past Medical History


Past Medical History:  Asthma, CHF, COPD, High Cholesterol, Hypertension


Additional Past Medical Histor:  CRACK, COCAINE


Past Surgical History:  Other


Additional Past Surgical Histo:  PACEMAKER


Smoking Status:  Former Smoker


Alcohol Use:  Occasionally


Drug Use:  Cocaine, Marijuana





General Adult


EDM:


Chief Complaint:  SHORTNESS OF BREATH





HPI:


HPI:





Patient is a 65  year old AA male who presents to the emergency room via EMS 

with complaints of increased shortness of breath, and an increased cough with 

pink sputum since yesterday.  Patient denies any known Covid-19 exposure.  

Patient reports that he was admitted a few weeks ago for a COPD exacerbation and

he was discharged home on .  He denies any chest pain, palpitations,

nausea, vomiting, diarrhea, abdominal pain, sore throat, or headache.  Patient 

reports that his breathing has become more labored.  He denies any increased 

swelling in his lower extremities.  Patient states he has not missed any of his 

prescribed medications.  He denies any fever.  The patient currently denies any 

pain.





Review of Systems:


Review of Systems:


Constitutional:   Denies fever or chills. []


HENT:   Denies nasal congestion or sore throat. [] 


Respiratory:   See HPI


Cardiovascular:   Denies chest pain; reports chronic swelling in bilateral lower

 extremities denies any increase


GI:   Denies abdominal pain, nausea, vomiting, or diarrhea. [] 


Musculoskeletal:   Denies back pain or joint pain. [] 


Integument:   Denies rash. [] 


Neurologic:   Denies headache, focal weakness or sensory changes. [] 


Lymphatic:  Denies swollen glands. [] 


Psychiatric:  Denies depression or anxiety. []





Current Medications:





Current Medications








 Medications


  (Trade)  Dose


 Ordered  Sig/Irvin  Start Time


 Stop Time Status Last Admin


Dose Admin


 


 Bumetanide


  (Bumex)  1 mg  1X  ONCE  20 13:15


 20 13:16 DC 20 13:15


1 MG











Allergies:


Allergies:





Allergies








Coded Allergies Type Severity Reaction Last Updated Verified


 


  levofloxacin Allergy Intermediate Itching 10/10/20 Yes











Physical Exam:


PE:





Constitutional: Well developed, well nourished, mild distress, ill appearance. 

[]


HENT: Normocephalic, atraumatic, bilateral external ears normal, nose normal. []


Eyes: PERRLA, EOMI, conjunctiva normal, no discharge. [] 


Neck: Normal range of motion, no stridor. [] 


Cardiovascular:Heart rate regular rhythm


Lungs & Thorax: Respirations even and unlabored, abdominal retractions present, 

increased respiratory rate, mild distress


Abdomen: soft, no tenderness


Skin: Warm, dry, no erythema, no rash. [] 


Extremities: No cyanosis, no clubbing, ROM intact, 2+ edema BLE


Neurologic: Alert and oriented X 3, normal motor function, normal sensory 

function, no focal deficits noted. []


Psychologic: Affect normal, judgement normal, mood normal. []





Current Patient Data:


Labs:





                                Laboratory Tests








Test


 20


12:03 20


12:25 20


13:05


 


O2 Saturation 97 % (92-99)    


 


Arterial Blood pH


 7.48


(7.35-7.45)  H 


 





 


Arterial Blood pCO2 at


Patient Temp 35 mmHg


(35-46) 


 





 


Arterial Blood pO2 at Patient


Temp 93 mmHg


() 


 





 


Arterial Blood HCO3


 25 mmol/L


(21-28) 


 





 


Arterial Blood Base Excess


 2 mmol/L


(-3-3) 


 





 


Oxyhemoglobin 96.1 %    


 


Methemoglobin


 0.4 %


(0.0-1.9) 


 





 


Carbon Monoxide, Quantitative


 0.7 %


(0.0-1.9) 


 





 


FiO2 40/5lnc    


 


White Blood Count


 


 7.8 x10^3/uL


(4.0-11.0) 





 


Red Blood Count


 


 4.00 x10^6/uL


(4.30-5.70)  L 





 


Hemoglobin


 


 11.4 g/dL


(13.0-17.5)  L 





 


Hematocrit


 


 34.9 %


(39.0-53.0)  L 





 


Mean Corpuscular Volume


 


 87 fL ()


 





 


Mean Corpuscular Hemoglobin  29 pg (25-35)   


 


Mean Corpuscular Hemoglobin


Concent 


 33 g/dL


(31-37) 





 


Red Cell Distribution Width


 


 16.8 %


(11.5-14.5)  H 





 


Platelet Count


 


 117 x10^3/uL


(140-400)  L 





 


Neutrophils (%) (Auto)  86 % (31-73)  H 


 


Lymphocytes (%) (Auto)  6 % (24-48)  L 


 


Monocytes (%) (Auto)  7 % (0-9)   


 


Eosinophils (%) (Auto)  1 % (0-3)   


 


Basophils (%) (Auto)  1 % (0-3)   


 


Neutrophils # (Auto)


 


 6.7 x10^3/uL


(1.8-7.7) 





 


Lymphocytes # (Auto)


 


 0.5 x10^3/uL


(1.0-4.8)  L 





 


Monocytes # (Auto)


 


 0.5 x10^3/uL


(0.0-1.1) 





 


Eosinophils # (Auto)


 


 0.0 x10^3/uL


(0.0-0.7) 





 


Basophils # (Auto)


 


 0.1 x10^3/uL


(0.0-0.2) 





 


Segmented Neutrophils %  79 % (35-66)  H 


 


Band Neutrophils %  8 % (0-9)   


 


Lymphocytes %  10 % (24-48)  L 


 


Atypical Lymphocytes %


(Manual) 


 1 % (0-0)  H


 





 


Monocytes %  2 % (0-10)   


 


Platelet Estimate


 


 Decreased


(ADEQUATE) 





 


Anisocytosis  Slight   


 


Ovalocytes  Mod   


 


Sodium Level


 


 140 mmol/L


(136-145) 





 


Potassium Level


 


 3.3 mmol/L


(3.5-5.1)  L 





 


Chloride Level


 


 104 mmol/L


() 





 


Carbon Dioxide Level


 


 31 mmol/L


(21-32) 





 


Anion Gap  5 (6-14)  L 


 


Blood Urea Nitrogen


 


 25 mg/dL


(8-26) 





 


Creatinine


 


 1.4 mg/dL


(0.7-1.3)  H 





 


Estimated GFR


(Cockcroft-Gault) 


 61.9  


 





 


BUN/Creatinine Ratio  18 (6-20)   


 


Glucose Level


 


 103 mg/dL


(70-99)  H 





 


Calcium Level


 


 9.7 mg/dL


(8.5-10.1) 





 


Magnesium Level


 


 2.2 mg/dL


(1.8-2.4) 





 


Total Bilirubin


 


 1.3 mg/dL


(0.2-1.0)  H 





 


Aspartate Amino Transferase


(AST) 


 21 U/L (15-37)


 





 


Alanine Aminotransferase (ALT)


 


 19 U/L (16-63)


 





 


Alkaline Phosphatase


 


 47 U/L


() 





 


Creatine Kinase


 


 166 U/L


() 





 


Creatine Kinase MB (Mass)


 


 3.2 ng/mL


(0.0-3.6) 





 


Creatine Kinase MB Relative


Index 


 1.9 % (0-4)  


 





 


Troponin I Quantitative


 


 0.201 ng/mL


(0.000-0.055) 





 


NT-Pro-B-Type Natriuretic


Peptide 


 87611 pg/mL


(0-124)  H 





 


Total Protein


 


 7.1 g/dL


(6.4-8.2) 





 


Albumin


 


 3.5 g/dL


(3.4-5.0) 





 


Albumin/Globulin Ratio  1.0 (1.0-1.7)   


 


Lipase


 


 37 U/L


()  L 





 


Influenza Type A Antigen


 


 


 Negative


(NEGATIVE)


 


Influenza Type B Antigen


 


 


 Negative


(NEGATIVE)


 


SARS-CoV-2 Antigen (Rapid)


 


 


 Negative


(NEGATIVE)





                                Laboratory Tests


20 12:25








                                Laboratory Tests


20 12:25








Vital Signs:





                                   Vital Signs








  Date Time  Temp Pulse Resp B/P (MAP) Pulse Ox O2 Delivery O2 Flow Rate FiO2


 


20 13:11      Nasal Cannula 8.0 


 


20 11:56 99.9   137/90 (106)    





 99.9       


 


20 11:36  69      











EKG:


EK- [paced rhythm, rate 69, no STEMI, read by Dr. Rodriguez.]





Heart Score:


HEART Score for Chest Pain:  








HEART Score for Chest Pain Response (Comments) Value


 


History Slighlty/Non-Suspicious 0


 


ECG Nonspecific Repolarizatio 1


 


Age >45 - < 65 1


 


Risk Factors >3 Risk Factors or Hx CAD 2


 


Troponin >3 x Normal Limit 2


 


Total  6








Risk Factors:


Risk Factors:  DM, Current or recent (<one month) smoker, HTN, HLP, family 

history of CAD, obesity.


Risk Scores:


Score 0 - 3:  2.5% MACE over next 6 weeks - Discharge Home


Score 4 - 6:  20.3% MACE over next 6 weeks - Admit for Clinical Observation


Score 7 - 10:  72.7% MACE over next 6 weeks - Early Invasive Strategies





Radiology/Procedures:


Radiology/Procedures:


PROCEDURE: CHEST AP ONLY





Single AP view of the chest.


 


Comparison:  2020.


 


Indication: Vomiting and diarrhea


 


Findings: 


 


3-lead left subclavian pacemaker is reidentified and is stable. The heart 


is enlarged but stable.  There is no pneumothorax or effusion. Persistent 


patchy airspace disease and trace effusions.


 


Impression: 


 


1. Findings again suggest volume overload with cardiomegaly. Trace 


effusions are reidentified.


 []





Course & Med Decision Making:


Course & Med Decision Making


Pertinent Labs and Imaging studies reviewed. (See chart for details)


65-year-old male presented to the emergency room with shortness of breath and 

increased cough since yesterday.


CBC reveals no acute changes; CMP reveals potassium of 3.3, creatinine 1.4, 

glucose 100 320 bili 1.3; patient's troponin is 0.201 this is somewhat increased

 from his last troponin during his last visit.  His BNP is 17,645.  Influenza 

and COVID-19 test are pending.


Patient was given 1 mg of Bumex IV and 325 mg of aspirin while in the emergency 

department.  


His oxygen saturation continued to be in the low 90s after increasing his oxygen

 to 8 L by nasal cannula by RT.  Patient was placed on BiPAP in the ER.


1321-spoke with Dr. Murry who is the admitting physician, and care was as

sumed following discussion of patient.  Will admit patient [facility] for PUI, CHF 

exacerbation, dyspnea, elevated troponin.  I advised Dr. Murry that the 

patient been given 1 mg of Bumex in the ER for elevated BNP and dyspnea.


Patient's vital signs stable. Patient remains afebrile, appears nontoxic, mild 

respiratory distress noted. Patient will be admitted to the [facility] floor. Patient's

 case and plan of care also discussed with Dr. Rodriguez





[]





Dragon Disclaimer:


Dragon Disclaimer:


This electronic medical record was generated, in whole or in part, using a voice

 recognition dictation system.





Departure


Departure


Impression:  


   Primary Impression:  


   Person under investigation for COVID-19


   Additional Impressions:  


   CHF exacerbation


   Dyspnea


   Elevated troponin


Disposition:   ADMITTED AS INPT THIS HOSP


Admitting Physician:  TYREE (Lovely)


Condition:  STABLE


Referrals:  


HIEN ALFONSO (PCP)





Attending Signature


Attending Signature


I have reviewed the PA/NP's note and plan of care. I was available for 

consultation as needed during the patient's visit in the emergency department. I

 agree with the clinical impression, plan, and disposition.





Problem Qualifiers








   Additional Impressions:  


   CHF exacerbation


   Heart failure type:  unspecified  Qualified Codes:  I50.9 - Heart failure, 

   unspecified


   Dyspnea


   Dyspnea type:  shortness of breath  Qualified Codes:  R06.02 - Shortness of 

   breath








BRIDGETTE WILD       2020 12:02


AIDAN RODRIGUEZ DO             2020 20:14

## 2020-11-17 NOTE — PDOC1
History and Physical


Date of Admission


Date of Admission


DATE: 11/17/20 


TIME: 13:30





Identification/Chief Complaint


Chief Complaint


 SEEN IN ER WITH WORSENING SOA, 35  year old AA male who presents to the 

emergency room via EMS with complaints of increased shortness of breath, and an 

increased cough with pink sputum since yesterday.  Patient denies any known 

Covid-19 exposure.  Patient reports that he was admitted a few weeks ago for a 

COPD exacerbation and he was discharged home on November 9th.  He denies any 

chest pain, palpitations, nausea, vomiting, diarrhea, abdominal pain, sore 

throat, or headache. 





 reports that his breathing has become more labored. TROPONIN I MILDLY ELEVATED.





Past Medical History


Cardiovascular:  CHF, HTN, Hyperlipidemia


Pulmonary:  Asthma, COPD


GI:  Other


Heme/Onc:  No pertinent hx


Hepatobiliary:  No pertinent hx


Psych:  No pertinent hx


Rheumatologic:  No pertinent hx


Infectious disease:  No pertinent hx


Renal/:  Chronic renal insuff


Endocrine:  No pertinent hx





Past Surgical History


Past Surgical History:  Appendectomy, Hernia Repair, Other





Family History


Family History:  Coronary Artery Disease, Diabetes, Hypertension





Social History


Smoke:  <1 pack per day


ALCOHOL:  heavy


Drugs:  Cocaine, Marijuana





Current Medications


Current Medications





Current Medications


Bumetanide (Bumex) 1 mg 1X  ONCE IV ;  Start 11/17/20 at 13:15;  Stop 11/17/20 

at 13:16;  Status DC


Aspirin (Sara Aspirin) 325 mg 1X  ONCE PO ;  Start 11/17/20 at 13:30;  Stop 

11/17/20 at 13:31





Active Scripts


Active


Bumetanide 1 Mg Tablet 1 Mg PO BID


Amiodarone Hcl 200 Mg Tablet 200 Mg PO DAILY


Potassium Chloride ** (Potassium Chloride) 20 Meq Tablet.er 20 Meq PO DAILY 30 

Days


Lisinopril 40 Mg Tablet 20 Mg PO DAILY 30 Days


Proair Hfa (Albuterol Sulfate) 8.5 Gm Hfa.aer.ad 2.5 Mg NEB PRN Q2HR PRN 30 Days


Duoneb 0.5-3(2.5) Mg/3 Ml (Albuterol/Ipratropium) 3 Ml Ampul.neb 3 Ml NEB Q4HRS 

30 Days


Albuterol Sulfate Neb Soln (Albuterol Sulfate) 0.63 Mg/3 Ml Vial.neb 0.63 Mg NEB

PRN Q4HRS PRN 30 Days


     AS NEEDED


Reported


NICODERM CQ 21mg (Nicotine) 1 Each Patch.td24 1 Patch TP DAILY


Eliquis (Apixaban) 5 Mg Tablet 5 Mg PO BID


Toprol Xl (Metoprolol Succinate) 25 Mg Tab.er.24h 1 Tab PO DAILY 30 Days


Zocor (Simvastatin) 20 Mg Tablet 20 Mg PO HS


     NEXT DOSE DUE TONIGHT AT BEDTIME





Allergies


Allergies:  


Coded Allergies:  


     levofloxacin (Verified  Allergy, Intermediate, Itching, 10/10/20)





ROS


Review of System


Review of Systems:


Review of Systems:


Constitutional:   Denies fever or chills. []


HENT:   Denies nasal congestion or sore throat. [] 


Respiratory:   See HPI


Cardiovascular:   Denies chest pain; reports chronic swelling in bilateral lower

 extremities denies any increase


GI:   Denies abdominal pain, nausea, vomiting, or diarrhea. [] 


Musculoskeletal:   Denies back pain or joint pain. [] 


Integument:   Denies rash. [] 


Neurologic:   Denies headache, focal weakness or sensory changes. [] 


Lymphatic:  Denies swollen glands. [] 


Psychiatric:  Denies depression or anxiety. []





14 pt ros otherwise neg





Physical Exam


Physical Exam





Constitutional: Well developed, well nourished, mild distress, ill appearance. 

[]


HENT: Normocephalic, atraumatic, bilateral external ears normal, nose normal. []


Eyes: PERRLA, EOMI, conjunctiva normal, no discharge. [] 


Neck: Normal range of motion, no stridor. [] 


Cardiovascular:Heart rate regular rhythm


Lungs & Thorax: Respirations even and unlabored, abdominal retractions present, 

increased respiratory rate, mild distress


Abdomen: soft, no tenderness


Skin: Warm, dry, no erythema, no rash. [] 


Extremities: No cyanosis, no clubbing, ROM intact, 2+ edema BLE


Neurologic: Alert and oriented X 3, normal motor function, normal sensory 

function, no focal deficits noted. []


Psychologic: Affect normal, judgement normal, mood normal. []


General:  mild distress


HEENT:  Mucous membr. moist/pink


Abdomen:  Soft


Rectal Exam:  not examined


Extremities:  No cyanosis


Neuro:  Normal speech, Normal tone, Cranial nerves 3-12 NL





Vitals


Vitals





Vital Signs








  Date Time  Temp Pulse Resp B/P (MAP) Pulse Ox O2 Delivery O2 Flow Rate FiO2


 


11/17/20 13:11      Nasal Cannula 8.0 


 


11/17/20 11:56 99.9   137/90 (106)    





 99.9       











Labs


Labs





Laboratory Tests








Test


 11/17/20


12:03 11/17/20


12:25


 


O2 Saturation 97 % (92-99)  


 


Arterial Blood pH


 7.48


(7.35-7.45) 





 


Arterial Blood pCO2 at


Patient Temp 35 mmHg


(35-46) 





 


Arterial Blood pO2 at Patient


Temp 93 mmHg


() 





 


Arterial Blood HCO3


 25 mmol/L


(21-28) 





 


Arterial Blood Base Excess


 2 mmol/L


(-3-3) 





 


Oxyhemoglobin 96.1 %  


 


Methemoglobin


 0.4 %


(0.0-1.9) 





 


Carbon Monoxide, Quantitative


 0.7 %


(0.0-1.9) 





 


FiO2 40/5lnc  


 


White Blood Count


 


 7.8 x10^3/uL


(4.0-11.0)


 


Red Blood Count


 


 4.00 x10^6/uL


(4.30-5.70)


 


Hemoglobin


 


 11.4 g/dL


(13.0-17.5)


 


Hematocrit


 


 34.9 %


(39.0-53.0)


 


Mean Corpuscular Volume  87 fL () 


 


Mean Corpuscular Hemoglobin  29 pg (25-35) 


 


Mean Corpuscular Hemoglobin


Concent 


 33 g/dL


(31-37)


 


Red Cell Distribution Width


 


 16.8 %


(11.5-14.5)


 


Platelet Count


 


 117 x10^3/uL


(140-400)


 


Neutrophils (%) (Auto)  86 % (31-73) 


 


Lymphocytes (%) (Auto)  6 % (24-48) 


 


Monocytes (%) (Auto)  7 % (0-9) 


 


Eosinophils (%) (Auto)  1 % (0-3) 


 


Basophils (%) (Auto)  1 % (0-3) 


 


Neutrophils # (Auto)


 


 6.7 x10^3/uL


(1.8-7.7)


 


Lymphocytes # (Auto)


 


 0.5 x10^3/uL


(1.0-4.8)


 


Monocytes # (Auto)


 


 0.5 x10^3/uL


(0.0-1.1)


 


Eosinophils # (Auto)


 


 0.0 x10^3/uL


(0.0-0.7)


 


Basophils # (Auto)


 


 0.1 x10^3/uL


(0.0-0.2)


 


Sodium Level


 


 140 mmol/L


(136-145)


 


Potassium Level


 


 3.3 mmol/L


(3.5-5.1)


 


Chloride Level


 


 104 mmol/L


()


 


Carbon Dioxide Level


 


 31 mmol/L


(21-32)


 


Anion Gap  5 (6-14) 


 


Blood Urea Nitrogen


 


 25 mg/dL


(8-26)


 


Creatinine


 


 1.4 mg/dL


(0.7-1.3)


 


Estimated GFR


(Cockcroft-Gault) 


 61.9 





 


BUN/Creatinine Ratio  18 (6-20) 


 


Glucose Level


 


 103 mg/dL


(70-99)


 


Calcium Level


 


 9.7 mg/dL


(8.5-10.1)


 


Magnesium Level


 


 2.2 mg/dL


(1.8-2.4)


 


Total Bilirubin


 


 1.3 mg/dL


(0.2-1.0)


 


Aspartate Amino Transf


(AST/SGOT) 


 21 U/L (15-37) 





 


Alanine Aminotransferase


(ALT/SGPT) 


 19 U/L (16-63) 





 


Alkaline Phosphatase


 


 47 U/L


()


 


Creatine Kinase


 


 166 U/L


()


 


Creatine Kinase MB (Mass)


 


 3.2 ng/mL


(0.0-3.6)


 


Creatine Kinase MB Relative


Index 


 1.9 % (0-4) 





 


Troponin I Quantitative


 


 0.201 ng/mL


(0.000-0.055)


 


NT-Pro-B-Type Natriuretic


Peptide 


 47393 pg/mL


(0-124)


 


Total Protein


 


 7.1 g/dL


(6.4-8.2)


 


Albumin


 


 3.5 g/dL


(3.4-5.0)


 


Albumin/Globulin Ratio  1.0 (1.0-1.7) 


 


Lipase


 


 37 U/L


()








Laboratory Tests








Test


 11/17/20


12:03 11/17/20


12:25


 


O2 Saturation 97 % (92-99)  


 


Arterial Blood pH


 7.48


(7.35-7.45) 





 


Arterial Blood pCO2 at


Patient Temp 35 mmHg


(35-46) 





 


Arterial Blood pO2 at Patient


Temp 93 mmHg


() 





 


Arterial Blood HCO3


 25 mmol/L


(21-28) 





 


Arterial Blood Base Excess


 2 mmol/L


(-3-3) 





 


Oxyhemoglobin 96.1 %  


 


Methemoglobin


 0.4 %


(0.0-1.9) 





 


Carbon Monoxide, Quantitative


 0.7 %


(0.0-1.9) 





 


FiO2 40/5lnc  


 


White Blood Count


 


 7.8 x10^3/uL


(4.0-11.0)


 


Red Blood Count


 


 4.00 x10^6/uL


(4.30-5.70)


 


Hemoglobin


 


 11.4 g/dL


(13.0-17.5)


 


Hematocrit


 


 34.9 %


(39.0-53.0)


 


Mean Corpuscular Volume  87 fL () 


 


Mean Corpuscular Hemoglobin  29 pg (25-35) 


 


Mean Corpuscular Hemoglobin


Concent 


 33 g/dL


(31-37)


 


Red Cell Distribution Width


 


 16.8 %


(11.5-14.5)


 


Platelet Count


 


 117 x10^3/uL


(140-400)


 


Neutrophils (%) (Auto)  86 % (31-73) 


 


Lymphocytes (%) (Auto)  6 % (24-48) 


 


Monocytes (%) (Auto)  7 % (0-9) 


 


Eosinophils (%) (Auto)  1 % (0-3) 


 


Basophils (%) (Auto)  1 % (0-3) 


 


Neutrophils # (Auto)


 


 6.7 x10^3/uL


(1.8-7.7)


 


Lymphocytes # (Auto)


 


 0.5 x10^3/uL


(1.0-4.8)


 


Monocytes # (Auto)


 


 0.5 x10^3/uL


(0.0-1.1)


 


Eosinophils # (Auto)


 


 0.0 x10^3/uL


(0.0-0.7)


 


Basophils # (Auto)


 


 0.1 x10^3/uL


(0.0-0.2)


 


Sodium Level


 


 140 mmol/L


(136-145)


 


Potassium Level


 


 3.3 mmol/L


(3.5-5.1)


 


Chloride Level


 


 104 mmol/L


()


 


Carbon Dioxide Level


 


 31 mmol/L


(21-32)


 


Anion Gap  5 (6-14) 


 


Blood Urea Nitrogen


 


 25 mg/dL


(8-26)


 


Creatinine


 


 1.4 mg/dL


(0.7-1.3)


 


Estimated GFR


(Cockcroft-Gault) 


 61.9 





 


BUN/Creatinine Ratio  18 (6-20) 


 


Glucose Level


 


 103 mg/dL


(70-99)


 


Calcium Level


 


 9.7 mg/dL


(8.5-10.1)


 


Magnesium Level


 


 2.2 mg/dL


(1.8-2.4)


 


Total Bilirubin


 


 1.3 mg/dL


(0.2-1.0)


 


Aspartate Amino Transf


(AST/SGOT) 


 21 U/L (15-37) 





 


Alanine Aminotransferase


(ALT/SGPT) 


 19 U/L (16-63) 





 


Alkaline Phosphatase


 


 47 U/L


()


 


Creatine Kinase


 


 166 U/L


()


 


Creatine Kinase MB (Mass)


 


 3.2 ng/mL


(0.0-3.6)


 


Creatine Kinase MB Relative


Index 


 1.9 % (0-4) 





 


Troponin I Quantitative


 


 0.201 ng/mL


(0.000-0.055)


 


NT-Pro-B-Type Natriuretic


Peptide 


 62663 pg/mL


(0-124)


 


Total Protein


 


 7.1 g/dL


(6.4-8.2)


 


Albumin


 


 3.5 g/dL


(3.4-5.0)


 


Albumin/Globulin Ratio  1.0 (1.0-1.7) 


 


Lipase


 


 37 U/L


()











Images


Images





INDICATION


Cardiomyopathy 


LV Function:Systolic





2D DIMENSIONS 


RVDd   3.8 (2.9-3.5cm)   Left Atrium(2D)   4.4 (1.6-4.0cm)


IVSd   1.4 (0.7-1.1cm)   Aortic Root(2D)   3.3 (2.0-3.7cm)


LVDd   6.3 (3.9-5.9cm)   LVOT Diameter   2.4 (1.8-2.4cm)


PWd   1.4 (0.7-1.1cm)   LVDs      4.7 (2.5-4.0cm)


FS (%)    17.0 %      SV      102.7 ml


LVEF(%)   35.0 (>50%)         





Tricuspid Valve


TR P. Velocity   244cm/s   RAP ESTIMATE   3mmHg


TR Peak Gr.   24mmHg   RVSP      27mmHg





 LEFT VENTRICLE 


The Left Ventricle is mildly dilated. There is mild concentric left ventricular 

hypertrophy. Left ventricle systolic function is mild to moderately impaired. 

The Ejection Fraction is 40%. Septal motion consistent with conduction 

abnormality.





 TRICUSPID VALVE 


The tricuspid valve is normal in structure and function. Doppler and Color Flow 

revealed physiological tricuspid regurgitation. The PA pressure was estimated at

 27 mmHg. There is no tricuspid valve stenosis.





 GREAT VESSELS 


na





 PERICARDIAL EFFUSION 


There is no evidence of significant pericardial effusion.





Critical Notification


Critical Value: No





<Conclusion>


Limited echo to evaluate LV function.


Left ventricle systolic function is mild to moderately impaired.


The Ejection Fraction is 40%.


Septal motion consistent with conduction abnormality.


There is no evidence of significant pericardial effusion.





Signed by : Elizabeth Sellers, 


Electronically Approved : 01/14/2020 16:49:38














DICTATED and SIGNED BY:     ELIZABETH SELLERS MD


DATE:     01/14/20 1523











Single AP view of the chest.


 


Comparison:  11/9/2020.


 


Indication: Vomiting and diarrhea


 


Findings: 


 


3-lead left subclavian pacemaker is reidentified and is stable. The heart 


is enlarged but stable.  There is no pneumothorax or effusion. Persistent 


patchy airspace disease and trace effusions.


 


Impression: 


 


1. Findings again suggest volume overload with cardiomegaly. Trace 


effusions are reidentified.


 


 


Electronically signed by: Jorge Wild MD (11/17/2020 12:41 PM) [id number]














DICTATED and SIGNED BY:     JORGE WILD MD


DATE:     11/17/20 1241


Images


Images





DPOA REVIEW 19 MIN =================== to patient portal








 What Is a Power of ?  





A power of  (POA) is a legal document giving one person (the agent or 

-in-fact) the power to act for another person (the principal). The agent

 can have broad legal authority or limited authority to make legal decisions 

about the principal's property, finances or medical care. The power of  

is frequently used in the event of a principal's illness or disability, or when 

the principal can't be present to sign necessary legal documents for financial 

transactions. 





A power of  can end for a number of reasons, such as when the principal 

dies, the principal revokes it, a court invalidates it, the principal divorces 

their spouse, who happens to be the agent, or the agent can no longer carry out 

the outlined responsibilities. 








Conventional POAs lapse when the creator becomes incapacitated, but a durable 

POA remains in force to enable the agent to manage the creators affairs, and a

 springing POA comes into effect only if and when the creator of the POA 

becomes incapacitated. A medical or healthcare POA enables an agent to make 

medical decisions on behalf of an incapacitated person. 

















Key Takeaways





A power of  (POA) is a legal document giving one person, the agent or 

-in-fact the power to act for another person, the principal.


The agent can have broad legal authority or limited authority to make decisions

 about the principal's property, finances or medical care.


The power of  is often used when a principal becomes ill or disabled, 

or when they can't be present to sign necessary legal documents for financial 

transactions.


 


 Understanding Power of   





A power of  should be considered when planning for long-term care. There

 are different types of POAs that fall under either a general power of  

or limited power of . 

















A general power of  acts on behalf of the principal in any and all 

matters, as allowed by the state. The agent under a general POA agreement may be

 authorized to take care of issues such as handling bank accounts, signing 

checks, selling property and assets like stocks, f





A limited power of  gives the agent the power to act on behalf of the 

principal in specific matters or events. For example, the limited POA may 

explicitly state that the agent is only allowed to manage the principal's 

skilled nursing accounts. A limited POA may also be limited to a specific period of 

time (e.g., if the principal will be out of the country for, say, two years). 














Most akins of  documents allow an agent to represent the principal in 

all property and financial matters as long as the principals mental state of 

mind is good. If a situation occurs where the principal becomes incapable of 

making decisions for him or herself, the POA agreement would automatically end. 

However, someone who wants the POA to remain in effect after the persons health

 deteriorates would need to sign a durable power of  (DPOA). 








What is an advance directive?


An advance directive is a legal document that says how you want to be cared for 

if you are unable to make decisions. You can include what medical treatments you

 would want and who you would trust to make decisions for you.





An advance directive can also include other legal documents. A living will is a 

list of treatment preferences. It can be used to indicate whether you would want

 cardiopulmonary resuscitation (CPR), tube feedings, a breathing machine, or 

certain medicines, like antibiotics.





The durable power of  for health care document identifies the person you

 would want to make medical decisions for you. This person is also called a 

proxy. Your proxy should be familiar with your values and wishes.





How do I get started?


You can get advance directive documents for your state from your doctor's office

 or from http://www.caringinfo.org. Review the forms, and ask your doctor if you

 have any questions. Pick a person to be your proxy, and talk it over with that 

person.





VTE Prophylaxis Ordered


VTE Prophylaxis Devices:  Yes


VTE Pharmacological Prophylaxi:  Yes





Assessment/Plan


Assessment/Plan


IMPRESSION =======================














Acute exac of CHF


Findings cxr, suggest volume overload with cardiomegaly. Trace  pleural 

effusions 


acute hypoxic respiratory failure


Left ventricle systolic function is moderately impaired.


recent  echo  Ejection Fraction is 20 %.


Chronic respiratory failure, multifactorial secondary to chronic obstructive


pulmonary disease and chronic heart failure.


Acute on chronic systolic heart failure.


Acute on chronic cor pulmonale.


Chronic obstructive pulmonary disease, tobacco dependence, in remission.


hx polysubstance use.   including cocaine











PLAN==================








ADMIT


cvc bed


TELE


Cardiology consult


o2 support


iv diuresis


pulm consult


iv diuresis duonebs qid 


dvt prophylaxis

















75 min pt exam, chart review, > 50% of time spent with exam, chart review, pt 

care coordination





Justifications for Admission


Other Justification


CHF vs COPD exacerbation


hypoxic resp fail











SOFIA DAVID MD          Nov 17, 2020 13:31

## 2020-11-18 VITALS — SYSTOLIC BLOOD PRESSURE: 158 MMHG | DIASTOLIC BLOOD PRESSURE: 94 MMHG

## 2020-11-18 VITALS — DIASTOLIC BLOOD PRESSURE: 79 MMHG | SYSTOLIC BLOOD PRESSURE: 133 MMHG

## 2020-11-18 VITALS — DIASTOLIC BLOOD PRESSURE: 85 MMHG | SYSTOLIC BLOOD PRESSURE: 147 MMHG

## 2020-11-18 VITALS — SYSTOLIC BLOOD PRESSURE: 144 MMHG | DIASTOLIC BLOOD PRESSURE: 93 MMHG

## 2020-11-18 VITALS — SYSTOLIC BLOOD PRESSURE: 127 MMHG | DIASTOLIC BLOOD PRESSURE: 82 MMHG

## 2020-11-18 VITALS — SYSTOLIC BLOOD PRESSURE: 132 MMHG | DIASTOLIC BLOOD PRESSURE: 86 MMHG

## 2020-11-18 VITALS — SYSTOLIC BLOOD PRESSURE: 149 MMHG | DIASTOLIC BLOOD PRESSURE: 99 MMHG

## 2020-11-18 VITALS — SYSTOLIC BLOOD PRESSURE: 134 MMHG | DIASTOLIC BLOOD PRESSURE: 86 MMHG

## 2020-11-18 LAB
ALBUMIN SERPL-MCNC: 3.2 G/DL (ref 3.4–5)
ALBUMIN/GLOB SERPL: 1.1 {RATIO} (ref 1–1.7)
ALP SERPL-CCNC: 49 U/L (ref 46–116)
ALT SERPL-CCNC: 21 U/L (ref 16–63)
AMPHETAMINE/METHAMPHETAMINE: (no result)
ANION GAP SERPL CALC-SCNC: 4 MMOL/L (ref 6–14)
AST SERPL-CCNC: 20 U/L (ref 15–37)
BARBITURATES UR-MCNC: (no result) UG/ML
BASOPHILS # BLD AUTO: 0 X10^3/UL (ref 0–0.2)
BASOPHILS NFR BLD: 1 % (ref 0–3)
BENZODIAZ UR-MCNC: (no result) UG/L
BILIRUB SERPL-MCNC: 1.2 MG/DL (ref 0.2–1)
BUN SERPL-MCNC: 24 MG/DL (ref 8–26)
BUN/CREAT SERPL: 14 (ref 6–20)
CALCIUM SERPL-MCNC: 9.8 MG/DL (ref 8.5–10.1)
CANNABINOIDS UR-MCNC: (no result) UG/L
CHLORIDE SERPL-SCNC: 106 MMOL/L (ref 98–107)
CO2 SERPL-SCNC: 34 MMOL/L (ref 21–32)
COCAINE UR-MCNC: (no result) NG/ML
CREAT SERPL-MCNC: 1.7 MG/DL (ref 0.7–1.3)
EOSINOPHIL NFR BLD: 0.1 X10^3/UL (ref 0–0.7)
EOSINOPHIL NFR BLD: 2 % (ref 0–3)
ERYTHROCYTE [DISTWIDTH] IN BLOOD BY AUTOMATED COUNT: 16.7 % (ref 11.5–14.5)
GFR SERPLBLD BASED ON 1.73 SQ M-ARVRAT: 49.5 ML/MIN
GLUCOSE SERPL-MCNC: 83 MG/DL (ref 70–99)
HCT VFR BLD CALC: 34.7 % (ref 39–53)
HGB BLD-MCNC: 11.1 G/DL (ref 13–17.5)
LYMPHOCYTES # BLD: 0.7 X10^3/UL (ref 1–4.8)
LYMPHOCYTES NFR BLD AUTO: 10 % (ref 24–48)
MCH RBC QN AUTO: 28 PG (ref 25–35)
MCHC RBC AUTO-ENTMCNC: 32 G/DL (ref 31–37)
MCV RBC AUTO: 88 FL (ref 79–100)
METHADONE SERPL-MCNC: (no result) NG/ML
MONO #: 0.5 X10^3/UL (ref 0–1.1)
MONOCYTES NFR BLD: 8 % (ref 0–9)
NEUT #: 5.3 X10^3/UL (ref 1.8–7.7)
NEUTROPHILS NFR BLD AUTO: 79 % (ref 31–73)
OPIATES UR-MCNC: (no result) NG/ML
PCP SERPL-MCNC: (no result) MG/DL
PLATELET # BLD AUTO: 125 X10^3/UL (ref 140–400)
POTASSIUM SERPL-SCNC: 3.7 MMOL/L (ref 3.5–5.1)
PROT SERPL-MCNC: 6.2 G/DL (ref 6.4–8.2)
RBC # BLD AUTO: 3.94 X10^6/UL (ref 4.3–5.7)
SODIUM SERPL-SCNC: 144 MMOL/L (ref 136–145)
WBC # BLD AUTO: 6.7 X10^3/UL (ref 4–11)

## 2020-11-18 PROCEDURE — 5A09357 ASSISTANCE WITH RESPIRATORY VENTILATION, LESS THAN 24 CONSECUTIVE HOURS, CONTINUOUS POSITIVE AIRWAY PRESSURE: ICD-10-PCS | Performed by: FAMILY MEDICINE

## 2020-11-18 PROCEDURE — 4B02XSZ MEASUREMENT OF CARDIAC PACEMAKER, EXTERNAL APPROACH: ICD-10-PCS | Performed by: FAMILY MEDICINE

## 2020-11-18 RX ADMIN — BUMETANIDE SCH MG: 1 TABLET ORAL at 21:03

## 2020-11-18 RX ADMIN — MILRINONE LACTATE PRN MLS/HR: 0.2 INJECTION, SOLUTION INTRAVENOUS at 23:52

## 2020-11-18 RX ADMIN — IPRATROPIUM BROMIDE AND ALBUTEROL SULFATE SCH ML: .5; 3 SOLUTION RESPIRATORY (INHALATION) at 11:27

## 2020-11-18 RX ADMIN — NICOTINE SCH PATCH: 21 PATCH, EXTENDED RELEASE TOPICAL at 08:51

## 2020-11-18 RX ADMIN — IPRATROPIUM BROMIDE AND ALBUTEROL SULFATE SCH ML: .5; 3 SOLUTION RESPIRATORY (INHALATION) at 15:57

## 2020-11-18 RX ADMIN — BUMETANIDE SCH MG: 1 TABLET ORAL at 08:51

## 2020-11-18 RX ADMIN — AMIODARONE HYDROCHLORIDE SCH MG: 200 TABLET ORAL at 08:51

## 2020-11-18 RX ADMIN — LISINOPRIL SCH MG: 20 TABLET ORAL at 08:52

## 2020-11-18 RX ADMIN — METOPROLOL SUCCINATE SCH MG: 25 TABLET, EXTENDED RELEASE ORAL at 08:51

## 2020-11-18 RX ADMIN — IPRATROPIUM BROMIDE AND ALBUTEROL SULFATE SCH ML: .5; 3 SOLUTION RESPIRATORY (INHALATION) at 21:10

## 2020-11-18 RX ADMIN — APIXABAN SCH MG: 5 TABLET, FILM COATED ORAL at 08:51

## 2020-11-18 RX ADMIN — SIMVASTATIN SCH MG: 20 TABLET, FILM COATED ORAL at 21:03

## 2020-11-18 RX ADMIN — APIXABAN SCH MG: 5 TABLET, FILM COATED ORAL at 21:03

## 2020-11-18 RX ADMIN — IPRATROPIUM BROMIDE AND ALBUTEROL SULFATE SCH ML: .5; 3 SOLUTION RESPIRATORY (INHALATION) at 00:59

## 2020-11-18 RX ADMIN — POTASSIUM CHLORIDE SCH MEQ: 1500 TABLET, EXTENDED RELEASE ORAL at 08:51

## 2020-11-18 RX ADMIN — MILRINONE LACTATE PRN MLS/HR: 0.2 INJECTION, SOLUTION INTRAVENOUS at 11:25

## 2020-11-18 RX ADMIN — IPRATROPIUM BROMIDE AND ALBUTEROL SULFATE SCH ML: .5; 3 SOLUTION RESPIRATORY (INHALATION) at 05:07

## 2020-11-18 RX ADMIN — IPRATROPIUM BROMIDE AND ALBUTEROL SULFATE SCH ML: .5; 3 SOLUTION RESPIRATORY (INHALATION) at 07:25

## 2020-11-18 NOTE — PDOC
PULMONARY PROGRESS NOTES


DATE: 11/18/20 


TIME: 13:45


Vitals





Vital Signs








  Date Time  Temp Pulse Resp B/P (MAP) Pulse Ox O2 Delivery O2 Flow Rate FiO2


 


11/18/20 11:28     98 Nasal Cannula 8.0 


 


11/18/20 11:00 98.3 69 20 134/86 (102)    





 98.3       








General:  Alert, Oriented X4, No acute distress


HEENT:  Other


Lungs:  Wheezing, Crackles, Other (On BiPAP)


Cardiovascular:  S1, S2


Abdomen:  Soft, Non-tender, Other


Extremities:  Other


Labs





Laboratory Tests








Test


 11/17/20


12:03 11/17/20


12:25 11/17/20


13:05 11/17/20


13:18


 


O2 Saturation 97 % (92-99)    


 


Arterial Blood pH


 7.48


(7.35-7.45) 


 


 





 


Arterial Blood pCO2 at


Patient Temp 35 mmHg


(35-46) 


 


 





 


Arterial Blood pO2 at Patient


Temp 93 mmHg


() 


 


 





 


Arterial Blood HCO3


 25 mmol/L


(21-28) 


 


 





 


Arterial Blood Base Excess


 2 mmol/L


(-3-3) 


 


 





 


Oxyhemoglobin 96.1 %    


 


Methemoglobin


 0.4 %


(0.0-1.9) 


 


 





 


Carbon Monoxide, Quantitative


 0.7 %


(0.0-1.9) 


 


 





 


FiO2 40/5lnc    


 


White Blood Count


 


 7.8 x10^3/uL


(4.0-11.0) 


 





 


Red Blood Count


 


 4.00 x10^6/uL


(4.30-5.70) 


 





 


Hemoglobin


 


 11.4 g/dL


(13.0-17.5) 


 





 


Hematocrit


 


 34.9 %


(39.0-53.0) 


 





 


Mean Corpuscular Volume  87 fL ()   


 


Mean Corpuscular Hemoglobin  29 pg (25-35)   


 


Mean Corpuscular Hemoglobin


Concent 


 33 g/dL


(31-37) 


 





 


Red Cell Distribution Width


 


 16.8 %


(11.5-14.5) 


 





 


Platelet Count


 


 117 x10^3/uL


(140-400) 


 





 


Neutrophils (%) (Auto)  86 % (31-73)   


 


Lymphocytes (%) (Auto)  6 % (24-48)   


 


Monocytes (%) (Auto)  7 % (0-9)   


 


Eosinophils (%) (Auto)  1 % (0-3)   


 


Basophils (%) (Auto)  1 % (0-3)   


 


Neutrophils # (Auto)


 


 6.7 x10^3/uL


(1.8-7.7) 


 





 


Lymphocytes # (Auto)


 


 0.5 x10^3/uL


(1.0-4.8) 


 





 


Monocytes # (Auto)


 


 0.5 x10^3/uL


(0.0-1.1) 


 





 


Eosinophils # (Auto)


 


 0.0 x10^3/uL


(0.0-0.7) 


 





 


Basophils # (Auto)


 


 0.1 x10^3/uL


(0.0-0.2) 


 





 


Segmented Neutrophils %  79 % (35-66)   


 


Band Neutrophils %  8 % (0-9)   


 


Lymphocytes %  10 % (24-48)   


 


Atypical Lymphocytes %


(Manual) 


 1 % (0-0) 


 


 





 


Monocytes %  2 % (0-10)   


 


Platelet Estimate


 


 Decreased


(ADEQUATE) 


 





 


Anisocytosis  Slight   


 


Ovalocytes  Mod   


 


Sodium Level


 


 140 mmol/L


(136-145) 


 





 


Potassium Level


 


 3.3 mmol/L


(3.5-5.1) 


 





 


Chloride Level


 


 104 mmol/L


() 


 





 


Carbon Dioxide Level


 


 31 mmol/L


(21-32) 


 





 


Anion Gap  5 (6-14)   


 


Blood Urea Nitrogen


 


 25 mg/dL


(8-26) 


 





 


Creatinine


 


 1.4 mg/dL


(0.7-1.3) 


 





 


Estimated GFR


(Cockcroft-Gault) 


 61.9 


 


 





 


BUN/Creatinine Ratio  18 (6-20)   


 


Glucose Level


 


 103 mg/dL


(70-99) 


 





 


Calcium Level


 


 9.7 mg/dL


(8.5-10.1) 


 





 


Magnesium Level


 


 2.2 mg/dL


(1.8-2.4) 


 





 


Total Bilirubin


 


 1.3 mg/dL


(0.2-1.0) 


 





 


Aspartate Amino Transf


(AST/SGOT) 


 21 U/L (15-37) 


 


 





 


Alanine Aminotransferase


(ALT/SGPT) 


 19 U/L (16-63) 


 


 





 


Alkaline Phosphatase


 


 47 U/L


() 


 





 


Creatine Kinase


 


 166 U/L


() 


 





 


Creatine Kinase MB (Mass)


 


 3.2 ng/mL


(0.0-3.6) 


 





 


Creatine Kinase MB Relative


Index 


 1.9 % (0-4) 


 


 





 


Troponin I Quantitative


 


 0.201 ng/mL


(0.000-0.055) 


 





 


NT-Pro-B-Type Natriuretic


Peptide 


 18896 pg/mL


(0-124) 


 





 


Total Protein


 


 7.1 g/dL


(6.4-8.2) 


 





 


Albumin


 


 3.5 g/dL


(3.4-5.0) 


 





 


Albumin/Globulin Ratio  1.0 (1.0-1.7)   


 


Lipase


 


 37 U/L


() 


 





 


Influenza Type A Antigen


 


 


 Negative


(NEGATIVE) 





 


Influenza Type B Antigen


 


 


 Negative


(NEGATIVE) 





 


SARS-CoV-2 Antigen (Rapid)


 


 


 Negative


(NEGATIVE) 





 


Coronavirus (COVID-19)(PCR)


 


 


 


 Negative


(NEGATIVE)


 


Test


 11/17/20


16:40 11/17/20


19:09 11/18/20


00:39 11/18/20


08:15


 


Urine Collection Type Unknown    


 


Urine Color Yellow    


 


Urine Clarity Clear    


 


Urine pH 7.0 (<5.0-8.0)    


 


Urine Specific Gravity


 1.010


(1.000-1.030) 


 


 





 


Urine Protein


 30 mg/dL


(NEG-TRACE) 


 


 





 


Urine Glucose (UA)


 Negative mg/dL


(NEG) 


 


 





 


Urine Ketones (Stick)


 Negative mg/dL


(NEG) 


 


 





 


Urine Blood Negative (NEG)    


 


Urine Nitrite Negative (NEG)    


 


Urine Bilirubin Negative (NEG)    


 


Urine Urobilinogen Dipstick


 0.2 mg/dL (0.2


mg/dL) 


 


 





 


Urine Leukocyte Esterase Negative (NEG)    


 


Urine RBC 3-5 /HPF (0-2)    


 


Urine WBC 0 /HPF (0-4)    


 


Urine Squamous Epithelial


Cells Few /LPF 


 


 


 





 


Urine Bacteria 0 /HPF (0-FEW)    


 


Urine Opiates Screen Neg (NEG)    


 


Urine Methadone Screen Neg (NEG)    


 


Urine Barbiturates Neg (NEG)    


 


Urine Phencyclidine Screen Neg (NEG)    


 


Urine


Amphetamine/Methamphetamine Neg (NEG) 


 


 


 





 


Urine Benzodiazepines Screen Neg (NEG)    


 


Urine Cocaine Screen Neg (NEG)    


 


Urine Cannabinoids Screen Neg (NEG)    


 


Urine Ethyl Alcohol Neg (NEG)    


 


Troponin I Quantitative


 


 0.240 ng/mL


(0.000-0.055) 0.205 ng/mL


(0.000-0.055) 0.195 ng/mL


(0.000-0.055)


 


White Blood Count


 


 


 


 6.7 x10^3/uL


(4.0-11.0)


 


Red Blood Count


 


 


 


 3.94 x10^6/uL


(4.30-5.70)


 


Hemoglobin


 


 


 


 11.1 g/dL


(13.0-17.5)


 


Hematocrit


 


 


 


 34.7 %


(39.0-53.0)


 


Mean Corpuscular Volume    88 fL () 


 


Mean Corpuscular Hemoglobin    28 pg (25-35) 


 


Mean Corpuscular Hemoglobin


Concent 


 


 


 32 g/dL


(31-37)


 


Red Cell Distribution Width


 


 


 


 16.7 %


(11.5-14.5)


 


Platelet Count


 


 


 


 125 x10^3/uL


(140-400)


 


Neutrophils (%) (Auto)    79 % (31-73) 


 


Lymphocytes (%) (Auto)    10 % (24-48) 


 


Monocytes (%) (Auto)    8 % (0-9) 


 


Eosinophils (%) (Auto)    2 % (0-3) 


 


Basophils (%) (Auto)    1 % (0-3) 


 


Neutrophils # (Auto)


 


 


 


 5.3 x10^3/uL


(1.8-7.7)


 


Lymphocytes # (Auto)


 


 


 


 0.7 x10^3/uL


(1.0-4.8)


 


Monocytes # (Auto)


 


 


 


 0.5 x10^3/uL


(0.0-1.1)


 


Eosinophils # (Auto)


 


 


 


 0.1 x10^3/uL


(0.0-0.7)


 


Basophils # (Auto)


 


 


 


 0.0 x10^3/uL


(0.0-0.2)


 


Sodium Level


 


 


 


 144 mmol/L


(136-145)


 


Potassium Level


 


 


 


 3.7 mmol/L


(3.5-5.1)


 


Chloride Level


 


 


 


 106 mmol/L


()


 


Carbon Dioxide Level


 


 


 


 34 mmol/L


(21-32)


 


Anion Gap    4 (6-14) 


 


Blood Urea Nitrogen


 


 


 


 24 mg/dL


(8-26)


 


Creatinine


 


 


 


 1.7 mg/dL


(0.7-1.3)


 


Estimated GFR


(Cockcroft-Gault) 


 


 


 49.5 





 


BUN/Creatinine Ratio    14 (6-20) 


 


Glucose Level


 


 


 


 83 mg/dL


(70-99)


 


Calcium Level


 


 


 


 9.8 mg/dL


(8.5-10.1)


 


Total Bilirubin


 


 


 


 1.2 mg/dL


(0.2-1.0)


 


Aspartate Amino Transf


(AST/SGOT) 


 


 


 20 U/L (15-37) 





 


Alanine Aminotransferase


(ALT/SGPT) 


 


 


 21 U/L (16-63) 





 


Alkaline Phosphatase


 


 


 


 49 U/L


()


 


Total Protein


 


 


 


 6.2 g/dL


(6.4-8.2)


 


Albumin


 


 


 


 3.2 g/dL


(3.4-5.0)


 


Albumin/Globulin Ratio    1.1 (1.0-1.7) 








Laboratory Tests








Test


 11/17/20


16:40 11/17/20


19:09 11/18/20


00:39 11/18/20


08:15


 


Urine Collection Type Unknown    


 


Urine Color Yellow    


 


Urine Clarity Clear    


 


Urine pH 7.0 (<5.0-8.0)    


 


Urine Specific Gravity


 1.010


(1.000-1.030) 


 


 





 


Urine Protein


 30 mg/dL


(NEG-TRACE) 


 


 





 


Urine Glucose (UA)


 Negative mg/dL


(NEG) 


 


 





 


Urine Ketones (Stick)


 Negative mg/dL


(NEG) 


 


 





 


Urine Blood Negative (NEG)    


 


Urine Nitrite Negative (NEG)    


 


Urine Bilirubin Negative (NEG)    


 


Urine Urobilinogen Dipstick


 0.2 mg/dL (0.2


mg/dL) 


 


 





 


Urine Leukocyte Esterase Negative (NEG)    


 


Urine RBC 3-5 /HPF (0-2)    


 


Urine WBC 0 /HPF (0-4)    


 


Urine Squamous Epithelial


Cells Few /LPF 


 


 


 





 


Urine Bacteria 0 /HPF (0-FEW)    


 


Urine Opiates Screen Neg (NEG)    


 


Urine Methadone Screen Neg (NEG)    


 


Urine Barbiturates Neg (NEG)    


 


Urine Phencyclidine Screen Neg (NEG)    


 


Urine


Amphetamine/Methamphetamine Neg (NEG) 


 


 


 





 


Urine Benzodiazepines Screen Neg (NEG)    


 


Urine Cocaine Screen Neg (NEG)    


 


Urine Cannabinoids Screen Neg (NEG)    


 


Urine Ethyl Alcohol Neg (NEG)    


 


Troponin I Quantitative


 


 0.240 ng/mL


(0.000-0.055) 0.205 ng/mL


(0.000-0.055) 0.195 ng/mL


(0.000-0.055)


 


White Blood Count


 


 


 


 6.7 x10^3/uL


(4.0-11.0)


 


Red Blood Count


 


 


 


 3.94 x10^6/uL


(4.30-5.70)


 


Hemoglobin


 


 


 


 11.1 g/dL


(13.0-17.5)


 


Hematocrit


 


 


 


 34.7 %


(39.0-53.0)


 


Mean Corpuscular Volume    88 fL () 


 


Mean Corpuscular Hemoglobin    28 pg (25-35) 


 


Mean Corpuscular Hemoglobin


Concent 


 


 


 32 g/dL


(31-37)


 


Red Cell Distribution Width


 


 


 


 16.7 %


(11.5-14.5)


 


Platelet Count


 


 


 


 125 x10^3/uL


(140-400)


 


Neutrophils (%) (Auto)    79 % (31-73) 


 


Lymphocytes (%) (Auto)    10 % (24-48) 


 


Monocytes (%) (Auto)    8 % (0-9) 


 


Eosinophils (%) (Auto)    2 % (0-3) 


 


Basophils (%) (Auto)    1 % (0-3) 


 


Neutrophils # (Auto)


 


 


 


 5.3 x10^3/uL


(1.8-7.7)


 


Lymphocytes # (Auto)


 


 


 


 0.7 x10^3/uL


(1.0-4.8)


 


Monocytes # (Auto)


 


 


 


 0.5 x10^3/uL


(0.0-1.1)


 


Eosinophils # (Auto)


 


 


 


 0.1 x10^3/uL


(0.0-0.7)


 


Basophils # (Auto)


 


 


 


 0.0 x10^3/uL


(0.0-0.2)


 


Sodium Level


 


 


 


 144 mmol/L


(136-145)


 


Potassium Level


 


 


 


 3.7 mmol/L


(3.5-5.1)


 


Chloride Level


 


 


 


 106 mmol/L


()


 


Carbon Dioxide Level


 


 


 


 34 mmol/L


(21-32)


 


Anion Gap    4 (6-14) 


 


Blood Urea Nitrogen


 


 


 


 24 mg/dL


(8-26)


 


Creatinine


 


 


 


 1.7 mg/dL


(0.7-1.3)


 


Estimated GFR


(Cockcroft-Gault) 


 


 


 49.5 





 


BUN/Creatinine Ratio    14 (6-20) 


 


Glucose Level


 


 


 


 83 mg/dL


(70-99)


 


Calcium Level


 


 


 


 9.8 mg/dL


(8.5-10.1)


 


Total Bilirubin


 


 


 


 1.2 mg/dL


(0.2-1.0)


 


Aspartate Amino Transf


(AST/SGOT) 


 


 


 20 U/L (15-37) 





 


Alanine Aminotransferase


(ALT/SGPT) 


 


 


 21 U/L (16-63) 





 


Alkaline Phosphatase


 


 


 


 49 U/L


()


 


Total Protein


 


 


 


 6.2 g/dL


(6.4-8.2)


 


Albumin


 


 


 


 3.2 g/dL


(3.4-5.0)


 


Albumin/Globulin Ratio    1.1 (1.0-1.7) 








Medications





Active Scripts








 Medications  Dose


 Route/Sig


 Max Daily Dose Days Date Category Dose


Instructions


 


 Bumetanide 1 Mg


 Tablet  1 Mg


 PO BID


   11/15/20 Rx 


 


 Amiodarone Hcl


 200 Mg Tablet  200 Mg


 PO DAILY


   11/15/20 Rx 


 


 Potassium


 Chloride **


  (Potassium


 Chloride) 20 Meq


 Tablet.er  20 Meq


 PO DAILY


  30 10/15/20 Rx 


 


 NICODERM CQ 21mg


  (Nicotine) 1 Each


 Patch.td24  1 Patch


 TP DAILY


   10/10/20 Reported 


 


 Eliquis


  (Apixaban) 5 Mg


 Tablet  5 Mg


 PO BID


   4/14/20 Reported 


 


 Toprol Xl


  (Metoprolol


 Succinate) 25 Mg


 Tab.er.24h  1 Tab


 PO DAILY


  30 4/14/20 Reported 


 


 Lisinopril 40 Mg


 Tablet  20 Mg


 PO DAILY


  30 1/21/20 Rx 


 


 Duoneb 0.5-3(2.5)


 Mg/3 Ml


  (Albuterol/Ipratropium)


 3 Ml Ampul.neb  3 Ml


 NEB Q4HRS


  30 1/20/20 Rx 


 


 Albuterol Sulfate


 Neb Soln


  (Albuterol


 Sulfate) 0.63


 Mg/3 Ml Vial.neb  0.63 Mg


 NEB PRN Q4HRS PRN


  30 6/26/17 Rx  AS NEEDED


 


 Zocor


  (Simvastatin) 20


 Mg Tablet  20 Mg


 PO HS


   7/10/15 Reported  NEXT DOSE DUE TONIGHT AT BEDTIME











Impression


.


Full note dictated


Acute on chronic hypoxemic respiratory failure acute on chronic CHF acute 

exacerbation of COPD


Discussed with , he needs long-term care placement











FIORDALIZA RANGEL MD              Nov 18, 2020 13:46

## 2020-11-18 NOTE — NUR
SS following for discharge planning. SS reviewed pt chart and discussed with pt RN. Pt is 
from home and is currently requiring oxygen at eight liters. COVID19 negative. Pt has home 
oxygen and Trilogy machine through [facility]. Pt was on services with [facility]. PT 
recommended skilled nursing unit. SS met with pt to discuss discharge planning and skilled 
nursing unit. Pt agreeable to skilled nursing unit and reported no preference of company. 
[facility] unable to accommodate more than five liters of oxygen. SS phoned and faxed 
referral to [facility], [phone]; fax [phone]. SS will await acceptance decision and 
insurance determination and will proceed accordingly with discharge planning.

## 2020-11-18 NOTE — CONS
DATE OF CONSULTATION:  11/18/2020



ATTENDING PHYSICIAN:  Gaston Murry MD



CONSULTING PHYSICIAN:  Fiordaliza Rangel MD



REASON FOR CONSULTATION:  The patient is seen in pulmonary consultation at the

request of Dr. Murry for acute on chronic respiratory failure.



HISTORY OF PRESENT ILLNESS:  The patient is well known to me.  He has underlying

cardiomyopathy, severe chronic obstructive pulmonary disease.  He is on home

Trilogy, presented with increasing shortness of breath.  He states that at home,

he was unable to tolerate activities of daily living.  He does live by himself. 

He has had multiple recurrent admissions.  He has had previous history of

polysubstance use.  He is tested negative twice now.



In the past, we done our best with case management as  trying to

come up with a plan to avoid readmissions.  The patient has refused to go to a

long-term care facility.  He lives at home by himself.  I think he is at the

point where he needs some assistance.



I reviewed his x-ray.  There is no significant change on x-ray.  He denies fever

or chills.  No COVID-19 exposures.  He did have serology for SARS-CoV-2 on

11/17/2020, which was negative, both the rapid and the PCR.



PAST MEDICAL HISTORY:  Chronic respiratory failure, chronic obstructive

pulmonary disease, chronic heart failure, hypertension, tobacco dependent, and

polysubstance use.  He is tested negative twice now, over the last 2 admissions.



PAST SURGICAL HISTORY:  Status post appendectomy and hernia repair.



ALLERGIES:  LEVAQUIN.



MEDICATIONS:  List was reviewed.



REVIEW OF SYSTEMS:  As indicated above, otherwise, a 10-point system was

reviewed and negative.



FAMILY HISTORY:  Coronary artery disease, diabetes, hypertension.



PHYSICAL EXAMINATION:

VITAL SIGNS:  The patient at rest did not appear to be in any significant

respiratory distress.  He is currently requiring anywhere between 5-8 liters of

oxygen.

HEENT:  Eyes:  The sclerae were nonicteric.

NECK:  Jugular venous distention was not elevated.  No lymphadenopathy.

CHEST:  Full expansion.

LUNGS:  Poor flow with some slight crackles in the bases.

CARDIOVASCULAR:  Regular rate and rhythm with S1, S2, no S3.

ABDOMEN:  Soft, nontender.

EXTREMITIES:  No clubbing, cyanosis or edema.

NEUROLOGICAL:  The patient was awake, alert, following commands.  A detailed

neuro exam was not performed.



LABORATORY DATA:  Reviewed.  White count was normal.  Serology for COVID-19 was

negative.  Electrolytes were noted.  Troponin was elevated.



IMPRESSION:

1.  Acute on chronic respiratory failure, multifactorial.

2.  Acute on chronic systolic and diastolic heart failure.

3.  Sick sinus syndrome, status post biventricular pacemaker.

4.  Nonischemic cardiomyopathy.

5.  Chronic troponin elevation.

6.  Paroxysmal atrial fibrillation.

7.  Hypertension.

8.  Severe chronic obstructive pulmonary disease.

9.  Tobacco dependence, in remission.

10.  Non-ST segment elevation myocardial infarction.



PLAN:  As indicated above, I had a lengthy discussion with the patient.  I think

he simply needs long-term care in a nursing home.  He lives at home by himself. 

I think that some of his dyspnea is related to anxiety.  He definitely has

underlying pathology.  We will continue to aggressively manage him diurese per

Cardiology.  Continue Trilogy at home.



I do appreciate the privilege in sharing in this patient's care.

 



______________________________

FIORDALIZA RANGEL MD



DR:  GILSON/coby  JOB#:  [id number] / [id number]

DD:  11/18/2020 13:44  DT:  11/18/2020 18:03

## 2020-11-18 NOTE — PDOC
[facility] PROGRESS NOTE


Date of Service


DOS:


DATE: 11/18/20 


TIME: 08:58





Chief Complaint


Chief Complaint


Acute on chronic systolic and diastolic heart failure


Prior history of cocaine abuse


Noncompliance


Hypokalemia


Anemia


HTN


Hyperlipidemia


Asthma


COPD





History of Present Illness


History of Present Illness


11/18/2020


Patient seen and examined


Discussed with RN


Chart reviewed





Vitals/I&O


Vitals/I&O:





                                   Vital Signs








  Date Time  Temp Pulse Resp B/P (MAP) Pulse Ox O2 Delivery O2 Flow Rate FiO2


 


11/18/20 08:52  68  144/93    


 


11/18/20 07:27 97.9  18  95 Nasal Cannula 8.0 





 97.9       














                                    I & O  0 


 


 11/17/20 11/17/20 11/18/20





 15:00 23:00 07:00


 


Output Total  750 ml 300 ml


 


Balance  -750 ml -300 ml











Physical Exam


General:  Alert, mild distress


Heart:  Regular rate, Other (S1-S2 soft S3)


Lungs:  Wheezing, Crackles, Other (On BiPAP)


Abdomen:  Soft


Extremities:  No cyanosis


Skin:  No rashes





Labs


Labs:





Laboratory Tests








Test


 11/17/20


12:03 11/17/20


12:25 11/17/20


13:05 11/17/20


13:18


 


O2 Saturation 97 % (92-99)    


 


Arterial Blood pH


 7.48


(7.35-7.45) 


 


 





 


Arterial Blood pCO2 at


Patient Temp 35 mmHg


(35-46) 


 


 





 


Arterial Blood pO2 at Patient


Temp 93 mmHg


() 


 


 





 


Arterial Blood HCO3


 25 mmol/L


(21-28) 


 


 





 


Arterial Blood Base Excess


 2 mmol/L


(-3-3) 


 


 





 


Oxyhemoglobin 96.1 %    


 


Methemoglobin


 0.4 %


(0.0-1.9) 


 


 





 


Carbon Monoxide, Quantitative


 0.7 %


(0.0-1.9) 


 


 





 


FiO2 40/5lnc    


 


White Blood Count


 


 7.8 x10^3/uL


(4.0-11.0) 


 





 


Red Blood Count


 


 4.00 x10^6/uL


(4.30-5.70) 


 





 


Hemoglobin


 


 11.4 g/dL


(13.0-17.5) 


 





 


Hematocrit


 


 34.9 %


(39.0-53.0) 


 





 


Mean Corpuscular Volume  87 fL ()   


 


Mean Corpuscular Hemoglobin  29 pg (25-35)   


 


Mean Corpuscular Hemoglobin


Concent 


 33 g/dL


(31-37) 


 





 


Red Cell Distribution Width


 


 16.8 %


(11.5-14.5) 


 





 


Platelet Count


 


 117 x10^3/uL


(140-400) 


 





 


Neutrophils (%) (Auto)  86 % (31-73)   


 


Lymphocytes (%) (Auto)  6 % (24-48)   


 


Monocytes (%) (Auto)  7 % (0-9)   


 


Eosinophils (%) (Auto)  1 % (0-3)   


 


Basophils (%) (Auto)  1 % (0-3)   


 


Neutrophils # (Auto)


 


 6.7 x10^3/uL


(1.8-7.7) 


 





 


Lymphocytes # (Auto)


 


 0.5 x10^3/uL


(1.0-4.8) 


 





 


Monocytes # (Auto)


 


 0.5 x10^3/uL


(0.0-1.1) 


 





 


Eosinophils # (Auto)


 


 0.0 x10^3/uL


(0.0-0.7) 


 





 


Basophils # (Auto)


 


 0.1 x10^3/uL


(0.0-0.2) 


 





 


Segmented Neutrophils %  79 % (35-66)   


 


Band Neutrophils %  8 % (0-9)   


 


Lymphocytes %  10 % (24-48)   


 


Atypical Lymphocytes %


(Manual) 


 1 % (0-0) 


 


 





 


Monocytes %  2 % (0-10)   


 


Platelet Estimate


 


 Decreased


(ADEQUATE) 


 





 


Anisocytosis  Slight   


 


Ovalocytes  Mod   


 


Sodium Level


 


 140 mmol/L


(136-145) 


 





 


Potassium Level


 


 3.3 mmol/L


(3.5-5.1) 


 





 


Chloride Level


 


 104 mmol/L


() 


 





 


Carbon Dioxide Level


 


 31 mmol/L


(21-32) 


 





 


Anion Gap  5 (6-14)   


 


Blood Urea Nitrogen


 


 25 mg/dL


(8-26) 


 





 


Creatinine


 


 1.4 mg/dL


(0.7-1.3) 


 





 


Estimated GFR


(Cockcroft-Gault) 


 61.9 


 


 





 


BUN/Creatinine Ratio  18 (6-20)   


 


Glucose Level


 


 103 mg/dL


(70-99) 


 





 


Calcium Level


 


 9.7 mg/dL


(8.5-10.1) 


 





 


Magnesium Level


 


 2.2 mg/dL


(1.8-2.4) 


 





 


Total Bilirubin


 


 1.3 mg/dL


(0.2-1.0) 


 





 


Aspartate Amino Transf


(AST/SGOT) 


 21 U/L (15-37) 


 


 





 


Alanine Aminotransferase


(ALT/SGPT) 


 19 U/L (16-63) 


 


 





 


Alkaline Phosphatase


 


 47 U/L


() 


 





 


Creatine Kinase


 


 166 U/L


() 


 





 


Creatine Kinase MB (Mass)


 


 3.2 ng/mL


(0.0-3.6) 


 





 


Creatine Kinase MB Relative


Index 


 1.9 % (0-4) 


 


 





 


Troponin I Quantitative


 


 0.201 ng/mL


(0.000-0.055) 


 





 


NT-Pro-B-Type Natriuretic


Peptide 


 39806 pg/mL


(0-124) 


 





 


Total Protein


 


 7.1 g/dL


(6.4-8.2) 


 





 


Albumin


 


 3.5 g/dL


(3.4-5.0) 


 





 


Albumin/Globulin Ratio  1.0 (1.0-1.7)   


 


Lipase


 


 37 U/L


() 


 





 


Influenza Type A Antigen


 


 


 Negative


(NEGATIVE) 





 


Influenza Type B Antigen


 


 


 Negative


(NEGATIVE) 





 


SARS-CoV-2 Antigen (Rapid)


 


 


 Negative


(NEGATIVE) 





 


Coronavirus (COVID-19)(PCR)


 


 


 


 Negative


(NEGATIVE)


 


Test


 11/17/20


16:40 11/17/20


19:09 11/18/20


00:39 





 


Urine Collection Type Unknown    


 


Urine Color Yellow    


 


Urine Clarity Clear    


 


Urine pH 7.0 (<5.0-8.0)    


 


Urine Specific Gravity


 1.010


(1.000-1.030) 


 


 





 


Urine Protein


 30 mg/dL


(NEG-TRACE) 


 


 





 


Urine Glucose (UA)


 Negative mg/dL


(NEG) 


 


 





 


Urine Ketones (Stick)


 Negative mg/dL


(NEG) 


 


 





 


Urine Blood Negative (NEG)    


 


Urine Nitrite Negative (NEG)    


 


Urine Bilirubin Negative (NEG)    


 


Urine Urobilinogen Dipstick


 0.2 mg/dL (0.2


mg/dL) 


 


 





 


Urine Leukocyte Esterase Negative (NEG)    


 


Urine RBC 3-5 /HPF (0-2)    


 


Urine WBC 0 /HPF (0-4)    


 


Urine Squamous Epithelial


Cells Few /LPF 


 


 


 





 


Urine Bacteria 0 /HPF (0-FEW)    


 


Urine Opiates Screen Neg (NEG)    


 


Urine Methadone Screen Neg (NEG)    


 


Urine Barbiturates Neg (NEG)    


 


Urine Phencyclidine Screen Neg (NEG)    


 


Urine


Amphetamine/Methamphetamine Neg (NEG) 


 


 


 





 


Urine Benzodiazepines Screen Neg (NEG)    


 


Urine Cocaine Screen Neg (NEG)    


 


Urine Cannabinoids Screen Neg (NEG)    


 


Urine Ethyl Alcohol Neg (NEG)    


 


Troponin I Quantitative


 


 0.240 ng/mL


(0.000-0.055) 0.205 ng/mL


(0.000-0.055) 














Review of Systems


Review of Systems:


Complains of shortness of breath complains of weakness





Assessment and Plan


Assessmemt and Plan


Problems


Medical Problems:


(1) CHF exacerbation


Status: Acute  





(2) Dyspnea


Status: Acute  





(3) Elevated troponin


Status: Acute  





Acute on chronic systolic and diastolic heart failure


Prior history of cocaine abuse


Noncompliance


Hypokalemia


Anemia


HTN


Hyperlipidemia


Asthma


COPD





Plan


Cardiac monitoring


Serial enzymes


Serial EKGs


Consult cardiology


IV Lasix


Home meds


DVT prophylaxis


Full code


Trend labs


Long-term prognosis guarded





Comment


Review of Relevant


I have reviewed the following items brittany (where applicable) has been applied.


Medications:





Current Medications








 Medications


  (Trade)  Dose


 Ordered  Sig/Irvin


 Route


 PRN Reason  Start Time


 Stop Time Status Last Admin


Dose Admin


 


 Bumetanide


  (Bumex)  1 mg  1X  ONCE


 IV


   11/17/20 13:15


 11/17/20 13:16 DC 11/17/20 13:15





 


 Aspirin


  (Sara Aspirin)  325 mg  1X  ONCE


 PO


   11/17/20 13:30


 11/17/20 13:38 DC 11/17/20 13:30





 


 Apixaban


  (Eliquis)  5 mg  BID


 PO


   11/17/20 21:00


    11/18/20 08:51





 


 Amiodarone HCl


  (Cordarone)  200 mg  DAILY


 PO


   11/18/20 09:00


    11/18/20 08:51





 


 Bumetanide


  (Bumex)  1 mg  BID


 PO


   11/17/20 21:00


    11/18/20 08:51





 


 Albuterol/


 Ipratropium


  (Duoneb)  3 ml  Q4HRS


 NEB


   11/17/20 20:00


    11/18/20 07:25





 


 Lisinopril


  (Prinivil)  20 mg  DAILY


 PO


   11/18/20 09:00


    11/18/20 08:52





 


 Metoprolol


 Succinate


  (Toprol Xl)  25 mg  DAILY


 PO


   11/18/20 09:00


    11/18/20 08:51





 


 Nicotine


  (Nicoderm Cq


 21mg)  1 patch  DAILY


 TD


   11/18/20 09:00


    11/18/20 08:51





 


 Potassium Chloride


  (Klor-Con)  20 meq  DAILY


 PO


   11/18/20 09:00


    11/18/20 08:51





 


 Simvastatin


  (Zocor)  20 mg  HS


 PO


   11/17/20 21:00


    11/17/20 21:30





 


 Enoxaparin Sodium


  (Lovenox 40mg


 Syringe)  40 mg  Q24H


 SQ


   11/17/20 19:30


 11/18/20 08:18 DC 11/17/20 21:32














Justifications for Admission


Other Justification


CHF vs COPD exacerbation


hypoxic resp fail











ANTONIA LUX III DO           Nov 18, 2020 09:00

## 2020-11-18 NOTE — PDOC
LIZET LLOYD APRN 11/18/20 1236:


CARDIO Progress Notes


Date and Time


Date of Service


11/18/2020


Time of Evaluation


1140





Subjective


Subjective:  No Chest Pain, No Palpitations, Other ( Still SOA)





Vitals


Vitals





Vital Signs








  Date Time  Temp Pulse Resp B/P (MAP) Pulse Ox O2 Delivery O2 Flow Rate FiO2


 


11/18/20 11:28     98 Nasal Cannula 8.0 


 


11/18/20 08:52  68  144/93    


 


11/18/20 07:27 97.9  18     





 97.9       








Weight


Weight [ ]





Input and Output


Intake and Output











Intake and Output 


 


 11/18/20





 07:00


 


Output Total 1050 ml


 


Balance -1050 ml


 


 


 


Output Urine Total 1050 ml











Laboratory


Labs





Laboratory Tests








Test


 11/17/20


12:25 11/17/20


13:05 11/17/20


13:18 11/17/20


16:40


 


White Blood Count


 7.8 x10^3/uL


(4.0-11.0) 


 


 





 


Red Blood Count


 4.00 x10^6/uL


(4.30-5.70) 


 


 





 


Hemoglobin


 11.4 g/dL


(13.0-17.5) 


 


 





 


Hematocrit


 34.9 %


(39.0-53.0) 


 


 





 


Mean Corpuscular Volume 87 fL ()    


 


Mean Corpuscular Hemoglobin 29 pg (25-35)    


 


Mean Corpuscular Hemoglobin


Concent 33 g/dL


(31-37) 


 


 





 


Red Cell Distribution Width


 16.8 %


(11.5-14.5) 


 


 





 


Platelet Count


 117 x10^3/uL


(140-400) 


 


 





 


Neutrophils (%) (Auto) 86 % (31-73)    


 


Lymphocytes (%) (Auto) 6 % (24-48)    


 


Monocytes (%) (Auto) 7 % (0-9)    


 


Eosinophils (%) (Auto) 1 % (0-3)    


 


Basophils (%) (Auto) 1 % (0-3)    


 


Neutrophils # (Auto)


 6.7 x10^3/uL


(1.8-7.7) 


 


 





 


Lymphocytes # (Auto)


 0.5 x10^3/uL


(1.0-4.8) 


 


 





 


Monocytes # (Auto)


 0.5 x10^3/uL


(0.0-1.1) 


 


 





 


Eosinophils # (Auto)


 0.0 x10^3/uL


(0.0-0.7) 


 


 





 


Basophils # (Auto)


 0.1 x10^3/uL


(0.0-0.2) 


 


 





 


Segmented Neutrophils % 79 % (35-66)    


 


Band Neutrophils % 8 % (0-9)    


 


Lymphocytes % 10 % (24-48)    


 


Atypical Lymphocytes %


(Manual) 1 % (0-0) 


 


 


 





 


Monocytes % 2 % (0-10)    


 


Platelet Estimate


 Decreased


(ADEQUATE) 


 


 





 


Anisocytosis Slight    


 


Ovalocytes Mod    


 


Sodium Level


 140 mmol/L


(136-145) 


 


 





 


Potassium Level


 3.3 mmol/L


(3.5-5.1) 


 


 





 


Chloride Level


 104 mmol/L


() 


 


 





 


Carbon Dioxide Level


 31 mmol/L


(21-32) 


 


 





 


Anion Gap 5 (6-14)    


 


Blood Urea Nitrogen


 25 mg/dL


(8-26) 


 


 





 


Creatinine


 1.4 mg/dL


(0.7-1.3) 


 


 





 


Estimated GFR


(Cockcroft-Gault) 61.9 


 


 


 





 


BUN/Creatinine Ratio 18 (6-20)    


 


Glucose Level


 103 mg/dL


(70-99) 


 


 





 


Calcium Level


 9.7 mg/dL


(8.5-10.1) 


 


 





 


Magnesium Level


 2.2 mg/dL


(1.8-2.4) 


 


 





 


Total Bilirubin


 1.3 mg/dL


(0.2-1.0) 


 


 





 


Aspartate Amino Transf


(AST/SGOT) 21 U/L (15-37) 


 


 


 





 


Alanine Aminotransferase


(ALT/SGPT) 19 U/L (16-63) 


 


 


 





 


Alkaline Phosphatase


 47 U/L


() 


 


 





 


Creatine Kinase


 166 U/L


() 


 


 





 


Creatine Kinase MB (Mass)


 3.2 ng/mL


(0.0-3.6) 


 


 





 


Creatine Kinase MB Relative


Index 1.9 % (0-4) 


 


 


 





 


Troponin I Quantitative


 0.201 ng/mL


(0.000-0.055) 


 


 





 


NT-Pro-B-Type Natriuretic


Peptide 02413 pg/mL


(0-124) 


 


 





 


Total Protein


 7.1 g/dL


(6.4-8.2) 


 


 





 


Albumin


 3.5 g/dL


(3.4-5.0) 


 


 





 


Albumin/Globulin Ratio 1.0 (1.0-1.7)    


 


Lipase


 37 U/L


() 


 


 





 


Influenza Type A Antigen


 


 Negative


(NEGATIVE) 


 





 


Influenza Type B Antigen


 


 Negative


(NEGATIVE) 


 





 


SARS-CoV-2 Antigen (Rapid)


 


 Negative


(NEGATIVE) 


 





 


Coronavirus (COVID-19)(PCR)


 


 


 Negative


(NEGATIVE) 





 


Urine Collection Type    Unknown 


 


Urine Color    Yellow 


 


Urine Clarity    Clear 


 


Urine pH    7.0 (<5.0-8.0) 


 


Urine Specific Gravity


 


 


 


 1.010


(1.000-1.030)


 


Urine Protein


 


 


 


 30 mg/dL


(NEG-TRACE)


 


Urine Glucose (UA)


 


 


 


 Negative mg/dL


(NEG)


 


Urine Ketones (Stick)


 


 


 


 Negative mg/dL


(NEG)


 


Urine Blood    Negative (NEG) 


 


Urine Nitrite    Negative (NEG) 


 


Urine Bilirubin    Negative (NEG) 


 


Urine Urobilinogen Dipstick


 


 


 


 0.2 mg/dL (0.2


mg/dL)


 


Urine Leukocyte Esterase    Negative (NEG) 


 


Urine RBC    3-5 /HPF (0-2) 


 


Urine WBC    0 /HPF (0-4) 


 


Urine Squamous Epithelial


Cells 


 


 


 Few /LPF 





 


Urine Bacteria    0 /HPF (0-FEW) 


 


Urine Opiates Screen    Neg (NEG) 


 


Urine Methadone Screen    Neg (NEG) 


 


Urine Barbiturates    Neg (NEG) 


 


Urine Phencyclidine Screen    Neg (NEG) 


 


Urine


Amphetamine/Methamphetamine 


 


 


 Neg (NEG) 





 


Urine Benzodiazepines Screen    Neg (NEG) 


 


Urine Cocaine Screen    Neg (NEG) 


 


Urine Cannabinoids Screen    Neg (NEG) 


 


Urine Ethyl Alcohol    Neg (NEG) 


 


Test


 11/17/20


19:09 11/18/20


00:39 11/18/20


08:15 





 


Troponin I Quantitative


 0.240 ng/mL


(0.000-0.055) 0.205 ng/mL


(0.000-0.055) 0.195 ng/mL


(0.000-0.055) 





 


White Blood Count


 


 


 6.7 x10^3/uL


(4.0-11.0) 





 


Red Blood Count


 


 


 3.94 x10^6/uL


(4.30-5.70) 





 


Hemoglobin


 


 


 11.1 g/dL


(13.0-17.5) 





 


Hematocrit


 


 


 34.7 %


(39.0-53.0) 





 


Mean Corpuscular Volume   88 fL ()  


 


Mean Corpuscular Hemoglobin   28 pg (25-35)  


 


Mean Corpuscular Hemoglobin


Concent 


 


 32 g/dL


(31-37) 





 


Red Cell Distribution Width


 


 


 16.7 %


(11.5-14.5) 





 


Platelet Count


 


 


 125 x10^3/uL


(140-400) 





 


Neutrophils (%) (Auto)   79 % (31-73)  


 


Lymphocytes (%) (Auto)   10 % (24-48)  


 


Monocytes (%) (Auto)   8 % (0-9)  


 


Eosinophils (%) (Auto)   2 % (0-3)  


 


Basophils (%) (Auto)   1 % (0-3)  


 


Neutrophils # (Auto)


 


 


 5.3 x10^3/uL


(1.8-7.7) 





 


Lymphocytes # (Auto)


 


 


 0.7 x10^3/uL


(1.0-4.8) 





 


Monocytes # (Auto)


 


 


 0.5 x10^3/uL


(0.0-1.1) 





 


Eosinophils # (Auto)


 


 


 0.1 x10^3/uL


(0.0-0.7) 





 


Basophils # (Auto)


 


 


 0.0 x10^3/uL


(0.0-0.2) 





 


Sodium Level


 


 


 144 mmol/L


(136-145) 





 


Potassium Level


 


 


 3.7 mmol/L


(3.5-5.1) 





 


Chloride Level


 


 


 106 mmol/L


() 





 


Carbon Dioxide Level


 


 


 34 mmol/L


(21-32) 





 


Anion Gap   4 (6-14)  


 


Blood Urea Nitrogen


 


 


 24 mg/dL


(8-26) 





 


Creatinine


 


 


 1.7 mg/dL


(0.7-1.3) 





 


Estimated GFR


(Cockcroft-Gault) 


 


 49.5 


 





 


BUN/Creatinine Ratio   14 (6-20)  


 


Glucose Level


 


 


 83 mg/dL


(70-99) 





 


Calcium Level


 


 


 9.8 mg/dL


(8.5-10.1) 





 


Total Bilirubin


 


 


 1.2 mg/dL


(0.2-1.0) 





 


Aspartate Amino Transf


(AST/SGOT) 


 


 20 U/L (15-37) 


 





 


Alanine Aminotransferase


(ALT/SGPT) 


 


 21 U/L (16-63) 


 





 


Alkaline Phosphatase


 


 


 49 U/L


() 





 


Total Protein


 


 


 6.2 g/dL


(6.4-8.2) 





 


Albumin


 


 


 3.2 g/dL


(3.4-5.0) 





 


Albumin/Globulin Ratio   1.1 (1.0-1.7)  











Physical Exam


HEENT:  Neck Supple W Full Motion


Chest:  Symmetric


LUNGS:  Other (diffuse crackles)


Heart:  RRR (Vpaced)


Abdomen:  Soft N/T


Extremities:  Other (3+pitting edema)


Neurology:  alert, oriented, follow commands





Assessment


Assessment


HPI: This is a 64 yo male admitted for complains of SOA. Reports that the day 

after his discharge he started becoming progressively SOA. Increasing leg edema,

wheezing with nonproductive cough. Reports that he did missed one day of bumex 

due to failure to fill his meds but has been compliant otherwise. Denies any 

chest pain. No palpitations. No falls or passing out. He has been successful in 

abstaining from tobacco and cocaine. Presently he is still SOA. 





1.  Acute on chronic respiratory failure COPD and CHF


2.  Acute on chronic systolic/diastolic CHF: interrogation revealed still fluid 

overloaded


3.  SSS/ NICM s/p BiV PPM/CRT-P (Biotronik); LVEF 20-25%. Recent device check 

revealed 31% AFIB burden with no significant RVR episodes since amiodarone. 

normal function, BiV pacing 100%


4.  Chronic mild troponin elevation; peak 0.153 with h/o chronic troponin 

elevation. Type II. demand ischemia. CP free. 


5.  PAFIB; V paced with underlying AFIB


6.  HTN: controlled


7.  ABIEL on CKD3


8.  Tobaccoism, chronic substance abuse; cocaine and marijuana use. Has been 

cocaine and tobacco free since last admission 


9.  COPD


10. NSVT





Recommendations


1. Amiodarone for rhythm maintenance. Eliquis for stroke prevention. Possibly 

hold. No recent workup, given his refractory HF episodes, will consider for inpt

ischemic w/u. will discuss with [facility] cardiologist


2. Goal wt is 85 KG. Start on milrinone and diurese with lasix


3. Secondary prevention measures. 


4. 2Gm Na diet, 2000cc FR. Standing wt


5. Will need referral to  HF clinic





Justicifation of Admission Dx:


Justifications for Admission:


Justification of Admission Dx:  Yes


Respiratory Failure:  Severe Resp Distress





LING KRISHNAMURTHY MD 11/18/20 1608:


CARDIO Progress Notes


Assessment


Assessment


Patient seen and examined


I agree with our nurse practitioners assessment and plan as above.


Acute on chronic respiratory failure COPD and CHF.  The patient is feeling 

better this morning.  Recurrent episodes of heart failure.  Will treat with 

milrinone and diuretics with close monitoring of lab.


Acute on chronic systolic/diastolic CHF: Diuresis as above.  Evaluation in heart

failure clinic.


SSS/ NICM s/p BiV PPM/CRT-P (Biotronik); LVEF 20-25%. Recent device check 

revealed 31% AFIB burden with no significant RVR episodes since amiodarone. 

normal function, BiV pacing 100%


Chronic mild troponin elevation; peak 0.153 with h/o chronic troponin elevation.

Type II. demand ischemia. CP free. 


PAFIB; V paced with underlying AFIB


HTN: controlled


ABIEL on CKD3


Tobaccoism, chronic substance abuse; cocaine and marijuana use. Has been cocaine

and tobacco free since last admission 


COPD.  Continue baseline medications.  Followed by pulmonary.


NSVT.  Rhythm stable.  Continue to monitor.











LIZET LLOYD          Nov 18, 2020 12:36


LING KRISHNAMURTHY MD            Nov 18, 2020 16:08

## 2020-11-19 VITALS — SYSTOLIC BLOOD PRESSURE: 154 MMHG | DIASTOLIC BLOOD PRESSURE: 95 MMHG

## 2020-11-19 VITALS — SYSTOLIC BLOOD PRESSURE: 135 MMHG | DIASTOLIC BLOOD PRESSURE: 80 MMHG

## 2020-11-19 VITALS — DIASTOLIC BLOOD PRESSURE: 88 MMHG | SYSTOLIC BLOOD PRESSURE: 149 MMHG

## 2020-11-19 VITALS — SYSTOLIC BLOOD PRESSURE: 148 MMHG | DIASTOLIC BLOOD PRESSURE: 88 MMHG

## 2020-11-19 VITALS — SYSTOLIC BLOOD PRESSURE: 142 MMHG | DIASTOLIC BLOOD PRESSURE: 89 MMHG

## 2020-11-19 VITALS — DIASTOLIC BLOOD PRESSURE: 77 MMHG | SYSTOLIC BLOOD PRESSURE: 118 MMHG

## 2020-11-19 VITALS — DIASTOLIC BLOOD PRESSURE: 77 MMHG | SYSTOLIC BLOOD PRESSURE: 133 MMHG

## 2020-11-19 VITALS — SYSTOLIC BLOOD PRESSURE: 139 MMHG | DIASTOLIC BLOOD PRESSURE: 88 MMHG

## 2020-11-19 VITALS — DIASTOLIC BLOOD PRESSURE: 86 MMHG | SYSTOLIC BLOOD PRESSURE: 145 MMHG

## 2020-11-19 VITALS — SYSTOLIC BLOOD PRESSURE: 147 MMHG | DIASTOLIC BLOOD PRESSURE: 83 MMHG

## 2020-11-19 LAB
ANION GAP SERPL CALC-SCNC: 2 MMOL/L (ref 6–14)
BUN SERPL-MCNC: 24 MG/DL (ref 8–26)
CALCIUM SERPL-MCNC: 9.3 MG/DL (ref 8.5–10.1)
CHLORIDE SERPL-SCNC: 106 MMOL/L (ref 98–107)
CO2 SERPL-SCNC: 35 MMOL/L (ref 21–32)
CREAT SERPL-MCNC: 1.6 MG/DL (ref 0.7–1.3)
GFR SERPLBLD BASED ON 1.73 SQ M-ARVRAT: 53.1 ML/MIN
GLUCOSE SERPL-MCNC: 87 MG/DL (ref 70–99)
MAGNESIUM SERPL-MCNC: 2.2 MG/DL (ref 1.8–2.4)
POTASSIUM SERPL-SCNC: 3.3 MMOL/L (ref 3.5–5.1)
SODIUM SERPL-SCNC: 143 MMOL/L (ref 136–145)

## 2020-11-19 PROCEDURE — 5A09357 ASSISTANCE WITH RESPIRATORY VENTILATION, LESS THAN 24 CONSECUTIVE HOURS, CONTINUOUS POSITIVE AIRWAY PRESSURE: ICD-10-PCS | Performed by: FAMILY MEDICINE

## 2020-11-19 RX ADMIN — IPRATROPIUM BROMIDE AND ALBUTEROL SULFATE SCH ML: .5; 3 SOLUTION RESPIRATORY (INHALATION) at 00:11

## 2020-11-19 RX ADMIN — POTASSIUM CHLORIDE SCH MEQ: 1500 TABLET, EXTENDED RELEASE ORAL at 12:00

## 2020-11-19 RX ADMIN — DILTIAZEM HYDROCHLORIDE PRN MLS/HR: 5 INJECTION INTRAVENOUS at 14:24

## 2020-11-19 RX ADMIN — POTASSIUM CHLORIDE SCH MEQ: 1500 TABLET, EXTENDED RELEASE ORAL at 17:15

## 2020-11-19 RX ADMIN — MILRINONE LACTATE PRN MLS/HR: 0.2 INJECTION, SOLUTION INTRAVENOUS at 16:45

## 2020-11-19 RX ADMIN — PANTOPRAZOLE SODIUM SCH MG: 40 TABLET, DELAYED RELEASE ORAL at 12:32

## 2020-11-19 RX ADMIN — SIMVASTATIN SCH MG: 20 TABLET, FILM COATED ORAL at 21:40

## 2020-11-19 RX ADMIN — AMIODARONE HYDROCHLORIDE SCH MG: 200 TABLET ORAL at 08:55

## 2020-11-19 RX ADMIN — NICOTINE SCH PATCH: 21 PATCH, EXTENDED RELEASE TOPICAL at 08:54

## 2020-11-19 RX ADMIN — POTASSIUM CHLORIDE SCH MEQ: 1500 TABLET, EXTENDED RELEASE ORAL at 09:12

## 2020-11-19 RX ADMIN — IPRATROPIUM BROMIDE AND ALBUTEROL SULFATE SCH ML: .5; 3 SOLUTION RESPIRATORY (INHALATION) at 19:43

## 2020-11-19 RX ADMIN — IPRATROPIUM BROMIDE AND ALBUTEROL SULFATE SCH ML: .5; 3 SOLUTION RESPIRATORY (INHALATION) at 16:00

## 2020-11-19 RX ADMIN — APIXABAN SCH MG: 5 TABLET, FILM COATED ORAL at 08:54

## 2020-11-19 RX ADMIN — LISINOPRIL SCH MG: 20 TABLET ORAL at 08:56

## 2020-11-19 RX ADMIN — IPRATROPIUM BROMIDE AND ALBUTEROL SULFATE SCH ML: .5; 3 SOLUTION RESPIRATORY (INHALATION) at 11:28

## 2020-11-19 RX ADMIN — METOPROLOL SUCCINATE SCH MG: 25 TABLET, EXTENDED RELEASE ORAL at 08:55

## 2020-11-19 RX ADMIN — IPRATROPIUM BROMIDE AND ALBUTEROL SULFATE SCH ML: .5; 3 SOLUTION RESPIRATORY (INHALATION) at 07:29

## 2020-11-19 RX ADMIN — IPRATROPIUM BROMIDE AND ALBUTEROL SULFATE SCH ML: .5; 3 SOLUTION RESPIRATORY (INHALATION) at 04:00

## 2020-11-19 NOTE — PDOC
PULMONARY PROGRESS NOTES


DATE: 11/19/20 


TIME: 08:55


Subjective


Patient is resting comfortably on 8 L nasal cannula reports some shortness of 

breath with exertion denies chest pain or cough


Afebrile overnight


No other concerns from nursing at this time


Vitals





Vital Signs








  Date Time  Temp Pulse Resp B/P (MAP) Pulse Ox O2 Delivery O2 Flow Rate FiO2


 


11/19/20 07:30     98 BiPAP/CPAP  


 


11/19/20 07:00 98.0 70 20 145/86 (105)   8.0 





 98.0       








ROS:  No Nausea, No Chest Pain, No Abdominal Pain, No Increase Cough


General:  Alert, Oriented X4, No acute distress


HEENT:  Other


Lungs:  Wheezing, Crackles, Other (On BiPAP)


Cardiovascular:  S1, S2


Abdomen:  Soft, Non-tender, Other


Extremities:  Other (BLE +2)


Labs





Laboratory Tests








Test


 11/17/20


12:03 11/17/20


12:25 11/17/20


13:05 11/17/20


13:18


 


O2 Saturation 97 % (92-99)    


 


Arterial Blood pH


 7.48


(7.35-7.45) 


 


 





 


Arterial Blood pCO2 at


Patient Temp 35 mmHg


(35-46) 


 


 





 


Arterial Blood pO2 at Patient


Temp 93 mmHg


() 


 


 





 


Arterial Blood HCO3


 25 mmol/L


(21-28) 


 


 





 


Arterial Blood Base Excess


 2 mmol/L


(-3-3) 


 


 





 


Oxyhemoglobin 96.1 %    


 


Methemoglobin


 0.4 %


(0.0-1.9) 


 


 





 


Carbon Monoxide, Quantitative


 0.7 %


(0.0-1.9) 


 


 





 


FiO2 40/5lnc    


 


White Blood Count


 


 7.8 x10^3/uL


(4.0-11.0) 


 





 


Red Blood Count


 


 4.00 x10^6/uL


(4.30-5.70) 


 





 


Hemoglobin


 


 11.4 g/dL


(13.0-17.5) 


 





 


Hematocrit


 


 34.9 %


(39.0-53.0) 


 





 


Mean Corpuscular Volume  87 fL ()   


 


Mean Corpuscular Hemoglobin  29 pg (25-35)   


 


Mean Corpuscular Hemoglobin


Concent 


 33 g/dL


(31-37) 


 





 


Red Cell Distribution Width


 


 16.8 %


(11.5-14.5) 


 





 


Platelet Count


 


 117 x10^3/uL


(140-400) 


 





 


Neutrophils (%) (Auto)  86 % (31-73)   


 


Lymphocytes (%) (Auto)  6 % (24-48)   


 


Monocytes (%) (Auto)  7 % (0-9)   


 


Eosinophils (%) (Auto)  1 % (0-3)   


 


Basophils (%) (Auto)  1 % (0-3)   


 


Neutrophils # (Auto)


 


 6.7 x10^3/uL


(1.8-7.7) 


 





 


Lymphocytes # (Auto)


 


 0.5 x10^3/uL


(1.0-4.8) 


 





 


Monocytes # (Auto)


 


 0.5 x10^3/uL


(0.0-1.1) 


 





 


Eosinophils # (Auto)


 


 0.0 x10^3/uL


(0.0-0.7) 


 





 


Basophils # (Auto)


 


 0.1 x10^3/uL


(0.0-0.2) 


 





 


Segmented Neutrophils %  79 % (35-66)   


 


Band Neutrophils %  8 % (0-9)   


 


Lymphocytes %  10 % (24-48)   


 


Atypical Lymphocytes %


(Manual) 


 1 % (0-0) 


 


 





 


Monocytes %  2 % (0-10)   


 


Platelet Estimate


 


 Decreased


(ADEQUATE) 


 





 


Anisocytosis  Slight   


 


Ovalocytes  Mod   


 


Sodium Level


 


 140 mmol/L


(136-145) 


 





 


Potassium Level


 


 3.3 mmol/L


(3.5-5.1) 


 





 


Chloride Level


 


 104 mmol/L


() 


 





 


Carbon Dioxide Level


 


 31 mmol/L


(21-32) 


 





 


Anion Gap  5 (6-14)   


 


Blood Urea Nitrogen


 


 25 mg/dL


(8-26) 


 





 


Creatinine


 


 1.4 mg/dL


(0.7-1.3) 


 





 


Estimated GFR


(Cockcroft-Gault) 


 61.9 


 


 





 


BUN/Creatinine Ratio  18 (6-20)   


 


Glucose Level


 


 103 mg/dL


(70-99) 


 





 


Calcium Level


 


 9.7 mg/dL


(8.5-10.1) 


 





 


Magnesium Level


 


 2.2 mg/dL


(1.8-2.4) 


 





 


Total Bilirubin


 


 1.3 mg/dL


(0.2-1.0) 


 





 


Aspartate Amino Transf


(AST/SGOT) 


 21 U/L (15-37) 


 


 





 


Alanine Aminotransferase


(ALT/SGPT) 


 19 U/L (16-63) 


 


 





 


Alkaline Phosphatase


 


 47 U/L


() 


 





 


Creatine Kinase


 


 166 U/L


() 


 





 


Creatine Kinase MB (Mass)


 


 3.2 ng/mL


(0.0-3.6) 


 





 


Creatine Kinase MB Relative


Index 


 1.9 % (0-4) 


 


 





 


Troponin I Quantitative


 


 0.201 ng/mL


(0.000-0.055) 


 





 


NT-Pro-B-Type Natriuretic


Peptide 


 39028 pg/mL


(0-124) 


 





 


Total Protein


 


 7.1 g/dL


(6.4-8.2) 


 





 


Albumin


 


 3.5 g/dL


(3.4-5.0) 


 





 


Albumin/Globulin Ratio  1.0 (1.0-1.7)   


 


Lipase


 


 37 U/L


() 


 





 


Influenza Type A Antigen


 


 


 Negative


(NEGATIVE) 





 


Influenza Type B Antigen


 


 


 Negative


(NEGATIVE) 





 


SARS-CoV-2 Antigen (Rapid)


 


 


 Negative


(NEGATIVE) 





 


Coronavirus (COVID-19)(PCR)


 


 


 


 Negative


(NEGATIVE)


 


Test


 11/17/20


16:40 11/17/20


19:09 11/18/20


00:39 11/18/20


08:15


 


Urine Collection Type Unknown    


 


Urine Color Yellow    


 


Urine Clarity Clear    


 


Urine pH 7.0 (<5.0-8.0)    


 


Urine Specific Gravity


 1.010


(1.000-1.030) 


 


 





 


Urine Protein


 30 mg/dL


(NEG-TRACE) 


 


 





 


Urine Glucose (UA)


 Negative mg/dL


(NEG) 


 


 





 


Urine Ketones (Stick)


 Negative mg/dL


(NEG) 


 


 





 


Urine Blood Negative (NEG)    


 


Urine Nitrite Negative (NEG)    


 


Urine Bilirubin Negative (NEG)    


 


Urine Urobilinogen Dipstick


 0.2 mg/dL (0.2


mg/dL) 


 


 





 


Urine Leukocyte Esterase Negative (NEG)    


 


Urine RBC 3-5 /HPF (0-2)    


 


Urine WBC 0 /HPF (0-4)    


 


Urine Squamous Epithelial


Cells Few /LPF 


 


 


 





 


Urine Bacteria 0 /HPF (0-FEW)    


 


Urine Opiates Screen Neg (NEG)    


 


Urine Methadone Screen Neg (NEG)    


 


Urine Barbiturates Neg (NEG)    


 


Urine Phencyclidine Screen Neg (NEG)    


 


Urine


Amphetamine/Methamphetamine Neg (NEG) 


 


 


 





 


Urine Benzodiazepines Screen Neg (NEG)    


 


Urine Cocaine Screen Neg (NEG)    


 


Urine Cannabinoids Screen Neg (NEG)    


 


Urine Ethyl Alcohol Neg (NEG)    


 


Troponin I Quantitative


 


 0.240 ng/mL


(0.000-0.055) 0.205 ng/mL


(0.000-0.055) 0.195 ng/mL


(0.000-0.055)


 


White Blood Count


 


 


 


 6.7 x10^3/uL


(4.0-11.0)


 


Red Blood Count


 


 


 


 3.94 x10^6/uL


(4.30-5.70)


 


Hemoglobin


 


 


 


 11.1 g/dL


(13.0-17.5)


 


Hematocrit


 


 


 


 34.7 %


(39.0-53.0)


 


Mean Corpuscular Volume    88 fL () 


 


Mean Corpuscular Hemoglobin    28 pg (25-35) 


 


Mean Corpuscular Hemoglobin


Concent 


 


 


 32 g/dL


(31-37)


 


Red Cell Distribution Width


 


 


 


 16.7 %


(11.5-14.5)


 


Platelet Count


 


 


 


 125 x10^3/uL


(140-400)


 


Neutrophils (%) (Auto)    79 % (31-73) 


 


Lymphocytes (%) (Auto)    10 % (24-48) 


 


Monocytes (%) (Auto)    8 % (0-9) 


 


Eosinophils (%) (Auto)    2 % (0-3) 


 


Basophils (%) (Auto)    1 % (0-3) 


 


Neutrophils # (Auto)


 


 


 


 5.3 x10^3/uL


(1.8-7.7)


 


Lymphocytes # (Auto)


 


 


 


 0.7 x10^3/uL


(1.0-4.8)


 


Monocytes # (Auto)


 


 


 


 0.5 x10^3/uL


(0.0-1.1)


 


Eosinophils # (Auto)


 


 


 


 0.1 x10^3/uL


(0.0-0.7)


 


Basophils # (Auto)


 


 


 


 0.0 x10^3/uL


(0.0-0.2)


 


Sodium Level


 


 


 


 144 mmol/L


(136-145)


 


Potassium Level


 


 


 


 3.7 mmol/L


(3.5-5.1)


 


Chloride Level


 


 


 


 106 mmol/L


()


 


Carbon Dioxide Level


 


 


 


 34 mmol/L


(21-32)


 


Anion Gap    4 (6-14) 


 


Blood Urea Nitrogen


 


 


 


 24 mg/dL


(8-26)


 


Creatinine


 


 


 


 1.7 mg/dL


(0.7-1.3)


 


Estimated GFR


(Cockcroft-Gault) 


 


 


 49.5 





 


BUN/Creatinine Ratio    14 (6-20) 


 


Glucose Level


 


 


 


 83 mg/dL


(70-99)


 


Calcium Level


 


 


 


 9.8 mg/dL


(8.5-10.1)


 


Total Bilirubin


 


 


 


 1.2 mg/dL


(0.2-1.0)


 


Aspartate Amino Transf


(AST/SGOT) 


 


 


 20 U/L (15-37) 





 


Alanine Aminotransferase


(ALT/SGPT) 


 


 


 21 U/L (16-63) 





 


Alkaline Phosphatase


 


 


 


 49 U/L


()


 


Total Protein


 


 


 


 6.2 g/dL


(6.4-8.2)


 


Albumin


 


 


 


 3.2 g/dL


(3.4-5.0)


 


Albumin/Globulin Ratio    1.1 (1.0-1.7) 


 


Test


 11/19/20


06:20 


 


 





 


Sodium Level


 143 mmol/L


(136-145) 


 


 





 


Potassium Level


 3.3 mmol/L


(3.5-5.1) 


 


 





 


Chloride Level


 106 mmol/L


() 


 


 





 


Carbon Dioxide Level


 35 mmol/L


(21-32) 


 


 





 


Anion Gap 2 (6-14)    


 


Blood Urea Nitrogen


 24 mg/dL


(8-26) 


 


 





 


Creatinine


 1.6 mg/dL


(0.7-1.3) 


 


 





 


Estimated GFR


(Cockcroft-Gault) 53.1 


 


 


 





 


Glucose Level


 87 mg/dL


(70-99) 


 


 





 


Calcium Level


 9.3 mg/dL


(8.5-10.1) 


 


 





 


Magnesium Level


 2.2 mg/dL


(1.8-2.4) 


 


 











Laboratory Tests








Test


 11/19/20


06:20


 


Sodium Level


 143 mmol/L


(136-145)


 


Potassium Level


 3.3 mmol/L


(3.5-5.1)


 


Chloride Level


 106 mmol/L


()


 


Carbon Dioxide Level


 35 mmol/L


(21-32)


 


Anion Gap 2 (6-14) 


 


Blood Urea Nitrogen


 24 mg/dL


(8-26)


 


Creatinine


 1.6 mg/dL


(0.7-1.3)


 


Estimated GFR


(Cockcroft-Gault) 53.1 





 


Glucose Level


 87 mg/dL


(70-99)


 


Calcium Level


 9.3 mg/dL


(8.5-10.1)


 


Magnesium Level


 2.2 mg/dL


(1.8-2.4)








Medications





Active Scripts








 Medications  Dose


 Route/Sig


 Max Daily Dose Days Date Category Dose


Instructions


 


 Bumetanide 1 Mg


 Tablet  1 Mg


 PO BID


   11/15/20 Rx 


 


 Amiodarone Hcl


 200 Mg Tablet  200 Mg


 PO DAILY


   11/15/20 Rx 


 


 Potassium


 Chloride **


  (Potassium


 Chloride) 20 Meq


 Tablet.er  20 Meq


 PO DAILY


  30 10/15/20 Rx 


 


 NICODERM CQ 21mg


  (Nicotine) 1 Each


 Patch.td24  1 Patch


 TP DAILY


   10/10/20 Reported 


 


 Eliquis


  (Apixaban) 5 Mg


 Tablet  5 Mg


 PO BID


   4/14/20 Reported 


 


 Toprol Xl


  (Metoprolol


 Succinate) 25 Mg


 Tab.er.24h  1 Tab


 PO DAILY


  30 4/14/20 Reported 


 


 Lisinopril 40 Mg


 Tablet  20 Mg


 PO DAILY


  30 1/21/20 Rx 


 


 Duoneb 0.5-3(2.5)


 Mg/3 Ml


  (Albuterol/Ipratropium)


 3 Ml Ampul.neb  3 Ml


 NEB Q4HRS


  30 1/20/20 Rx 


 


 Albuterol Sulfate


 Neb Soln


  (Albuterol


 Sulfate) 0.63


 Mg/3 Ml Vial.neb  0.63 Mg


 NEB PRN Q4HRS PRN


  30 6/26/17 Rx  AS NEEDED


 


 Zocor


  (Simvastatin) 20


 Mg Tablet  20 Mg


 PO HS


   7/10/15 Reported  NEXT DOSE DUE TONIGHT AT BEDTIME








Comments


CXR


Impression: 


 


1. Findings again suggest volume overload with cardiomegaly. Trace 


effusions are reidentified.





Impression


.


IMPRESSION:


1.  Acute on chronic respiratory failure, multifactorial.


2.  Acute on chronic systolic and diastolic heart failure.


3.  Sick sinus syndrome, status post biventricular pacemaker.


4.  Nonischemic cardiomyopathy.


5.  Chronic troponin elevation.


6.  Paroxysmal atrial fibrillation.


7.  Hypertension.


8.  Severe chronic obstructive pulmonary disease.


9.  Tobacco dependence, in remission.


10.  Non-ST segment elevation myocardial infarction.





Plan


.


PLAN: 


Continue supplemental oxygen, currently on 8 L nasal cannula, baseline 7 L nasal

cannula


Follow cardiology recommendations, diuresis per cardiology, currently on 

milrinone drip


Hypertension Per PCP and cardiology-- LVEF 20-25%.


Monitor renal function during diuresis, slightly improved today


Social work for DC planning, continue trilogy at home, patient may benefit from 

long-term nursing


Physical therapy/Occupational Therapy


DVT/GI prophylaxis: Eliquis and Protonix





Discussed with RN


PT. is FULL CODE











FIORDALIZA RANGEL MD              Nov 19, 2020 08:55

## 2020-11-19 NOTE — PDOC
CARDIO Progress Notes


Date and Time


Date of Service


11/19/2020


Time of Evaluation


0940





Subjective


Subjective:  No Chest Pain, No Palpitations, Other ( Still SOA)





Vitals


Vitals





Vital Signs








  Date Time  Temp Pulse Resp B/P (MAP) Pulse Ox O2 Delivery O2 Flow Rate FiO2


 


11/19/20 08:56  70  145/86    


 


11/19/20 07:30     98 BiPAP/CPAP  


 


11/19/20 07:00 98.0  20    8.0 





 98.0       








Weight


Weight [ ]





Input and Output


Intake and Output











Intake and Output 


 


 11/19/20





 07:00


 


Intake Total 1320 ml


 


Output Total 1350 ml


 


Balance -30 ml


 


 


 


Intake Oral 1320 ml


 


Output Urine Total 1350 ml











Laboratory


Labs





Laboratory Tests








Test


 11/19/20


06:20


 


Sodium Level


 143 mmol/L


(136-145)


 


Potassium Level


 3.3 mmol/L


(3.5-5.1)


 


Chloride Level


 106 mmol/L


()


 


Carbon Dioxide Level


 35 mmol/L


(21-32)


 


Anion Gap 2 (6-14) 


 


Blood Urea Nitrogen


 24 mg/dL


(8-26)


 


Creatinine


 1.6 mg/dL


(0.7-1.3)


 


Estimated GFR


(Cockcroft-Gault) 53.1 





 


Glucose Level


 87 mg/dL


(70-99)


 


Calcium Level


 9.3 mg/dL


(8.5-10.1)


 


Magnesium Level


 2.2 mg/dL


(1.8-2.4)











Physical Exam


HEENT:  Neck Supple W Full Motion


Chest:  Symmetric


LUNGS:  Other (diffuse crackles)


Heart:  RRR (Vpaced with first degree AV block)


Abdomen:  Soft N/T


Extremities:  Other (3-4+pitting edema)


Neurology:  alert, oriented, follow commands





Assessment


Assessment


1.  Acute on chronic respiratory failure COPD and CHF


2.  Acute on chronic systolic/diastolic CHF:still fluid overloaded


3.  SSS/ NICM s/p BiV PPM/CRT-P (Biotronik); LVEF 20-25%. Recent device check 

revealed 31% AFIB burden with no significant RVR episodes since amiodarone. 

normal function, BiV pacing 100%


4.  Chronic mild troponin elevation; peak 0.153 with h/o chronic troponin 

elevation. Type II. demand ischemia. CP free. 


5.  PAFIB; V paced 


6.  HTN: controlled


7.  ABIEL on CKD3


8.  Hx of tobaccoism, cocaine and marijuana use. Has been sober


9.  COPD


10. NSVT





Recommendations


1. Amiodarone for rhythm maintenance. Eliquis for stroke prevention. Will hold 

for possible ischemic workup tomorrow. 


2. Goal wt is 85 KG. Continue Milrinone and start lasix drip. Albumin x1. 

Replace K


3. Secondary prevention measures. 


4. 2Gm Na diet, 1500cc FR. Standing wt


5. Will need referral to  HF clinic





Justicifation of Admission Dx:


Justifications for Admission:


Justification of Admission Dx:  Yes


Respiratory Failure:  Severe Resp Distress











LIZET LLOYD APRN          Nov 19, 2020 11:04

## 2020-11-19 NOTE — PDOC
[facility] PROGRESS NOTE


Date of Service


DOS:


DATE: 11/19/20 


TIME: 11:20





Chief Complaint


Chief Complaint


Acute on chronic systolic and diastolic heart failure


Prior history of cocaine abuse


Noncompliance


Hypokalemia


Anemia


HTN


Hyperlipidemia


Asthma


COPD





History of Present Illness


History of Present Illness


11/18/2020


Patient seen and examined


Discussed with RN


Chart reviewed





11/19/20


Pt seen and examined in room. 


FER RN regarding fluid restriction and low sodium diet. 


Chart reviewed.


Pt claims breathign is improving and denies any chest pain.





Vitals/I&O


Vitals/I&O:





                                   Vital Signs








  Date Time  Temp Pulse Resp B/P (MAP) Pulse Ox O2 Delivery O2 Flow Rate FiO2


 


11/19/20 08:56  70  145/86    


 


11/19/20 07:30     98 BiPAP/CPAP  


 


11/19/20 07:00 98.0  20    8.0 





 98.0       














                                    I & O   


 


 11/18/20 11/18/20 11/19/20





 15:00 23:00 07:00


 


Intake Total  320 ml 1000 ml


 


Output Total 500 ml 550 ml 300 ml


 


Balance -500 ml -230 ml 700 ml











Physical Exam


General:  Alert, mild distress


Heart:  Regular rate, Other (S1-S2 soft S3)


Lungs:  Wheezing, Crackles, Other (On BiPAP)


Abdomen:  Soft


Extremities:  No cyanosis


Skin:  No rashes





Labs


Labs:





Laboratory Tests








Test


 11/19/20


06:20


 


Sodium Level


 143 mmol/L


(136-145)


 


Potassium Level


 3.3 mmol/L


(3.5-5.1)


 


Chloride Level


 106 mmol/L


()


 


Carbon Dioxide Level


 35 mmol/L


(21-32)


 


Anion Gap 2 (6-14) 


 


Blood Urea Nitrogen


 24 mg/dL


(8-26)


 


Creatinine


 1.6 mg/dL


(0.7-1.3)


 


Estimated GFR


(Cockcroft-Gault) 53.1 





 


Glucose Level


 87 mg/dL


(70-99)


 


Calcium Level


 9.3 mg/dL


(8.5-10.1)


 


Magnesium Level


 2.2 mg/dL


(1.8-2.4)











Review of Systems


Review of Systems:


General:  No fever, chills, nausea


CV: No Chest Pain


GI: No Abdominal Pain, vomiting, diarrhea


Pulm:  No Increase Cough, Pt complains of SOB- but feels is improving





Assessment and Plan


Assessmemt and Plan


Assessment:


1. Acute on chronic systolic and diastolic heart failure


2. Acute on Chronic Respiratory Failure 


3. Hypokalemia


4. AFIB with Ventricular Pacing


5. Anemia


6. HTN


7. Hyperlipidemia


8. Asthma


9. COPD


10. ABIEL on CKD3


11. Noncompliance


12. Hx of polysubstance abuse


13. Hx of tobaccoism 





Plan: 


1. Continue Amiodarone for rhythm maintenance AFIB. 


2. Continue Eliquis for stroke prevention- AFIB. 


3. Continue Milrinone 


4. Continue Lasix drip. 


5. Albumin given 


6. PO Potassium replacement


7. DVT ppx


8. Continue Low Na diet


9. 1.5 L fluid restriction 


10. Will need referral to  HF clinic for outpt follow up.


11. Discharge disposition pending.


12. Trend labs 


12. Cardiology and Pulm consulted and following. We appreciate their input.





Comment


Review of Relevant


I have reviewed the following items brittany (where applicable) has been applied.


Medications:





Current Medications








 Medications


  (Trade)  Dose


 Ordered  Sig/Irvin


 Route


 PRN Reason  Start Time


 Stop Time Status Last Admin


Dose Admin


 


 Bumetanide


  (Bumex)  1 mg  BID94


 IV


   11/19/20 09:00


 11/19/20 10:58 DC 11/19/20 08:54





 


 Potassium Chloride


  (Klor-Con)  40 meq  1X  ONCE


 PO


   11/19/20 08:15


 11/19/20 08:16 DC 11/19/20 09:12














Justifications for Admission


Other Justification


CHF vs COPD exacerbation


hypoxic resp fail











ANTONIA LUX III DO           Nov 19, 2020 11:21

## 2020-11-19 NOTE — NUR
SS following up with discharge planning. SS reviewed pt chart and discussed with pt RN. Pt 
is currently requiring oxygen and has home oxygen. PT/OT recommended skilled nursing unit. 
Pt accepted at [facility], [phone]; fax [phone], pending insurance authorization. 
COVID19 negative. SS will continue to follow for discharge planning.

## 2020-11-20 VITALS — SYSTOLIC BLOOD PRESSURE: 146 MMHG | DIASTOLIC BLOOD PRESSURE: 88 MMHG

## 2020-11-20 VITALS — SYSTOLIC BLOOD PRESSURE: 150 MMHG | DIASTOLIC BLOOD PRESSURE: 91 MMHG

## 2020-11-20 VITALS — SYSTOLIC BLOOD PRESSURE: 150 MMHG | DIASTOLIC BLOOD PRESSURE: 90 MMHG

## 2020-11-20 VITALS — SYSTOLIC BLOOD PRESSURE: 143 MMHG | DIASTOLIC BLOOD PRESSURE: 94 MMHG

## 2020-11-20 VITALS — DIASTOLIC BLOOD PRESSURE: 74 MMHG | SYSTOLIC BLOOD PRESSURE: 119 MMHG

## 2020-11-20 VITALS — DIASTOLIC BLOOD PRESSURE: 90 MMHG | SYSTOLIC BLOOD PRESSURE: 153 MMHG

## 2020-11-20 VITALS — SYSTOLIC BLOOD PRESSURE: 139 MMHG | DIASTOLIC BLOOD PRESSURE: 85 MMHG

## 2020-11-20 VITALS — DIASTOLIC BLOOD PRESSURE: 102 MMHG | SYSTOLIC BLOOD PRESSURE: 151 MMHG

## 2020-11-20 VITALS — DIASTOLIC BLOOD PRESSURE: 90 MMHG | SYSTOLIC BLOOD PRESSURE: 144 MMHG

## 2020-11-20 VITALS — DIASTOLIC BLOOD PRESSURE: 91 MMHG | SYSTOLIC BLOOD PRESSURE: 142 MMHG

## 2020-11-20 VITALS — DIASTOLIC BLOOD PRESSURE: 69 MMHG | SYSTOLIC BLOOD PRESSURE: 119 MMHG

## 2020-11-20 VITALS — DIASTOLIC BLOOD PRESSURE: 72 MMHG | SYSTOLIC BLOOD PRESSURE: 125 MMHG

## 2020-11-20 VITALS — SYSTOLIC BLOOD PRESSURE: 128 MMHG | DIASTOLIC BLOOD PRESSURE: 81 MMHG

## 2020-11-20 VITALS — DIASTOLIC BLOOD PRESSURE: 94 MMHG | SYSTOLIC BLOOD PRESSURE: 151 MMHG

## 2020-11-20 VITALS — DIASTOLIC BLOOD PRESSURE: 75 MMHG | SYSTOLIC BLOOD PRESSURE: 128 MMHG

## 2020-11-20 VITALS — DIASTOLIC BLOOD PRESSURE: 90 MMHG | SYSTOLIC BLOOD PRESSURE: 132 MMHG

## 2020-11-20 VITALS — DIASTOLIC BLOOD PRESSURE: 91 MMHG | SYSTOLIC BLOOD PRESSURE: 149 MMHG

## 2020-11-20 VITALS — SYSTOLIC BLOOD PRESSURE: 152 MMHG | DIASTOLIC BLOOD PRESSURE: 86 MMHG

## 2020-11-20 VITALS — SYSTOLIC BLOOD PRESSURE: 131 MMHG | DIASTOLIC BLOOD PRESSURE: 81 MMHG

## 2020-11-20 VITALS — SYSTOLIC BLOOD PRESSURE: 131 MMHG | DIASTOLIC BLOOD PRESSURE: 84 MMHG

## 2020-11-20 LAB
ANION GAP SERPL CALC-SCNC: 5 MMOL/L (ref 6–14)
BUN SERPL-MCNC: 22 MG/DL (ref 8–26)
CALCIUM SERPL-MCNC: 9.5 MG/DL (ref 8.5–10.1)
CHLORIDE SERPL-SCNC: 105 MMOL/L (ref 98–107)
CO2 SERPL-SCNC: 34 MMOL/L (ref 21–32)
CREAT SERPL-MCNC: 1.7 MG/DL (ref 0.7–1.3)
GFR SERPLBLD BASED ON 1.73 SQ M-ARVRAT: 49.5 ML/MIN
GLUCOSE SERPL-MCNC: 83 MG/DL (ref 70–99)
MAGNESIUM SERPL-MCNC: 2.3 MG/DL (ref 1.8–2.4)
POTASSIUM SERPL-SCNC: 3.8 MMOL/L (ref 3.5–5.1)
SODIUM SERPL-SCNC: 144 MMOL/L (ref 136–145)

## 2020-11-20 PROCEDURE — 5A09357 ASSISTANCE WITH RESPIRATORY VENTILATION, LESS THAN 24 CONSECUTIVE HOURS, CONTINUOUS POSITIVE AIRWAY PRESSURE: ICD-10-PCS | Performed by: FAMILY MEDICINE

## 2020-11-20 RX ADMIN — IPRATROPIUM BROMIDE AND ALBUTEROL SULFATE SCH ML: .5; 3 SOLUTION RESPIRATORY (INHALATION) at 00:20

## 2020-11-20 RX ADMIN — APIXABAN SCH MG: 5 TABLET, FILM COATED ORAL at 13:20

## 2020-11-20 RX ADMIN — IPRATROPIUM BROMIDE AND ALBUTEROL SULFATE SCH ML: .5; 3 SOLUTION RESPIRATORY (INHALATION) at 07:48

## 2020-11-20 RX ADMIN — POTASSIUM CHLORIDE SCH MEQ: 1500 TABLET, EXTENDED RELEASE ORAL at 09:00

## 2020-11-20 RX ADMIN — IPRATROPIUM BROMIDE AND ALBUTEROL SULFATE SCH ML: .5; 3 SOLUTION RESPIRATORY (INHALATION) at 20:13

## 2020-11-20 RX ADMIN — METOPROLOL SUCCINATE SCH MG: 25 TABLET, EXTENDED RELEASE ORAL at 08:25

## 2020-11-20 RX ADMIN — SIMVASTATIN SCH MG: 20 TABLET, FILM COATED ORAL at 20:08

## 2020-11-20 RX ADMIN — APIXABAN SCH MG: 5 TABLET, FILM COATED ORAL at 20:08

## 2020-11-20 RX ADMIN — POTASSIUM CHLORIDE SCH MEQ: 1500 TABLET, EXTENDED RELEASE ORAL at 17:18

## 2020-11-20 RX ADMIN — PANTOPRAZOLE SODIUM SCH MG: 40 TABLET, DELAYED RELEASE ORAL at 08:25

## 2020-11-20 RX ADMIN — AMIODARONE HYDROCHLORIDE SCH MG: 200 TABLET ORAL at 08:26

## 2020-11-20 RX ADMIN — LISINOPRIL SCH MG: 20 TABLET ORAL at 08:26

## 2020-11-20 RX ADMIN — IPRATROPIUM BROMIDE AND ALBUTEROL SULFATE SCH ML: .5; 3 SOLUTION RESPIRATORY (INHALATION) at 13:05

## 2020-11-20 RX ADMIN — DILTIAZEM HYDROCHLORIDE PRN MLS/HR: 5 INJECTION INTRAVENOUS at 06:37

## 2020-11-20 RX ADMIN — IPRATROPIUM BROMIDE AND ALBUTEROL SULFATE SCH ML: .5; 3 SOLUTION RESPIRATORY (INHALATION) at 04:12

## 2020-11-20 RX ADMIN — NICOTINE SCH PATCH: 21 PATCH, EXTENDED RELEASE TOPICAL at 08:30

## 2020-11-20 RX ADMIN — IPRATROPIUM BROMIDE AND ALBUTEROL SULFATE SCH ML: .5; 3 SOLUTION RESPIRATORY (INHALATION) at 16:02

## 2020-11-20 RX ADMIN — MILRINONE LACTATE PRN MLS/HR: 0.2 INJECTION, SOLUTION INTRAVENOUS at 04:41

## 2020-11-20 RX ADMIN — POTASSIUM CHLORIDE SCH MEQ: 1500 TABLET, EXTENDED RELEASE ORAL at 08:25

## 2020-11-20 RX ADMIN — MILRINONE LACTATE PRN MLS/HR: 0.2 INJECTION, SOLUTION INTRAVENOUS at 20:05

## 2020-11-20 NOTE — PDOC
PULMONARY PROGRESS NOTES


DATE: 11/20/20 


TIME: 08:29


Subjective


Patient is resting comfortably on 7 L nasal cannula


Reports improvement in his breathing, reports he wore BiPAP overnight last night


Denies any increased shortness of breath or cough


Now on Lasix drip and milrinone drip


Afebrile overnight


No other concerns from nursing at this time


Vitals





Vital Signs








  Date Time  Temp Pulse Resp B/P (MAP) Pulse Ox O2 Delivery O2 Flow Rate FiO2


 


11/20/20 07:49     94 Nasal Cannula 8.0 


 


11/20/20 07:00  70  150/90 (110)    


 


11/20/20 06:35   12     


 


11/20/20 02:51 98.2       





 98.2       








ROS:  No Nausea, No Chest Pain, No Abdominal Pain, No Increase Cough


General:  Alert, Oriented X4, No acute distress


HEENT:  Other


Lungs:  Crackles


Cardiovascular:  S1, S2


Abdomen:  Soft, Non-tender, Other


Extremities:  Other (BLE +2)


Labs





Laboratory Tests








Test


 11/19/20


06:20 11/20/20


05:00


 


Sodium Level


 143 mmol/L


(136-145) 144 mmol/L


(136-145)


 


Potassium Level


 3.3 mmol/L


(3.5-5.1) 3.8 mmol/L


(3.5-5.1)


 


Chloride Level


 106 mmol/L


() 105 mmol/L


()


 


Carbon Dioxide Level


 35 mmol/L


(21-32) 34 mmol/L


(21-32)


 


Anion Gap 2 (6-14)  5 (6-14) 


 


Blood Urea Nitrogen


 24 mg/dL


(8-26) 22 mg/dL


(8-26)


 


Creatinine


 1.6 mg/dL


(0.7-1.3) 1.7 mg/dL


(0.7-1.3)


 


Estimated GFR


(Cockcroft-Gault) 53.1 


 49.5 





 


Glucose Level


 87 mg/dL


(70-99) 83 mg/dL


(70-99)


 


Calcium Level


 9.3 mg/dL


(8.5-10.1) 9.5 mg/dL


(8.5-10.1)


 


Magnesium Level


 2.2 mg/dL


(1.8-2.4) 2.3 mg/dL


(1.8-2.4)








Laboratory Tests








Test


 11/20/20


05:00


 


Sodium Level


 144 mmol/L


(136-145)


 


Potassium Level


 3.8 mmol/L


(3.5-5.1)


 


Chloride Level


 105 mmol/L


()


 


Carbon Dioxide Level


 34 mmol/L


(21-32)


 


Anion Gap 5 (6-14) 


 


Blood Urea Nitrogen


 22 mg/dL


(8-26)


 


Creatinine


 1.7 mg/dL


(0.7-1.3)


 


Estimated GFR


(Cockcroft-Gault) 49.5 





 


Glucose Level


 83 mg/dL


(70-99)


 


Calcium Level


 9.5 mg/dL


(8.5-10.1)


 


Magnesium Level


 2.3 mg/dL


(1.8-2.4)








Medications





Active Scripts








 Medications  Dose


 Route/Sig


 Max Daily Dose Days Date Category Dose


Instructions


 


 Bumetanide 1 Mg


 Tablet  1 Mg


 PO BID


   11/15/20 Rx 


 


 Amiodarone Hcl


 200 Mg Tablet  200 Mg


 PO DAILY


   11/15/20 Rx 


 


 Potassium


 Chloride **


  (Potassium


 Chloride) 20 Meq


 Tablet.er  20 Meq


 PO DAILY


  30 10/15/20 Rx 


 


 NICODERM CQ 21mg


  (Nicotine) 1 Each


 Patch.td24  1 Patch


 TP DAILY


   10/10/20 Reported 


 


 Eliquis


  (Apixaban) 5 Mg


 Tablet  5 Mg


 PO BID


   4/14/20 Reported 


 


 Toprol Xl


  (Metoprolol


 Succinate) 25 Mg


 Tab.er.24h  1 Tab


 PO DAILY


  30 4/14/20 Reported 


 


 Lisinopril 40 Mg


 Tablet  20 Mg


 PO DAILY


  30 1/21/20 Rx 


 


 Duoneb 0.5-3(2.5)


 Mg/3 Ml


  (Albuterol/Ipratropium)


 3 Ml Ampul.neb  3 Ml


 NEB Q4HRS


  30 1/20/20 Rx 


 


 Albuterol Sulfate


 Neb Soln


  (Albuterol


 Sulfate) 0.63


 Mg/3 Ml Vial.neb  0.63 Mg


 NEB PRN Q4HRS PRN


  30 6/26/17 Rx  AS NEEDED


 


 Zocor


  (Simvastatin) 20


 Mg Tablet  20 Mg


 PO HS


   7/10/15 Reported  NEXT DOSE DUE TONIGHT AT BEDTIME








Comments


CXR


Impression: 


 


1. Findings again suggest volume overload with cardiomegaly. Trace 


effusions are reidentified.





Impression


.


IMPRESSION:


1.  Acute on chronic respiratory failure, multifactorial.


2.  Acute on chronic systolic and diastolic heart failure.


3.  Sick sinus syndrome, status post biventricular pacemaker.


4.  Nonischemic cardiomyopathy.


5.  Chronic troponin elevation.


6.  Paroxysmal atrial fibrillation.


7.  Hypertension.


8.  Severe chronic obstructive pulmonary disease.


9.  Tobacco dependence, in remission.


10.  Non-ST segment elevation myocardial infarction.





Plan


.


PLAN: 


Continue supplemental oxygen, currently on 7 L nasal cannula


Continue BiPAP/trilogy at night


Follow chest x-ray as needed


Bronchodilators as needed


Follow cardiology recommendations, diuresis per cardiology, currently on 

milrinone drip and Lasix drip


Hypertension Per PCP and cardiology-- LVEF 20-25%.


Monitor renal function -- stable 


Social work for DC planning, plan to discharge to rehab when medically stable


Physical therapy/Occupational Therapy


DVT/GI prophylaxis: Eliquis and Protonix





Discussed with RN





PT. is FULL CODE











FIORDALIZA RANGEL MD              Nov 20, 2020 08:29

## 2020-11-20 NOTE — PDOC
LIZET LLOYD APRN 11/20/20 1241:


CARDIO Progress Notes


Date and Time


Date of Service


11/20/2020


Time of Evaluation


1000





Subjective


Subjective:  No Chest Pain, No Palpitations, Other ( Still SOA)





Vitals


Vitals





Vital Signs








  Date Time  Temp Pulse Resp B/P (MAP) Pulse Ox O2 Delivery O2 Flow Rate FiO2


 


11/20/20 10:48 98.1 71 24 131/81 (98) 100 Nasal Cannula 8.0 





 98.1       








Weight


Weight [ ]





Input and Output


Intake and Output











Intake and Output 


 


 11/20/20





 07:00


 


Intake Total 1420 ml


 


Output Total 3375 ml


 


Balance -1955 ml


 


 


 


Intake Oral 1420 ml


 


Output Urine Total 3375 ml











Laboratory


Labs





Laboratory Tests








Test


 11/20/20


05:00


 


Sodium Level


 144 mmol/L


(136-145)


 


Potassium Level


 3.8 mmol/L


(3.5-5.1)


 


Chloride Level


 105 mmol/L


()


 


Carbon Dioxide Level


 34 mmol/L


(21-32)


 


Anion Gap 5 (6-14) 


 


Blood Urea Nitrogen


 22 mg/dL


(8-26)


 


Creatinine


 1.7 mg/dL


(0.7-1.3)


 


Estimated GFR


(Cockcroft-Gault) 49.5 





 


Glucose Level


 83 mg/dL


(70-99)


 


Calcium Level


 9.5 mg/dL


(8.5-10.1)


 


Magnesium Level


 2.3 mg/dL


(1.8-2.4)











Physical Exam


HEENT:  Neck Supple W Full Motion


Chest:  Symmetric


LUNGS:  Other (diffuse crackles)


Heart:  RRR (Vpaced with first degree AV block)


Abdomen:  Soft N/T


Extremities:  Other (3-4+pitting edema)


Neurology:  alert, oriented, follow commands





Assessment


Assessment


1.  Acute on chronic respiratory failure COPD and CHF


2.  Acute on chronic systolic/diastolic CHF:still fluid overloaded


3.  SSS/ NICM s/p BiV PPM/CRT-P (Biotronik); LVEF 20-25%. Recent device check 

revealed 31% AFIB burden with no significant RVR episodes since amiodarone. 

normal function, BiV pacing 100%


4.  Chronic mild troponin elevation; peak 0.153 with h/o chronic troponin 

elevation. Type II. demand ischemia. CP free. 


5.  PAFIB; V paced 


6.  HTN: controlled


7.  ABIEL on CKD3


8.  Hx of tobaccoism, cocaine and marijuana use. Has been sober


9.  COPD


10. NSVT





Recommendations


1. Amiodarone for rhythm maintenance. Eliquis for stroke prevention. 


2. Optimize over the weekend and will consider for ischemic w/u next week. Good 

UOP with lasix drip so far. Continue Milrinone. 


3. Secondary prevention measures. 


4. 2Gm Na diet, 1500cc FR. Standing wt


5. Will need referral to  HF clinic





Justicifation of Admission Dx:


Justifications for Admission:


Justification of Admission Dx:  Yes


Respiratory Failure:  Severe Resp Distress





LING KRISHNAMURTHY MD 11/20/20 1633:


CARDIO Progress Notes


Assessment


Assessment


Patient seen and examined


I agree with our nurse practitioners assessment and plan.


Acute on chronic respiratory failure COPD and CHF.  Clinically improved.


Acute on chronic systolic/diastolic CHF:still fluid overloaded


SSS/ NICM s/p BiV PPM/CRT-P (Biotronik); LVEF 20-25%. Recent device check 

revealed 31% AFIB burden with no significant RVR episodes since amiodarone. 

normal function, BiV pacing 100%


Chronic mild troponin elevation; peak 0.153 with h/o chronic troponin elevation.

Type II. demand ischemia. CP free. 


PAFIB; V paced 


HTN: controlled


ABIEL on CKD3


Hx of tobaccoism, cocaine and marijuana use. Has been sober


NSVT

















LIZET LLOYD          Nov 20, 2020 12:41


LING KRISHNAMURTHY MD            Nov 20, 2020 16:33

## 2020-11-20 NOTE — PDOC
[facility] PROGRESS NOTE


Date of Service


DOS:


DATE: 11/20/20 


TIME: 21:40





Chief Complaint


Chief Complaint


Acute on chronic systolic and diastolic heart failure


Prior history of cocaine abuse


Noncompliance


Hypokalemia


Anemia


HTN


Hyperlipidemia


Asthma


COPD





History of Present Illness


History of Present Illness


11/19/20


No acute events overnight.  Currently on milirnone and lasix gtt.  Goal weight 

of 85 kg.  








11/18/2020


Patient seen and examined


Discussed with RN


Chart reviewed





11/19/20


Pt seen and examined in room. 


FER RN regarding fluid restriction and low sodium diet. 


Chart reviewed.


Pt claims breathign is improving and denies any chest pain.





Vitals/I&O


Vitals/I&O:





                                   Vital Signs








  Date Time  Temp Pulse Resp B/P (MAP) Pulse Ox O2 Delivery O2 Flow Rate FiO2


 


11/20/20 20:19     100 Nasal Cannula 8.0 


 


11/20/20 19:44 98.2 71 24 128/75 (92)    





 98.2       














                                    I & O   


 


 11/19/20 11/19/20 11/20/20





 15:00 23:00 07:00


 


Intake Total 1060 ml 360 ml 0 ml


 


Output Total 700 ml 1475 ml 1200 ml


 


Balance 360 ml -1115 ml -1200 ml











Physical Exam


General:  Alert, mild distress


Heart:  Regular rate, Other (S1-S2 soft S3)


Lungs:  Crackles


Abdomen:  Soft


Extremities:  No cyanosis


Skin:  No rashes





Labs


Labs:





Laboratory Tests








Test


 11/20/20


05:00


 


Sodium Level


 144 mmol/L


(136-145)


 


Potassium Level


 3.8 mmol/L


(3.5-5.1)


 


Chloride Level


 105 mmol/L


()


 


Carbon Dioxide Level


 34 mmol/L


(21-32)


 


Anion Gap 5 (6-14) 


 


Blood Urea Nitrogen


 22 mg/dL


(8-26)


 


Creatinine


 1.7 mg/dL


(0.7-1.3)


 


Estimated GFR


(Cockcroft-Gault) 49.5 





 


Glucose Level


 83 mg/dL


(70-99)


 


Calcium Level


 9.5 mg/dL


(8.5-10.1)


 


Magnesium Level


 2.3 mg/dL


(1.8-2.4)











Assessment and Plan


Assessmemt and Plan


Problems


Medical Problems:


(1) CHF exacerbation


Status: Acute  





(2) Dyspnea


Status: Acute  





(3) Elevated troponin


Status: Acute  











Comment


Review of Relevant


I have reviewed the following items brittany (where applicable) has been applied.


Medications:





Current Medications








 Medications


  (Trade)  Dose


 Ordered  Sig/Irvin


 Route


 PRN Reason  Start Time


 Stop Time Status Last Admin


Dose Admin


 


 Apixaban


  (Eliquis)  5 mg  BID


 PO


   11/20/20 12:45


    11/20/20 20:08














Justifications for Admission


Other Justification


CHF vs COPD exacerbation


hypoxic resp fail











CELESTINE MENDEZ MD                    Nov 20, 2020 21:42

## 2020-11-20 NOTE — NUR
SS following up with discharge planning. SS reviewed pt chart and discussed with pt RN. Pt 
is currently requiring oxygen. Pt has home oxygen and trilogy. COVID19 negative. Pt accepted 
at [facility], [phone]; fax [phone]. Insurance authorization received. Cristina from 
[facility] contacted SS and reported bed not available until Sunday, 11/22/2020. 
Transportation scheduled by [facility] for 11/22/2020 at 1100. Pt's RN notified that 
discharge instructions need to be faxed to facility prior to discharge. SS will continue to 
follow for discharge planning.

## 2020-11-21 VITALS — DIASTOLIC BLOOD PRESSURE: 86 MMHG | SYSTOLIC BLOOD PRESSURE: 147 MMHG

## 2020-11-21 VITALS — SYSTOLIC BLOOD PRESSURE: 138 MMHG | DIASTOLIC BLOOD PRESSURE: 83 MMHG

## 2020-11-21 VITALS — SYSTOLIC BLOOD PRESSURE: 144 MMHG | DIASTOLIC BLOOD PRESSURE: 89 MMHG

## 2020-11-21 VITALS — SYSTOLIC BLOOD PRESSURE: 150 MMHG | DIASTOLIC BLOOD PRESSURE: 88 MMHG

## 2020-11-21 VITALS — SYSTOLIC BLOOD PRESSURE: 137 MMHG | DIASTOLIC BLOOD PRESSURE: 85 MMHG

## 2020-11-21 VITALS — DIASTOLIC BLOOD PRESSURE: 86 MMHG | SYSTOLIC BLOOD PRESSURE: 141 MMHG

## 2020-11-21 PROCEDURE — 5A09357 ASSISTANCE WITH RESPIRATORY VENTILATION, LESS THAN 24 CONSECUTIVE HOURS, CONTINUOUS POSITIVE AIRWAY PRESSURE: ICD-10-PCS | Performed by: FAMILY MEDICINE

## 2020-11-21 RX ADMIN — IPRATROPIUM BROMIDE AND ALBUTEROL SULFATE SCH ML: .5; 3 SOLUTION RESPIRATORY (INHALATION) at 16:25

## 2020-11-21 RX ADMIN — NICOTINE SCH PATCH: 21 PATCH, EXTENDED RELEASE TOPICAL at 08:19

## 2020-11-21 RX ADMIN — IPRATROPIUM BROMIDE AND ALBUTEROL SULFATE SCH ML: .5; 3 SOLUTION RESPIRATORY (INHALATION) at 04:10

## 2020-11-21 RX ADMIN — IPRATROPIUM BROMIDE AND ALBUTEROL SULFATE SCH ML: .5; 3 SOLUTION RESPIRATORY (INHALATION) at 00:00

## 2020-11-21 RX ADMIN — POTASSIUM CHLORIDE SCH MEQ: 1500 TABLET, EXTENDED RELEASE ORAL at 17:05

## 2020-11-21 RX ADMIN — IPRATROPIUM BROMIDE AND ALBUTEROL SULFATE SCH ML: .5; 3 SOLUTION RESPIRATORY (INHALATION) at 12:30

## 2020-11-21 RX ADMIN — APIXABAN SCH MG: 5 TABLET, FILM COATED ORAL at 21:28

## 2020-11-21 RX ADMIN — IPRATROPIUM BROMIDE AND ALBUTEROL SULFATE SCH ML: .5; 3 SOLUTION RESPIRATORY (INHALATION) at 08:35

## 2020-11-21 RX ADMIN — DILTIAZEM HYDROCHLORIDE PRN MLS/HR: 5 INJECTION INTRAVENOUS at 03:06

## 2020-11-21 RX ADMIN — LISINOPRIL SCH MG: 20 TABLET ORAL at 08:19

## 2020-11-21 RX ADMIN — POTASSIUM CHLORIDE SCH MEQ: 1500 TABLET, EXTENDED RELEASE ORAL at 08:19

## 2020-11-21 RX ADMIN — SIMVASTATIN SCH MG: 20 TABLET, FILM COATED ORAL at 21:28

## 2020-11-21 RX ADMIN — PANTOPRAZOLE SODIUM SCH MG: 40 TABLET, DELAYED RELEASE ORAL at 08:19

## 2020-11-21 RX ADMIN — APIXABAN SCH MG: 5 TABLET, FILM COATED ORAL at 08:19

## 2020-11-21 RX ADMIN — MILRINONE LACTATE PRN MLS/HR: 0.2 INJECTION, SOLUTION INTRAVENOUS at 10:12

## 2020-11-21 RX ADMIN — IPRATROPIUM BROMIDE AND ALBUTEROL SULFATE SCH ML: .5; 3 SOLUTION RESPIRATORY (INHALATION) at 20:28

## 2020-11-21 RX ADMIN — METOPROLOL SUCCINATE SCH MG: 25 TABLET, EXTENDED RELEASE ORAL at 08:20

## 2020-11-21 RX ADMIN — AMIODARONE HYDROCHLORIDE SCH MG: 200 TABLET ORAL at 08:18

## 2020-11-21 NOTE — PDOC
[facility] PROGRESS NOTE


Date of Service


DOS:


DATE: 11/21/20 


TIME: 12:55





Chief Complaint


Chief Complaint


Acute on chronic systolic and diastolic heart failure


Prior history of cocaine abuse


Noncompliance


Hypokalemia


Anemia


HTN


Hyperlipidemia


Asthma


COPD





History of Present Illness


History of Present Illness


11/17/20 HPI:  "SEEN IN ER WITH WORSENING SOA, 35  year old AA male who presents

to the emergency room via EMS with complaints of increased shortness of breath, 

and an increased cough with pink sputum since yesterday.  Patient denies any 

known Covid-19 exposure.  Patient reports that he was admitted a few weeks ago 

for a COPD exacerbation and he was discharged home on November 9th.  He denies 

any chest pain, palpitations, nausea, vomiting, diarrhea, abdominal pain, sore 

throat, or headache. 


reports that his breathing has become more labored. TROPONIN I MILDLY ELEVATED."





11/18/2020


Patient seen and examined


Discussed with RN


Chart reviewed





11/19/20


Pt seen and examined in room. 


FER RN regarding fluid restriction and low sodium diet. 


Chart reviewed.


Pt claims breathing is improving and denies any chest pain. 





11/19/20


No acute events overnight.  Currently on milirnone and lasix gtt.  Goal weight 

of 85 kg.  





11/21/20


Pt seen and examined in room. 


Pt currently on nasal canula with lasix and milrinone drip. 


Pt claims his breathing has improved since yesterday and denies any chest pain. 


Pt claims he is adhering to the diet and fluid restrictions. 


FER nurse. 


FER cardiology.





Vitals/I&O


Vitals/I&O:





                                   Vital Signs








  Date Time  Temp Pulse Resp B/P (MAP) Pulse Ox O2 Delivery O2 Flow Rate FiO2


 


11/21/20 12:32     92 Nasal Cannula 6.0 


 


11/21/20 11:00 98.4 69 12 147/86 (106)    





 98.4       














                                    I & O   


 


 11/20/20 11/20/20 11/21/20





 15:00 23:00 07:00


 


Intake Total 1060 ml 120 ml 0 ml


 


Output Total 1250 ml 825 ml 425 ml


 


Balance -190 ml -705 ml -425 ml











Physical Exam


General:  Alert, mild distress


Heart:  Regular rate, Other (S1-S2 soft S3)


Lungs:  Crackles


Abdomen:  Soft


Extremities:  No cyanosis


Skin:  No rashes





Review of Systems


Review of Systems:


General:  No fever, chills, nausea


CV: No Chest Pain


GI: No Abdominal Pain, vomiting, diarrhea


Pulm:  No Increase Cough, Pt complains of SOB- but feels is improving





Assessment and Plan


Assessmemt and Plan


Assessment:


1. Acute on chronic systolic and diastolic heart failure


2. Acute on Chronic Respiratory Failure 


3. Hypokalemia


4. AFIB with Ventricular Pacing


5. Anemia


6. HTN


7. Hyperlipidemia


8. Asthma


9. COPD


10. ABIEL on CKD3


11. Noncompliance


12. Hx of polysubstance abuse


13. Hx of tobaccoism 





Plan: 


1. Continue Amiodarone for rhythm maintenance AFIB. 


2. Continue Eliquis for stroke prevention- AFIB. 


3. Continue Milrinone 


4. Continue Lasix drip. 


5. Albumin given 


6. PO Potassium replacement


7. DVT ppx


8. Continue Low Na diet


9. 1.5 L fluid restriction 


10. Will need referral to  HF clinic for outpt follow up.


11.  Social work for DC planning, plan to discharge to rehab when medically 

stable


12. Trend labs 


13. Cardiology and Pulm consulted and following. We appreciate their input.


14. Monitor renal function - stable


15. Physical therapy/Occupational Therapy


16. Continue BiPAP/trilogy at night





Comment


Review of Relevant


I have reviewed the following items brittany (where applicable) has been applied.





Justifications for Admission


Other Justification


CHF vs COPD exacerbation


hypoxic resp fail











ANTONIA LUX III DO           Nov 21, 2020 12:55

## 2020-11-21 NOTE — PDOC
PROGRESS NOTES


Date of Service:


DATE: 11/21/20 


TIME: 13:44





Subjective


Subjective


Dyspnea improving slowly





Objective


Objective





Vital Signs








  Date Time  Temp Pulse Resp B/P (MAP) Pulse Ox O2 Delivery O2 Flow Rate FiO2


 


11/21/20 12:32     92 Nasal Cannula 6.0 


 


11/21/20 11:00 98.4 69 12 147/86 (106)    





 98.4       














Intake and Output 


 


 11/21/20





 07:00


 


Intake Total 1180 ml


 


Output Total 2500 ml


 


Balance -1320 ml


 


 


 


Intake Oral 1180 ml


 


Output Urine Total 2500 ml


 


# Bowel Movements 3











Physical Exam


Abdomen:  Soft


Heart:  Regular rate, Other (S1-S2 soft S3)


Extremities:  No cyanosis, Other (1-2+ pitting edema)


General:  Alert, mild distress


HEENT:  Atraumatic, Mucous membr. moist/pink


Lungs:  Other (Bilateral basal crepitations)


Neck:  Supple


Neuro:  Normal speech, Normal tone


Skin:  No rashes





Assessment


Assessment


1.  Acute on chronic respiratory failure, multifactorial.  Pulmonary team 

following.


2.  Acute on chronic combined systolic/diastolic CHF: Improving slowly.  

Continue milrinone and Lasix drips.


3.  SSS/ NICM s/p BiV PPM/CRT-P (Biotronik); LVEF 20-25%. Recent device check 

revealed 31% AFIB burden with no significant RVR episodes since amiodarone. 

normal function, BiV pacing 100%


4.  Chronic mild troponin elevation; peak 0.153 with h/o chronic troponin 

elevation. Type II. demand ischemia. CP free.  Consider ischemic evaluation once

better compensated.


5.  PAFIB; V paced.  Continue amiodarone for rhythm maintenance and Eliquis for 

stroke prophylaxis.


6.  HTN: controlled


7.  ABIEL on CKD3


8.  Hx of tobaccoism, cocaine and marijuana use. Has been sober


9.  COPD


10. NSVT: No further episodes noted on telemetry.





Plan


Plan of Care


Problems


Medical Problems:


(1) CHF exacerbation


Status: Acute  





(2) Dyspnea


Status: Acute  





(3) Elevated troponin


Status: Acute  











Comment


Review of Relevant


I have reviewed the following items brittany (where applicable) has been applied.


Medications





Current Medications


Apixaban (Eliquis) 5 mg BID PO ;  Start 11/21/20 at 09:00;  Stop 11/20/20 at 

12:40;  Status DC


Vitals/I & O





Vital Sign - Last 24 Hours








 11/20/20 11/20/20 11/20/20 11/20/20





 14:00 14:43 15:00 16:00


 


Temp  98.2  





  98.2  


 


Pulse 68 69 68 68


 


Resp  24  


 


B/P (MAP) 143/94 (110) 131/84 (100) 144/90 (108) 151/102 (118)


 


Pulse Ox  97  


 


O2 Delivery  Nasal Cannula  


 


O2 Flow Rate  8.0  


 


    





    





 11/20/20 11/20/20 11/20/20 11/20/20





 16:04 17:00 19:44 20:00


 


Temp   98.2 





   98.2 


 


Pulse   71 


 


Resp   24 


 


B/P (MAP)  128/81 (97) 128/75 (92) 


 


Pulse Ox 94  97 


 


O2 Delivery Nasal Cannula  Nasal Cannula Nasal Cannula


 


O2 Flow Rate 8.0  8.0 8.0


 


    





    





 11/20/20 11/20/20 11/21/20 11/21/20





 20:19 22:44 00:50 02:29


 


Temp  98.2  98.1





  98.2  98.1


 


Pulse  71  71


 


Resp  24  12


 


B/P (MAP)  151/94 (113)  137/85 (102)


 


Pulse Ox 100 91 95 100


 


O2 Delivery Nasal Cannula Nasal Cannula BiPAP/CPAP BiPAP/CPAP


 


O2 Flow Rate 8.0 8.0  


 


    





    





 11/21/20 11/21/20 11/21/20 11/21/20





 04:15 07:00 08:00 08:18


 


Temp  98.8  





  98.8  


 


Pulse  69  69


 


Resp  12  


 


B/P (MAP)  150/88 (108)  150/88


 


Pulse Ox 97 94  


 


O2 Delivery BiPAP/CPAP BiPAP/CPAP Nasal Cannula 


 


O2 Flow Rate   7.0 


 


    





    





 11/21/20 11/21/20 11/21/20 11/21/20





 08:19 08:20 08:35 08:38


 


Pulse 69 69  


 


B/P (MAP) 150/88 150/88  


 


Pulse Ox   100 98


 


O2 Delivery   Nasal Cannula 


 


O2 Flow Rate   8.0 7.0





 11/21/20 11/21/20  





 11:00 12:32  


 


Temp 98.4   





 98.4   


 


Pulse 69   


 


Resp 12   


 


B/P (MAP) 147/86 (106)   


 


Pulse Ox 95 92  


 


O2 Delivery BiPAP/CPAP Nasal Cannula  


 


O2 Flow Rate  6.0  














Intake and Output   


 


 11/20/20 11/20/20 11/21/20





 15:00 23:00 07:00


 


Intake Total 1060 ml 120 ml 0 ml


 


Output Total 1250 ml 825 ml 425 ml


 


Balance -190 ml -705 ml -425 ml

















ELIZABETH SELLERS MD           Nov 21, 2020 13:47

## 2020-11-21 NOTE — PDOC
PULMONARY PROGRESS NOTES


DATE: 11/21/20 


TIME: 06:53


Subjective


on bipap  40% fio2       7-8 L nasal cannula during day


sob better  has occ cough


on Lasix drip and milrinone drip


Afebrile overnight


Vitals





Vital Signs








  Date Time  Temp Pulse Resp B/P (MAP) Pulse Ox O2 Delivery O2 Flow Rate FiO2


 


11/21/20 04:15     97 BiPAP/CPAP  


 


11/21/20 02:29 98.1 71 12 137/85 (102)    





 98.1       


 


11/20/20 22:44       8.0 








ROS:  No Nausea, No Chest Pain, No Abdominal Pain, No Increase Cough


General:  Alert, Oriented X4, No acute distress


HEENT:  Other (nc at )


Lungs:  Crackles


Cardiovascular:  S1, S2


Abdomen:  Soft, Non-tender, Other


Extremities:  Other (BLE +2)


Skin:  Warm


Labs





Laboratory Tests








Test


 11/20/20


05:00


 


Sodium Level


 144 mmol/L


(136-145)


 


Potassium Level


 3.8 mmol/L


(3.5-5.1)


 


Chloride Level


 105 mmol/L


()


 


Carbon Dioxide Level


 34 mmol/L


(21-32)


 


Anion Gap 5 (6-14) 


 


Blood Urea Nitrogen


 22 mg/dL


(8-26)


 


Creatinine


 1.7 mg/dL


(0.7-1.3)


 


Estimated GFR


(Cockcroft-Gault) 49.5 





 


Glucose Level


 83 mg/dL


(70-99)


 


Calcium Level


 9.5 mg/dL


(8.5-10.1)


 


Magnesium Level


 2.3 mg/dL


(1.8-2.4)








Medications





Active Scripts








 Medications  Dose


 Route/Sig


 Max Daily Dose Days Date Category Dose


Instructions


 


 Bumetanide 1 Mg


 Tablet  1 Mg


 PO BID


   11/15/20 Rx 


 


 Amiodarone Hcl


 200 Mg Tablet  200 Mg


 PO DAILY


   11/15/20 Rx 


 


 Potassium


 Chloride **


  (Potassium


 Chloride) 20 Meq


 Tablet.er  20 Meq


 PO DAILY


  30 10/15/20 Rx 


 


 NICODERM CQ 21mg


  (Nicotine) 1 Each


 Patch.td24  1 Patch


 TP DAILY


   10/10/20 Reported 


 


 Eliquis


  (Apixaban) 5 Mg


 Tablet  5 Mg


 PO BID


   4/14/20 Reported 


 


 Toprol Xl


  (Metoprolol


 Succinate) 25 Mg


 Tab.er.24h  1 Tab


 PO DAILY


  30 4/14/20 Reported 


 


 Lisinopril 40 Mg


 Tablet  20 Mg


 PO DAILY


  30 1/21/20 Rx 


 


 Duoneb 0.5-3(2.5)


 Mg/3 Ml


  (Albuterol/Ipratropium)


 3 Ml Ampul.neb  3 Ml


 NEB Q4HRS


  30 1/20/20 Rx 


 


 Albuterol Sulfate


 Neb Soln


  (Albuterol


 Sulfate) 0.63


 Mg/3 Ml Vial.neb  0.63 Mg


 NEB PRN Q4HRS PRN


  30 6/26/17 Rx  AS NEEDED


 


 Zocor


  (Simvastatin) 20


 Mg Tablet  20 Mg


 PO HS


   7/10/15 Reported  NEXT DOSE DUE TONIGHT AT BEDTIME








Comments


CXR


Impression: 


 


1. Findings again suggest volume overload with cardiomegaly. Trace 


effusions are reidentified.





Impression


.


IMPRESSION:


1.  Acute on chronic respiratory failure, multifactorial.


2.  Acute on chronic systolic and diastolic heart failure.


3.  Sick sinus syndrome, status post biventricular pacemaker.


4.  Nonischemic cardiomyopathy.


5.  Chronic troponin elevation.


6.  Paroxysmal atrial fibrillation.


7.  Hypertension.


8.  Severe chronic obstructive pulmonary disease.


9.  Tobacco dependence, in remission.


10.  Non-ST segment elevation myocardial infarction.





Plan


.


PLAN: 


Continue supplemental oxygen, titrate fio2 to keep sat 90%


Continue BiPAP/trilogy at night


Follow chest x-ray as needed


Bronchodilators as needed


Follow cardiology recommendations, diuresis per cardiology, currently on 

milrinone drip and Lasix drip


Hypertension Per PCP and cardiology-- LVEF 20-25%.


Monitor renal function -- stable 


Social work for DC planning, plan to discharge to rehab when medically stable


Physical therapy/Occupational Therapy


DVT/GI prophylaxis: Eliquis and Protonix





Discussed with RN





PT. is FULL CODE











AUTUMN WILLIAM MD               Nov 21, 2020 06:55

## 2020-11-22 VITALS — SYSTOLIC BLOOD PRESSURE: 151 MMHG | DIASTOLIC BLOOD PRESSURE: 94 MMHG

## 2020-11-22 VITALS — SYSTOLIC BLOOD PRESSURE: 144 MMHG | DIASTOLIC BLOOD PRESSURE: 86 MMHG

## 2020-11-22 VITALS — DIASTOLIC BLOOD PRESSURE: 87 MMHG | SYSTOLIC BLOOD PRESSURE: 139 MMHG

## 2020-11-22 VITALS — DIASTOLIC BLOOD PRESSURE: 81 MMHG | SYSTOLIC BLOOD PRESSURE: 131 MMHG

## 2020-11-22 VITALS — SYSTOLIC BLOOD PRESSURE: 148 MMHG | DIASTOLIC BLOOD PRESSURE: 92 MMHG

## 2020-11-22 VITALS — SYSTOLIC BLOOD PRESSURE: 137 MMHG | DIASTOLIC BLOOD PRESSURE: 79 MMHG

## 2020-11-22 LAB
ALBUMIN SERPL-MCNC: 2.9 G/DL (ref 3.4–5)
ALBUMIN/GLOB SERPL: 0.9 {RATIO} (ref 1–1.7)
ALP SERPL-CCNC: 55 U/L (ref 46–116)
ALT SERPL-CCNC: 16 U/L (ref 16–63)
ANION GAP SERPL CALC-SCNC: 0 MMOL/L (ref 6–14)
AST SERPL-CCNC: 14 U/L (ref 15–37)
BASOPHILS # BLD AUTO: 0.1 X10^3/UL (ref 0–0.2)
BASOPHILS NFR BLD: 1 % (ref 0–3)
BILIRUB SERPL-MCNC: 0.5 MG/DL (ref 0.2–1)
BUN SERPL-MCNC: 24 MG/DL (ref 8–26)
BUN/CREAT SERPL: 15 (ref 6–20)
CALCIUM SERPL-MCNC: 9.6 MG/DL (ref 8.5–10.1)
CHLORIDE SERPL-SCNC: 106 MMOL/L (ref 98–107)
CO2 SERPL-SCNC: 37 MMOL/L (ref 21–32)
CREAT SERPL-MCNC: 1.6 MG/DL (ref 0.7–1.3)
EOSINOPHIL NFR BLD: 0.2 X10^3/UL (ref 0–0.7)
EOSINOPHIL NFR BLD: 3 % (ref 0–3)
ERYTHROCYTE [DISTWIDTH] IN BLOOD BY AUTOMATED COUNT: 16.5 % (ref 11.5–14.5)
GFR SERPLBLD BASED ON 1.73 SQ M-ARVRAT: 53.1 ML/MIN
GLUCOSE SERPL-MCNC: 86 MG/DL (ref 70–99)
HCT VFR BLD CALC: 32.5 % (ref 39–53)
HGB BLD-MCNC: 10.5 G/DL (ref 13–17.5)
LYMPHOCYTES # BLD: 0.6 X10^3/UL (ref 1–4.8)
LYMPHOCYTES NFR BLD AUTO: 13 % (ref 24–48)
MCH RBC QN AUTO: 28 PG (ref 25–35)
MCHC RBC AUTO-ENTMCNC: 32 G/DL (ref 31–37)
MCV RBC AUTO: 87 FL (ref 79–100)
MONO #: 0.6 X10^3/UL (ref 0–1.1)
MONOCYTES NFR BLD: 12 % (ref 0–9)
NEUT #: 3.5 X10^3/UL (ref 1.8–7.7)
NEUTROPHILS NFR BLD AUTO: 70 % (ref 31–73)
PLATELET # BLD AUTO: 131 X10^3/UL (ref 140–400)
POTASSIUM SERPL-SCNC: 3.6 MMOL/L (ref 3.5–5.1)
PROT SERPL-MCNC: 6.2 G/DL (ref 6.4–8.2)
RBC # BLD AUTO: 3.73 X10^6/UL (ref 4.3–5.7)
SODIUM SERPL-SCNC: 143 MMOL/L (ref 136–145)
WBC # BLD AUTO: 5 X10^3/UL (ref 4–11)

## 2020-11-22 PROCEDURE — 5A09357 ASSISTANCE WITH RESPIRATORY VENTILATION, LESS THAN 24 CONSECUTIVE HOURS, CONTINUOUS POSITIVE AIRWAY PRESSURE: ICD-10-PCS | Performed by: FAMILY MEDICINE

## 2020-11-22 RX ADMIN — POTASSIUM CHLORIDE SCH MEQ: 1500 TABLET, EXTENDED RELEASE ORAL at 17:13

## 2020-11-22 RX ADMIN — IPRATROPIUM BROMIDE AND ALBUTEROL SULFATE SCH ML: .5; 3 SOLUTION RESPIRATORY (INHALATION) at 16:18

## 2020-11-22 RX ADMIN — APIXABAN SCH MG: 5 TABLET, FILM COATED ORAL at 08:50

## 2020-11-22 RX ADMIN — AMIODARONE HYDROCHLORIDE SCH MG: 200 TABLET ORAL at 08:50

## 2020-11-22 RX ADMIN — POTASSIUM CHLORIDE SCH MEQ: 1500 TABLET, EXTENDED RELEASE ORAL at 08:50

## 2020-11-22 RX ADMIN — DILTIAZEM HYDROCHLORIDE PRN MLS/HR: 5 INJECTION INTRAVENOUS at 01:28

## 2020-11-22 RX ADMIN — MILRINONE LACTATE PRN MLS/HR: 0.2 INJECTION, SOLUTION INTRAVENOUS at 16:45

## 2020-11-22 RX ADMIN — MILRINONE LACTATE PRN MLS/HR: 0.2 INJECTION, SOLUTION INTRAVENOUS at 01:28

## 2020-11-22 RX ADMIN — PANTOPRAZOLE SODIUM SCH MG: 40 TABLET, DELAYED RELEASE ORAL at 08:50

## 2020-11-22 RX ADMIN — METOPROLOL SUCCINATE SCH MG: 25 TABLET, EXTENDED RELEASE ORAL at 08:51

## 2020-11-22 RX ADMIN — IPRATROPIUM BROMIDE AND ALBUTEROL SULFATE SCH ML: .5; 3 SOLUTION RESPIRATORY (INHALATION) at 00:53

## 2020-11-22 RX ADMIN — LISINOPRIL SCH MG: 20 TABLET ORAL at 08:50

## 2020-11-22 RX ADMIN — DILTIAZEM HYDROCHLORIDE PRN MLS/HR: 5 INJECTION INTRAVENOUS at 22:01

## 2020-11-22 RX ADMIN — IPRATROPIUM BROMIDE AND ALBUTEROL SULFATE SCH ML: .5; 3 SOLUTION RESPIRATORY (INHALATION) at 19:57

## 2020-11-22 RX ADMIN — NICOTINE SCH PATCH: 21 PATCH, EXTENDED RELEASE TOPICAL at 08:49

## 2020-11-22 RX ADMIN — APIXABAN SCH MG: 5 TABLET, FILM COATED ORAL at 22:00

## 2020-11-22 RX ADMIN — IPRATROPIUM BROMIDE AND ALBUTEROL SULFATE SCH ML: .5; 3 SOLUTION RESPIRATORY (INHALATION) at 11:46

## 2020-11-22 RX ADMIN — SIMVASTATIN SCH MG: 20 TABLET, FILM COATED ORAL at 22:00

## 2020-11-22 RX ADMIN — IPRATROPIUM BROMIDE AND ALBUTEROL SULFATE SCH ML: .5; 3 SOLUTION RESPIRATORY (INHALATION) at 07:55

## 2020-11-22 NOTE — PDOC
[facility] PROGRESS NOTE


Date of Service


DOS:


DATE: 11/22/20 


TIME: 11:21





Chief Complaint


Chief Complaint


Acute on chronic systolic and diastolic heart failure


Prior history of cocaine abuse


Noncompliance


Hypokalemia


Anemia


HTN


Hyperlipidemia


Asthma


COPD





History of Present Illness


History of Present Illness


11/17/20 HPI:  "SEEN IN ER WITH WORSENING SOA, 35  year old AA male who presents

to the emergency room via EMS with complaints of increased shortness of breath, 

and an increased cough with pink sputum since yesterday.  Patient denies any 

known Covid-19 exposure.  Patient reports that he was admitted a few weeks ago 

for a COPD exacerbation and he was discharged home on November 9th.  He denies 

any chest pain, palpitations, nausea, vomiting, diarrhea, abdominal pain, sore 

throat, or headache. 


reports that his breathing has become more labored. TROPONIN I MILDLY ELEVATED."





11/18/2020


Patient seen and examined


Discussed with RN


Chart reviewed





11/19/20


Pt seen and examined in room. 


FER RN regarding fluid restriction and low sodium diet. 


Chart reviewed.


Pt claims breathing is improving and denies any chest pain. 





11/19/20


No acute events overnight.  Currently on milirnone and lasix gtt.  Goal weight 

of 85 kg.  





11/21/20


Pt seen and examined in room. 


Pt currently on nasal canula with lasix and milrinone drip. 


Pt claims his breathing has improved since yesterday and denies any chest pain. 


Pt claims he is adhering to the diet and fluid restrictions. 


FER nurse. 


FER cardiology. 





11/22/2020


Patient seen and examined in bedside.  Patient currently saturating 96% on 8 L 

nasal cannula.  -675 cc fluid balance in the past 24 hours.  No complaints 

voiced at this time.  Tolerating breakfast.  Patient's chart, labs, images were 

reviewed and discussed with RN





Vitals/I&O


Vitals/I&O:





                                   Vital Signs








  Date Time  Temp Pulse Resp B/P (MAP) Pulse Ox O2 Delivery O2 Flow Rate FiO2


 


11/22/20 08:51  69  144/86    


 


11/22/20 08:00      Nasal Cannula 8.0 


 


11/22/20 07:56     96   


 


11/22/20 07:00 99.0  18     





 99.0       














                                    I & O   


 


 11/21/20 11/21/20 11/22/20





 15:00 23:00 07:00


 


Intake Total 800 ml 600 ml 0 ml


 


Output Total 300 ml 1075 ml 700 ml


 


Balance 500 ml -475 ml -700 ml











Physical Exam


General:  Alert, mild distress


Heart:  Regular rate, Other (S1-S2 soft S3)


Lungs:  Crackles


Abdomen:  Soft


Extremities:  No cyanosis, Other (1-2+ pitting edema)


Skin:  No rashes





Labs


Labs:





Laboratory Tests








Test


 11/22/20


04:30


 


White Blood Count


 5.0 x10^3/uL


(4.0-11.0)


 


Red Blood Count


 3.73 x10^6/uL


(4.30-5.70)


 


Hemoglobin


 10.5 g/dL


(13.0-17.5)


 


Hematocrit


 32.5 %


(39.0-53.0)


 


Mean Corpuscular Volume 87 fL () 


 


Mean Corpuscular Hemoglobin 28 pg (25-35) 


 


Mean Corpuscular Hemoglobin


Concent 32 g/dL


(31-37)


 


Red Cell Distribution Width


 16.5 %


(11.5-14.5)


 


Platelet Count


 131 x10^3/uL


(140-400)


 


Neutrophils (%) (Auto) 70 % (31-73) 


 


Lymphocytes (%) (Auto) 13 % (24-48) 


 


Monocytes (%) (Auto) 12 % (0-9) 


 


Eosinophils (%) (Auto) 3 % (0-3) 


 


Basophils (%) (Auto) 1 % (0-3) 


 


Neutrophils # (Auto)


 3.5 x10^3/uL


(1.8-7.7)


 


Lymphocytes # (Auto)


 0.6 x10^3/uL


(1.0-4.8)


 


Monocytes # (Auto)


 0.6 x10^3/uL


(0.0-1.1)


 


Eosinophils # (Auto)


 0.2 x10^3/uL


(0.0-0.7)


 


Basophils # (Auto)


 0.1 x10^3/uL


(0.0-0.2)


 


Sodium Level


 143 mmol/L


(136-145)


 


Potassium Level


 3.6 mmol/L


(3.5-5.1)


 


Chloride Level


 106 mmol/L


()


 


Carbon Dioxide Level


 37 mmol/L


(21-32)


 


Anion Gap 0 (6-14) 


 


Blood Urea Nitrogen


 24 mg/dL


(8-26)


 


Creatinine


 1.6 mg/dL


(0.7-1.3)


 


Estimated GFR


(Cockcroft-Gault) 53.1 





 


BUN/Creatinine Ratio 15 (6-20) 


 


Glucose Level


 86 mg/dL


(70-99)


 


Calcium Level


 9.6 mg/dL


(8.5-10.1)


 


Total Bilirubin


 0.5 mg/dL


(0.2-1.0)


 


Aspartate Amino Transf


(AST/SGOT) 14 U/L (15-37) 





 


Alanine Aminotransferase


(ALT/SGPT) 16 U/L (16-63) 





 


Alkaline Phosphatase


 55 U/L


()


 


Total Protein


 6.2 g/dL


(6.4-8.2)


 


Albumin


 2.9 g/dL


(3.4-5.0)


 


Albumin/Globulin Ratio 0.9 (1.0-1.7) 











Assessment and Plan


Assessmemt and Plan


Problems


Medical Problems:


(1) CHF exacerbation


Status: Acute  





(2) Dyspnea


Status: Acute  





(3) Elevated troponin


Status: Acute  











Comment


Review of Relevant


I have reviewed the following items brittany (where applicable) has been applied.


Medications:





Current Medications








 Medications


  (Trade)  Dose


 Ordered  Sig/Irvin


 Route


 PRN Reason  Start Time


 Stop Time Status Last Admin


Dose Admin


 


 Albuterol/


 Ipratropium


  (Duoneb)  3 ml  Q4HRS  W/A


 NEB


   11/22/20 06:00


    11/22/20 07:55














Justifications for Admission


Other Justification


CHF vs COPD exacerbation


hypoxic resp fail











CELESTINE MENDEZ MD                    Nov 22, 2020 11:22

## 2020-11-22 NOTE — PDOC
PROGRESS NOTES


Date of Service:


DATE: 11/22/20 


TIME: 10:54





Subjective


Subjective


Dyspnea improved.  Edema improving slowly.  Denied any chest pain.





Objective


Objective





Vital Signs








  Date Time  Temp Pulse Resp B/P (MAP) Pulse Ox O2 Delivery O2 Flow Rate FiO2


 


11/22/20 08:51  69  144/86    


 


11/22/20 07:56     96 Nasal Cannula 6.0 


 


11/22/20 07:00 99.0  18     





 99.0       














Intake and Output 


 


 11/22/20





 07:00


 


Intake Total 1400 ml


 


Output Total 2075 ml


 


Balance -675 ml


 


 


 


Intake Oral 1400 ml


 


Output Urine Total 2075 ml


 


# Bowel Movements 1











Physical Exam


Abdomen:  Soft


Heart:  Regular rate, Other (S1-S2 soft S3)


Extremities:  No cyanosis, Other (1-2+ pitting edema)


General:  Alert, mild distress


HEENT:  Atraumatic, Mucous membr. moist/pink


Lungs:  Other (Bilateral basal crepitations)


Neck:  Supple


Neuro:  Normal speech, Normal tone


Skin:  No rashes





Assessment


Assessment


1.  Acute on chronic respiratory failure, multifactorial.  Pulmonary team 

following.


2.  Acute on chronic combined systolic/diastolic CHF: Improving slowly.  Patient

apparently drinking a lot of water despite placing fluid restriction, per 

nursing personnel.  Importance of compliance reemphasized.  Continue milrinone 

and Lasix drips.


3.  SSS/ NICM s/p BiV PPM/CRT-P (Biotronik); LVEF 20-25%. Recent device check 

revealed 31% AFIB burden with no significant RVR episodes since amiodarone. 

normal function, BiV pacing 100%


4.  Chronic mild troponin elevation; peak 0.153 with h/o chronic troponin 

elevation. Type II. demand ischemia. CP free.  Consider ischemic evaluation once

better compensated.


5.  PAFIB; V paced.  Continue amiodarone for rhythm maintenance and Eliquis for 

stroke prophylaxis.


6.  HTN: controlled


7.  ABIEL on CKD3


8.  Hx of tobaccoism, cocaine and marijuana use. Has been sober


9.  COPD


10. NSVT: No further episodes noted on telemetry.





Plan


Plan of Care


Problems


Medical Problems:


(1) CHF exacerbation


Status: Acute  





(2) Dyspnea


Status: Acute  





(3) Elevated troponin


Status: Acute  











Comment


Review of Relevant


I have reviewed the following items brittany (where applicable) has been applied.


Labs





Laboratory Tests








Test


 11/22/20


04:30


 


White Blood Count


 5.0 x10^3/uL


(4.0-11.0)


 


Red Blood Count


 3.73 x10^6/uL


(4.30-5.70)


 


Hemoglobin


 10.5 g/dL


(13.0-17.5)


 


Hematocrit


 32.5 %


(39.0-53.0)


 


Mean Corpuscular Volume 87 fL () 


 


Mean Corpuscular Hemoglobin 28 pg (25-35) 


 


Mean Corpuscular Hemoglobin


Concent 32 g/dL


(31-37)


 


Red Cell Distribution Width


 16.5 %


(11.5-14.5)


 


Platelet Count


 131 x10^3/uL


(140-400)


 


Neutrophils (%) (Auto) 70 % (31-73) 


 


Lymphocytes (%) (Auto) 13 % (24-48) 


 


Monocytes (%) (Auto) 12 % (0-9) 


 


Eosinophils (%) (Auto) 3 % (0-3) 


 


Basophils (%) (Auto) 1 % (0-3) 


 


Neutrophils # (Auto)


 3.5 x10^3/uL


(1.8-7.7)


 


Lymphocytes # (Auto)


 0.6 x10^3/uL


(1.0-4.8)


 


Monocytes # (Auto)


 0.6 x10^3/uL


(0.0-1.1)


 


Eosinophils # (Auto)


 0.2 x10^3/uL


(0.0-0.7)


 


Basophils # (Auto)


 0.1 x10^3/uL


(0.0-0.2)


 


Sodium Level


 143 mmol/L


(136-145)


 


Potassium Level


 3.6 mmol/L


(3.5-5.1)


 


Chloride Level


 106 mmol/L


()


 


Carbon Dioxide Level


 37 mmol/L


(21-32)


 


Anion Gap 0 (6-14) 


 


Blood Urea Nitrogen


 24 mg/dL


(8-26)


 


Creatinine


 1.6 mg/dL


(0.7-1.3)


 


Estimated GFR


(Cockcroft-Gault) 53.1 





 


BUN/Creatinine Ratio 15 (6-20) 


 


Glucose Level


 86 mg/dL


(70-99)


 


Calcium Level


 9.6 mg/dL


(8.5-10.1)


 


Total Bilirubin


 0.5 mg/dL


(0.2-1.0)


 


Aspartate Amino Transf


(AST/SGOT) 14 U/L (15-37) 





 


Alanine Aminotransferase


(ALT/SGPT) 16 U/L (16-63) 





 


Alkaline Phosphatase


 55 U/L


()


 


Total Protein


 6.2 g/dL


(6.4-8.2)


 


Albumin


 2.9 g/dL


(3.4-5.0)


 


Albumin/Globulin Ratio 0.9 (1.0-1.7) 








Medications





Current Medications


Albuterol/ Ipratropium (Duoneb) 3 ml Q4HRS  W/A NEB  Last administered on 

11/22/20at 07:55;  Start 11/22/20 at 06:00


Vitals/I & O





Vital Sign - Last 24 Hours








 11/21/20 11/21/20 11/21/20 11/21/20





 11:00 12:32 15:00 16:26


 


Temp 98.4  98.6 





 98.4  98.6 


 


Pulse 69  69 


 


Resp 12  12 


 


B/P (MAP) 147/86 (106)  138/83 (101) 


 


Pulse Ox 95 92 96 96


 


O2 Delivery BiPAP/CPAP Nasal Cannula BiPAP/CPAP Nasal Cannula


 


O2 Flow Rate  6.0  6.0


 


    





    





 11/21/20 11/21/20 11/21/20 11/21/20





 19:25 20:00 20:31 22:45


 


Temp 99.0   98.6





 99.0   98.6


 


Pulse 72   69


 


Resp 22   20


 


B/P (MAP) 144/89 (107)   141/86 (104)


 


Pulse Ox 93  97 97


 


O2 Delivery Nasal Cannula Nasal Cannula Nasal Cannula Nasal Cannula


 


O2 Flow Rate 8.0 8.0 6.0 8.0


 


    





    





 11/22/20 11/22/20 11/22/20 11/22/20





 00:53 03:50 04:00 07:00


 


Temp  97.7  99.0





  97.7  99.0


 


Pulse  67  69


 


Resp  22  18


 


B/P (MAP)  137/79 (98)  144/86 (105)


 


Pulse Ox 97 97 97 94


 


O2 Delivery BiPAP/CPAP BiPAP/CPAP BiPAP/CPAP Nasal Cannula


 


O2 Flow Rate    8.0


 


    





    





 11/22/20 11/22/20 11/22/20 11/22/20





 07:56 08:50 08:50 08:51


 


Pulse  69 69 69


 


B/P (MAP)  144/86 144/86 144/86


 


Pulse Ox 96   


 


O2 Delivery Nasal Cannula   


 


O2 Flow Rate 6.0   














Intake and Output   


 


 11/21/20 11/21/20 11/22/20





 15:00 23:00 07:00


 


Intake Total 800 ml 600 ml 0 ml


 


Output Total 300 ml 1075 ml 700 ml


 


Balance 500 ml -475 ml -700 ml

















ELIZABETH SELLERS MD           Nov 22, 2020 10:56

## 2020-11-22 NOTE — PDOC
PULMONARY PROGRESS NOTES


DATE: 11/22/20 


TIME: 06:47


Subjective


on bipap  40% fio2       8 L nasal cannula during day


sob better  has occ cough


on Lasix drip and milrinone drip


Afebrile overnight


Vitals





Vital Signs








  Date Time  Temp Pulse Resp B/P (MAP) Pulse Ox O2 Delivery O2 Flow Rate FiO2


 


11/22/20 03:50 97.7 67 22 137/79 (98) 97 BiPAP/CPAP  





 97.7       


 


11/21/20 22:45       8.0 








ROS:  No Nausea, No Chest Pain, No Abdominal Pain, No Increase Cough


General:  Alert, Oriented X4, No acute distress


HEENT:  Other (nc at )


Lungs:  Crackles


Cardiovascular:  S1, S2


Abdomen:  Soft, Non-tender, Other


Extremities:  Other (BLE +2)


Skin:  Warm


Labs





Laboratory Tests








Test


 11/22/20


04:30


 


White Blood Count


 5.0 x10^3/uL


(4.0-11.0)


 


Red Blood Count


 3.73 x10^6/uL


(4.30-5.70)


 


Hemoglobin


 10.5 g/dL


(13.0-17.5)


 


Hematocrit


 32.5 %


(39.0-53.0)


 


Mean Corpuscular Volume 87 fL () 


 


Mean Corpuscular Hemoglobin 28 pg (25-35) 


 


Mean Corpuscular Hemoglobin


Concent 32 g/dL


(31-37)


 


Red Cell Distribution Width


 16.5 %


(11.5-14.5)


 


Platelet Count


 131 x10^3/uL


(140-400)


 


Neutrophils (%) (Auto) 70 % (31-73) 


 


Lymphocytes (%) (Auto) 13 % (24-48) 


 


Monocytes (%) (Auto) 12 % (0-9) 


 


Eosinophils (%) (Auto) 3 % (0-3) 


 


Basophils (%) (Auto) 1 % (0-3) 


 


Neutrophils # (Auto)


 3.5 x10^3/uL


(1.8-7.7)


 


Lymphocytes # (Auto)


 0.6 x10^3/uL


(1.0-4.8)


 


Monocytes # (Auto)


 0.6 x10^3/uL


(0.0-1.1)


 


Eosinophils # (Auto)


 0.2 x10^3/uL


(0.0-0.7)


 


Basophils # (Auto)


 0.1 x10^3/uL


(0.0-0.2)


 


Sodium Level


 143 mmol/L


(136-145)


 


Potassium Level


 3.6 mmol/L


(3.5-5.1)


 


Chloride Level


 106 mmol/L


()


 


Carbon Dioxide Level


 37 mmol/L


(21-32)


 


Anion Gap 0 (6-14) 


 


Blood Urea Nitrogen


 24 mg/dL


(8-26)


 


Creatinine


 1.6 mg/dL


(0.7-1.3)


 


Estimated GFR


(Cockcroft-Gault) 53.1 





 


BUN/Creatinine Ratio 15 (6-20) 


 


Glucose Level


 86 mg/dL


(70-99)


 


Calcium Level


 9.6 mg/dL


(8.5-10.1)


 


Total Bilirubin


 0.5 mg/dL


(0.2-1.0)


 


Aspartate Amino Transf


(AST/SGOT) 14 U/L (15-37) 





 


Alanine Aminotransferase


(ALT/SGPT) 16 U/L (16-63) 





 


Alkaline Phosphatase


 55 U/L


()


 


Total Protein


 6.2 g/dL


(6.4-8.2)


 


Albumin


 2.9 g/dL


(3.4-5.0)


 


Albumin/Globulin Ratio 0.9 (1.0-1.7) 








Laboratory Tests








Test


 11/22/20


04:30


 


White Blood Count


 5.0 x10^3/uL


(4.0-11.0)


 


Red Blood Count


 3.73 x10^6/uL


(4.30-5.70)


 


Hemoglobin


 10.5 g/dL


(13.0-17.5)


 


Hematocrit


 32.5 %


(39.0-53.0)


 


Mean Corpuscular Volume 87 fL () 


 


Mean Corpuscular Hemoglobin 28 pg (25-35) 


 


Mean Corpuscular Hemoglobin


Concent 32 g/dL


(31-37)


 


Red Cell Distribution Width


 16.5 %


(11.5-14.5)


 


Platelet Count


 131 x10^3/uL


(140-400)


 


Neutrophils (%) (Auto) 70 % (31-73) 


 


Lymphocytes (%) (Auto) 13 % (24-48) 


 


Monocytes (%) (Auto) 12 % (0-9) 


 


Eosinophils (%) (Auto) 3 % (0-3) 


 


Basophils (%) (Auto) 1 % (0-3) 


 


Neutrophils # (Auto)


 3.5 x10^3/uL


(1.8-7.7)


 


Lymphocytes # (Auto)


 0.6 x10^3/uL


(1.0-4.8)


 


Monocytes # (Auto)


 0.6 x10^3/uL


(0.0-1.1)


 


Eosinophils # (Auto)


 0.2 x10^3/uL


(0.0-0.7)


 


Basophils # (Auto)


 0.1 x10^3/uL


(0.0-0.2)


 


Sodium Level


 143 mmol/L


(136-145)


 


Potassium Level


 3.6 mmol/L


(3.5-5.1)


 


Chloride Level


 106 mmol/L


()


 


Carbon Dioxide Level


 37 mmol/L


(21-32)


 


Anion Gap 0 (6-14) 


 


Blood Urea Nitrogen


 24 mg/dL


(8-26)


 


Creatinine


 1.6 mg/dL


(0.7-1.3)


 


Estimated GFR


(Cockcroft-Gault) 53.1 





 


BUN/Creatinine Ratio 15 (6-20) 


 


Glucose Level


 86 mg/dL


(70-99)


 


Calcium Level


 9.6 mg/dL


(8.5-10.1)


 


Total Bilirubin


 0.5 mg/dL


(0.2-1.0)


 


Aspartate Amino Transf


(AST/SGOT) 14 U/L (15-37) 





 


Alanine Aminotransferase


(ALT/SGPT) 16 U/L (16-63) 





 


Alkaline Phosphatase


 55 U/L


()


 


Total Protein


 6.2 g/dL


(6.4-8.2)


 


Albumin


 2.9 g/dL


(3.4-5.0)


 


Albumin/Globulin Ratio 0.9 (1.0-1.7) 








Medications





Active Scripts








 Medications  Dose


 Route/Sig


 Max Daily Dose Days Date Category Dose


Instructions


 


 Bumetanide 1 Mg


 Tablet  1 Mg


 PO BID


   11/15/20 Rx 


 


 Amiodarone Hcl


 200 Mg Tablet  200 Mg


 PO DAILY


   11/15/20 Rx 


 


 Potassium


 Chloride **


  (Potassium


 Chloride) 20 Meq


 Tablet.er  20 Meq


 PO DAILY


  30 10/15/20 Rx 


 


 NICODERM CQ 21mg


  (Nicotine) 1 Each


 Patch.td24  1 Patch


 TP DAILY


   10/10/20 Reported 


 


 Eliquis


  (Apixaban) 5 Mg


 Tablet  5 Mg


 PO BID


   4/14/20 Reported 


 


 Toprol Xl


  (Metoprolol


 Succinate) 25 Mg


 Tab.er.24h  1 Tab


 PO DAILY


  30 4/14/20 Reported 


 


 Lisinopril 40 Mg


 Tablet  20 Mg


 PO DAILY


  30 1/21/20 Rx 


 


 Duoneb 0.5-3(2.5)


 Mg/3 Ml


  (Albuterol/Ipratropium)


 3 Ml Ampul.neb  3 Ml


 NEB Q4HRS


  30 1/20/20 Rx 


 


 Albuterol Sulfate


 Neb Soln


  (Albuterol


 Sulfate) 0.63


 Mg/3 Ml Vial.neb  0.63 Mg


 NEB PRN Q4HRS PRN


  30 6/26/17 Rx  AS NEEDED


 


 Zocor


  (Simvastatin) 20


 Mg Tablet  20 Mg


 PO HS


   7/10/15 Reported  NEXT DOSE DUE TONIGHT AT BEDTIME








Comments


CXR


Impression: 


 


1. Findings again suggest volume overload with cardiomegaly. Trace 


effusions are reidentified.





Impression


.


IMPRESSION:


1.  Acute on chronic respiratory failure, multifactorial.


2.  Acute on chronic systolic and diastolic heart failure.


3.  Sick sinus syndrome, status post biventricular pacemaker.


4.  Nonischemic cardiomyopathy.


5.  Chronic troponin elevation.


6.  Paroxysmal atrial fibrillation.


7.  Hypertension.


8.  Severe chronic obstructive pulmonary disease.


9.  Tobacco dependence, in remission.


10.  Non-ST segment elevation myocardial infarction.





Plan


.


PLAN: 


Continue supplemental oxygen, titrate fio2 to keep sat 90%


Continue BiPAP/trilogy at night


Follow chest x-ray as needed


Bronchodilators as needed


Follow cardiology recommendations, diuresis per cardiology, currently on 

milrinone drip and Lasix drip


Hypertension Per PCP and cardiology-- LVEF 20-25%.


Monitor renal function -- stable 


Social work for DC planning, plan to discharge to rehab when medically stable


Physical therapy/Occupational Therapy


DVT/GI prophylaxis: Eliquis and Protonix





Discussed with RN





PT. is FULL CODE











AUTUMN WILLIAM MD               Nov 22, 2020 06:47

## 2020-11-22 NOTE — NUR
Patient was to discharge to [facility] this shift. Per MD Cheikh patient not ready to 
discharge at this time. Patient is not at goal weight for discharge. Patient is on a 1.5L 
fluid restriction. Patient is non complaint with fluid restriction. Patient has been 
educated on the importance of following the fluid restriction. MD Bishnu notified also of 
the non complaince of his fluid restriction.

## 2020-11-23 VITALS — SYSTOLIC BLOOD PRESSURE: 132 MMHG | DIASTOLIC BLOOD PRESSURE: 85 MMHG

## 2020-11-23 VITALS — SYSTOLIC BLOOD PRESSURE: 145 MMHG | DIASTOLIC BLOOD PRESSURE: 87 MMHG

## 2020-11-23 VITALS — DIASTOLIC BLOOD PRESSURE: 88 MMHG | SYSTOLIC BLOOD PRESSURE: 148 MMHG

## 2020-11-23 VITALS — DIASTOLIC BLOOD PRESSURE: 102 MMHG | SYSTOLIC BLOOD PRESSURE: 151 MMHG

## 2020-11-23 LAB
ALBUMIN SERPL-MCNC: 2.9 G/DL (ref 3.4–5)
ALBUMIN/GLOB SERPL: 0.9 {RATIO} (ref 1–1.7)
ALP SERPL-CCNC: 49 U/L (ref 46–116)
ALT SERPL-CCNC: 14 U/L (ref 16–63)
ANION GAP SERPL CALC-SCNC: 2 MMOL/L (ref 6–14)
AST SERPL-CCNC: 12 U/L (ref 15–37)
BASOPHILS # BLD AUTO: 0 X10^3/UL (ref 0–0.2)
BASOPHILS NFR BLD: 1 % (ref 0–3)
BILIRUB SERPL-MCNC: 0.6 MG/DL (ref 0.2–1)
BUN SERPL-MCNC: 17 MG/DL (ref 8–26)
BUN/CREAT SERPL: 11 (ref 6–20)
CALCIUM SERPL-MCNC: 9.2 MG/DL (ref 8.5–10.1)
CHLORIDE SERPL-SCNC: 105 MMOL/L (ref 98–107)
CO2 SERPL-SCNC: 37 MMOL/L (ref 21–32)
CREAT SERPL-MCNC: 1.5 MG/DL (ref 0.7–1.3)
EOSINOPHIL NFR BLD: 0.2 X10^3/UL (ref 0–0.7)
EOSINOPHIL NFR BLD: 4 % (ref 0–3)
ERYTHROCYTE [DISTWIDTH] IN BLOOD BY AUTOMATED COUNT: 16.6 % (ref 11.5–14.5)
GFR SERPLBLD BASED ON 1.73 SQ M-ARVRAT: 57.2 ML/MIN
GLUCOSE SERPL-MCNC: 84 MG/DL (ref 70–99)
HCT VFR BLD CALC: 32.8 % (ref 39–53)
HGB BLD-MCNC: 10.6 G/DL (ref 13–17.5)
LYMPHOCYTES # BLD: 0.6 X10^3/UL (ref 1–4.8)
LYMPHOCYTES NFR BLD AUTO: 13 % (ref 24–48)
MCH RBC QN AUTO: 28 PG (ref 25–35)
MCHC RBC AUTO-ENTMCNC: 32 G/DL (ref 31–37)
MCV RBC AUTO: 88 FL (ref 79–100)
MONO #: 0.5 X10^3/UL (ref 0–1.1)
MONOCYTES NFR BLD: 11 % (ref 0–9)
NEUT #: 3.4 X10^3/UL (ref 1.8–7.7)
NEUTROPHILS NFR BLD AUTO: 72 % (ref 31–73)
PLATELET # BLD AUTO: 130 X10^3/UL (ref 140–400)
POTASSIUM SERPL-SCNC: 3.3 MMOL/L (ref 3.5–5.1)
PROT SERPL-MCNC: 6.3 G/DL (ref 6.4–8.2)
RBC # BLD AUTO: 3.73 X10^6/UL (ref 4.3–5.7)
SODIUM SERPL-SCNC: 144 MMOL/L (ref 136–145)
WBC # BLD AUTO: 4.8 X10^3/UL (ref 4–11)

## 2020-11-23 PROCEDURE — 5A09357 ASSISTANCE WITH RESPIRATORY VENTILATION, LESS THAN 24 CONSECUTIVE HOURS, CONTINUOUS POSITIVE AIRWAY PRESSURE: ICD-10-PCS | Performed by: FAMILY MEDICINE

## 2020-11-23 RX ADMIN — IPRATROPIUM BROMIDE AND ALBUTEROL SULFATE SCH ML: .5; 3 SOLUTION RESPIRATORY (INHALATION) at 07:45

## 2020-11-23 RX ADMIN — LISINOPRIL SCH MG: 20 TABLET ORAL at 10:37

## 2020-11-23 RX ADMIN — METOPROLOL SUCCINATE SCH MG: 25 TABLET, EXTENDED RELEASE ORAL at 10:37

## 2020-11-23 RX ADMIN — POTASSIUM CHLORIDE SCH MEQ: 1500 TABLET, EXTENDED RELEASE ORAL at 10:36

## 2020-11-23 RX ADMIN — NICOTINE SCH PATCH: 21 PATCH, EXTENDED RELEASE TOPICAL at 10:38

## 2020-11-23 RX ADMIN — AMIODARONE HYDROCHLORIDE SCH MG: 200 TABLET ORAL at 10:38

## 2020-11-23 RX ADMIN — IPRATROPIUM BROMIDE AND ALBUTEROL SULFATE SCH ML: .5; 3 SOLUTION RESPIRATORY (INHALATION) at 00:05

## 2020-11-23 RX ADMIN — APIXABAN SCH MG: 5 TABLET, FILM COATED ORAL at 10:36

## 2020-11-23 RX ADMIN — MILRINONE LACTATE PRN MLS/HR: 0.2 INJECTION, SOLUTION INTRAVENOUS at 07:58

## 2020-11-23 RX ADMIN — PANTOPRAZOLE SODIUM SCH MG: 40 TABLET, DELAYED RELEASE ORAL at 10:37

## 2020-11-23 RX ADMIN — IPRATROPIUM BROMIDE AND ALBUTEROL SULFATE SCH ML: .5; 3 SOLUTION RESPIRATORY (INHALATION) at 10:00

## 2020-11-23 RX ADMIN — IPRATROPIUM BROMIDE AND ALBUTEROL SULFATE SCH ML: .5; 3 SOLUTION RESPIRATORY (INHALATION) at 16:04

## 2020-11-23 NOTE — PDOC
YEIMY FLORES APRN 11/23/20 1321:


CARDIO Progress Notes


Date and Time


Date of Service


11/23/20


Time of Evaluation


1245





Subjective


Subjective:  No Chest Pain, No Palpitations, Other (breathing improved. Mild LE 

edema persists )





Vitals


Vitals





Vital Signs








  Date Time  Temp Pulse Resp B/P (MAP) Pulse Ox O2 Delivery O2 Flow Rate FiO2


 


11/23/20 11:52     98 Nasal Cannula 6.0 


 


11/23/20 11:00 97.9 70 20 132/85 (101)    





 97.9       








Weight


Weight [ ]





Input and Output


Intake and Output











Intake and Output 


 


 11/23/20





 07:00


 


Intake Total 1396 ml


 


Output Total 3425 ml


 


Balance -2029 ml


 


 


 


Intake Oral 1106 ml


 


IV Total 290 ml


 


Output Urine Total 3425 ml











Laboratory


Labs





Laboratory Tests








Test


 11/23/20


06:50


 


White Blood Count


 4.8 x10^3/uL


(4.0-11.0)


 


Red Blood Count


 3.73 x10^6/uL


(4.30-5.70)


 


Hemoglobin


 10.6 g/dL


(13.0-17.5)


 


Hematocrit


 32.8 %


(39.0-53.0)


 


Mean Corpuscular Volume 88 fL () 


 


Mean Corpuscular Hemoglobin 28 pg (25-35) 


 


Mean Corpuscular Hemoglobin


Concent 32 g/dL


(31-37)


 


Red Cell Distribution Width


 16.6 %


(11.5-14.5)


 


Platelet Count


 130 x10^3/uL


(140-400)


 


Neutrophils (%) (Auto) 72 % (31-73) 


 


Lymphocytes (%) (Auto) 13 % (24-48) 


 


Monocytes (%) (Auto) 11 % (0-9) 


 


Eosinophils (%) (Auto) 4 % (0-3) 


 


Basophils (%) (Auto) 1 % (0-3) 


 


Neutrophils # (Auto)


 3.4 x10^3/uL


(1.8-7.7)


 


Lymphocytes # (Auto)


 0.6 x10^3/uL


(1.0-4.8)


 


Monocytes # (Auto)


 0.5 x10^3/uL


(0.0-1.1)


 


Eosinophils # (Auto)


 0.2 x10^3/uL


(0.0-0.7)


 


Basophils # (Auto)


 0.0 x10^3/uL


(0.0-0.2)


 


Sodium Level


 144 mmol/L


(136-145)


 


Potassium Level


 3.3 mmol/L


(3.5-5.1)


 


Chloride Level


 105 mmol/L


()


 


Carbon Dioxide Level


 37 mmol/L


(21-32)


 


Anion Gap 2 (6-14) 


 


Blood Urea Nitrogen


 17 mg/dL


(8-26)


 


Creatinine


 1.5 mg/dL


(0.7-1.3)


 


Estimated GFR


(Cockcroft-Gault) 57.2 





 


BUN/Creatinine Ratio 11 (6-20) 


 


Glucose Level


 84 mg/dL


(70-99)


 


Calcium Level


 9.2 mg/dL


(8.5-10.1)


 


Total Bilirubin


 0.6 mg/dL


(0.2-1.0)


 


Aspartate Amino Transf


(AST/SGOT) 12 U/L (15-37) 





 


Alanine Aminotransferase


(ALT/SGPT) 14 U/L (16-63) 





 


Alkaline Phosphatase


 49 U/L


()


 


Total Protein


 6.3 g/dL


(6.4-8.2)


 


Albumin


 2.9 g/dL


(3.4-5.0)


 


Albumin/Globulin Ratio 0.9 (1.0-1.7) 











Physical Exam


HEENT:  Neck Supple W Full Motion


Chest:  Symmetric


LUNGS:  Other (diminished bases )


Heart:  RRR (Vpaced with first degree AV block)


Abdomen:  Soft N/T


Extremities:  Other (1-2+ pitting edema of bilateral LE )


Neurology:  alert, oriented, follow commands





Assessment


Assessment


1.  Acute on chronic respiratory failure, multifactorial.  


2.  Acute on chronic combined systolic/diastolic CHF: Improved. Noncompliance 

with fluid/dietary restrictions per RN  


3.  SSS/ NICM s/p BiV PPM/CRT-P (Biotronik); LVEF 20-25%. Recent device check 

revealed 31% AFIB burden with no significant RVR episodes since amiodarone. norm

al function, BiV pacing 100%


4.  Chronic mild troponin elevation; peak 0.153 with h/o chronic troponin 

elevation. Type II. demand ischemia. CP free.  


5.  PAFIB; V paced.  Continue amiodarone for rhythm maintenance and Eliquis for 

stroke prophylaxis.


6.  HTN: controlled


7.  ABIEL on CKD3; improved 


8.  Hx of tobaccoism, cocaine and marijuana use. Has been sober


9.  COPD


10. NSVT: No further episodes noted on telemetry.








Recommendations





Titrate off milrinone gtt. 


Stop IV Lasix


Resume Oral Bumex BID


Will give dose of metolazone today


Replace K


Reinforced importance of fluid and Na restrictions


Follow up with Dr. Krishnamurthy 12/10/20 at 2:00pm 


Will consider for outpatient ischemic evaluation at that time.





Justicifation of Admission Dx:


Justifications for Admission:


Justification of Admission Dx:  Yes


Respiratory Failure:  Severe Resp Distress





LING KRISHNAMURTHY MD 11/23/20 1802:


CARDIO Progress Notes


Assessment


Assessment


Patient seen and examined


I agree with our nurse practitioners assessment and plan as above.


Acute on chronic respiratory failure, multifactorial.  Improved.  Continue 

medical treatment.


Acute on chronic combined systolic/diastolic CHF: Improved. Noncompliance with 

fluid/dietary restrictions per RN.  Tapering off milrinone.  On oral Bumex.  

Outpatient follow-up.


SSS/ NICM s/p BiV PPM/CRT-P (Biotronik); LVEF 20-25%. Recent device check 

revealed 31% AFIB burden with no significant RVR episodes since amiodarone. 

normal function, BiV pacing 100%


Chronic mild troponin elevation; peak 0.153 with h/o chronic troponin elevation.

Type II. demand ischemia. CP free.  


PAFIB; V paced.  Continue amiodarone for rhythm maintenance and Eliquis for 

stroke prophylaxis.


HTN: controlled


ABIEL on CKD3; improved 


Hx of tobaccoism, cocaine and marijuana use. Has been sober.


NSVT: No further episodes noted on telemetry.











YEIMY FLORES           Nov 23, 2020 13:21


LING KRISHNAMURTHY MD            Nov 23, 2020 18:02

## 2020-11-23 NOTE — PDOC3
Discharge Summary


Visit Information


Date of Admission:  Nov 17, 2020


Date of Discharge:  Nov 23, 2020


Admitting Diagnosis Comment:


Acute exac of CHF


Findings cxr, suggest volume overload with cardiomegaly. Trace  pleural 

effusions 


acute hypoxic respiratory failure


Left ventricle systolic function is moderately impaired.


recent  echo  Ejection Fraction is 20 %.


Chronic respiratory failure, multifactorial secondary to chronic obstructive


pulmonary disease and chronic heart failure.


Acute on chronic systolic heart failure.


Acute on chronic cor pulmonale.


Chronic obstructive pulmonary disease, tobacco dependence, in remission.


hx polysubstance use.   including cocaine


Final Diagnosis


Problems


Medical Problems:


(1) CHF exacerbation


Status: Acute  





(2) Dyspnea


Status: Acute  





(3) Elevated troponin


Status: Acute  





Acute on chronic systolic and diastolic heart failure


Prior history of cocaine abuse


Noncompliance


Hypokalemia


Anemia


HTN


Hyperlipidemia


Asthma


COPD





Brief Hospital Course


Allergies





                                    Allergies








Coded Allergies Type Severity Reaction Last Updated Verified


 


  levofloxacin Allergy Intermediate Itching 10/10/20 Yes








Vital Signs





Vital Signs








  Date Time  Temp Pulse Resp B/P (MAP) Pulse Ox O2 Delivery O2 Flow Rate FiO2


 


11/23/20 10:38  69  151/102    


 


11/23/20 07:46     98 Nasal Cannula 6.0 


 


11/23/20 07:00 97.9  20     





 97.9       








Lab Results





Laboratory Tests








Test


 11/22/20


04:30 11/23/20


06:50


 


White Blood Count


 5.0 x10^3/uL


(4.0-11.0) 4.8 x10^3/uL


(4.0-11.0)


 


Red Blood Count


 3.73 x10^6/uL


(4.30-5.70) 3.73 x10^6/uL


(4.30-5.70)


 


Hemoglobin


 10.5 g/dL


(13.0-17.5) 10.6 g/dL


(13.0-17.5)


 


Hematocrit


 32.5 %


(39.0-53.0) 32.8 %


(39.0-53.0)


 


Mean Corpuscular Volume 87 fL ()  88 fL () 


 


Mean Corpuscular Hemoglobin 28 pg (25-35)  28 pg (25-35) 


 


Mean Corpuscular Hemoglobin


Concent 32 g/dL


(31-37) 32 g/dL


(31-37)


 


Red Cell Distribution Width


 16.5 %


(11.5-14.5) 16.6 %


(11.5-14.5)


 


Platelet Count


 131 x10^3/uL


(140-400) 130 x10^3/uL


(140-400)


 


Neutrophils (%) (Auto) 70 % (31-73)  72 % (31-73) 


 


Lymphocytes (%) (Auto) 13 % (24-48)  13 % (24-48) 


 


Monocytes (%) (Auto) 12 % (0-9)  11 % (0-9) 


 


Eosinophils (%) (Auto) 3 % (0-3)  4 % (0-3) 


 


Basophils (%) (Auto) 1 % (0-3)  1 % (0-3) 


 


Neutrophils # (Auto)


 3.5 x10^3/uL


(1.8-7.7) 3.4 x10^3/uL


(1.8-7.7)


 


Lymphocytes # (Auto)


 0.6 x10^3/uL


(1.0-4.8) 0.6 x10^3/uL


(1.0-4.8)


 


Monocytes # (Auto)


 0.6 x10^3/uL


(0.0-1.1) 0.5 x10^3/uL


(0.0-1.1)


 


Eosinophils # (Auto)


 0.2 x10^3/uL


(0.0-0.7) 0.2 x10^3/uL


(0.0-0.7)


 


Basophils # (Auto)


 0.1 x10^3/uL


(0.0-0.2) 0.0 x10^3/uL


(0.0-0.2)


 


Sodium Level


 143 mmol/L


(136-145) 144 mmol/L


(136-145)


 


Potassium Level


 3.6 mmol/L


(3.5-5.1) 3.3 mmol/L


(3.5-5.1)


 


Chloride Level


 106 mmol/L


() 105 mmol/L


()


 


Carbon Dioxide Level


 37 mmol/L


(21-32) 37 mmol/L


(21-32)


 


Anion Gap 0 (6-14)  2 (6-14) 


 


Blood Urea Nitrogen


 24 mg/dL


(8-26) 17 mg/dL


(8-26)


 


Creatinine


 1.6 mg/dL


(0.7-1.3) 1.5 mg/dL


(0.7-1.3)


 


Estimated GFR


(Cockcroft-Gault) 53.1 


 57.2 





 


BUN/Creatinine Ratio 15 (6-20)  11 (6-20) 


 


Glucose Level


 86 mg/dL


(70-99) 84 mg/dL


(70-99)


 


Calcium Level


 9.6 mg/dL


(8.5-10.1) 9.2 mg/dL


(8.5-10.1)


 


Total Bilirubin


 0.5 mg/dL


(0.2-1.0) 0.6 mg/dL


(0.2-1.0)


 


Aspartate Amino Transf


(AST/SGOT) 14 U/L (15-37) 


 12 U/L (15-37) 





 


Alanine Aminotransferase


(ALT/SGPT) 16 U/L (16-63) 


 14 U/L (16-63) 





 


Alkaline Phosphatase


 55 U/L


() 49 U/L


()


 


Total Protein


 6.2 g/dL


(6.4-8.2) 6.3 g/dL


(6.4-8.2)


 


Albumin


 2.9 g/dL


(3.4-5.0) 2.9 g/dL


(3.4-5.0)


 


Albumin/Globulin Ratio 0.9 (1.0-1.7)  0.9 (1.0-1.7) 








Laboratory Tests








Test


 11/23/20


06:50


 


White Blood Count


 4.8 x10^3/uL


(4.0-11.0)


 


Red Blood Count


 3.73 x10^6/uL


(4.30-5.70)


 


Hemoglobin


 10.6 g/dL


(13.0-17.5)


 


Hematocrit


 32.8 %


(39.0-53.0)


 


Mean Corpuscular Volume 88 fL () 


 


Mean Corpuscular Hemoglobin 28 pg (25-35) 


 


Mean Corpuscular Hemoglobin


Concent 32 g/dL


(31-37)


 


Red Cell Distribution Width


 16.6 %


(11.5-14.5)


 


Platelet Count


 130 x10^3/uL


(140-400)


 


Neutrophils (%) (Auto) 72 % (31-73) 


 


Lymphocytes (%) (Auto) 13 % (24-48) 


 


Monocytes (%) (Auto) 11 % (0-9) 


 


Eosinophils (%) (Auto) 4 % (0-3) 


 


Basophils (%) (Auto) 1 % (0-3) 


 


Neutrophils # (Auto)


 3.4 x10^3/uL


(1.8-7.7)


 


Lymphocytes # (Auto)


 0.6 x10^3/uL


(1.0-4.8)


 


Monocytes # (Auto)


 0.5 x10^3/uL


(0.0-1.1)


 


Eosinophils # (Auto)


 0.2 x10^3/uL


(0.0-0.7)


 


Basophils # (Auto)


 0.0 x10^3/uL


(0.0-0.2)


 


Sodium Level


 144 mmol/L


(136-145)


 


Potassium Level


 3.3 mmol/L


(3.5-5.1)


 


Chloride Level


 105 mmol/L


()


 


Carbon Dioxide Level


 37 mmol/L


(21-32)


 


Anion Gap 2 (6-14) 


 


Blood Urea Nitrogen


 17 mg/dL


(8-26)


 


Creatinine


 1.5 mg/dL


(0.7-1.3)


 


Estimated GFR


(Cockcroft-Gault) 57.2 





 


BUN/Creatinine Ratio 11 (6-20) 


 


Glucose Level


 84 mg/dL


(70-99)


 


Calcium Level


 9.2 mg/dL


(8.5-10.1)


 


Total Bilirubin


 0.6 mg/dL


(0.2-1.0)


 


Aspartate Amino Transf


(AST/SGOT) 12 U/L (15-37) 





 


Alanine Aminotransferase


(ALT/SGPT) 14 U/L (16-63) 





 


Alkaline Phosphatase


 49 U/L


()


 


Total Protein


 6.3 g/dL


(6.4-8.2)


 


Albumin


 2.9 g/dL


(3.4-5.0)


 


Albumin/Globulin Ratio 0.9 (1.0-1.7) 








Brief Hospital Course


History of Present Illness


11/17/20 HPI:  "SEEN IN ER WITH WORSENING SOA, 35  year old AA male who presents

to the emergency room via EMS with complaints of increased shortness of breath, 

and an increased cough with pink sputum since yesterday.  Patient denies any 

known Covid-19 exposure.  Patient reports that he was admitted a few weeks ago 

for a COPD exacerbation and he was discharged home on November 9th.  He denies 

any chest pain, palpitations, nausea, vomiting, diarrhea, abdominal pain, sore 

throat, or headache. 


reports that his breathing has become more labored. TROPONIN I MILDLY ELEVATED."





11/18/2020


Patient seen and examined


Discussed with RN


Chart reviewed





11/19/20


Pt seen and examined in room. 


FER RN regarding fluid restriction and low sodium diet. 


Chart reviewed.


Pt claims breathing is improving and denies any chest pain. 





11/19/20


No acute events overnight.  Currently on milirnone and lasix gtt.  Goal weight 

of 85 kg.  





11/21/20


Pt seen and examined in room. 


Pt currently on nasal canula with lasix and milrinone drip. 


Pt claims his breathing has improved since yesterday and denies any chest pain. 


Pt claims he is adhering to the diet and fluid restrictions. 


DW nurse. 


FER cardiology. 





11/22/2020


Patient seen and examined in bedside.  Patient currently saturating 96% on 8 L 

nasal cannula.  -675 cc fluid balance in the past 24 hours.  No complaints 

voiced at this time.  Tolerating breakfast.  Patient's chart, labs, images were 

reviewed and discussed with RN





11/23/2020


No acute events reported overnight, he continues to do well and tolerate 

hismedication swell, awaiting for recommendations from cardiology prior to 

deaprture. He will transition to a rehab facility since he is a very high reisk 

for readmission given his frail condition medically. Hoepfully he stays away 

from the substances as well by being in an institution which will help oversee 

his medical care. Greater than 35 minutes spent in the discharge process of the 

patien in counseling coordination of care an darrangements for a safe transfer.





Discharge Information


Condition at Discharge:  Improved


Follow Up:  Weeks


Disposition/Orders:  D/C to Home


Scheduled


Amiodarone Hcl (Amiodarone Hcl) 200 Mg Tablet, 200 MG PO DAILY for A-fib, #30 

Ref 3


   Prescribed by: LISS ANNA MD on 11/15/20 1035


   Last Action: Continued on 11/17/20 1914 by SOFIA DAVID MD


Apixaban (Eliquis) 5 Mg Tablet, 5 MG PO BID for blood thinner, (Reported)


   Entered as Reported by: TOVA SEGUNDO on 4/14/20 1452


   Last Taken: Unknown Dose on 11/13/20      Last Action: Continued on 11/17/20 1826 by SOPHIE MAHAN


Bumetanide (Bumetanide) 1 Mg Tablet, 1 MG PO BID for Heart Failure, #60 Ref 3


   Prescribed by: LISS ANNA MD on 11/15/20 1035


   Last Action: Continued on 11/17/20 1914 by SOFIA DAVID MD


Ipratropium/Albuterol Sulfate (Duoneb 0.5-3(2.5) Mg/3 Ml) 3 Ml Ampul.neb, 3 ML 

NEB Q4HRS for COPD for 30 Days, #180


   Prescribed by: MARY LOU PETERSEN MD on 1/20/20 1420


   Last Taken: Unknown Dose on 11/17/20      Last Action: Continued on 11/17/20 1914 by SOFIA DAVID MD


Lisinopril (Lisinopril) 40 Mg Tablet, 20 MG PO DAILY for HTN for 30 Days, #15


   Prescribed by: MARY LOU PETERSEN MD on 1/21/20 1155


   Last Action: Continued on 11/17/20 1914 by SOFIA DAVID MD


Metoprolol Succinate (Toprol Xl) 25 Mg Tab.er.24h, 1 TAB PO DAILY for blood 

pressure for 30 Days, #30 Ref 0 (Reported)


   Entered as Reported by: TOVA SEGUNDO on 4/14/20 1458


   Last Action: Continued on 11/17/20 1914 by SOFIA DAVID MD


Nicotine (NICODERM CQ 21mg) 1 Each Patch.td24, 1 PATCH TP DAILY for smoking 

cessation, #28 Ref 1 (Reported)


   Entered as Reported by: DAYSI KEEN on 10/10/20 1600


   Last Action: Continued on 11/17/20 1914 by SOFIA DAVID MD


Potassium Chloride (Potassium Chloride **) 20 Meq Tablet.er, 20 MEQ PO DAILY for

SUPPLEMENT for 30 Days, #30 Ref 2


   Prescribed by: TONY MENDES MD on 10/15/20 1008


   Last Action: Continued on 11/17/20 1914 by SOFIA DAVID MD


Simvastatin (Zocor) 20 Mg Tablet, 20 MG PO HS for FOR CHOLESTEROL, #30 Ref 0 

(Reported)


   NEXT DOSE DUE TONIGHT AT BEDTIME 


   Entered as Reported by: Hiren Tong on 7/10/15 1329


   Last Action: Continued on 11/17/20 1914 by SOFIA DAVID MD





Scheduled PRN


Albuterol Sulfate (Albuterol Sulfate Neb Soln) 0.63 Mg/3 Ml Vial.neb, 0.63 MG 

NEB PRN Q4HRS PRN for SHORTNESS OF BREATH for 30 Days, #100 Ref 2


   AS NEEDED 


   Prescribed by: SONU LEE on 6/26/17 0932


   Last Action: Converted on 11/17/20 1914 by SOFIA DAVID MD





Justicifation of Admission Dx:


Justifications for Admission:


Justification of Admission Dx:  Yes


Respiratory Failure:  Severe Resp Distress











MARY LOU PETERSEN MD             Nov 23, 2020 11:53

## 2020-11-23 NOTE — NUR
SS following up with discharge planning. SS reviewed pt chart and discussed with pt RN. Pt 
is currently requiring oxygen. Pt has home oxygen. COVID19 negative. Pt accepted at 
[facility] and has insurance authorization. Discharge orders received for [facility], 
[phone]; fax [phone]. SS phoned and faxed discharge orders to [facility]. Pt will 
discharge today and go to [facility] at 1400. [facility] to provide transportation. Pt and 
pt's RN notified.

## 2020-11-23 NOTE — NUR
Discharge Note:



CESAR BALDERAS



Discharge instructions and discharge home medications reviewed with Patient and a copy 
given. All questions have been answered and understanding verbalized. 



The following instructions and handouts were given: symptoms worsening and follow up



Discontinued lines and drains: peripheral lines discontinued.



Patient discharged to acute rehab facility.

## 2020-11-23 NOTE — PDOC
PULMONARY PROGRESS NOTES


DATE: 11/23/20 


TIME: 11:59


Subjective


off bipap  40% fio2       8 L nasal cannula during day


sob better  has occ cough


on Lasix drip and milrinone drip


Afebrile overnight


Vitals





Vital Signs








  Date Time  Temp Pulse Resp B/P (MAP) Pulse Ox O2 Delivery O2 Flow Rate FiO2


 


11/23/20 11:52     98 Nasal Cannula 6.0 


 


11/23/20 11:00 97.9 70 20 132/85 (101)    





 97.9       








ROS:  No Nausea, No Chest Pain, No Abdominal Pain, No Increase Cough


General:  Alert, Oriented X4, No acute distress


HEENT:  Other (nc at )


Lungs:  Crackles


Cardiovascular:  S1, S2


Abdomen:  Soft, Non-tender, Other


Extremities:  Other (BLE +2)


Skin:  Warm


Labs





Laboratory Tests








Test


 11/22/20


04:30 11/23/20


06:50


 


White Blood Count


 5.0 x10^3/uL


(4.0-11.0) 4.8 x10^3/uL


(4.0-11.0)


 


Red Blood Count


 3.73 x10^6/uL


(4.30-5.70) 3.73 x10^6/uL


(4.30-5.70)


 


Hemoglobin


 10.5 g/dL


(13.0-17.5) 10.6 g/dL


(13.0-17.5)


 


Hematocrit


 32.5 %


(39.0-53.0) 32.8 %


(39.0-53.0)


 


Mean Corpuscular Volume 87 fL ()  88 fL () 


 


Mean Corpuscular Hemoglobin 28 pg (25-35)  28 pg (25-35) 


 


Mean Corpuscular Hemoglobin


Concent 32 g/dL


(31-37) 32 g/dL


(31-37)


 


Red Cell Distribution Width


 16.5 %


(11.5-14.5) 16.6 %


(11.5-14.5)


 


Platelet Count


 131 x10^3/uL


(140-400) 130 x10^3/uL


(140-400)


 


Neutrophils (%) (Auto) 70 % (31-73)  72 % (31-73) 


 


Lymphocytes (%) (Auto) 13 % (24-48)  13 % (24-48) 


 


Monocytes (%) (Auto) 12 % (0-9)  11 % (0-9) 


 


Eosinophils (%) (Auto) 3 % (0-3)  4 % (0-3) 


 


Basophils (%) (Auto) 1 % (0-3)  1 % (0-3) 


 


Neutrophils # (Auto)


 3.5 x10^3/uL


(1.8-7.7) 3.4 x10^3/uL


(1.8-7.7)


 


Lymphocytes # (Auto)


 0.6 x10^3/uL


(1.0-4.8) 0.6 x10^3/uL


(1.0-4.8)


 


Monocytes # (Auto)


 0.6 x10^3/uL


(0.0-1.1) 0.5 x10^3/uL


(0.0-1.1)


 


Eosinophils # (Auto)


 0.2 x10^3/uL


(0.0-0.7) 0.2 x10^3/uL


(0.0-0.7)


 


Basophils # (Auto)


 0.1 x10^3/uL


(0.0-0.2) 0.0 x10^3/uL


(0.0-0.2)


 


Sodium Level


 143 mmol/L


(136-145) 144 mmol/L


(136-145)


 


Potassium Level


 3.6 mmol/L


(3.5-5.1) 3.3 mmol/L


(3.5-5.1)


 


Chloride Level


 106 mmol/L


() 105 mmol/L


()


 


Carbon Dioxide Level


 37 mmol/L


(21-32) 37 mmol/L


(21-32)


 


Anion Gap 0 (6-14)  2 (6-14) 


 


Blood Urea Nitrogen


 24 mg/dL


(8-26) 17 mg/dL


(8-26)


 


Creatinine


 1.6 mg/dL


(0.7-1.3) 1.5 mg/dL


(0.7-1.3)


 


Estimated GFR


(Cockcroft-Gault) 53.1 


 57.2 





 


BUN/Creatinine Ratio 15 (6-20)  11 (6-20) 


 


Glucose Level


 86 mg/dL


(70-99) 84 mg/dL


(70-99)


 


Calcium Level


 9.6 mg/dL


(8.5-10.1) 9.2 mg/dL


(8.5-10.1)


 


Total Bilirubin


 0.5 mg/dL


(0.2-1.0) 0.6 mg/dL


(0.2-1.0)


 


Aspartate Amino Transf


(AST/SGOT) 14 U/L (15-37) 


 12 U/L (15-37) 





 


Alanine Aminotransferase


(ALT/SGPT) 16 U/L (16-63) 


 14 U/L (16-63) 





 


Alkaline Phosphatase


 55 U/L


() 49 U/L


()


 


Total Protein


 6.2 g/dL


(6.4-8.2) 6.3 g/dL


(6.4-8.2)


 


Albumin


 2.9 g/dL


(3.4-5.0) 2.9 g/dL


(3.4-5.0)


 


Albumin/Globulin Ratio 0.9 (1.0-1.7)  0.9 (1.0-1.7) 








Laboratory Tests








Test


 11/23/20


06:50


 


White Blood Count


 4.8 x10^3/uL


(4.0-11.0)


 


Red Blood Count


 3.73 x10^6/uL


(4.30-5.70)


 


Hemoglobin


 10.6 g/dL


(13.0-17.5)


 


Hematocrit


 32.8 %


(39.0-53.0)


 


Mean Corpuscular Volume 88 fL () 


 


Mean Corpuscular Hemoglobin 28 pg (25-35) 


 


Mean Corpuscular Hemoglobin


Concent 32 g/dL


(31-37)


 


Red Cell Distribution Width


 16.6 %


(11.5-14.5)


 


Platelet Count


 130 x10^3/uL


(140-400)


 


Neutrophils (%) (Auto) 72 % (31-73) 


 


Lymphocytes (%) (Auto) 13 % (24-48) 


 


Monocytes (%) (Auto) 11 % (0-9) 


 


Eosinophils (%) (Auto) 4 % (0-3) 


 


Basophils (%) (Auto) 1 % (0-3) 


 


Neutrophils # (Auto)


 3.4 x10^3/uL


(1.8-7.7)


 


Lymphocytes # (Auto)


 0.6 x10^3/uL


(1.0-4.8)


 


Monocytes # (Auto)


 0.5 x10^3/uL


(0.0-1.1)


 


Eosinophils # (Auto)


 0.2 x10^3/uL


(0.0-0.7)


 


Basophils # (Auto)


 0.0 x10^3/uL


(0.0-0.2)


 


Sodium Level


 144 mmol/L


(136-145)


 


Potassium Level


 3.3 mmol/L


(3.5-5.1)


 


Chloride Level


 105 mmol/L


()


 


Carbon Dioxide Level


 37 mmol/L


(21-32)


 


Anion Gap 2 (6-14) 


 


Blood Urea Nitrogen


 17 mg/dL


(8-26)


 


Creatinine


 1.5 mg/dL


(0.7-1.3)


 


Estimated GFR


(Cockcroft-Gault) 57.2 





 


BUN/Creatinine Ratio 11 (6-20) 


 


Glucose Level


 84 mg/dL


(70-99)


 


Calcium Level


 9.2 mg/dL


(8.5-10.1)


 


Total Bilirubin


 0.6 mg/dL


(0.2-1.0)


 


Aspartate Amino Transf


(AST/SGOT) 12 U/L (15-37) 





 


Alanine Aminotransferase


(ALT/SGPT) 14 U/L (16-63) 





 


Alkaline Phosphatase


 49 U/L


()


 


Total Protein


 6.3 g/dL


(6.4-8.2)


 


Albumin


 2.9 g/dL


(3.4-5.0)


 


Albumin/Globulin Ratio 0.9 (1.0-1.7) 








Medications





Active Scripts








 Medications  Dose


 Route/Sig


 Max Daily Dose Days Date Category Dose


Instructions


 


 Bumetanide 1 Mg


 Tablet  1 Mg


 PO BID


   11/15/20 Rx 


 


 Amiodarone Hcl


 200 Mg Tablet  200 Mg


 PO DAILY


   11/15/20 Rx 


 


 Potassium


 Chloride **


  (Potassium


 Chloride) 20 Meq


 Tablet.er  20 Meq


 PO DAILY


  30 10/15/20 Rx 


 


 NICODERM CQ 21mg


  (Nicotine) 1 Each


 Patch.td24  1 Patch


 TP DAILY


   10/10/20 Reported 


 


 Eliquis


  (Apixaban) 5 Mg


 Tablet  5 Mg


 PO BID


   4/14/20 Reported 


 


 Toprol Xl


  (Metoprolol


 Succinate) 25 Mg


 Tab.er.24h  1 Tab


 PO DAILY


  30 4/14/20 Reported 


 


 Lisinopril 40 Mg


 Tablet  20 Mg


 PO DAILY


  30 1/21/20 Rx 


 


 Duoneb 0.5-3(2.5)


 Mg/3 Ml


  (Albuterol/Ipratropium)


 3 Ml Ampul.neb  3 Ml


 NEB Q4HRS


  30 1/20/20 Rx 


 


 Albuterol Sulfate


 Neb Soln


  (Albuterol


 Sulfate) 0.63


 Mg/3 Ml Vial.neb  0.63 Mg


 NEB PRN Q4HRS PRN


  30 6/26/17 Rx  AS NEEDED


 


 Zocor


  (Simvastatin) 20


 Mg Tablet  20 Mg


 PO HS


   7/10/15 Reported  NEXT DOSE DUE TONIGHT AT BEDTIME








Comments


CXR


Impression: 


 


1. Findings again suggest volume overload with cardiomegaly. Trace 


effusions are reidentified.





Impression


.


IMPRESSION:


1.  Acute on chronic respiratory failure, multifactorial.


2.  Acute on chronic systolic and diastolic heart failure.


3.  Sick sinus syndrome, status post biventricular pacemaker.


4.  Nonischemic cardiomyopathy.


5.  Chronic troponin elevation.


6.  Paroxysmal atrial fibrillation.


7.  Hypertension.


8.  Severe chronic obstructive pulmonary disease.


9.  Tobacco dependence, in remission.


10.  Non-ST segment elevation myocardial infarction.





Plan


.


PLAN: 


Continue supplemental oxygen, titrate fio2 to keep sat 90%


Continue BiPAP/trilogy at night


Follow chest x-ray as needed


Bronchodilators as needed


Follow cardiology recommendations, diuresis per cardiology, currently on 

milrinone drip and Lasix drip


Hypertension Per PCP and cardiology-- LVEF 20-25%.


Monitor renal function -- stable 


Social work for DC planning, plan to discharge to rehab ,medically stable


Physical therapy/Occupational Therapy


DVT/GI prophylaxis: Eliquis and Protonix





Discussed with RN





PT. is FULL CODE











JESSE DOVE MD                 Nov 23, 2020 12:00

## 2020-11-23 NOTE — SN[facility]/HH DC
DISCHARGE ORDERS


DISCHARGE INFORMATION:


DISCHARGE DATE:  Nov 23, 2020


FINAL DIAGNOSIS


Problems


Medical Problems:


(1) CHF exacerbation


Status: Acute  





(2) Dyspnea


Status: Acute  





(3) Elevated troponin


Status: Acute  





CONDITION ON DISCHARGE:  Stable





CODE STATUS:


Code Status:  Full





SKILLED NURSING:


SNF STAY <30 DAYS:  Yes





POST DISCHARGE ORDERS:


ACTIVITY ORDERS:  Activity as tolerated


WEIGHT BEARING STATUS:  Full weight bearing, As tolerated


DIET AFTER DISCHARGE:  Cardiac


WOUND/INCISION CARE:  No wound care needed





CHECKS AFTER DISCHARGE:


CHECKS AFTER DISCHARGE:  Check blood press - daily, Check your Temp as needed, 

Weigh Yourself Daily





TREATMENT/EQUIPMENT ORDERS:


ADAPTIVE EQUIPMENT NEEDED:  None


RESPIRATORY EQUIPMENT NEEDED:  Oxygen


Physical Therapy For:  Evalulation/Treatment


Occupational Therapy For:  Evaluation/Treatment





DISCHARGE MEDICATIONS:


Home Meds


Active Scripts


Bumetanide (BUMETANIDE) 1 Mg Tablet, 1 MG PO BID for Heart Failure, #60 TAB 3 

Refills


   Prov:LISS ANNA MD         11/15/20


Amiodarone Hcl (AMIODARONE HCL) 200 Mg Tablet, 200 MG PO DAILY for A-fib, #30 

TAB 3 Refills


   Prov:LISS ANNA MD         11/15/20


Potassium Chloride (POTASSIUM CHLORIDE **) 20 Meq Tablet.er, 20 MEQ PO DAILY for

SUPPLEMENT for 30 Days, #30 TAB.SR 2 Refills


   Prov:TONY MENDES MD         10/15/20


Lisinopril (LISINOPRIL) 40 Mg Tablet, 20 MG PO DAILY for HTN for 30 Days, #15 

TAB


   Prov:MARY LOU PETERSEN MD         1/21/20


Ipratropium/Albuterol Sulfate (DUONEB 0.5-3(2.5) MG/3 ML) 3 Ml Ampul.neb, 3 ML 

NEB Q4HRS for COPD for 30 Days, #180 EACH


   Prov:MARY LOU PETERSEN MD         1/20/20


Albuterol Sulfate (ALBUTEROL SULFATE NEB SOLN) 0.63 Mg/3 Ml Vial.neb, 0.63 MG 

NEB PRN Q4HRS PRN for SHORTNESS OF BREATH for 30 Days, #100 EACH 2 Refills


   AS NEEDED


   Prov:SONU LEE MD         6/26/17


Reported Medications


Nicotine (NICODERM CQ 21mg) 1 Each Patch.td24, 1 PATCH TP DAILY for smoking 

cessation, #28 PATCH 1 Refill


   10/10/20


Apixaban (ELIQUIS) 5 Mg Tablet, 5 MG PO BID for blood thinner, TAB


   4/14/20


Metoprolol Succinate (TOPROL XL) 25 Mg Tab.er.24h, 1 TAB PO DAILY for blood 

pressure for 30 Days, #30 TAB 0 Refills


   4/14/20


Simvastatin (ZOCOR) 20 Mg Tablet, 20 MG PO HS for FOR CHOLESTEROL, #30 TAB 0 

Refills


   NEXT DOSE DUE TONIGHT AT BEDTIME


   7/10/15











MARY LOU PETERSEN MD             Nov 23, 2020 11:50

## 2021-05-07 ENCOUNTER — HOSPITAL ENCOUNTER (OUTPATIENT)
Dept: HOSPITAL 61 - ER | Age: 66
Setting detail: OBSERVATION
Discharge: HOME | End: 2021-05-07
Attending: INTERNAL MEDICINE | Admitting: INTERNAL MEDICINE
Payer: MEDICARE

## 2021-05-07 VITALS — DIASTOLIC BLOOD PRESSURE: 86 MMHG | SYSTOLIC BLOOD PRESSURE: 133 MMHG

## 2021-05-07 VITALS — DIASTOLIC BLOOD PRESSURE: 87 MMHG | SYSTOLIC BLOOD PRESSURE: 139 MMHG

## 2021-05-07 VITALS — DIASTOLIC BLOOD PRESSURE: 95 MMHG | SYSTOLIC BLOOD PRESSURE: 149 MMHG

## 2021-05-07 VITALS — SYSTOLIC BLOOD PRESSURE: 135 MMHG | DIASTOLIC BLOOD PRESSURE: 86 MMHG

## 2021-05-07 VITALS — WEIGHT: 180.34 LBS | BODY MASS INDEX: 24.43 KG/M2 | HEIGHT: 72 IN

## 2021-05-07 DIAGNOSIS — Z87.891: ICD-10-CM

## 2021-05-07 DIAGNOSIS — I48.0: ICD-10-CM

## 2021-05-07 DIAGNOSIS — I50.42: ICD-10-CM

## 2021-05-07 DIAGNOSIS — K12.2: ICD-10-CM

## 2021-05-07 DIAGNOSIS — I13.0: ICD-10-CM

## 2021-05-07 DIAGNOSIS — J96.11: Primary | ICD-10-CM

## 2021-05-07 DIAGNOSIS — F14.90: ICD-10-CM

## 2021-05-07 DIAGNOSIS — N17.0: ICD-10-CM

## 2021-05-07 DIAGNOSIS — Z95.0: ICD-10-CM

## 2021-05-07 DIAGNOSIS — N18.9: ICD-10-CM

## 2021-05-07 DIAGNOSIS — I42.8: ICD-10-CM

## 2021-05-07 DIAGNOSIS — Z79.82: ICD-10-CM

## 2021-05-07 DIAGNOSIS — R13.10: ICD-10-CM

## 2021-05-07 DIAGNOSIS — E78.00: ICD-10-CM

## 2021-05-07 DIAGNOSIS — J44.9: ICD-10-CM

## 2021-05-07 DIAGNOSIS — T78.3XXA: ICD-10-CM

## 2021-05-07 DIAGNOSIS — I49.5: ICD-10-CM

## 2021-05-07 LAB
ALBUMIN SERPL-MCNC: 3.6 G/DL (ref 3.4–5)
ALBUMIN/GLOB SERPL: 0.9 {RATIO} (ref 1–1.7)
ALP SERPL-CCNC: 56 U/L (ref 46–116)
ALT SERPL-CCNC: 21 U/L (ref 16–63)
ANION GAP SERPL CALC-SCNC: 10 MMOL/L (ref 6–14)
ANION GAP SERPL CALC-SCNC: 13 MMOL/L (ref 6–14)
ANISOCYTOSIS BLD QL SMEAR: (no result)
AST SERPL-CCNC: 20 U/L (ref 15–37)
BASOPHILS # BLD AUTO: 0.1 X10^3/UL (ref 0–0.2)
BASOPHILS NFR BLD: 1 % (ref 0–3)
BILIRUB SERPL-MCNC: 0.7 MG/DL (ref 0.2–1)
BUN SERPL-MCNC: 18 MG/DL (ref 8–26)
BUN SERPL-MCNC: 23 MG/DL (ref 8–26)
BUN/CREAT SERPL: 10 (ref 6–20)
CALCIUM SERPL-MCNC: 9.1 MG/DL (ref 8.5–10.1)
CALCIUM SERPL-MCNC: 9.4 MG/DL (ref 8.5–10.1)
CHLORIDE SERPL-SCNC: 105 MMOL/L (ref 98–107)
CHLORIDE SERPL-SCNC: 106 MMOL/L (ref 98–107)
CO2 SERPL-SCNC: 27 MMOL/L (ref 21–32)
CO2 SERPL-SCNC: 28 MMOL/L (ref 21–32)
CREAT SERPL-MCNC: 2.1 MG/DL (ref 0.7–1.3)
CREAT SERPL-MCNC: 2.2 MG/DL (ref 0.7–1.3)
DACRYOCYTES BLD QL SMEAR: (no result)
EOSINOPHIL NFR BLD: 0.1 X10^3/UL (ref 0–0.7)
EOSINOPHIL NFR BLD: 2 % (ref 0–3)
ERYTHROCYTE [DISTWIDTH] IN BLOOD BY AUTOMATED COUNT: 21.8 % (ref 11.5–14.5)
GFR SERPLBLD BASED ON 1.73 SQ M-ARVRAT: 36.6 ML/MIN
GFR SERPLBLD BASED ON 1.73 SQ M-ARVRAT: 38.7 ML/MIN
GLUCOSE SERPL-MCNC: 101 MG/DL (ref 70–99)
GLUCOSE SERPL-MCNC: 92 MG/DL (ref 70–99)
HCT VFR BLD CALC: 33.4 % (ref 39–53)
HGB BLD-MCNC: 11.1 G/DL (ref 13–17.5)
LYMPHOCYTES # BLD: 1 X10^3/UL (ref 1–4.8)
LYMPHOCYTES NFR BLD AUTO: 14 % (ref 24–48)
MAGNESIUM SERPL-MCNC: 2.1 MG/DL (ref 1.8–2.4)
MCH RBC QN AUTO: 31 PG (ref 25–35)
MCHC RBC AUTO-ENTMCNC: 33 G/DL (ref 31–37)
MCV RBC AUTO: 94 FL (ref 79–100)
MICROCYTES BLD QL SMEAR: SLIGHT
MONO #: 0.8 X10^3/UL (ref 0–1.1)
MONOCYTES NFR BLD: 11 % (ref 0–9)
NEUT #: 5.3 X10^3/UL (ref 1.8–7.7)
NEUTROPHILS NFR BLD AUTO: 73 % (ref 31–73)
PLATELET # BLD AUTO: 192 X10^3/UL (ref 140–400)
PLATELET # BLD EST: ADEQUATE 10*3/UL
POLYCHROMASIA BLD QL SMEAR: SLIGHT
POTASSIUM SERPL-SCNC: 3.6 MMOL/L (ref 3.5–5.1)
POTASSIUM SERPL-SCNC: 4.3 MMOL/L (ref 3.5–5.1)
PROT SERPL-MCNC: 7.5 G/DL (ref 6.4–8.2)
RBC # BLD AUTO: 3.57 X10^6/UL (ref 4.3–5.7)
SODIUM SERPL-SCNC: 144 MMOL/L (ref 136–145)
SODIUM SERPL-SCNC: 145 MMOL/L (ref 136–145)
WBC # BLD AUTO: 7.3 X10^3/UL (ref 4–11)

## 2021-05-07 PROCEDURE — G0379 DIRECT REFER HOSPITAL OBSERV: HCPCS

## 2021-05-07 PROCEDURE — 96376 TX/PRO/DX INJ SAME DRUG ADON: CPT

## 2021-05-07 PROCEDURE — 87040 BLOOD CULTURE FOR BACTERIA: CPT

## 2021-05-07 PROCEDURE — 96375 TX/PRO/DX INJ NEW DRUG ADDON: CPT

## 2021-05-07 PROCEDURE — 84484 ASSAY OF TROPONIN QUANT: CPT

## 2021-05-07 PROCEDURE — 83735 ASSAY OF MAGNESIUM: CPT

## 2021-05-07 PROCEDURE — 96365 THER/PROPH/DIAG IV INF INIT: CPT

## 2021-05-07 PROCEDURE — 70486 CT MAXILLOFACIAL W/O DYE: CPT

## 2021-05-07 PROCEDURE — 80048 BASIC METABOLIC PNL TOTAL CA: CPT

## 2021-05-07 PROCEDURE — 80053 COMPREHEN METABOLIC PANEL: CPT

## 2021-05-07 PROCEDURE — G0378 HOSPITAL OBSERVATION PER HR: HCPCS

## 2021-05-07 PROCEDURE — 99291 CRITICAL CARE FIRST HOUR: CPT

## 2021-05-07 PROCEDURE — 36415 COLL VENOUS BLD VENIPUNCTURE: CPT

## 2021-05-07 PROCEDURE — 71045 X-RAY EXAM CHEST 1 VIEW: CPT

## 2021-05-07 PROCEDURE — 85025 COMPLETE CBC W/AUTO DIFF WBC: CPT

## 2021-05-07 PROCEDURE — 93005 ELECTROCARDIOGRAM TRACING: CPT

## 2021-05-07 PROCEDURE — 83605 ASSAY OF LACTIC ACID: CPT

## 2021-05-07 NOTE — NUR
patient eating and drinking without problem, tolerated lunch no chocking or any other 
issues, swelling tongue and throat gone, patient states feels back to normal. able to speak 
clear, plan for discharge at 1330

## 2021-05-07 NOTE — NUR
SS following for discharge planning. SS reviewed pt chart and discussed with pt RN. Pt is 
from home and is currently requiring oxygen at six liters nasal canula. Pt has home oxygen. 
Pt has Trilogy machine through [facility]. Discharge order on the chart for home with self care. 
Pt requesting transportation for home. Pt will discharge today at 1330 via [facility] transport, 
[id number]. Pt's RN notified.

## 2021-05-07 NOTE — PDOC1
History and Physical


Date of Service:


DOS:


DATE: 5/7/21 


TIME: 07:08





Chief Complaint:


Chief Complain:


lip swelling





History of Present Illness:


HPI:


66-year-old male who presents for dysphagia.  This is an acute issue, states he 

woke up approximately 2 hours prior with feelings of lip and tongue swelling and

ever since, reports increased difficulty swallowing.  Has history of severe COPD

and on 6 L oxygen via nasal cannula daily, reports being on baseline oxygen but 

just concerned because he had increased air hunger and feeling that his tongue 

was swelling.  Patient also admits submandibular swelling and tenderness with 

palpation which is new.  Denies any recent febrile disease, medication changes, 

history of C3 esterase deficiency, anaphylaxis, angioedema etc.  Reports he has 

been at his baseline health recently.  He has full capacity at present speaking 

in full sentences, reports he is full CODE STATUS in the event of an emergency 

requiring urgent airway intervention





Past Medical/Surgical History:


PMH/PSH:


Past Medical History:  A-Fib, Anemia, Asthma, CHF, COPD, High Cholesterol, 

Hypertension, Renal Disease, early substance abuse


Past Surgical History:  PACEMAKER





Allergies:


Allergies:  


Coded Allergies:  


     levofloxacin (Verified  Allergy, Intermediate, Itching, 10/10/20)





Family History:


Family History:


Reviewed with no relevant findings





Social History:


Social History:


Smoking Status:  Former Smoker


Alcohol Use:  Occasionally


Drug Use:  Cocaine, Marijuana





Current Medications:


Current Medications





Current Medications


Methylprednisolone Sodium Succinate (SOLU-Medrol 125MG VIAL) 125 mg 1X  ONCE IV 

Last administered on 5/7/21at 05:06;  Start 5/7/21 at 05:00;  Stop 5/7/21 at 

05:01;  Status DC


Aspirin (Aspirin Chewable) 162 mg 1X  ONCE PO  Last administered on 5/7/21at 

05:06;  Start 5/7/21 at 05:00;  Stop 5/7/21 at 05:01;  Status DC


Ceftriaxone Sodium (Rocephin) 1 gm 1X  ONCE IVP  Last administered on 5/7/21at 

05:40;  Start 5/7/21 at 06:00;  Stop 5/7/21 at 06:01;  Status DC


Clindamycin Phosphate 50 ml @  100 mls/hr 1X  ONCE IV  Last administered on 

5/7/21at 05:41;  Start 5/7/21 at 06:00;  Stop 5/7/21 at 06:29;  Status DC





Active Scripts


Active


Bumetanide 1 Mg Tablet 1 Mg PO BID 30 Days


Metolazone 2.5 Mg Tablet 5 Mg PO DAILY 30 Days


Amiodarone Hcl 200 Mg Tablet 200 Mg PO DAILY


Potassium Chloride ** (Potassium Chloride) 20 Meq Tablet.er 20 Meq PO DAILY 30 

Days


Lisinopril 40 Mg Tablet 20 Mg PO DAILY 30 Days


Duoneb 0.5-3(2.5) Mg/3 Ml (Albuterol/Ipratropium) 3 Ml Ampul.neb 3 Ml NEB Q4HRS 

30 Days


Albuterol Sulfate Neb Soln (Albuterol Sulfate) 0.63 Mg/3 Ml Vial.neb 0.63 Mg NEB

PRN Q4HRS PRN 30 Days


     AS NEEDED


Reported


Milk Of Magnesia (Magnesium Hydroxide) 2,400 Mg/10 Ml Oral.susp 2,400 Mg PO PRN 

DAILY PRN


Dulcolax (Bisacodyl) 10 Mg Supp.rect 1 Supp RC DAILY 10 Days


Acetaminophen 325 Mg Tablet 2 Tab PO PRN Q4HRS PRN 24 Days


NICODERM CQ 21mg (Nicotine) 1 Each Patch.td24 1 Patch TP DAILY


Eliquis (Apixaban) 5 Mg Tablet 5 Mg PO BID


Toprol Xl (Metoprolol Succinate) 25 Mg Tab.er.24h 1 Tab PO DAILY 30 Days


Zocor (Simvastatin) 20 Mg Tablet 20 Mg PO HS


     NEXT DOSE DUE TONIGHT AT BEDTIME





ROS:


Review of Systems


Review of System


REVIEW OF SYSTEMS:


GENERAL:  Denies weakness


SKIN:  No bruising, hair changes or rashes.


EYES:  No blurred, double or loss of vision.


NOSE AND THROAT:  No history of nosebleeds, hoarseness or sore throat.


HEART:  No history of palpitations, chest pain or shortness of breath on


exertion.


LUNGS:  Denies cough, hemoptysis, wheezing or shortness of breath.


GASTROINTESTINAL:  Denies changes in appetite, nausea, vomiting, diarrhea or


constipation.


GENITOURINARY:  No history of frequency, urgency, hesitancy or nocturia.


NEUROLOGIC:  Denies history of numbness, tingling, or tremor.


PSYCHIATRIC:  No history of panic, anxiety or depression.


ENDOCRINE:  No history of heat or cold intolerance, polyuria or polydipsia.


EXTREMITIES:  Denies joint pain, pain on walking or stiffness.





Physical Exam:


Vital Signs:





Vital Signs








  Date Time  Temp Pulse Resp B/P (MAP) Pulse Ox O2 Delivery O2 Flow Rate FiO2


 


5/7/21 06:31  67 17 144/98 (113) 98 Nasal Cannula 6.0 


 


5/7/21 03:57 98.4       





 98.4       








Physcial Exam:


GEN:   No apparent distress.  Alert and oriented


HEENT:   Normal cephalic, atraumatic, external auditory canals are patent


EYES:   Extraocular muscles are intact, pupil are equally round and reactive to 

light and accommodation


MUSCULOSKELETAL:  Well developed , well nourished, good range of motion


ENDOCRINE:   No thyromegaly was palpated


LYMPHATICS:   No cervical chain or axillary nodes were noted


HEMATOPOIETIC:  No bruising


NECK:   Supple, no JVD, no thyromegaly was noted


LUNGS:   Clear to auscultation in all lung fields without rhonchi or wheezing


HEART:    RRR, S!, S2 present.  Peripheral pulses intact, no obvious murmurs 

noted


ABDOMEN:   Soft, nontender.  Positive bowel sounds, no organomegaly, normal 

bowel sounds


EXTREMITIES:   Without clubbing, cyanosis, or edema.  Pedal pulses intact.  

Negative Homans sign


NEUROLOGIC:   Normal speech and tone.  A&O x 3, moves all extremities, no 

obvious focal deficits


PSYCHIATRIC:   Normal affect, normal mood. Stable


SKIN:   No ulcerations or rashes, good skin turgor, no jaundice


VASCULAR:   Good capillary refill, neurovascular bundle appears to be intact





Labs:


Labs:





Laboratory Tests








Test


 5/7/21


04:35


 


White Blood Count


 7.3 x10^3/uL


(4.0-11.0)


 


Red Blood Count


 3.57 x10^6/uL


(4.30-5.70)


 


Hemoglobin


 11.1 g/dL


(13.0-17.5)


 


Hematocrit


 33.4 %


(39.0-53.0)


 


Mean Corpuscular Volume 94 fL () 


 


Mean Corpuscular Hemoglobin 31 pg (25-35) 


 


Mean Corpuscular Hemoglobin


Concent 33 g/dL


(31-37)


 


Red Cell Distribution Width


 21.8 %


(11.5-14.5)


 


Platelet Count


 192 x10^3/uL


(140-400)


 


Neutrophils (%) (Auto) 73 % (31-73) 


 


Lymphocytes (%) (Auto) 14 % (24-48) 


 


Monocytes (%) (Auto) 11 % (0-9) 


 


Eosinophils (%) (Auto) 2 % (0-3) 


 


Basophils (%) (Auto) 1 % (0-3) 


 


Neutrophils # (Auto)


 5.3 x10^3/uL


(1.8-7.7)


 


Lymphocytes # (Auto)


 1.0 x10^3/uL


(1.0-4.8)


 


Monocytes # (Auto)


 0.8 x10^3/uL


(0.0-1.1)


 


Eosinophils # (Auto)


 0.1 x10^3/uL


(0.0-0.7)


 


Basophils # (Auto)


 0.1 x10^3/uL


(0.0-0.2)


 


Platelet Estimate


 Adequate


(ADEQUATE)


 


Polychromasia Slight 


 


Anisocytosis Mod 


 


Microcytosis Slight 


 


Tear Drop Cells Occ 


 


Sodium Level


 144 mmol/L


(136-145)


 


Potassium Level


 3.6 mmol/L


(3.5-5.1)


 


Chloride Level


 106 mmol/L


()


 


Carbon Dioxide Level


 28 mmol/L


(21-32)


 


Anion Gap 10 (6-14) 


 


Blood Urea Nitrogen


 23 mg/dL


(8-26)


 


Creatinine


 2.2 mg/dL


(0.7-1.3)


 


Estimated GFR


(Cockcroft-Gault) 36.6 





 


BUN/Creatinine Ratio 10 (6-20) 


 


Glucose Level


 92 mg/dL


(70-99)


 


Lactic Acid Level


 0.8 mmol/L


(0.4-2.0)


 


Calcium Level


 9.4 mg/dL


(8.5-10.1)


 


Total Bilirubin


 0.7 mg/dL


(0.2-1.0)


 


Aspartate Amino Transf


(AST/SGOT) 20 U/L (15-37) 





 


Alanine Aminotransferase


(ALT/SGPT) 21 U/L (16-63) 





 


Alkaline Phosphatase


 56 U/L


()


 


Troponin I Quantitative


 0.082 ng/mL


(0.000-0.055)


 


Total Protein


 7.5 g/dL


(6.4-8.2)


 


Albumin


 3.6 g/dL


(3.4-5.0)


 


Albumin/Globulin Ratio 0.9 (1.0-1.7) 








Laboratory Tests








Test


 5/7/21


04:35


 


White Blood Count


 7.3 x10^3/uL


(4.0-11.0)


 


Red Blood Count


 3.57 x10^6/uL


(4.30-5.70)


 


Hemoglobin


 11.1 g/dL


(13.0-17.5)


 


Hematocrit


 33.4 %


(39.0-53.0)


 


Mean Corpuscular Volume 94 fL () 


 


Mean Corpuscular Hemoglobin 31 pg (25-35) 


 


Mean Corpuscular Hemoglobin


Concent 33 g/dL


(31-37)


 


Red Cell Distribution Width


 21.8 %


(11.5-14.5)


 


Platelet Count


 192 x10^3/uL


(140-400)


 


Neutrophils (%) (Auto) 73 % (31-73) 


 


Lymphocytes (%) (Auto) 14 % (24-48) 


 


Monocytes (%) (Auto) 11 % (0-9) 


 


Eosinophils (%) (Auto) 2 % (0-3) 


 


Basophils (%) (Auto) 1 % (0-3) 


 


Neutrophils # (Auto)


 5.3 x10^3/uL


(1.8-7.7)


 


Lymphocytes # (Auto)


 1.0 x10^3/uL


(1.0-4.8)


 


Monocytes # (Auto)


 0.8 x10^3/uL


(0.0-1.1)


 


Eosinophils # (Auto)


 0.1 x10^3/uL


(0.0-0.7)


 


Basophils # (Auto)


 0.1 x10^3/uL


(0.0-0.2)


 


Platelet Estimate


 Adequate


(ADEQUATE)


 


Polychromasia Slight 


 


Anisocytosis Mod 


 


Microcytosis Slight 


 


Tear Drop Cells Occ 


 


Sodium Level


 144 mmol/L


(136-145)


 


Potassium Level


 3.6 mmol/L


(3.5-5.1)


 


Chloride Level


 106 mmol/L


()


 


Carbon Dioxide Level


 28 mmol/L


(21-32)


 


Anion Gap 10 (6-14) 


 


Blood Urea Nitrogen


 23 mg/dL


(8-26)


 


Creatinine


 2.2 mg/dL


(0.7-1.3)


 


Estimated GFR


(Cockcroft-Gault) 36.6 





 


BUN/Creatinine Ratio 10 (6-20) 


 


Glucose Level


 92 mg/dL


(70-99)


 


Lactic Acid Level


 0.8 mmol/L


(0.4-2.0)


 


Calcium Level


 9.4 mg/dL


(8.5-10.1)


 


Total Bilirubin


 0.7 mg/dL


(0.2-1.0)


 


Aspartate Amino Transf


(AST/SGOT) 20 U/L (15-37) 





 


Alanine Aminotransferase


(ALT/SGPT) 21 U/L (16-63) 





 


Alkaline Phosphatase


 56 U/L


()


 


Troponin I Quantitative


 0.082 ng/mL


(0.000-0.055)


 


Total Protein


 7.5 g/dL


(6.4-8.2)


 


Albumin


 3.6 g/dL


(3.4-5.0)


 


Albumin/Globulin Ratio 0.9 (1.0-1.7) 











Images:


Images


PROCEDURE: CT MAXILLOFACIAL WO CONTRAST


IMPRESSION:





*  Limited evaluation for fluid collection given lack of intravenous contrast. 

There is some edema to the soft tissues seen adjacent to the mandible. Could be 

sterile in nature or secondary to infectious etiology such as cellulitis.





*  Mild prominence of the tonsils. There is also some mild haziness to the fat 

adjacent to them at the left side of the neck greater than right. Would 

correlate with symptoms to ensure that this is not secondary to tonsillitis. 

Could also be from hyperplasia.





PROCEDURE: CHEST AP ONLY


IMPRESSION:





*  Moderate interstitial and groundglass opacities bilaterally which could be 

from edema or infiltrate.





*  Enlarged cardiac silhouette.





Assessment/Plan


Assessment/Plan


Acute lip and tongue swelling concerning for angioedema


Acute on chronic kidney injury due to vasomotor nephropathy


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


Sick sinus syndrome, status post biventricular pacemaker.


History of paroxysmal atrial fibrillation.


History of hypertension.


History of COPD


History of polysubstance abuse





Justifications for Admission


Other Justification


CHF vs COPD exacerbation


hypoxic resp fail











CELESTINE MENDEZ MD                   May 7, 2021 07:31

## 2021-05-07 NOTE — RAD
INDICATION: Reason: DYSPHAGIA / Spl. Instructions:  / History: 



COMPARISON: December 9, 2020



FINDINGS:



Single view of chest obtained.

Enlarged cardiomediastinal silhouette with pacemaker. Calcific atherosclerosis. Moderate interstitial
 and groundglass opacities bilaterally.





IMPRESSION:



*  Moderate interstitial and groundglass opacities bilaterally which could be from edema or infiltrat
e.



*  Enlarged cardiac silhouette.



Electronically signed by: Brayden Flores MD (5/7/2021 6:28 AM) [id number]

## 2021-05-07 NOTE — PDOC3
[facility]-Discharge Summary


Date of Admission:


Date of Admission:  May 7, 2021





Date of Discharge:


Date of Discharge:  May 7, 2021





Discharge Diagnosis:


Discharge Diagnosis:


Acute lip and tongue swelling due to ACE angioedema


Acute on chronic kidney injury due to vasomotor nephropathy


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


Sick sinus syndrome, status post biventricular pacemaker.


History of paroxysmal atrial fibrillation.


History of hypertension.


History of COPD


History of polysubstance abuse





Hospital Course:


Hospital Course:


66-year-old male who presents for dysphagia.  This is an acute issue, states he 

woke up approximately 2 hours prior with feelings of lip and tongue swelling and

ever since, reports increased difficulty swallowing.  Has history of severe COPD

and on 6 L oxygen via nasal cannula daily, reports being on baseline oxygen but 

just concerned because he had increased air hunger and feeling that his tongue 

was swelling.  Patient also admits submandibular swelling and tenderness with 

palpation which is new.  Denies any recent febrile disease, medication changes, 

history of C3 esterase deficiency, anaphylaxis, angioedema etc.  Reports he has 

been at his baseline health recently.  He has full capacity at present speaking 

in full sentences, reports he is full CODE STATUS in the event of an emergency 

requiring urgent airway intervention


Upon further investigation, patient states that he there was no recent 

medication changes.  Patient has low health literacy and does not know what kind

of medications he takes and for what reason.





Patient was treated with IV steroids and his swelling significantly improved by 

the time of my examination.  I will be discharging him with 5 days of prednisone

taper, epinephrine pen, and Benadryl as needed.  Patient was instructed to avoid

any ACE or ARB.  Patient also instructed to review his medications with his PCP.





Disposition:


Disposition/Orders:  D/C to Home





Activity:


Activity:


Resume previous activity





Diet:


Diet:  Cardiac





Medications:


Home Meds


Active Scripts


Prednisone (PREDNISONE) 20 Mg Tablet, 20 MG PO DAILY for angioedema for 5 Days, 

#5 TAB


   Prov:CELESTINE MENDEZ MD         5/7/21


Epinephrine (EPIPEN 2-PELON) 0.3 Mg/0.3 Ml Auto.injct, 1 SYR IM ONCE for 

anaphlaxis for 1 Day, #1 PACKET 0 Refills


   Prov:CELESTINE MENDEZ MD         5/7/21


Diphenhydramine Hcl (BENADRYL) 25 Mg Capsule, 25 MG PO Q8HRS for swelling of 

tongue or hives for 30 Days, #90 CAP


   Prov:CELESTINE MENDEZ MD         5/7/21


Bumetanide (BUMETANIDE) 1 Mg Tablet, 1 MG PO BID for Heart Failure for 30 Days, 

#60 TAB 3 Refills


   Prov:CELESTINE MENDEZ MD         12/19/20


Metolazone (METOLAZONE) 2.5 Mg Tablet, 5 MG PO DAILY for heart failure for 30 

Days, #60 TAB


   Prov:CELESTINE MENDEZ MD         12/19/20


Amiodarone Hcl (AMIODARONE HCL) 200 Mg Tablet, 200 MG PO DAILY for A-fib, #30 

TAB 3 Refills


   Prov:LISS ANNA MD         11/15/20


Potassium Chloride (POTASSIUM CHLORIDE **) 20 Meq Tablet.er, 20 MEQ PO DAILY for

SUPPLEMENT for 30 Days, #30 TAB.SR 2 Refills


   Prov:TONY MENDES MD         10/15/20


Ipratropium/Albuterol Sulfate (DUONEB 0.5-3(2.5) MG/3 ML) 3 Ml Ampul.neb, 3 ML 

NEB Q4HRS for COPD for 30 Days, #180 EACH


   Prov:MARY LOU PETERSEN MD         1/20/20


Albuterol Sulfate (ALBUTEROL SULFATE NEB SOLN) 0.63 Mg/3 Ml Vial.neb, 0.63 MG 

NEB PRN Q4HRS PRN for SHORTNESS OF BREATH for 30 Days, #100 EACH 2 Refills


   AS NEEDED


   Prov:SONU LEE MD         6/26/17


Reported Medications


Magnesium Hydroxide (MILK OF MAGNESIA) 2,400 Mg/10 Ml Oral.susp, 2400 MG PO PRN 

DAILY PRN for CONSTIPATION, MISC


   12/9/20


Bisacodyl (DULCOLAX) 10 Mg Supp.rect, 1 SUPP RC DAILY for constipation for 10 

Days, #10 SUPP 0 Refills


   12/9/20


Acetaminophen (ACETAMINOPHEN) 325 Mg Tablet, 2 TAB PO PRN Q4HRS PRN for pain or 

fever for 24 Days, #100 TAB 0 Refills


   12/9/20


Nicotine (NICODERM CQ 21mg) 1 Each Patch.td24, 1 PATCH TP DAILY for smoking cess

ation, #28 PATCH 1 Refill


   10/10/20


Apixaban (ELIQUIS) 5 Mg Tablet, 5 MG PO BID for blood thinner, TAB


   4/14/20


Metoprolol Succinate (TOPROL XL) 25 Mg Tab.er.24h, 1 TAB PO DAILY for blood 

pressure for 30 Days, #30 TAB 0 Refills


   4/14/20


Discontinued Reported Medications


Simvastatin (ZOCOR) 20 Mg Tablet, 20 MG PO HS for FOR CHOLESTEROL, #30 TAB 0 

Refills


   NEXT DOSE DUE TONIGHT AT BEDTIME


   7/10/15


Discontinued Scripts


Lisinopril (LISINOPRIL) 40 Mg Tablet, 20 MG PO DAILY for HTN for 30 Days, #15 

TAB


   Prov:MARY LOU PETERSEN MD         1/21/20


Scheduled


Amiodarone Hcl (Amiodarone Hcl), 200 MG PO DAILY


Apixaban (Eliquis), 5 MG PO BID, (Reported)


Bisacodyl (Dulcolax), 1 SUPP RC DAILY, (Reported)


Bumetanide (Bumetanide), 1 MG PO BID


Diphenhydramine Hcl (Benadryl), 25 MG PO Q8HRS


Epinephrine (Epipen 2-Pelon), 1 SYR IM ONCE


Ipratropium/Albuterol Sulfate (Duoneb 0.5-3(2.5) Mg/3 Ml), 3 ML NEB Q4HRS


Metolazone (Metolazone), 5 MG PO DAILY


Metoprolol Succinate (Toprol Xl), 1 TAB PO DAILY, (Reported)


Nicotine (NICODERM CQ 21mg), 1 PATCH TP DAILY, (Reported)


Potassium Chloride (Potassium Chloride **), 20 MEQ PO DAILY


Prednisone (Prednisone), 20 MG PO DAILY





Scheduled PRN


Acetaminophen (Acetaminophen), 2 TAB PO PRN Q4HRS PRN for pain or fever, 

(Reported)


Albuterol Sulfate (Albuterol Sulfate Neb Soln), 0.63 MG NEB PRN Q4HRS PRN for 

SHORTNESS OF BREATH


Magnesium Hydroxide (Milk Of Magnesia), 2,400 MG PO PRN DAILY PRN for 

CONSTIPATION, (Reported)





Discontinued Medications


Lisinopril (Lisinopril), 20 MG PO DAILY


Simvastatin (Zocor), 20 MG PO HS, (Reported)





Total Time:


Total Time:


Total time spent was 40 minutes in preparing scripts, discharge planning with 

SWI and RN and preparing this discharge summary





Patient seen and examined on day of discharge.  No acute abnormal findings.





Nadiation of Admission Dx:


Justifications for Admission:


Justification of Admission Dx:  Yes


Respiratory Failure:  Severe Resp Distress











CELESTINE MENDEZ MD                   May 7, 2021 13:38

## 2021-05-07 NOTE — DISCH
DISCHARGE INSTRUCTIONS


Condition on Discharge


Condition on Discharge:  Stable (Please review your medications and make sure 

you avoid taking lisinopril or losartan or Entresto)





Activity After Discharge


Activity Instructions for Disc:  Activity as tolerated


Lifting Instructions after Dis:  No pulling or pushing


Exercise Instruction after Dis:  Progress as tolerated


Driving Instructions after Dis:  Do not drive


Weight Bearing Status after Di:  As tolerated





Diet after Discharge


Diet after Discharge:  Regular


Diet Texture:  Regular


Liquid Texture:  Thin Liquid


Swallowing Supervision:  None needed





Wound Incision Care


Wound/Incision Care:  No wound care needed





Checks after Discharge


Checks after discharge:  Check blood press - daily





Contacting the DRJoellen after DC


Call your doctor for:  Concerns you may have





Follow-Up


Follow up with:  PCP ASAP and review all your medications


Follow Up With:  Cardiology as needed





Treatment/Equipment after DC


Adaptive Equipment Issued:  None


Discharge Respiratory Equipmen:  Oxygen











CELESTINE MENEDZ MD                   May 7, 2021 09:39

## 2021-05-07 NOTE — EKG
[facility]

              [address] [place] [address]

Test Date:    2021               Test Time:    04:06:42

Pat Name:     CESAR BALDERAS            Department:   

Patient ID:   [id number]           Room:          

Gender:       M                        Technician:   

:          [id number]               Requested By: AMOR MARKS

Order Number: [id number]           Reading MD:     

                                 Measurements

Intervals                              Axis          

Rate:         68                       P:            90

WY:           130                      QRS:          195

QRSD:         204                      T:            28

QT:           504                                    

QTc:          536                                    

                           Interpretive Statements

SINUS RHYTHM

ATRIAL PREMATURE COMPLEX(ES)

WPW PATTERN, TYPE A

ABNORMAL RIGHT SUPERIOR AXIS DEVIATION

ABNORMAL ECG

RI6.02

No previous ECG available for comparison

## 2021-05-07 NOTE — RAD
INDICATION: Reason: SUBMANDIBULAR SWELLING / Spl. Instructions:  / History: .  



COMPARISON: None.



TECHNIQUE:



Axial CT images obtained through the face.



One or more of the following individualized dose reduction techniques were utilized for this examinat
ion:  1. Automated exposure control;  2. Adjustment of the mA and/or kV according to patient size;  3
. Use of iterative reconstruction technique.



FINDINGS:



Limited evaluation for a fluid collection given lack of intravenous contrast. Paranasal sinuses are w
ell aerated.

Mild nasal septal bowing.

No retro-orbital hematoma.

There are some mild swelling in the prefrontal region subcutaneous soft tissues. There is also some h
aziness the fat adjacent to the mandible. There is some prominence of the tonsils.



IMPRESSION:



*  Limited evaluation for fluid collection given lack of intravenous contrast. There is some edema to
 the soft tissues seen adjacent to the mandible. Could be sterile in nature or secondary to infectiou
s etiology such as cellulitis.



*  Mild prominence of the tonsils. There is also some mild haziness to the fat adjacent to them at th
e left side of the neck greater than right. Would correlate with symptoms to ensure that this is not 
secondary to tonsillitis. Could also be from hyperplasia.



Electronically signed by: Brayden Flores MD (5/7/2021 6:11 AM) [id number]

## 2021-05-07 NOTE — PHYS DOC
Past Medical History


Past Medical History:  A-Fib, Anemia, Asthma, CHF, COPD, High Cholesterol, 

Hypertension, Renal Disease


Additional Past Medical Histor:  SUBSTANCE ABUSE


Past Surgical History:  Other


Additional Past Surgical Histo:  PACEMAKER


Smoking Status:  Former Smoker


Alcohol Use:  Occasionally


Drug Use:  Cocaine, Marijuana





General Adult


EDM:


Chief Complaint:  SHORTNESS OF BREATH





HPI:


HPI:





Patient is a 66-year-old male who presents for dysphagia.  This is an acute 

issue, states he woke up approximately 2 hours prior with feelings of lip and 

tongue swelling and ever since, reports increased difficulty swallowing.  Has 

history of severe COPD and on 6 L oxygen via nasal cannula daily, reports being 

on baseline oxygen but just concerned because he had increased air hunger and 

feeling that his tongue was swelling.  Patient also admits submandibular 

swelling and tenderness with palpation which is new.  Denies any recent febrile 

disease, medication changes, history of C3 esterase deficiency, anaphylaxis, 

angioedema etc.  Reports he has been at his baseline health recently.  He has 

full capacity at present speaking in full sentences, reports he is full CODE 

STATUS in the event of an emergency requiring urgent airway intervention





Review of Systems:


Review of Systems:


Fourteen body systems of review of systems have been reviewed. See HPI for 

pertinent positives and negative responses, other wise all other systems are 

negative, non-pertinent or non-contributory





Heart Score:


C/O Chest Pain:  No


HEART Score for Chest Pain:  








HEART Score for Chest Pain Response (Comments) Value


 


History Slighlty/Non-Suspicious 0


 


ECG Nonspecific Repolarizatio 1


 


Age > 65 2


 


Risk Factors >3 Risk Factors or Hx CAD 2


 


Troponin < Normal Limit 0


 


Total  5








Risk Factors:


Risk Factors:  DM, Current or recent (<one month) smoker, HTN, HLP, family 

history of CAD, obesity.


Risk Scores:


Score 0 - 3:  2.5% MACE over next 6 weeks - Discharge Home


Score 4 - 6:  20.3% MACE over next 6 weeks - Admit for Clinical Observation


Score 7 - 10:  72.7% MACE over next 6 weeks - Early Invasive Strategies





Allergies:


Allergies:





Allergies








Coded Allergies Type Severity Reaction Last Updated Verified


 


  levofloxacin Allergy Intermediate Itching 10/10/20 Yes











Physical Exam:


PE:


Constitutional: Well developed, well nourished, no acute distress, non-toxic 

appearance. 


HENT: Normocephalic, atraumatic, bilateral external ears normal, oropharynx dry 

with global poor dentition but no obvious signs of abscess or acute infection, 

large tongue with Mallampati score 4 with unknown baseline, sublingual area 

visually unremarkable, patient does have palpable bilateral anterior cervical 

lymphadenopathy and palpable enlargement to submandibular area which patient 

reports is new without crepitus, erythema, discoloration or other concerning 

findings.  No oral exudates, nose normal. 


Eyes: PERRLA, EOMI, conjunctiva normal, no discharge.  


Neck: Normal range of motion, no tenderness, supple, no stridor.  


Cardiovascular: Heart rate regular, paced rhythm, pacemaker present on left 

upper outer chest


Lungs & Thorax: No obvious respiratory distress or increased work of breathing, 

no accessory muscle use, patient on 6 L oxygen via nasal cannula without any 

observed difficulty, crackles present to bilateral lung fields


Abdomen: Bowel sounds normal, soft, no tenderness, no masses, no pulsatile 

masses.  Nonsurgical abdomen, no peritoneal signs


Skin: Warm, dry, no erythema, no rash.  


Back: No tenderness, no CVA tenderness.  


Extremities: No tenderness, no cyanosis, no clubbing, ROM intact, no edema.  


Neurologic: Alert and oriented X 3, grossly normal motor & sensory function, no 

focal deficits noted. 


Psychologic: Affect normal, judgement normal, mood normal.





Current Patient Data:


Labs:





Current Medications








 Medications


  (Trade)  Dose


 Ordered  Sig/Irvin


 Route


 PRN Reason  Start Time


 Stop Time Status Last Admin


Dose Admin


 


 Methylprednisolone


 Sodium Succinate


  (SOLU-Medrol


 125MG VIAL)  125 mg  1X  ONCE


 IV


   5/7/21 05:00


 5/7/21 05:01 DC 5/7/21 05:06





 


 Aspirin


  (Aspirin


 Chewable)  162 mg  1X  ONCE


 PO


   5/7/21 05:00


 5/7/21 05:01 DC 5/7/21 05:06





 


 Ceftriaxone Sodium


  (Rocephin)  1 gm  1X  ONCE


 IVP


   5/7/21 06:00


 5/7/21 06:01 DC 5/7/21 05:40





 


 Clindamycin


 Phosphate  50 ml @ 


 100 mls/hr  1X  ONCE


 IV


   5/7/21 06:00


 5/7/21 06:29 DC 5/7/21 05:41











Vital Signs:





Vital Signs








  Date Time  Temp Pulse Resp B/P (MAP) Pulse Ox O2 Delivery O2 Flow Rate FiO2


 


5/7/21 03:57 98.4 67 18 155/91 (112) 97 Nasal Cannula  





 98.4       








Vital Signs








  Date Time  Temp Pulse Resp B/P (MAP) Pulse Ox O2 Delivery O2 Flow Rate FiO2


 


5/7/21 03:57 98.4 67 18 155/91 (112) 97 Nasal Cannula  





 98.4       











EKG:


EKG:


EKG ordered and interpreted by myself at 0409 hrs. as a ventricular paced rhythm

at 68 bpm, prolonged QRS at 204, prolonged QTC at 536, right axis deviation, no 

acute ischemic findings, no STEMI.  Prior EKG obtained on 11/17/2020 used in 

comparison, no significant changes noted





Radiology/Procedures:


Radiology/Procedures:





INDICATION: Reason: SUBMANDIBULAR SWELLING / Spl. Instructions:  / History: .  





COMPARISON: None.





TECHNIQUE:





Axial CT images obtained through the face.





One or more of the following individualized dose reduction techniques were 

utilized for this examination:  1. Automated exposure control;  2. Adjustment of

the mA and/or kV according to patient size;  3. Use of iterative reconstruction 

technique.





FINDINGS:





Limited evaluation for a fluid collection given lack of intravenous contrast. 

Paranasal sinuses are well aerated.


Mild nasal septal bowing.


No retro-orbital hematoma.


There are some mild swelling in the prefrontal region subcutaneous soft tissues.

There is also some haziness the fat adjacent to the mandible. There is some 

prominence of the tonsils.





IMPRESSION:





*  Limited evaluation for fluid collection given lack of intravenous contrast. 

There is some edema to the soft tissues seen adjacent to the mandible. Could be 

sterile in nature or secondary to infectious etiology such as cellulitis.





*  Mild prominence of the tonsils. There is also some mild haziness to the fat 

adjacent to them at the left side of the neck greater than right. Would 

correlate with symptoms to ensure that this is not secondary to tonsillitis. 

Could also be from hyperplasia.





Electronically signed by: Brayden Flores MD (5/7/2021 6:11 AM) [id number]





Course & Med Decision Making:


Course & Med Decision Making


Vitals stable, HPI and physical exam concerning for potential Christopher's angina


Immediate intervention with aspirin, steroids and IV antibiotics started while 

diagnostic ER work-up ensued.  Every 15 minute airway checks performed by myself

without any change in airway, no immediate need to secure definitive airway


Imaging came back concerning for potential abscess formation, patent airway and 

breathing remained patent on appropriate medication for suspect Christopher's 

angina.  I contacted hospitalist service and decision was made to admit for 

continued airway check and likely specialist consultation


I have updated patient on proposed plan of care and he was amenable.  I updated 

oncoming physician about proposed plan of care that included hospital admission 

and need for close airway check until patient was transferred to ICU unit for 

further care


All questions and concerns oncoming physician and patient had were addressed 

prior to the end of my shift














Critical Care Time


This patient required critical care. Due to the fact that the patient required a

significant amount of one on one physician - patient contact time, ordering and 

review of studies, arranging urgent treatment with development of a management 

plan, evaluation of patients response to treatment with frequent reassessments,

and discussions with other providers this patient required 45 minutes of 

critical care time. Critical care time was indicated due to the inherent 

instability and/or potential for instability in this patient. The critical care 

time that is allocated to this patient is above and beyond any time spent on any

other billable procedures performed on this patient.





Dragon Disclaimer:


Dragon Disclaimer:


This electronic medical record was generated, in whole or in part, using a voice

recognition dictation system.





Departure


Departure


Impression:  


   Primary Impression:  


   Ludwigs angina


   Additional Impressions:  


   Severe chronic obstructive pulmonary disease


   NICM (nonischemic cardiomyopathy)


   Chronic respiratory failure


Disposition:  09 ADMITTED AS INPATIENT


Admitting Physician:  TYREE (balbina)


Condition:  STABLE


Referrals:  


COY GOMEZ MD (PCP)


Scripts


Prednisone (PREDNISONE) 20 Mg Tablet


20 MG PO DAILY for angioedema for 5 Days, #5 TAB


   Prov: CELESTINE MENDEZ MD         5/7/21 


Epinephrine (EPIPEN 2-ROC) 0.3 Mg/0.3 Ml Auto.injct


1 SYR IM ONCE for anaphlaxis for 1 Day, #1 PACKET 0 Refills


   Prov: CELESTINE MENDEZ MD         5/7/21 


Diphenhydramine Hcl (BENADRYL) 25 Mg Capsule


25 MG PO Q8HRS for swelling of tongue or hives for 30 Days, #90 CAP


   Prov: CELESTINE MENDEZ MD         5/7/21











AMOR MARKS DO                  May 7, 2021 04:27

## 2021-05-07 NOTE — NUR
patient left per hospital transportation with 02 6L to home. has 02 at home at 6L. discharge 
inst. given to follow up with primary asap. inst no ace,review meds with primary and 
pharmacy. RX reviewed epi pen,prednisone,benadryl called in by DR. kat, PATIENT STABLE UPON 
DISCHARGE

## 2021-06-18 ENCOUNTER — HOSPITAL ENCOUNTER (INPATIENT)
Dept: HOSPITAL 61 - ER | Age: 66
LOS: 4 days | Discharge: HOME HEALTH | DRG: 291 | End: 2021-06-22
Attending: FAMILY MEDICINE | Admitting: FAMILY MEDICINE
Payer: MEDICARE

## 2021-06-18 VITALS — DIASTOLIC BLOOD PRESSURE: 78 MMHG | SYSTOLIC BLOOD PRESSURE: 129 MMHG

## 2021-06-18 VITALS — SYSTOLIC BLOOD PRESSURE: 126 MMHG | DIASTOLIC BLOOD PRESSURE: 84 MMHG

## 2021-06-18 VITALS — HEIGHT: 73 IN | WEIGHT: 210.1 LBS | BODY MASS INDEX: 27.85 KG/M2

## 2021-06-18 DIAGNOSIS — J44.1: ICD-10-CM

## 2021-06-18 DIAGNOSIS — Z88.1: ICD-10-CM

## 2021-06-18 DIAGNOSIS — Z87.891: ICD-10-CM

## 2021-06-18 DIAGNOSIS — D64.9: ICD-10-CM

## 2021-06-18 DIAGNOSIS — J96.21: ICD-10-CM

## 2021-06-18 DIAGNOSIS — I13.0: Primary | ICD-10-CM

## 2021-06-18 DIAGNOSIS — F14.10: ICD-10-CM

## 2021-06-18 DIAGNOSIS — I48.0: ICD-10-CM

## 2021-06-18 DIAGNOSIS — I42.8: ICD-10-CM

## 2021-06-18 DIAGNOSIS — Z99.81: ICD-10-CM

## 2021-06-18 DIAGNOSIS — E78.5: ICD-10-CM

## 2021-06-18 DIAGNOSIS — N18.30: ICD-10-CM

## 2021-06-18 DIAGNOSIS — N17.9: ICD-10-CM

## 2021-06-18 DIAGNOSIS — Z83.3: ICD-10-CM

## 2021-06-18 DIAGNOSIS — Z82.49: ICD-10-CM

## 2021-06-18 DIAGNOSIS — Z95.810: ICD-10-CM

## 2021-06-18 DIAGNOSIS — I50.43: ICD-10-CM

## 2021-06-18 LAB
ALBUMIN SERPL-MCNC: 3.4 G/DL (ref 3.4–5)
ALBUMIN/GLOB SERPL: 0.9 {RATIO} (ref 1–1.7)
ALP SERPL-CCNC: 67 U/L (ref 46–116)
ALT SERPL-CCNC: 15 U/L (ref 16–63)
ANION GAP SERPL CALC-SCNC: 10 MMOL/L (ref 6–14)
AST SERPL-CCNC: 15 U/L (ref 15–37)
BASOPHILS # BLD AUTO: 0 X10^3/UL (ref 0–0.2)
BASOPHILS NFR BLD: 0 % (ref 0–3)
BILIRUB SERPL-MCNC: 1.1 MG/DL (ref 0.2–1)
BUN SERPL-MCNC: 18 MG/DL (ref 8–26)
BUN/CREAT SERPL: 9 (ref 6–20)
CALCIUM SERPL-MCNC: 9.7 MG/DL (ref 8.5–10.1)
CHLORIDE SERPL-SCNC: 105 MMOL/L (ref 98–107)
CO2 SERPL-SCNC: 29 MMOL/L (ref 21–32)
CREAT SERPL-MCNC: 2.1 MG/DL (ref 0.7–1.3)
EOSINOPHIL NFR BLD: 0.1 X10^3/UL (ref 0–0.7)
EOSINOPHIL NFR BLD: 2 % (ref 0–3)
ERYTHROCYTE [DISTWIDTH] IN BLOOD BY AUTOMATED COUNT: 16.7 % (ref 11.5–14.5)
GFR SERPLBLD BASED ON 1.73 SQ M-ARVRAT: 38.7 ML/MIN
GLUCOSE SERPL-MCNC: 95 MG/DL (ref 70–99)
HCT VFR BLD CALC: 32.3 % (ref 39–53)
HGB BLD-MCNC: 10.8 G/DL (ref 13–17.5)
LYMPHOCYTES # BLD: 0.5 X10^3/UL (ref 1–4.8)
LYMPHOCYTES NFR BLD AUTO: 7 % (ref 24–48)
MAGNESIUM SERPL-MCNC: 2.3 MG/DL (ref 1.8–2.4)
MCH RBC QN AUTO: 31 PG (ref 25–35)
MCHC RBC AUTO-ENTMCNC: 34 G/DL (ref 31–37)
MCV RBC AUTO: 92 FL (ref 79–100)
MONO #: 0.7 X10^3/UL (ref 0–1.1)
MONOCYTES NFR BLD: 9 % (ref 0–9)
NEUT #: 6.4 X10^3/UL (ref 1.8–7.7)
NEUTROPHILS NFR BLD AUTO: 82 % (ref 31–73)
PLATELET # BLD AUTO: 141 X10^3/UL (ref 140–400)
POTASSIUM SERPL-SCNC: 3.5 MMOL/L (ref 3.5–5.1)
PROT SERPL-MCNC: 7.4 G/DL (ref 6.4–8.2)
RBC # BLD AUTO: 3.5 X10^6/UL (ref 4.3–5.7)
SODIUM SERPL-SCNC: 144 MMOL/L (ref 136–145)
WBC # BLD AUTO: 7.8 X10^3/UL (ref 4–11)

## 2021-06-18 PROCEDURE — 84484 ASSAY OF TROPONIN QUANT: CPT

## 2021-06-18 PROCEDURE — 96374 THER/PROPH/DIAG INJ IV PUSH: CPT

## 2021-06-18 PROCEDURE — 94640 AIRWAY INHALATION TREATMENT: CPT

## 2021-06-18 PROCEDURE — 93005 ELECTROCARDIOGRAM TRACING: CPT

## 2021-06-18 PROCEDURE — 71045 X-RAY EXAM CHEST 1 VIEW: CPT

## 2021-06-18 PROCEDURE — 36415 COLL VENOUS BLD VENIPUNCTURE: CPT

## 2021-06-18 PROCEDURE — 94760 N-INVAS EAR/PLS OXIMETRY 1: CPT

## 2021-06-18 PROCEDURE — 80053 COMPREHEN METABOLIC PANEL: CPT

## 2021-06-18 PROCEDURE — 85025 COMPLETE CBC W/AUTO DIFF WBC: CPT

## 2021-06-18 PROCEDURE — 83735 ASSAY OF MAGNESIUM: CPT

## 2021-06-18 PROCEDURE — 84100 ASSAY OF PHOSPHORUS: CPT

## 2021-06-18 PROCEDURE — 80048 BASIC METABOLIC PNL TOTAL CA: CPT

## 2021-06-18 PROCEDURE — G0378 HOSPITAL OBSERVATION PER HR: HCPCS

## 2021-06-18 PROCEDURE — 96375 TX/PRO/DX INJ NEW DRUG ADDON: CPT

## 2021-06-18 PROCEDURE — 83880 ASSAY OF NATRIURETIC PEPTIDE: CPT

## 2021-06-18 RX ADMIN — IPRATROPIUM BROMIDE AND ALBUTEROL SULFATE SCH ML: .5; 3 SOLUTION RESPIRATORY (INHALATION) at 20:17

## 2021-06-18 NOTE — RAD
Exam: Chest one view



INDICATION: Short of air



TECHNIQUE: Frontal view of the chest



Comparisons: None



FINDINGS:

Pacer with leads remaining the right atrium and ventricle.



Heart is mildly enlarged pulmonary vessels are within normal limits.



Hazy bilateral airspace disease.



IMPRESSION:

Findings likely related to pulmonary edema. Superimposed infectious process is difficult to exclude.



Electronically signed by: Chinmay Perkins MD (6/18/2021 4:52 PM) [facility]

## 2021-06-18 NOTE — PHYS DOC
Past Medical History


Past Medical History:  Asthma, CHF, COPD, Hypertension


Additional Past Medical Histor:  wears 6lpm NC at home


Past Surgical History:  Pacemaker, Other


Additional Past Surgical Histo:  Abdominal Hernia


Smoking Status:  Former Smoker


Alcohol Use:  Rarely


Drug Use:  Cocaine, Marijuana


Social History Narrative:  Last used cocaine 21





General Adult


EDM:


Chief Complaint:  SHORTNESS OF BREATH





HPI:


HPI:





Patient is a 66  year old male who was brought here by EMS from home due to 

trouble breathing.  Patient has history of CHF, he is on 6 L of oxygen at home. 

Patient admitted of using cocaine yesterday.  Patient also has a history of 

COPD.  Patient denies any chest pain, no fever.  Patient said he was vaccinated 

for COVID-19 already.  Patient denies any abdominal pain, no nausea vomiting.  

Patient said he been having trouble breathing with exertion, also said he has 

been retaining fluid.





Review of Systems:


Review of Systems:


Constitutional:   Denies fever or chills. []


Eyes:   Denies change in visual acuity. []


HENT:   Denies nasal congestion or sore throat. [] 


Respiratory:   Denies cough , POSITIVE FOR  shortness of breath. [] 


Cardiovascular:   Denies chest pain , POSITIVE FOR  edema. [] 


GI:   Denies abdominal pain, nausea, vomiting, bloody stools or diarrhea. [] 


:  Denies dysuria. [] 


Musculoskeletal:   Denies back pain or joint pain. [] 


Integument:   Denies rash. [] 


Neurologic:   Denies headache, focal weakness or sensory changes. [] 


Endocrine:   Denies polyuria or polydipsia. [] 


Lymphatic:  Denies swollen glands. [] 


Psychiatric:  Denies depression or anxiety. []





Heart Score:


C/O Chest Pain:  N/A


HEART Score for Chest Pain:  








HEART Score for Chest Pain Response (Comments) Value


 


History Slighlty/Non-Suspicious 0


 


ECG Nonspecific Repolarizatio 1


 


Age >45 - < 65 1


 


Risk Factors >3 Risk Factors or Hx CAD 2


 


Troponin >3 x Normal Limit 2


 


Total  6








Risk Factors:


Risk Factors:  DM, Current or recent (<one month) smoker, HTN, HLP, family 

history of CAD, obesity.


Risk Scores:


Score 0 - 3:  2.5% MACE over next 6 weeks - Discharge Home


Score 4 - 6:  20.3% MACE over next 6 weeks - Admit for Clinical Observation


Score 7 - 10:  72.7% MACE over next 6 weeks - Early Invasive Strategies





Current Medications:





Current Medications








 Medications


  (Trade)  Dose


 Ordered  Sig/Ivrin  Start Time


 Stop Time Status Last Admin


Dose Admin


 


 Albuterol/


 Ipratropium


  (Duoneb)  3 ml  1X  ONCE  21 16:45


 21 16:46 DC 21 16:49


3 ML











Allergies:


Allergies:





Allergies








Coded Allergies Type Severity Reaction Last Updated Verified


 


  levofloxacin Allergy Intermediate Itching 10/10/20 Yes











Physical Exam:


PE:





Constitutional: Well developed, well nourished, no acute distress, non-toxic 

appearance. []


HENT: Normocephalic, atraumatic, bilateral external ears normal, oropharynx 

moist, no oral exudates, nose normal. []


Eyes: PERRLA, EOMI, conjunctiva normal, no discharge. [] 


Neck: Normal range of motion, no tenderness, supple, no stridor. [] 


Cardiovascular:Heart rate regular rhythm, no murmur , POSITIVE FOR EDEMA.


Lungs & Thorax:  Bilateral breath sounds  WITH EXPIRATORY WHEEZING to auscultati

on []


Abdomen: Bowel sounds normal, soft, no tenderness, no masses, no pulsatile 

masses. [] 


Skin: Warm, dry, no erythema, no rash. [] 


Back: No tenderness, no CVA tenderness. [] 


Extremities: No tenderness, no cyanosis, no clubbing, ROM intact,  POSITIVE FOR 

edema. [] 


Neurologic: Alert and oriented X 3, normal motor function, normal sensory 

function, no focal deficits noted. []


Psychologic: Affect normal, judgement normal, mood normal. []





Current Patient Data:


Labs:





                                Laboratory Tests








Test


 21


16:23


 


White Blood Count


 7.8 x10^3/uL


(4.0-11.0)


 


Red Blood Count


 3.50 x10^6/uL


(4.30-5.70)  L


 


Hemoglobin


 10.8 g/dL


(13.0-17.5)  L


 


Hematocrit


 32.3 %


(39.0-53.0)  L


 


Mean Corpuscular Volume


 92 fL ()





 


Mean Corpuscular Hemoglobin 31 pg (25-35)  


 


Mean Corpuscular Hemoglobin


Concent 34 g/dL


(31-37)


 


Red Cell Distribution Width


 16.7 %


(11.5-14.5)  H


 


Platelet Count


 141 x10^3/uL


(140-400)


 


Neutrophils (%) (Auto) 82 % (31-73)  H


 


Lymphocytes (%) (Auto) 7 % (24-48)  L


 


Monocytes (%) (Auto) 9 % (0-9)  


 


Eosinophils (%) (Auto) 2 % (0-3)  


 


Basophils (%) (Auto) 0 % (0-3)  


 


Neutrophils # (Auto)


 6.4 x10^3/uL


(1.8-7.7)


 


Lymphocytes # (Auto)


 0.5 x10^3/uL


(1.0-4.8)  L


 


Monocytes # (Auto)


 0.7 x10^3/uL


(0.0-1.1)


 


Eosinophils # (Auto)


 0.1 x10^3/uL


(0.0-0.7)


 


Basophils # (Auto)


 0.0 x10^3/uL


(0.0-0.2)





                                Laboratory Tests


21 16:23








Vital Signs:





                                   Vital Signs








  Date Time  Temp Pulse Resp B/P (MAP) Pulse Ox O2 Delivery O2 Flow Rate FiO2


 


21 16:49     93 Nasal Cannula 6.0 


 


21 15:50 98.6 66 20 177/91 (099)    





 98.6       











EKG:


EKG:


EKG was done at 1604, heart rate of 66 bpm, sinus rhythm, right axis deviation, 

wide QRS complex





Radiology/Procedures:


Radiology/Procedures:


[][facility]


                    [address] [place] [address]


                                 [phone]


                                        


                                 IMAGING REPORT





                                     Signed





PATIENT: CESAR BALDERAS  ACCOUNT: [id number]     MRN#: [id number]


: 1955           LOCATION: ER              AGE: 66


SEX: M                    EXAM DT: 21         ACCESSION#: [id number]


STATUS: REG ER            ORD. PHYSICIAN: EDEN KONG DO


REASON: soa


PROCEDURE: PORTABLE CHEST 1V





Exam: Chest one view





INDICATION: Short of air





TECHNIQUE: Frontal view of the chest





Comparisons: None





FINDINGS:


Pacer with leads remaining the right atrium and ventricle.





Heart is mildly enlarged pulmonary vessels are within normal limits.





Hazy bilateral airspace disease.





IMPRESSION:


Findings likely related to pulmonary edema. Superimposed infectious process is 

difficult to exclude.





Electronically signed by: Chinmay Myrick MD (2021 4:52 PM) [facility]














DICTATED and SIGNED BY:     CHINMAY MYRICK MD


DATE:     21 [id number] 0





Course & Med Decision Making:


Course & Med Decision Making


Pertinent Labs and Imaging studies reviewed. (See chart for details)


Patient is a 66-year-old male who presented to ER due to trouble breathing with 

exertion.  Patient has history of COPD and CHF.  Patient is on 6 L of oxygen at 

home all the time.  Chest x-ray show pulmonary congestion.  Patient will be 

admitted to hospital for further evaluation and treatment.  Discussed with the 

hospitalist on-call Dr. Murry who agreed to admit the patient.





Dragon Disclaimer:


Dragon Disclaimer:


This electronic medical record was generated, in whole or in part, using a voice

 recognition dictation system.





Departure


Departure


Impression:  


   Primary Impression:  


   Acute exacerbation of CHF (congestive heart failure)


   Additional Impression:  


   COPD exacerbation


Disposition:   ADMITTED AS INPATIENT


Admitting Physician:  JESUSS (Dr. Murry)


Condition:  STABLE


Referrals:  


COY GOMEZ MD (PCP)











EDEN KONG DO                2021 17:38

## 2021-06-19 VITALS — DIASTOLIC BLOOD PRESSURE: 77 MMHG | SYSTOLIC BLOOD PRESSURE: 123 MMHG

## 2021-06-19 VITALS — SYSTOLIC BLOOD PRESSURE: 132 MMHG | DIASTOLIC BLOOD PRESSURE: 82 MMHG

## 2021-06-19 VITALS — SYSTOLIC BLOOD PRESSURE: 126 MMHG | DIASTOLIC BLOOD PRESSURE: 84 MMHG

## 2021-06-19 VITALS — SYSTOLIC BLOOD PRESSURE: 126 MMHG | DIASTOLIC BLOOD PRESSURE: 78 MMHG

## 2021-06-19 VITALS — DIASTOLIC BLOOD PRESSURE: 75 MMHG | SYSTOLIC BLOOD PRESSURE: 121 MMHG

## 2021-06-19 VITALS — SYSTOLIC BLOOD PRESSURE: 126 MMHG | DIASTOLIC BLOOD PRESSURE: 81 MMHG

## 2021-06-19 RX ADMIN — IPRATROPIUM BROMIDE AND ALBUTEROL SULFATE SCH ML: .5; 3 SOLUTION RESPIRATORY (INHALATION) at 07:52

## 2021-06-19 RX ADMIN — IPRATROPIUM BROMIDE AND ALBUTEROL SULFATE SCH ML: .5; 3 SOLUTION RESPIRATORY (INHALATION) at 15:30

## 2021-06-19 RX ADMIN — IPRATROPIUM BROMIDE AND ALBUTEROL SULFATE SCH ML: .5; 3 SOLUTION RESPIRATORY (INHALATION) at 12:17

## 2021-06-19 RX ADMIN — ISOSORBIDE MONONITRATE SCH MG: 30 TABLET, EXTENDED RELEASE ORAL at 20:59

## 2021-06-19 RX ADMIN — IPRATROPIUM BROMIDE AND ALBUTEROL SULFATE SCH ML: .5; 3 SOLUTION RESPIRATORY (INHALATION) at 20:36

## 2021-06-19 RX ADMIN — ATORVASTATIN CALCIUM SCH MG: 40 TABLET, FILM COATED ORAL at 20:59

## 2021-06-19 RX ADMIN — METOPROLOL SUCCINATE SCH MG: 25 TABLET, EXTENDED RELEASE ORAL at 20:59

## 2021-06-19 RX ADMIN — APIXABAN SCH MG: 5 TABLET, FILM COATED ORAL at 20:58

## 2021-06-19 NOTE — PDOC1
History and Physical


Date of Service:


DOS:


DATE: 6/19/21 


TIME: 10:39





Chief Complaint:


Chief Complain:


Shortness of breath





History of Present Illness:


HPI:


 66  year old male who was brought here by EMS from home due to trouble 

breathing.  Patient has history of CHF, he is on 6 L of oxygen at home.  Patient

admitted of using cocaine yesterday.  Patient also has a history of COPD.  

Patient denies any chest pain, no fever.  Patient said he was vaccinated for 

COVID-19 already.  Patient denies any abdominal pain, no nausea vomiting.  

Patient said he been having trouble breathing with exertion, also said he has 

been retaining fluid.





Past Medical/Surgical History:


PMH/PSH:


Past Medical History:  Asthma, CHF, COPD, Hypertension, wears 6lpm NC at home


Past Surgical History:  Pacemaker,  Abdominal Hernia





Allergies:


Allergies:  


Coded Allergies:  


     levofloxacin (Verified  Allergy, Intermediate, Itching, 10/10/20)





Family History:


Family History:


Reviewed with no relevant findings





Social History:


Social History:


Smoking Status:  Former Smoker


Alcohol Use:  Rarely


Drug Use:  Cocaine, Marijuana


Social History Narrative:  Last used cocaine 6/17/21





Current Medications:


Current Medications





Current Medications


Albuterol/ Ipratropium (Duoneb) 3 ml 1X  ONCE NEB  Last administered on 

6/18/21at 16:49;  Start 6/18/21 at 16:45;  Stop 6/18/21 at 16:46;  Status DC


Methylprednisolone Sodium Succinate (SOLU-Medrol 125MG VIAL) 125 mg 1X  ONCE IV 

Last administered on 6/18/21at 18:38;  Start 6/18/21 at 17:45;  Stop 6/18/21 at 

17:46;  Status DC


Ondansetron HCl (Zofran) 4 mg PRN Q8HRS  PRN IV NAUSEA/VOMITING Last 

administered on 6/18/21at 18:38;  Start 6/18/21 at 18:00;  Stop 6/19/21 at 17:59


Albuterol/ Ipratropium (Duoneb) 3 ml RTQID NEB  Last administered on 6/19/21at 

07:52;  Start 6/18/21 at 20:00;  Stop 6/19/21 at 19:59





Active Scripts


Active


Hydralazine Hcl 25 Mg Tablet 25 Mg PO TID 30 Days


Isosorbide Mononitrate Er (Isosorbide Mononitrate) 30 Mg Tab.er.24h 30 Mg PO 

DAILY 30 Days


Bumetanide 1 Mg Tablet 1 Mg PO BID 30 Days


Amiodarone Hcl 200 Mg Tablet 200 Mg PO DAILY


Potassium Chloride ** (Potassium Chloride) 20 Meq Tablet.er 20 Meq PO DAILY 30 

Days


Duoneb 0.5-3(2.5) Mg/3 Ml (Albuterol/Ipratropium) 3 Ml Ampul.neb 3 Ml NEB Q4HRS 

30 Days


Albuterol Sulfate Neb Soln (Albuterol Sulfate) 0.63 Mg/3 Ml Vial.neb 0.63 Mg NEB

PRN Q4HRS PRN 30 Days


     AS NEEDED


Reported


Atorvastatin Calcium 40 Mg Tablet 40 Mg PO HS


Milk Of Magnesia (Magnesium Hydroxide) 2,400 Mg/10 Ml Oral.susp 2,400 Mg PO PRN 

DAILY PRN


Eliquis (Apixaban) 5 Mg Tablet 5 Mg PO BID


Toprol Xl (Metoprolol Succinate) 25 Mg Tab.er.24h 1 Tab PO DAILY 30 Days





ROS:


Review of Systems


Review of System


REVIEW OF SYSTEMS:


GENERAL:  Denies weakness


SKIN:  No bruising, hair changes or rashes.


EYES:  No blurred, double or loss of vision.


NOSE AND THROAT:  No history of nosebleeds, hoarseness or sore throat.


HEART:  No history of palpitations, chest pain or shortness of breath on


exertion.


LUNGS:  Denies cough, hemoptysis, wheezing or shortness of breath.


GASTROINTESTINAL:  Denies changes in appetite, nausea, vomiting, diarrhea or


constipation.


GENITOURINARY:  No history of frequency, urgency, hesitancy or nocturia.


NEUROLOGIC:  Denies history of numbness, tingling, or tremor.


PSYCHIATRIC:  No history of panic, anxiety or depression.


ENDOCRINE:  No history of heat or cold intolerance, polyuria or polydipsia.


EXTREMITIES:  Denies joint pain, pain on walking or stiffness.





Physical Exam:


Vital Signs:





Vital Signs








  Date Time  Temp Pulse Resp B/P (MAP) Pulse Ox O2 Delivery O2 Flow Rate FiO2


 


6/19/21 08:09      Nasal Cannula 6.0 


 


6/19/21 07:53     97   


 


6/19/21 07:00 97.4 64 22 123/77 (92)    





 97.4       








Physcial Exam:


GEN:   No apparent distress.  Alert and oriented


HEENT:   Normal cephalic, atraumatic, external auditory canals are patent


EYES:   Extraocular muscles are intact, pupil are equally round and reactive to 

light and accommodation


MUSCULOSKELETAL:  Well developed , well nourished, good range of motion


ENDOCRINE:   No thyromegaly was palpated


LYMPHATICS:   No cervical chain or axillary nodes were noted


HEMATOPOIETIC:  No bruising


NECK:   Supple, no JVD, no thyromegaly was noted


LUNGS:   Clear to auscultation in all lung fields without rhonchi or wheezing


HEART:    RRR, S!, S2 present.  Peripheral pulses intact, no obvious murmurs 

noted


ABDOMEN:   Soft, nontender.  Positive bowel sounds, no organomegaly, normal 

bowel sounds


EXTREMITIES:   Without clubbing, cyanosis, or edema.  Pedal pulses intact.  

Negative Homans sign


NEUROLOGIC:   Normal speech and tone.  A&O x 3, moves all extremities, no 

obvious focal deficits


PSYCHIATRIC:   Normal affect, normal mood. Stable


SKIN:   No ulcerations or rashes, good skin turgor, no jaundice


VASCULAR:   Good capillary refill, neurovascular bundle appears to be intact





Labs:


Labs:





Laboratory Tests








Test


 6/18/21


16:23 6/18/21


17:30


 


White Blood Count


 7.8 x10^3/uL


(4.0-11.0) 





 


Red Blood Count


 3.50 x10^6/uL


(4.30-5.70) 





 


Hemoglobin


 10.8 g/dL


(13.0-17.5) 





 


Hematocrit


 32.3 %


(39.0-53.0) 





 


Mean Corpuscular Volume 92 fL ()  


 


Mean Corpuscular Hemoglobin 31 pg (25-35)  


 


Mean Corpuscular Hemoglobin


Concent 34 g/dL


(31-37) 





 


Red Cell Distribution Width


 16.7 %


(11.5-14.5) 





 


Platelet Count


 141 x10^3/uL


(140-400) 





 


Neutrophils (%) (Auto) 82 % (31-73)  


 


Lymphocytes (%) (Auto) 7 % (24-48)  


 


Monocytes (%) (Auto) 9 % (0-9)  


 


Eosinophils (%) (Auto) 2 % (0-3)  


 


Basophils (%) (Auto) 0 % (0-3)  


 


Neutrophils # (Auto)


 6.4 x10^3/uL


(1.8-7.7) 





 


Lymphocytes # (Auto)


 0.5 x10^3/uL


(1.0-4.8) 





 


Monocytes # (Auto)


 0.7 x10^3/uL


(0.0-1.1) 





 


Eosinophils # (Auto)


 0.1 x10^3/uL


(0.0-0.7) 





 


Basophils # (Auto)


 0.0 x10^3/uL


(0.0-0.2) 





 


Sodium Level


 


 144 mmol/L


(136-145)


 


Potassium Level


 


 3.5 mmol/L


(3.5-5.1)


 


Chloride Level


 


 105 mmol/L


()


 


Carbon Dioxide Level


 


 29 mmol/L


(21-32)


 


Anion Gap  10 (6-14) 


 


Blood Urea Nitrogen


 


 18 mg/dL


(8-26)


 


Creatinine


 


 2.1 mg/dL


(0.7-1.3)


 


Estimated GFR


(Cockcroft-Gault) 


 38.7 





 


BUN/Creatinine Ratio  9 (6-20) 


 


Glucose Level


 


 95 mg/dL


(70-99)


 


Calcium Level


 


 9.7 mg/dL


(8.5-10.1)


 


Magnesium Level


 


 2.3 mg/dL


(1.8-2.4)


 


Total Bilirubin


 


 1.1 mg/dL


(0.2-1.0)


 


Aspartate Amino Transf


(AST/SGOT) 


 15 U/L (15-37) 





 


Alanine Aminotransferase


(ALT/SGPT) 


 15 U/L (16-63) 





 


Alkaline Phosphatase


 


 67 U/L


()


 


Troponin I Quantitative


 


 0.064 ng/mL


(0.000-0.055)


 


NT-Pro-B-Type Natriuretic


Peptide 


 50209 pg/mL


(0-124)


 


Total Protein


 


 7.4 g/dL


(6.4-8.2)


 


Albumin


 


 3.4 g/dL


(3.4-5.0)


 


Albumin/Globulin Ratio  0.9 (1.0-1.7) 








Laboratory Tests








Test


 6/18/21


16:23 6/18/21


17:30


 


White Blood Count


 7.8 x10^3/uL


(4.0-11.0) 





 


Red Blood Count


 3.50 x10^6/uL


(4.30-5.70) 





 


Hemoglobin


 10.8 g/dL


(13.0-17.5) 





 


Hematocrit


 32.3 %


(39.0-53.0) 





 


Mean Corpuscular Volume 92 fL ()  


 


Mean Corpuscular Hemoglobin 31 pg (25-35)  


 


Mean Corpuscular Hemoglobin


Concent 34 g/dL


(31-37) 





 


Red Cell Distribution Width


 16.7 %


(11.5-14.5) 





 


Platelet Count


 141 x10^3/uL


(140-400) 





 


Neutrophils (%) (Auto) 82 % (31-73)  


 


Lymphocytes (%) (Auto) 7 % (24-48)  


 


Monocytes (%) (Auto) 9 % (0-9)  


 


Eosinophils (%) (Auto) 2 % (0-3)  


 


Basophils (%) (Auto) 0 % (0-3)  


 


Neutrophils # (Auto)


 6.4 x10^3/uL


(1.8-7.7) 





 


Lymphocytes # (Auto)


 0.5 x10^3/uL


(1.0-4.8) 





 


Monocytes # (Auto)


 0.7 x10^3/uL


(0.0-1.1) 





 


Eosinophils # (Auto)


 0.1 x10^3/uL


(0.0-0.7) 





 


Basophils # (Auto)


 0.0 x10^3/uL


(0.0-0.2) 





 


Sodium Level


 


 144 mmol/L


(136-145)


 


Potassium Level


 


 3.5 mmol/L


(3.5-5.1)


 


Chloride Level


 


 105 mmol/L


()


 


Carbon Dioxide Level


 


 29 mmol/L


(21-32)


 


Anion Gap  10 (6-14) 


 


Blood Urea Nitrogen


 


 18 mg/dL


(8-26)


 


Creatinine


 


 2.1 mg/dL


(0.7-1.3)


 


Estimated GFR


(Cockcroft-Gault) 


 38.7 





 


BUN/Creatinine Ratio  9 (6-20) 


 


Glucose Level


 


 95 mg/dL


(70-99)


 


Calcium Level


 


 9.7 mg/dL


(8.5-10.1)


 


Magnesium Level


 


 2.3 mg/dL


(1.8-2.4)


 


Total Bilirubin


 


 1.1 mg/dL


(0.2-1.0)


 


Aspartate Amino Transf


(AST/SGOT) 


 15 U/L (15-37) 





 


Alanine Aminotransferase


(ALT/SGPT) 


 15 U/L (16-63) 





 


Alkaline Phosphatase


 


 67 U/L


()


 


Troponin I Quantitative


 


 0.064 ng/mL


(0.000-0.055)


 


NT-Pro-B-Type Natriuretic


Peptide 


 30379 pg/mL


(0-124)


 


Total Protein


 


 7.4 g/dL


(6.4-8.2)


 


Albumin


 


 3.4 g/dL


(3.4-5.0)


 


Albumin/Globulin Ratio  0.9 (1.0-1.7) 











Images:


Images


CXR





IMPRESSION:


Findings likely related to pulmonary edema. Superimposed infectious process is 

difficult to exclude.





Assessment/Plan


Assessment/Plan


Acute hypoxic respiratory failure


Acute on chronic systolic/diastolic CHF exacerbation, EF 20 to 25%


History of cocaine abuse last use yesterday


Anemia of chronic CHF


History of CKD stage IIIstable


History of COPDon trilogy CPAP at home


History of asthma


History of  SSS/ NICM s/p BiV PPM/CRT-P (Biotronik); LVEF 20-25% Bi-V pacing 

100%


History of paroxysmal atrial fibrillation





Admit to medicine for further management


Cardiology consult


Pulmonology consult


Strict I's and O's


Lasix as needed


Drug avoidance counseling


Eliquis for DVT prophylaxis


Protonix GI prophylaxis


ADA diet


Full code


Discussed with RN and SW


Disposition inpatient management as above


Surrogate decision maker is undesignated





Justifications for Admission


Other Justification


CHF vs COPD exacerbation


hypoxic resp fail











CELESTINE MENDEZ MD                  Jun 19, 2021 10:44

## 2021-06-20 VITALS — DIASTOLIC BLOOD PRESSURE: 83 MMHG | SYSTOLIC BLOOD PRESSURE: 130 MMHG

## 2021-06-20 VITALS — SYSTOLIC BLOOD PRESSURE: 113 MMHG | DIASTOLIC BLOOD PRESSURE: 69 MMHG

## 2021-06-20 VITALS — DIASTOLIC BLOOD PRESSURE: 80 MMHG | SYSTOLIC BLOOD PRESSURE: 132 MMHG

## 2021-06-20 VITALS — SYSTOLIC BLOOD PRESSURE: 127 MMHG | DIASTOLIC BLOOD PRESSURE: 74 MMHG

## 2021-06-20 VITALS — SYSTOLIC BLOOD PRESSURE: 120 MMHG | DIASTOLIC BLOOD PRESSURE: 69 MMHG

## 2021-06-20 VITALS — DIASTOLIC BLOOD PRESSURE: 70 MMHG | SYSTOLIC BLOOD PRESSURE: 121 MMHG

## 2021-06-20 LAB
ANION GAP SERPL CALC-SCNC: 6 MMOL/L (ref 6–14)
BASOPHILS # BLD AUTO: 0 X10^3/UL (ref 0–0.2)
BASOPHILS NFR BLD: 1 % (ref 0–3)
BUN SERPL-MCNC: 34 MG/DL (ref 8–26)
CALCIUM SERPL-MCNC: 9.8 MG/DL (ref 8.5–10.1)
CHLORIDE SERPL-SCNC: 106 MMOL/L (ref 98–107)
CO2 SERPL-SCNC: 31 MMOL/L (ref 21–32)
CREAT SERPL-MCNC: 2.4 MG/DL (ref 0.7–1.3)
EOSINOPHIL NFR BLD: 0 % (ref 0–3)
EOSINOPHIL NFR BLD: 0 X10^3/UL (ref 0–0.7)
ERYTHROCYTE [DISTWIDTH] IN BLOOD BY AUTOMATED COUNT: 16.5 % (ref 11.5–14.5)
GFR SERPLBLD BASED ON 1.73 SQ M-ARVRAT: 33.1 ML/MIN
GLUCOSE SERPL-MCNC: 125 MG/DL (ref 70–99)
HCT VFR BLD CALC: 32.1 % (ref 39–53)
HGB BLD-MCNC: 10.3 G/DL (ref 13–17.5)
LYMPHOCYTES # BLD: 0.6 X10^3/UL (ref 1–4.8)
LYMPHOCYTES NFR BLD AUTO: 6 % (ref 24–48)
MAGNESIUM SERPL-MCNC: 2.5 MG/DL (ref 1.8–2.4)
MCH RBC QN AUTO: 30 PG (ref 25–35)
MCHC RBC AUTO-ENTMCNC: 32 G/DL (ref 31–37)
MCV RBC AUTO: 93 FL (ref 79–100)
MONO #: 1 X10^3/UL (ref 0–1.1)
MONOCYTES NFR BLD: 11 % (ref 0–9)
NEUT #: 7.9 X10^3/UL (ref 1.8–7.7)
NEUTROPHILS NFR BLD AUTO: 82 % (ref 31–73)
PHOSPHATE SERPL-MCNC: 3.3 MG/DL (ref 2.6–4.7)
PLATELET # BLD AUTO: 156 X10^3/UL (ref 140–400)
POTASSIUM SERPL-SCNC: 4.5 MMOL/L (ref 3.5–5.1)
RBC # BLD AUTO: 3.46 X10^6/UL (ref 4.3–5.7)
SODIUM SERPL-SCNC: 143 MMOL/L (ref 136–145)
WBC # BLD AUTO: 9.6 X10^3/UL (ref 4–11)

## 2021-06-20 RX ADMIN — BUMETANIDE SCH MG: 0.25 INJECTION INTRAMUSCULAR; INTRAVENOUS at 13:44

## 2021-06-20 RX ADMIN — IPRATROPIUM BROMIDE AND ALBUTEROL SULFATE SCH ML: .5; 3 SOLUTION RESPIRATORY (INHALATION) at 23:49

## 2021-06-20 RX ADMIN — METOPROLOL SUCCINATE SCH MG: 25 TABLET, EXTENDED RELEASE ORAL at 08:38

## 2021-06-20 RX ADMIN — IPRATROPIUM BROMIDE AND ALBUTEROL SULFATE SCH ML: .5; 3 SOLUTION RESPIRATORY (INHALATION) at 04:00

## 2021-06-20 RX ADMIN — BUMETANIDE SCH MG: 0.25 INJECTION INTRAMUSCULAR; INTRAVENOUS at 08:44

## 2021-06-20 RX ADMIN — AMIODARONE HYDROCHLORIDE SCH MG: 200 TABLET ORAL at 08:38

## 2021-06-20 RX ADMIN — IPRATROPIUM BROMIDE AND ALBUTEROL SULFATE SCH ML: .5; 3 SOLUTION RESPIRATORY (INHALATION) at 00:00

## 2021-06-20 RX ADMIN — IPRATROPIUM BROMIDE AND ALBUTEROL SULFATE SCH ML: .5; 3 SOLUTION RESPIRATORY (INHALATION) at 19:58

## 2021-06-20 RX ADMIN — IPRATROPIUM BROMIDE AND ALBUTEROL SULFATE SCH ML: .5; 3 SOLUTION RESPIRATORY (INHALATION) at 13:08

## 2021-06-20 RX ADMIN — APIXABAN SCH MG: 5 TABLET, FILM COATED ORAL at 08:38

## 2021-06-20 RX ADMIN — ISOSORBIDE MONONITRATE SCH MG: 30 TABLET, EXTENDED RELEASE ORAL at 08:38

## 2021-06-20 RX ADMIN — APIXABAN SCH MG: 5 TABLET, FILM COATED ORAL at 20:59

## 2021-06-20 RX ADMIN — IPRATROPIUM BROMIDE AND ALBUTEROL SULFATE SCH ML: .5; 3 SOLUTION RESPIRATORY (INHALATION) at 16:18

## 2021-06-20 RX ADMIN — IPRATROPIUM BROMIDE AND ALBUTEROL SULFATE SCH ML: .5; 3 SOLUTION RESPIRATORY (INHALATION) at 08:48

## 2021-06-20 RX ADMIN — ATORVASTATIN CALCIUM SCH MG: 40 TABLET, FILM COATED ORAL at 20:59

## 2021-06-20 NOTE — PDOC
[facility] PROGRESS NOTE


Date of Service


DOS:


DATE: 6/20/21 


TIME: 11:22





Chief Complaint


Chief Complaint


Assessment/Plan


Acute hypoxic respiratory failure


Acute on chronic systolic/diastolic CHF exacerbation, EF 20 to 25%


History of cocaine abuse last use yesterday


Anemia of chronic CHF


History of CKD stage IIIstable


History of COPDon trilogy CPAP at home


History of asthma


History of  SSS/ NICM s/p BiV PPM/CRT-P (Biotronik); LVEF 20-25% Bi-V pacing 

100%


History of paroxysmal atrial fibrillation





Admit to medicine for further management


Cardiology consult


Pulmonology consult


Strict I's and O's


Lasix as needed


Drug avoidance counseling


Eliquis for DVT prophylaxis


Protonix GI prophylaxis


ADA diet


Full code


Discussed with RN and SW


Disposition inpatient management as above


Surrogate decision maker is undesignated





History of Present Illness


History of Present Illness


6/20/2021


No acute events overnight.  Patient seen and examined bedside.  Documented urine

output about 400 cc.  Increase of his weight from 90 to 92 kg.  We will switch 

from p.o. to IV Bumex today and monitor for response.  Pending cardiology 

evaluation.  Dyspnea is improved.  Patient's chart, labs, images were reviewed 

and discussed with RN





66  year old male who was brought here by EMS from home due to trouble b

reathing.  Patient has history of CHF, he is on 6 L of oxygen at home.  Patient 

admitted of using cocaine yesterday.  Patient also has a history of COPD.  

Patient denies any chest pain, no fever.  Patient said he was vaccinated for 

COVID-19 already.  Patient denies any abdominal pain, no nausea vomiting.  

Patient said he been having trouble breathing with exertion, also said he has 

been retaining fluid.





Vitals/I&O


Vitals/I&O:





                                   Vital Signs








  Date Time  Temp Pulse Resp B/P (MAP) Pulse Ox O2 Delivery O2 Flow Rate FiO2


 


6/20/21 08:49     95 Nasal Cannula 4.0 


 


6/20/21 08:38  58  130/83    


 


6/20/21 07:00 98.3  20     





 98.3       














                                    I & O   


 


 6/19/21 6/19/21 6/20/21





 15:00 23:00 07:00


 


Intake Total 540 ml 400 ml 220 ml


 


Output Total 200 ml  200 ml


 


Balance 340 ml 400 ml 20 ml











Physical Exam


Lungs:  Clear





Labs


Labs:





Laboratory Tests








Test


 6/20/21


04:45


 


White Blood Count


 9.6 x10^3/uL


(4.0-11.0)


 


Red Blood Count


 3.46 x10^6/uL


(4.30-5.70)


 


Hemoglobin


 10.3 g/dL


(13.0-17.5)


 


Hematocrit


 32.1 %


(39.0-53.0)


 


Mean Corpuscular Volume 93 fL () 


 


Mean Corpuscular Hemoglobin 30 pg (25-35) 


 


Mean Corpuscular Hemoglobin


Concent 32 g/dL


(31-37)


 


Red Cell Distribution Width


 16.5 %


(11.5-14.5)


 


Platelet Count


 156 x10^3/uL


(140-400)


 


Neutrophils (%) (Auto) 82 % (31-73) 


 


Lymphocytes (%) (Auto) 6 % (24-48) 


 


Monocytes (%) (Auto) 11 % (0-9) 


 


Eosinophils (%) (Auto) 0 % (0-3) 


 


Basophils (%) (Auto) 1 % (0-3) 


 


Neutrophils # (Auto)


 7.9 x10^3/uL


(1.8-7.7)


 


Lymphocytes # (Auto)


 0.6 x10^3/uL


(1.0-4.8)


 


Monocytes # (Auto)


 1.0 x10^3/uL


(0.0-1.1)


 


Eosinophils # (Auto)


 0.0 x10^3/uL


(0.0-0.7)


 


Basophils # (Auto)


 0.0 x10^3/uL


(0.0-0.2)


 


Sodium Level


 143 mmol/L


(136-145)


 


Potassium Level


 4.5 mmol/L


(3.5-5.1)


 


Chloride Level


 106 mmol/L


()


 


Carbon Dioxide Level


 31 mmol/L


(21-32)


 


Anion Gap 6 (6-14) 


 


Blood Urea Nitrogen


 34 mg/dL


(8-26)


 


Creatinine


 2.4 mg/dL


(0.7-1.3)


 


Estimated GFR


(Cockcroft-Gault) 33.1 





 


Glucose Level


 125 mg/dL


(70-99)


 


Calcium Level


 9.8 mg/dL


(8.5-10.1)


 


Phosphorus Level


 3.3 mg/dL


(2.6-4.7)


 


Magnesium Level


 2.5 mg/dL


(1.8-2.4)











Assessment and Plan


Assessmemt and Plan


Problems


Medical Problems:


(1) Acute exacerbation of CHF (congestive heart failure)


Status: Acute  





(2) COPD exacerbation


Status: Acute  











Comment


Review of Relevant


I have reviewed the following items brittany (where applicable) has been applied.


Medications:





Current Medications








 Medications


  (Trade)  Dose


 Ordered  Sig/Irvin


 Route


 PRN Reason  Start Time


 Stop Time Status Last Admin


Dose Admin


 


 Amiodarone HCl


  (Cordarone)  200 mg  DAILY


 PO


   6/20/21 09:00


    6/20/21 08:38





 


 Apixaban


  (Eliquis)  5 mg  BID


 PO


   6/19/21 21:00


    6/20/21 08:38





 


 Atorvastatin


 Calcium


  (Lipitor)  40 mg  HS


 PO


   6/19/21 21:00


    6/19/21 20:59





 


 Bumetanide


  (Bumex)  1 mg  [id number]


 PO


   6/19/21 20:00


 6/20/21 08:33 DC 6/19/21 20:58





 


 Hydralazine HCl


  (Apresoline)  25 mg  TID


 PO


   6/19/21 21:00


    6/20/21 08:38





 


 Albuterol/


 Ipratropium


  (Duoneb)  3 ml  Q4HRS


 NEB


   6/19/21 20:00


    6/20/21 08:48





 


 Isosorbide


 Mononitrate


  (Imdur)  30 mg  DAILY


 PO


   6/19/21 20:00


    6/20/21 08:38





 


 Metoprolol


 Succinate


  (Toprol Xl)  25 mg  DAILY


 PO


   6/19/21 20:00


    6/20/21 08:38





 


 Bumetanide


  (Bumex)  1 mg  [id number]


 IV


   6/20/21 09:00


    6/20/21 08:44














Justifications for Admission


Other Justification


CHF vs COPD exacerbation


hypoxic resp fail











CELESTINE MENDEZ MD                  Jun 20, 2021 11:25

## 2021-06-20 NOTE — PDOC2
CONSULT


Date of Consult


Date of Consult


DATE: 6/20/21 


TIME: 10:30





Reason for Consult


Reason for Consult:


Congestive heart failure





Referring Physician


Referring Physician:


Dr. Lackey





Identification/Chief Complaint


Chief Complaint


Shortness of breath





Source


Source:  Chart review, Patient





History of Present Illness


Reason for Visit:


66-year-old male with history of chronic systolic heart failure s/p 

biventricular ICD implantation, paroxysmal atrial fibrillation and COPD on home 

oxygen therapy presented with 2 to 3-day history of progressive shortness of dante

ath.  He has history of substance abuse disorder and apparently used cocaine at 

a party 3 days ago.  He denied any chest pain, palpitations or syncope.  He also

denied any ICD firing episodes.





Past Medical History


Cardiovascular:  CHF, HTN, Hyperlipidemia


Pulmonary:  Asthma, COPD


GI:  Other


Heme/Onc:  No pertinent hx


Hepatobiliary:  No pertinent hx


Psych:  No pertinent hx, Addictions


Rheumatologic:  No pertinent hx


Infectious disease:  No pertinent hx


Renal/:  Chronic renal insuff


Endocrine:  No pertinent hx





Past Surgical History


Past Surgical History:  Appendectomy, Hernia Repair, Other





Family History


Family History:  Alcohol Abuse, Coronary Artery Disease, Diabetes, Hypertension





Social History


ALCOHOL:  none


Drugs:  Cocaine, Marijuana


Lives:  Alone


Domestic Violence:  Neg





Current Problem List


Problem List


Problems


Medical Problems:


(1) Acute exacerbation of CHF (congestive heart failure)


Status: Acute  





(2) COPD exacerbation


Status: Acute  











Current Medications


Current Medications





Current Medications


Albuterol/ Ipratropium (Duoneb) 3 ml 1X  ONCE NEB  Last administered on 

6/18/21at 16:49;  Start 6/18/21 at 16:45;  Stop 6/18/21 at 16:46;  Status DC


Methylprednisolone Sodium Succinate (SOLU-Medrol 125MG VIAL) 125 mg 1X  ONCE IV 

Last administered on 6/18/21at 18:38;  Start 6/18/21 at 17:45;  Stop 6/18/21 at 

17:46;  Status DC


Ondansetron HCl (Zofran) 4 mg PRN Q8HRS  PRN IV NAUSEA/VOMITING Last 

administered on 6/18/21at 18:38;  Start 6/18/21 at 18:00;  Stop 6/19/21 at 

17:59;  Status DC


Albuterol/ Ipratropium (Duoneb) 3 ml RTQID NEB  Last administered on 6/19/21at 

15:30;  Start 6/18/21 at 20:00;  Stop 6/19/21 at 19:33;  Status DC


Amiodarone HCl (Cordarone) 200 mg DAILY PO  Last administered on 6/20/21at 

08:38;  Start 6/20/21 at 09:00


Apixaban (Eliquis) 5 mg BID PO  Last administered on 6/20/21at 08:38;  Start 

6/19/21 at 21:00


Atorvastatin Calcium (Lipitor) 40 mg HS PO  Last administered on 6/19/21at 

20:59;  Start 6/19/21 at 21:00


Bumetanide (Bumex) 1 mg BID94 PO  Last administered on 6/19/21at 20:58;  Start 

6/19/21 at 20:00;  Stop 6/20/21 at 08:33;  Status DC


Hydralazine HCl (Apresoline) 25 mg TID PO  Last administered on 6/20/21at 08:38;

 Start 6/19/21 at 21:00


Albuterol/ Ipratropium (Duoneb) 3 ml Q4HRS NEB  Last administered on 6/20/21at 

08:48;  Start 6/19/21 at 20:00


Isosorbide Mononitrate (Imdur) 30 mg DAILY PO  Last administered on 6/20/21at 

08:38;  Start 6/19/21 at 20:00


Metoprolol Succinate (Toprol Xl) 25 mg DAILY PO  Last administered on 6/20/21at 

08:38;  Start 6/19/21 at 20:00


Sennosides (Senna) 17.2 mg PRN BID  PRN PO CONSTIPATION;  Start 6/19/21 at 19:30


Docusate Sodium (Colace) 100 mg PRN DAILY  PRN PO HARD STOOLS;  Start 6/19/21 at

19:30


Ondansetron HCl (Zofran) 4 mg PRN Q6HRS  PRN IVP NAUSEA/VOMITING;  Start 6/19/21

at 19:30


Dextrose (Dextrose 50%-Water Syringe) 12.5 gm PRN Q15MIN  PRN IV SEE COMMENTS;  

Start 6/19/21 at 19:30


Acetaminophen (Tylenol) 650 mg PRN Q4HRS  PRN PO TEMP OVER 100.4F OR MILD PAIN; 

Start 6/19/21 at 19:30


Bumetanide (Bumex) 1 mg BID92 IV  Last administered on 6/20/21at 08:44;  Start 

6/20/21 at 09:00





Active Scripts


Active


Hydralazine Hcl 25 Mg Tablet 25 Mg PO TID 30 Days


Isosorbide Mononitrate Er (Isosorbide Mononitrate) 30 Mg Tab.er.24h 30 Mg PO 

DAILY 30 Days


Bumetanide 1 Mg Tablet 1 Mg PO BID 30 Days


Amiodarone Hcl 200 Mg Tablet 200 Mg PO DAILY


Potassium Chloride ** (Potassium Chloride) 20 Meq Tablet.er 20 Meq PO DAILY 30 

Days


Duoneb 0.5-3(2.5) Mg/3 Ml (Albuterol/Ipratropium) 3 Ml Ampul.neb 3 Ml NEB Q4HRS 

30 Days


Albuterol Sulfate Neb Soln (Albuterol Sulfate) 0.63 Mg/3 Ml Vial.neb 0.63 Mg NEB

PRN Q4HRS PRN 30 Days


     AS NEEDED


Reported


Atorvastatin Calcium 40 Mg Tablet 40 Mg PO HS


Milk Of Magnesia (Magnesium Hydroxide) 2,400 Mg/10 Ml Oral.susp 2,400 Mg PO PRN 

DAILY PRN


Eliquis (Apixaban) 5 Mg Tablet 5 Mg PO BID


Toprol Xl (Metoprolol Succinate) 25 Mg Tab.er.24h 1 Tab PO DAILY 30 Days





Allergies


Allergies:  


Coded Allergies:  


     levofloxacin (Verified  Allergy, Intermediate, Itching, 10/10/20)





ROS


PSYCHOLOGICAL ROS:  No: Hallucinations


Eyes:  No Loss of vision


HEENT:  No: Epistaxis


Respiratory:  YES: Shortness of breath; 


   No: Hemoptysis


Cardiovascular:  No Chest Pain


Genitourinary:  No Hematuria


Neurological:  No Seizures


Skin:  No Rash





Physical Exam


General:  Alert, No acute distress


HEENT:  Atraumatic


Lungs:  Other (Bilateral basal crepitations)


Heart:  Regular rate


Extremities:  No edema


Neuro:  Normal speech


Psych/Mental Status:  Mood NL





Vitals


VITALS





Vital Signs








  Date Time  Temp Pulse Resp B/P (MAP) Pulse Ox O2 Delivery O2 Flow Rate FiO2


 


6/20/21 08:49     95 Nasal Cannula 4.0 


 


6/20/21 08:38  58  130/83    


 


6/20/21 07:00 98.3  20     





 98.3       











Labs


Labs





Laboratory Tests








Test


 6/18/21


16:23 6/18/21


17:30 6/20/21


04:45


 


White Blood Count


 7.8 x10^3/uL


(4.0-11.0) 


 9.6 x10^3/uL


(4.0-11.0)


 


Red Blood Count


 3.50 x10^6/uL


(4.30-5.70) 


 3.46 x10^6/uL


(4.30-5.70)


 


Hemoglobin


 10.8 g/dL


(13.0-17.5) 


 10.3 g/dL


(13.0-17.5)


 


Hematocrit


 32.3 %


(39.0-53.0) 


 32.1 %


(39.0-53.0)


 


Mean Corpuscular Volume 92 fL ()   93 fL () 


 


Mean Corpuscular Hemoglobin 31 pg (25-35)   30 pg (25-35) 


 


Mean Corpuscular Hemoglobin


Concent 34 g/dL


(31-37) 


 32 g/dL


(31-37)


 


Red Cell Distribution Width


 16.7 %


(11.5-14.5) 


 16.5 %


(11.5-14.5)


 


Platelet Count


 141 x10^3/uL


(140-400) 


 156 x10^3/uL


(140-400)


 


Neutrophils (%) (Auto) 82 % (31-73)   82 % (31-73) 


 


Lymphocytes (%) (Auto) 7 % (24-48)   6 % (24-48) 


 


Monocytes (%) (Auto) 9 % (0-9)   11 % (0-9) 


 


Eosinophils (%) (Auto) 2 % (0-3)   0 % (0-3) 


 


Basophils (%) (Auto) 0 % (0-3)   1 % (0-3) 


 


Neutrophils # (Auto)


 6.4 x10^3/uL


(1.8-7.7) 


 7.9 x10^3/uL


(1.8-7.7)


 


Lymphocytes # (Auto)


 0.5 x10^3/uL


(1.0-4.8) 


 0.6 x10^3/uL


(1.0-4.8)


 


Monocytes # (Auto)


 0.7 x10^3/uL


(0.0-1.1) 


 1.0 x10^3/uL


(0.0-1.1)


 


Eosinophils # (Auto)


 0.1 x10^3/uL


(0.0-0.7) 


 0.0 x10^3/uL


(0.0-0.7)


 


Basophils # (Auto)


 0.0 x10^3/uL


(0.0-0.2) 


 0.0 x10^3/uL


(0.0-0.2)


 


Sodium Level


 


 144 mmol/L


(136-145) 143 mmol/L


(136-145)


 


Potassium Level


 


 3.5 mmol/L


(3.5-5.1) 4.5 mmol/L


(3.5-5.1)


 


Chloride Level


 


 105 mmol/L


() 106 mmol/L


()


 


Carbon Dioxide Level


 


 29 mmol/L


(21-32) 31 mmol/L


(21-32)


 


Anion Gap  10 (6-14)  6 (6-14) 


 


Blood Urea Nitrogen


 


 18 mg/dL


(8-26) 34 mg/dL


(8-26)


 


Creatinine


 


 2.1 mg/dL


(0.7-1.3) 2.4 mg/dL


(0.7-1.3)


 


Estimated GFR


(Cockcroft-Gault) 


 38.7 


 33.1 





 


BUN/Creatinine Ratio  9 (6-20)  


 


Glucose Level


 


 95 mg/dL


(70-99) 125 mg/dL


(70-99)


 


Calcium Level


 


 9.7 mg/dL


(8.5-10.1) 9.8 mg/dL


(8.5-10.1)


 


Magnesium Level


 


 2.3 mg/dL


(1.8-2.4) 2.5 mg/dL


(1.8-2.4)


 


Total Bilirubin


 


 1.1 mg/dL


(0.2-1.0) 





 


Aspartate Amino Transf


(AST/SGOT) 


 15 U/L (15-37) 


 





 


Alanine Aminotransferase


(ALT/SGPT) 


 15 U/L (16-63) 


 





 


Alkaline Phosphatase


 


 67 U/L


() 





 


Troponin I Quantitative


 


 0.064 ng/mL


(0.000-0.055) 





 


NT-Pro-B-Type Natriuretic


Peptide 


 10541 pg/mL


(0-124) 





 


Total Protein


 


 7.4 g/dL


(6.4-8.2) 





 


Albumin


 


 3.4 g/dL


(3.4-5.0) 





 


Albumin/Globulin Ratio  0.9 (1.0-1.7)  


 


Phosphorus Level


 


 


 3.3 mg/dL


(2.6-4.7)








Laboratory Tests








Test


 6/20/21


04:45


 


White Blood Count


 9.6 x10^3/uL


(4.0-11.0)


 


Red Blood Count


 3.46 x10^6/uL


(4.30-5.70)


 


Hemoglobin


 10.3 g/dL


(13.0-17.5)


 


Hematocrit


 32.1 %


(39.0-53.0)


 


Mean Corpuscular Volume 93 fL () 


 


Mean Corpuscular Hemoglobin 30 pg (25-35) 


 


Mean Corpuscular Hemoglobin


Concent 32 g/dL


(31-37)


 


Red Cell Distribution Width


 16.5 %


(11.5-14.5)


 


Platelet Count


 156 x10^3/uL


(140-400)


 


Neutrophils (%) (Auto) 82 % (31-73) 


 


Lymphocytes (%) (Auto) 6 % (24-48) 


 


Monocytes (%) (Auto) 11 % (0-9) 


 


Eosinophils (%) (Auto) 0 % (0-3) 


 


Basophils (%) (Auto) 1 % (0-3) 


 


Neutrophils # (Auto)


 7.9 x10^3/uL


(1.8-7.7)


 


Lymphocytes # (Auto)


 0.6 x10^3/uL


(1.0-4.8)


 


Monocytes # (Auto)


 1.0 x10^3/uL


(0.0-1.1)


 


Eosinophils # (Auto)


 0.0 x10^3/uL


(0.0-0.7)


 


Basophils # (Auto)


 0.0 x10^3/uL


(0.0-0.2)


 


Sodium Level


 143 mmol/L


(136-145)


 


Potassium Level


 4.5 mmol/L


(3.5-5.1)


 


Chloride Level


 106 mmol/L


()


 


Carbon Dioxide Level


 31 mmol/L


(21-32)


 


Anion Gap 6 (6-14) 


 


Blood Urea Nitrogen


 34 mg/dL


(8-26)


 


Creatinine


 2.4 mg/dL


(0.7-1.3)


 


Estimated GFR


(Cockcroft-Gault) 33.1 





 


Glucose Level


 125 mg/dL


(70-99)


 


Calcium Level


 9.8 mg/dL


(8.5-10.1)


 


Phosphorus Level


 3.3 mg/dL


(2.6-4.7)


 


Magnesium Level


 2.5 mg/dL


(1.8-2.4)











Assessment/Plan


Assessment/Plan


1.  Acute respiratory failure secondary to combination of acute on chronic 

systolic heart failure and acute COPD exacerbation.  2D echo in May 2021 showed 

LVEF 25%.  Telemetry showed ventricular paced rhythm.  Slight troponin elevation

probably demand ischemia.  Continue diuresis.  Pulmonary team following.


2.  SSS/NICM s/p BiV PPM/CRT-P (Biotronik); recent device interrogation showed 

normal function.


3.  PAFIB; telemetry showed ventricular paced rhythm.  Continue amiodarone for 

rhythm maintenance and Eliquis for stroke prophylaxis


4.  HTN: well controlled


5.  Hyperlipidemia: Continue statins


6.  Substance abuse: Advised on abstinence from cocaine use especially in lieu 

of his diminished LVEF


Thank you for your consultation











ELIZABETH SELLERS MD           Jun 20, 2021 10:30

## 2021-06-21 VITALS — DIASTOLIC BLOOD PRESSURE: 80 MMHG | SYSTOLIC BLOOD PRESSURE: 128 MMHG

## 2021-06-21 VITALS — DIASTOLIC BLOOD PRESSURE: 84 MMHG | SYSTOLIC BLOOD PRESSURE: 137 MMHG

## 2021-06-21 VITALS — SYSTOLIC BLOOD PRESSURE: 128 MMHG | DIASTOLIC BLOOD PRESSURE: 81 MMHG

## 2021-06-21 VITALS — DIASTOLIC BLOOD PRESSURE: 78 MMHG | SYSTOLIC BLOOD PRESSURE: 120 MMHG

## 2021-06-21 VITALS — DIASTOLIC BLOOD PRESSURE: 74 MMHG | SYSTOLIC BLOOD PRESSURE: 123 MMHG

## 2021-06-21 VITALS — DIASTOLIC BLOOD PRESSURE: 92 MMHG | SYSTOLIC BLOOD PRESSURE: 137 MMHG

## 2021-06-21 RX ADMIN — IPRATROPIUM BROMIDE AND ALBUTEROL SULFATE SCH ML: .5; 3 SOLUTION RESPIRATORY (INHALATION) at 07:57

## 2021-06-21 RX ADMIN — BUMETANIDE SCH MG: 0.25 INJECTION INTRAMUSCULAR; INTRAVENOUS at 08:56

## 2021-06-21 RX ADMIN — IPRATROPIUM BROMIDE AND ALBUTEROL SULFATE SCH ML: .5; 3 SOLUTION RESPIRATORY (INHALATION) at 03:54

## 2021-06-21 RX ADMIN — APIXABAN SCH MG: 5 TABLET, FILM COATED ORAL at 08:57

## 2021-06-21 RX ADMIN — METOPROLOL SUCCINATE SCH MG: 25 TABLET, EXTENDED RELEASE ORAL at 09:00

## 2021-06-21 RX ADMIN — ATORVASTATIN CALCIUM SCH MG: 40 TABLET, FILM COATED ORAL at 21:53

## 2021-06-21 RX ADMIN — APIXABAN SCH MG: 5 TABLET, FILM COATED ORAL at 21:53

## 2021-06-21 RX ADMIN — IPRATROPIUM BROMIDE AND ALBUTEROL SULFATE SCH ML: .5; 3 SOLUTION RESPIRATORY (INHALATION) at 11:18

## 2021-06-21 RX ADMIN — IPRATROPIUM BROMIDE AND ALBUTEROL SULFATE SCH ML: .5; 3 SOLUTION RESPIRATORY (INHALATION) at 23:46

## 2021-06-21 RX ADMIN — ISOSORBIDE MONONITRATE SCH MG: 30 TABLET, EXTENDED RELEASE ORAL at 08:56

## 2021-06-21 RX ADMIN — BUMETANIDE SCH MG: 0.25 INJECTION INTRAMUSCULAR; INTRAVENOUS at 14:08

## 2021-06-21 RX ADMIN — AMIODARONE HYDROCHLORIDE SCH MG: 200 TABLET ORAL at 08:57

## 2021-06-21 RX ADMIN — IPRATROPIUM BROMIDE AND ALBUTEROL SULFATE SCH ML: .5; 3 SOLUTION RESPIRATORY (INHALATION) at 20:49

## 2021-06-21 RX ADMIN — IPRATROPIUM BROMIDE AND ALBUTEROL SULFATE SCH ML: .5; 3 SOLUTION RESPIRATORY (INHALATION) at 15:15

## 2021-06-21 NOTE — PDOC
YEIMY FLORES APRN 6/21/21 1412:


CARDIO Progress Notes


Date and Time


Date of Service


6/21/21


Time of Evaluation


1400





Subjective


Subjective:  No Chest Pain, No Palpitations, No Dizziness, Other (not more SOA)





Vitals


Vitals





Vital Signs








  Date Time  Temp Pulse Resp B/P (MAP) Pulse Ox O2 Delivery O2 Flow Rate FiO2


 


6/21/21 14:09  63  128/80    


 


6/21/21 11:27 98.9  14  93 Nasal Cannula  





 98.9       


 


6/21/21 11:19       6.0 








Weight


Weight [ ]





Input and Output


Intake and Output











Intake and Output 


 


 6/21/21





 07:00


 


Intake Total 1600 ml


 


Output Total 2350 ml


 


Balance -750 ml


 


 


 


Intake Oral 1600 ml


 


Output Urine Total 2350 ml











Physical Exam


HEENT:  Neck Supple W Full Motion


Chest:  Symmetric


LUNGS:  Other (crackles )


Heart:  RRR (v paced )


Abdomen:  Soft N/T


Extremities:  Other (2+ bilateral LE edema )


Neurology:  alert, oriented, follow commands





Assessment


Assessment


1.  Acute on chronic respiratory failure with CHF exacerbation and AE COPD 


2.  Acute on chronic combined systolic/diastolic CHF; improved IV diuresis 


3.  Mild troponin elevation; 0.06. most probably type II, demand ischemia. CP 

free 


4.  SSS/ NICM s/p BiV PPM/CRT-P (Biotronik); Echo 5/21 with LVEF 20-25%. recent 

device interrogation showed normal function.


5.  PAFIB; v paced


6.  HTN: controlled


7.  Hyperlipidemia; statin 


8.  ABIEL on CKD


9.  Substance abuse;  reports he used last week. abstinence reinforced  








Recommendations





Ongoing diuresis with close monitoring of renal function


2Gm Na diet


2000cc FR


May need inotropic support with milrinone therapy 


Continue amiodarone for rhythm maintenance 


Metoprolol for rate control


Eliquis for stroke prophylaxis


Supportive care





Justicifation of Admission Dx:


Justifications for Admission:


Justification of Admission Dx:  Yes


Respiratory Failure:  Severe Resp Distress





LING KRISHNAMURTHY MD 6/22/21 5015:


CARDIO Progress Notes


Assessment


Assessment


Patient seen and examined on 6/21/21.


I agree with our nurse practitioners assessment and plan.


Acute on chronic respiratory failure with CHF exacerbation and AE COPD.  

Continues to improve on present treatment.


Acute on chronic combined systolic/diastolic CHF; improved  diuresis 


Mild troponin elevation; 0.06. type II, demand ischemia. CP free 


SSS/ NICM s/p BiV PPM/CRT-P (Biotronik); Echo 5/21 with LVEF 20-25%. recent 

device interrogation showed normal function.


PAFIB; v paced.  Continuing metoprolol and Eliquis.


HTN: controlled


Hyperlipidemia; statin 


ABIEL on CKD


Substance abuse











YEIMY FLORES           Jun 21, 2021 14:12


LING KRISHNAMURTHY MD            Jun 22, 2021 08:52

## 2021-06-21 NOTE — PDOC
[facility] PROGRESS NOTE


Date of Service


DOS:


DATE: 6/21/21 


TIME: 09:16





Chief Complaint


Chief Complaint


Assessment/Plan


Acute hypoxic respiratory failure


Acute on chronic systolic/diastolic CHF exacerbation, EF 20 to 25%


History of cocaine abuse last use yesterday


Anemia of chronic CHF


History of CKD stage IIIstable


History of COPDon trilogy CPAP at home


History of asthma


History of  SSS/ NICM s/p BiV PPM/CRT-P (Biotronik); LVEF 20-25% Bi-V pacing 

100%


History of paroxysmal atrial fibrillation





Admit to medicine for further management


Cardiology consult


Pulmonology consult


Strict I's and O's


Lasix as needed


Drug avoidance counseling


Eliquis for DVT prophylaxis


Protonix GI prophylaxis


ADA diet


Full code


Discussed with RN and SW


Disposition inpatient management as above


Surrogate decision maker is undesignated





History of Present Illness


History of Present Illness


No overnight events, another 2000ml UOP. Dyspnea on exertion improved to 5 feet 

today.





6/20/2021


No acute events overnight.  Patient seen and examined bedside.  Documented urine

output about 400 cc.  Increase of his weight from 90 to 92 kg.  We will switch 

from p.o. to IV Bumex today and monitor for response.  Pending cardiology 

evaluation.  Dyspnea is improved.  Patient's chart, labs, images were reviewed 

and discussed with RN





66  year old male who was brought here by EMS from home due to trouble 

breathing.  Patient has history of CHF, he is on 6 L of oxygen at home.  Patient

admitted of using cocaine yesterday.  Patient also has a history of COPD.  Titus stearns denies any chest pain, no fever.  Patient said he was vaccinated for 

COVID-19 already.  Patient denies any abdominal pain, no nausea vomiting.  

Patient said he been having trouble breathing with exertion, also said he has 

been retaining fluid.





Vitals/I&O


Vitals/I&O:





                                   Vital Signs








  Date Time  Temp Pulse Resp B/P (MAP) Pulse Ox O2 Delivery O2 Flow Rate FiO2


 


6/21/21 07:57     95 Nasal Cannula 6.0 


 


6/21/21 07:08 98.8 65 18 137/92 (107)    





 98.8       














                                    I & O   


 


 6/20/21 6/20/21 6/21/21





 14:59 22:59 06:59


 


Intake Total 600 ml 240 ml 760 ml


 


Output Total 300 ml 1700 ml 350 ml


 


Balance 300 ml -1460 ml 410 ml











Physical Exam


General:  Alert, No acute distress


Heart:  Regular rate


Lungs:  Clear


Extremities:  No edema





Assessment and Plan


Assessmemt and Plan


Problems


Medical Problems:


(1) Acute exacerbation of CHF (congestive heart failure)


Status: Acute  





(2) COPD exacerbation


Status: Acute  











Comment


Review of Relevant


I have reviewed the following items brittany (where applicable) has been applied.





Justifications for Admission


Other Justification


CHF vs COPD exacerbation


hypoxic resp fail











TONY MENDES MD        Jun 21, 2021 09:16

## 2021-06-21 NOTE — NUR
SS following for discharge planning. SS reviewed pt chart and discussed with pt RN. Pt is 
from home and is currently requiring oxygen at six liters nasal canula. Pt has home oxygen 
and home trilogy. Pt on IV Bumex and diuresing. PT/OT recommended home. SS will continue to 
follow for discharge planning.

## 2021-06-22 VITALS — DIASTOLIC BLOOD PRESSURE: 87 MMHG | SYSTOLIC BLOOD PRESSURE: 133 MMHG

## 2021-06-22 VITALS — DIASTOLIC BLOOD PRESSURE: 85 MMHG | SYSTOLIC BLOOD PRESSURE: 137 MMHG

## 2021-06-22 VITALS — SYSTOLIC BLOOD PRESSURE: 130 MMHG | DIASTOLIC BLOOD PRESSURE: 81 MMHG

## 2021-06-22 LAB
ANION GAP SERPL CALC-SCNC: 5 MMOL/L (ref 6–14)
BUN SERPL-MCNC: 28 MG/DL (ref 8–26)
CALCIUM SERPL-MCNC: 9.5 MG/DL (ref 8.5–10.1)
CHLORIDE SERPL-SCNC: 104 MMOL/L (ref 98–107)
CO2 SERPL-SCNC: 34 MMOL/L (ref 21–32)
CREAT SERPL-MCNC: 2 MG/DL (ref 0.7–1.3)
GFR SERPLBLD BASED ON 1.73 SQ M-ARVRAT: 40.9 ML/MIN
GLUCOSE SERPL-MCNC: 123 MG/DL (ref 70–99)
MAGNESIUM SERPL-MCNC: 2.7 MG/DL (ref 1.8–2.4)
POTASSIUM SERPL-SCNC: 3.6 MMOL/L (ref 3.5–5.1)
SODIUM SERPL-SCNC: 143 MMOL/L (ref 136–145)

## 2021-06-22 RX ADMIN — METOPROLOL SUCCINATE SCH MG: 25 TABLET, EXTENDED RELEASE ORAL at 09:36

## 2021-06-22 RX ADMIN — ISOSORBIDE MONONITRATE SCH MG: 30 TABLET, EXTENDED RELEASE ORAL at 09:37

## 2021-06-22 RX ADMIN — APIXABAN SCH MG: 5 TABLET, FILM COATED ORAL at 09:37

## 2021-06-22 RX ADMIN — BUMETANIDE SCH MG: 0.25 INJECTION INTRAMUSCULAR; INTRAVENOUS at 09:35

## 2021-06-22 RX ADMIN — IPRATROPIUM BROMIDE AND ALBUTEROL SULFATE SCH ML: .5; 3 SOLUTION RESPIRATORY (INHALATION) at 07:35

## 2021-06-22 RX ADMIN — IPRATROPIUM BROMIDE AND ALBUTEROL SULFATE SCH ML: .5; 3 SOLUTION RESPIRATORY (INHALATION) at 04:03

## 2021-06-22 RX ADMIN — IPRATROPIUM BROMIDE AND ALBUTEROL SULFATE SCH ML: .5; 3 SOLUTION RESPIRATORY (INHALATION) at 11:25

## 2021-06-22 RX ADMIN — AMIODARONE HYDROCHLORIDE SCH MG: 200 TABLET ORAL at 09:36

## 2021-06-22 NOTE — PDOC3
Discharge Summary


Visit Information


Date of Admission:  Jun 18, 2021


Date of Discharge:  Jun 22, 2021


Admitting Diagnosis:  Acute CHF exacerbation


Final Diagnosis


Problems


Medical Problems:


(1) Acute exacerbation of CHF (congestive heart failure)


Status: Acute  





(2) COPD exacerbation


Status: Acute  











Brief Hospital Course


Allergies





                                    Allergies








Coded Allergies Type Severity Reaction Last Updated Verified


 


  levofloxacin Allergy Intermediate Itching 10/10/20 Yes








Vital Signs





Vital Signs








  Date Time  Temp Pulse Resp B/P (MAP) Pulse Ox O2 Delivery O2 Flow Rate FiO2


 


6/22/21 11:25     96 Nasal Cannula 6.0 


 


6/22/21 11:00 99.0 71 18 130/81 (97)    





 99.0       








Lab Results





Laboratory Tests








Test


 6/22/21


09:35


 


Sodium Level


 143 mmol/L


(136-145)


 


Potassium Level


 3.6 mmol/L


(3.5-5.1)


 


Chloride Level


 104 mmol/L


()


 


Carbon Dioxide Level


 34 mmol/L


(21-32)


 


Anion Gap 5 (6-14) 


 


Blood Urea Nitrogen


 28 mg/dL


(8-26)


 


Creatinine


 2.0 mg/dL


(0.7-1.3)


 


Estimated GFR


(Cockcroft-Gault) 40.9 





 


Glucose Level


 123 mg/dL


(70-99)


 


Calcium Level


 9.5 mg/dL


(8.5-10.1)


 


Magnesium Level


 2.7 mg/dL


(1.8-2.4)








Laboratory Tests








Test


 6/22/21


09:35


 


Sodium Level


 143 mmol/L


(136-145)


 


Potassium Level


 3.6 mmol/L


(3.5-5.1)


 


Chloride Level


 104 mmol/L


()


 


Carbon Dioxide Level


 34 mmol/L


(21-32)


 


Anion Gap 5 (6-14) 


 


Blood Urea Nitrogen


 28 mg/dL


(8-26)


 


Creatinine


 2.0 mg/dL


(0.7-1.3)


 


Estimated GFR


(Cockcroft-Gault) 40.9 





 


Glucose Level


 123 mg/dL


(70-99)


 


Calcium Level


 9.5 mg/dL


(8.5-10.1)


 


Magnesium Level


 2.7 mg/dL


(1.8-2.4)








Brief Hospital Course


Mr Colon is a 66  year old male who was brought here by EMS from home due to 

trouble breathing.  Patient has history of CHF, he is on 6 L of oxygen at home. 

Patient admitted of using cocaine yesterday.  Patient also has a history of 

COPD.  Patient denies any chest pain, no fever.  Patient said he was vaccinated 

for COVID-19 already.  Patient denies any abdominal pain, no nausea vomiting.  

Patient said he been having trouble breathing with exertion, also said he has 

been retaining fluid.





6/20: No acute events overnight.  Patient seen and examined bedside.  Documented

urine output about 400 cc.  Increase of his weight from 90 to 92 kg.  We will 

switch from p.o. to IV Bumex today and monitor for response.  Pending cardiology

evaluation.  Dyspnea is improved.


6/21: No overnight events, another 2000ml UOP. Dyspnea on exertion improved to 5

feet today.





Dyspnea improved.  On home 6 L nasal cannula oxygen still with some lower 

extremity edema.  Creatinine improved to 2.  He was to go home with home health 

with cardiology and nephrology outpatient follow-up.





Consults: Cardiology





Problem list:


Acute hypoxic respiratory failure


Acute on chronic systolic/diastolic CHF exacerbation, EF 20 to 25%


History of cocaine abuse last use yesterday


Anemia of chronic CHF


History of CKD stage IIIstable


History of COPDon trilogy CPAP at home


History of asthma


History of  SSS/ NICM s/p BiV PPM/CRT-P (Biotronik); LVEF 20-25% Bi-V pacing 

100%


History of paroxysmal atrial fibrillation





Greater than 30 minutes spent on d/c home with home health





Discharge Information


Condition at Discharge:  Improved


Follow Up:  Weeks (1)


Disposition/Orders:  D/C to Home w/ HH


Scheduled


Amiodarone Hcl (Amiodarone Hcl) 200 Mg Tablet, 200 MG PO DAILY for A-fib, #30 

Ref 3


   Prescribed by: LISS ANNA MD on 11/15/20 1035


   Last Action: Continued on 6/19/21 1926 by CELESTINE MENDEZ MD


Apixaban (Eliquis) 5 Mg Tablet, 5 MG PO BID for blood thinner, (Reported)


   Entered as Reported by: TOVA SEGUNDO on 4/14/20 6042


   Last Action: Continued on 6/19/21 1926 by CELESTINE MENDEZ MD


Atorvastatin Calcium (Atorvastatin Calcium) 40 Mg Tablet, 40 MG PO HS for FOR 

CHOLESTEROL, #30 Ref 0 (Reported)


   Entered as Reported by: GENE DALY RN on 5/19/21 1815


   Last Action: Continued on 6/19/21 1926 by CELESTINE MENDEZ MD


Bumetanide (Bumetanide) 1 Mg Tablet, 1 MG PO BID for Heart Failure for 30 Days, 

#60 Ref 3


   Prescribed by: CELESTINE MENDEZ MD on 12/19/20 0805


   Last Action: Continued on 6/19/21 1926 by CELESTINE MENDEZ MD


Hydralazine Hcl (Hydralazine Hcl) 25 Mg Tablet, 25 MG PO TID for CHF for 30 

Days, #90 Ref 5


   Prescribed by: TONY MENDES MD on 5/24/21 1054


   Last Action: Continued on 6/19/21 1926 by CELESTINE MENDEZ MD


Ipratropium/Albuterol Sulfate (Duoneb 0.5-3(2.5) Mg/3 Ml) 3 Ml Ampul.neb, 3 ML 

NEB Q4HRS for COPD for 30 Days, #180


   Prescribed by: MARY LOU PETERSEN MD on 1/20/20 1420


   Last Action: Continued on 6/19/21 1926 by CELESTINE MENDEZ MD


Isosorbide Mononitrate (Isosorbide Mononitrate Er) 30 Mg Tab.er.24h, 30 MG PO 

DAILY for CHF for 30 Days, #30 Ref 5


   Prescribed by: TONY MENDES MD on 5/24/21 1054


   Last Action: Continued on 6/19/21 1926 by CELESTINE MENDEZ MD


Metoprolol Succinate (Toprol Xl) 25 Mg Tab.er.24h, 1 TAB PO DAILY for blood 

pressure for 30 Days, #30 Ref 0 (Reported)


   Entered as Reported by: TOVA SEGUNDO on 4/14/20 1458


   Last Action: Continued on 6/19/21 1926 by CELESTINE MENDEZ MD


Potassium Chloride (Potassium Chloride **) 20 Meq Tablet.er, 20 MEQ PO DAILY for

SUPPLEMENT for 30 Days, #30 Ref 2


   Prescribed by: TONY MENDES MD on 10/15/20 1008


   Last Action: Reviewed on 6/18/21 2300 by MARIAELENA GUADALUPE RN





Scheduled PRN


Albuterol Sulfate (Albuterol Sulfate Neb Soln) 0.63 Mg/3 Ml Vial.neb, 0.63 MG 

NEB PRN Q4HRS PRN for SHORTNESS OF BREATH for 30 Days, #100 Ref 2


   AS NEEDED 


   Prescribed by: SONU LEE on 6/26/17 0932


   Last Action: Reviewed on 6/18/21 2300 by MARIAELENA GUADALUPE RN


Magnesium Hydroxide (Milk Of Magnesia) 2,400 Mg/10 Ml Oral.susp, 2,400 MG PO PRN

DAILY PRN for CONSTIPATION, (Reported)


   Entered as Reported by: CHARLEE CARBALLO on 12/9/20 1826


   Last Action: Reviewed on 6/18/21 2300 by MARIAELENA GUADALUPE RN





Justicifation of Admission Dx:


Justifications for Admission:


Justification of Admission Dx:  Yes


Respiratory Failure:  Severe Resp Distress











TONY MENDES MD        Jun 22, 2021 12:55

## 2021-06-22 NOTE — PDOC
CARDIO Progress Notes


Date and Time


Date of Service


6/22/21


Time of Evaluation


1220





Subjective


Subjective:  No Chest Pain, No Palpitations, No Dizziness, Other (not more SOA, 

LE edema better)





Vitals


Vitals





Vital Signs








  Date Time  Temp Pulse Resp B/P (MAP) Pulse Ox O2 Delivery O2 Flow Rate FiO2


 


6/22/21 11:25     96 Nasal Cannula 6.0 


 


6/22/21 11:00 99.0 71 18 130/81 (97)    





 99.0       








Weight


Weight [ ]





Input and Output


Intake and Output











Intake and Output 


 


 6/22/21





 07:00


 


Intake Total 930 ml


 


Output Total 2500 ml


 


Balance -1570 ml


 


 


 


Intake Oral 930 ml


 


Output Urine Total 2500 ml


 


# Bowel Movements 1











Laboratory


Labs





Laboratory Tests








Test


 6/22/21


09:35


 


Sodium Level


 143 mmol/L


(136-145)


 


Potassium Level


 3.6 mmol/L


(3.5-5.1)


 


Chloride Level


 104 mmol/L


()


 


Carbon Dioxide Level


 34 mmol/L


(21-32)


 


Anion Gap 5 (6-14) 


 


Blood Urea Nitrogen


 28 mg/dL


(8-26)


 


Creatinine


 2.0 mg/dL


(0.7-1.3)


 


Estimated GFR


(Cockcroft-Gault) 40.9 





 


Glucose Level


 123 mg/dL


(70-99)


 


Calcium Level


 9.5 mg/dL


(8.5-10.1)


 


Magnesium Level


 2.7 mg/dL


(1.8-2.4)











Physical Exam


HEENT:  Neck Supple W Full Motion


Chest:  Symmetric


LUNGS:  Other (diminished bases)


Heart:  RRR (v paced )


Abdomen:  Soft N/T


Extremities:  Other (1+ bilateral LE edema )


Neurology:  alert, oriented, follow commands





Assessment


Assessment


1.  Acute on chronic respiratory failure with CHF exacerbation and AE COPD 


2.  Acute on chronic combined systolic/diastolic CHF; improved with IV diuresis 


3.  Mild troponin elevation; 0.06. most probably type II, demand ischemia. CP 

free 


4.  SSS/ NICM s/p BiV PPM/CRT-P (Biotronik); Echo 5/21 with LVEF 20-25%. recent 

device interrogation showed normal function.


5.  PAFIB; v paced


6.  HTN: controlled


7.  Hyperlipidemia; statin 


8.  ABIEL on CKD


9.  Substance abuse;  reports he used last week. abstinence reinforced  








Recommendations





Resume oral diuresis 


2Gm Na diet, 2000cc FR


Daily weight monitoring 


Continue amiodarone for rhythm maintenance 


Metoprolol for rate control


Eliquis for stroke prophylaxis


Follow up in our office with Dr. Rodriguez as scheduled 


Supportive care





Justicifation of Admission Dx:


Justifications for Admission:


Justification of Admission Dx:  Yes


Respiratory Failure:  Severe Resp Distress











YEIMY FLORES           Jun 22, 2021 12:20

## 2021-06-22 NOTE — PDOC
[facility] PROGRESS NOTE


Date of Service


DOS:


DATE: 6/22/21 


TIME: 07:47





Chief Complaint


Chief Complaint


Assessment/Plan


Acute hypoxic respiratory failure


Acute on chronic systolic/diastolic CHF exacerbation, EF 20 to 25%


History of cocaine abuse last use yesterday


Anemia of chronic CHF


History of CKD stage IIIstable


History of COPDon trilogy CPAP at home


History of asthma


History of  SSS/ NICM s/p BiV PPM/CRT-P (Biotronik); LVEF 20-25% Bi-V pacing 

100%


History of paroxysmal atrial fibrillation





Admit to medicine for further management


Cardiology consult


Pulmonology consult


Strict I's and O's


Lasix as needed


Drug avoidance counseling


Eliquis for DVT prophylaxis


Protonix GI prophylaxis


ADA diet


Full code


Discussed with RN and SW


Disposition inpatient management as above


Surrogate decision maker is undesignated





History of Present Illness


History of Present Illness


Mr Colon is a 66  year old male who was brought here by EMS from home due to 

trouble breathing.  Patient has history of CHF, he is on 6 L of oxygen at home. 

Patient admitted of using cocaine yesterday.  Patient also has a history of 

COPD.  Patient denies any chest pain, no fever.  Patient said he was vaccinated 

for COVID-19 already.  Patient denies any abdominal pain, no nausea vomiting.  

Patient said he been having trouble breathing with exertion, also said he has 

been retaining fluid.





6/20: No acute events overnight.  Patient seen and examined bedside.  Documented

urine output about 400 cc.  Increase of his weight from 90 to 92 kg.  We will 

switch from p.o. to IV Bumex today and monitor for response.  Pending cardiology

evaluation.  Dyspnea is improved.


6/21: No overnight events, another 2000ml UOP. Dyspnea on exertion improved to 5

feet today.





Dyspnea improved.  On home 6 L nasal cannula oxygen still with some lower 

extremity edema.  Creatinine improved to 2.  He was to go home with home health 

with cardiology and nephrology outpatient follow-up.





Vitals/I&O


Vitals/I&O:





                                   Vital Signs








  Date Time  Temp Pulse Resp B/P (MAP) Pulse Ox O2 Delivery O2 Flow Rate FiO2


 


6/22/21 07:35     96 Nasal Cannula 6.0 


 


6/22/21 02:25 98.4 65 18 133/87 (102)    





 98.4       














                                    I & O   


 


 6/21/21 6/21/21 6/22/21





 15:00 23:00 07:00


 


Intake Total  240 ml 690 ml


 


Output Total 1175 ml 750 ml 575 ml


 


Balance -1175 ml -510 ml 115 ml











Physical Exam


General:  Alert, No acute distress


Heart:  Regular rate


Lungs:  Clear


Extremities:  No edema





Assessment and Plan


Assessmemt and Plan


Problems


Medical Problems:


(1) Acute exacerbation of CHF (congestive heart failure)


Status: Acute  





(2) COPD exacerbation


Status: Acute  











Comment


Review of Relevant


I have reviewed the following items brittany (where applicable) has been applied.





Justifications for Admission


Other Justification


CHF vs COPD exacerbation


hypoxic resp fail











TONY MENDES MD        Jun 22, 2021 07:47

## 2021-06-22 NOTE — SNU/HH DC
DISCHARGE WITH HOME HEALTH


DISCHARGE INFORMATION:


Discharge Date:  Jun 22, 2021


Final Diagnosis:


Problems


Medical Problems:


(1) Acute exacerbation of CHF (congestive heart failure)


Status: Acute  





(2) COPD exacerbation


Status: Acute  








Condition on Discharge:  Stable





CODE STATUS:


Code Status:  Full





HOME HEALTH:


Face to Face:


I certify this patient is under my care and that I, or a nurse practitioner or 

physician's assistant working with me, had a face to face encounter that meets 

the physician face to face encounter requirements with this patient on 

6/22/2021.


Medical Complications:  CHF, COPD


Skilled Nursing For:  Assess/Skilled Observatio, Medication Management


RN For Eval/Treatment:  Yes


Physical Therapy For:  Evalulation/Treatment


Occupational Therapy For:  Evaluation/Treatment


Pt Meets Homebound Status:  Extreme weakness w/ amb., Fatigue w/ amb.





POST DISCHARGE ORDERS:


Activity Instructions for Disc:  Activity as tolerated


Weight Bearing Status after Di:  As tolerated


DIET AFTER DISCHARGE:  Cardiac


Wound/Incision Care:  No wound care needed





CHECKS AFTER DISCHARGE:


Checks after discharge:  Check blood press - daily, Check your Temp as needed, 

Weigh Yourself Daily





FOLLOW-UP:


DC TO SNF LABS:  CBC, CMP in 1 week





TREATMENT/EQUIPMENT ORDERS:


Adaptive Equipment Issued:  None


Discharge Respiratory Equipmen:  Oxygen





CERTIFICATION STATEMENT:


Certification Statement:


Certification Statement: Based on the above finding, I certify that this patient

is confined to the home and needs intermittent skilled nursing care, physical 

therapy and/or speech therapy, or continues to need occupational therapy.~ This 

patient is under my care, and I have initiated the establishment of the plan of 

care.~ This patient will be followed by myself or a community physician who will

periodically review the plan of care.


Home Meds


Active Scripts


Hydralazine Hcl (HYDRALAZINE HCL) 25 Mg Tablet, 25 MG PO TID for CHF for 30 

Days, #90 TAB 5 Refills


   Prov:TONY MENDES MD         5/24/21


Isosorbide Mononitrate (ISOSORBIDE MONONITRATE ER) 30 Mg Tab.er.24h, 30 MG PO 

DAILY for CHF for 30 Days, #30 TAB.SR 5 Refills


   Prov:TONY MENDES MD         5/24/21


Bumetanide (BUMETANIDE) 1 Mg Tablet, 1 MG PO BID for Heart Failure for 30 Days, 

#60 TAB 3 Refills


   Prov:CELESTINE MENDEZ MD         12/19/20


Amiodarone Hcl (AMIODARONE HCL) 200 Mg Tablet, 200 MG PO DAILY for A-fib, #30 

TAB 3 Refills


   Prov:LISS ANNA MD         11/15/20


Potassium Chloride (POTASSIUM CHLORIDE **) 20 Meq Tablet.er, 20 MEQ PO DAILY for

SUPPLEMENT for 30 Days, #30 TAB.SR 2 Refills


   Prov:TONY MENDES MD         10/15/20


Ipratropium/Albuterol Sulfate (DUONEB 0.5-3(2.5) MG/3 ML) 3 Ml Ampul.neb, 3 ML 

NEB Q4HRS for COPD for 30 Days, #180 EACH


   Prov:MARY LOU PETERSEN MD         1/20/20


Albuterol Sulfate (ALBUTEROL SULFATE NEB SOLN) 0.63 Mg/3 Ml Vial.neb, 0.63 MG 

NEB PRN Q4HRS PRN for SHORTNESS OF BREATH for 30 Days, #100 EACH 2 Refills


   AS NEEDED


   Prov:SONU LEE MD         6/26/17


Reported Medications


Atorvastatin Calcium (ATORVASTATIN CALCIUM) 40 Mg Tablet, 40 MG PO HS for FOR 

CHOLESTEROL, #30 TAB 0 Refills


   5/19/21


Magnesium Hydroxide (MILK OF MAGNESIA) 2,400 Mg/10 Ml Oral.susp, 2400 MG PO PRN 

DAILY PRN for CONSTIPATION, MISC


   12/9/20


Apixaban (ELIQUIS) 5 Mg Tablet, 5 MG PO BID for blood thinner, TAB


   4/14/20


Metoprolol Succinate (TOPROL XL) 25 Mg Tab.er.24h, 1 TAB PO DAILY for blood 

pressure for 30 Days, #30 TAB 0 Refills


   4/14/20











TONY MENDES MD        Jun 22, 2021 11:06

## 2021-06-22 NOTE — NUR
Discharge Note:



CESAR BALDERAS



Discharge instructions and discharge home medications reviewed with Patient and a copy 
given. All questions have been answered and understanding verbalized. 



IV out and monitor placed at nursing station. 



Patient discharged to home with home health [facility]. 



Follow up scheduled for August 3rd, at 12:45. Advised patient the importance of making sure 
he attends the appointments.

## 2021-06-22 NOTE — NUR
SS following up with discharge planning. SS reviewed pt chart and discussed with pt RN. Pt 
is from home and is currently requiring oxygen at six liters nasal canula. Pt has home 
oxygen and home trilogy. PT/OT recommended home. Discharge orders received for home 
healthcare. SS met with pt to discuss discharge planning. Pt previously had [facility], [phone]; fax [phone]. Discharge orders and clinical phoned and faxed 
to [facility]. Pt needing transportation to home. Pt will discharge and return to 
home between 1300 and 1330 via [facility] [employer] transportation. Pt and pt's RN notified.

## 2022-01-03 ENCOUNTER — HOSPITAL ENCOUNTER (INPATIENT)
Dept: HOSPITAL 61 - 6 SOUTH | Age: 67
LOS: 2 days | DRG: 283 | End: 2022-01-05
Attending: INTERNAL MEDICINE | Admitting: INTERNAL MEDICINE
Payer: COMMERCIAL

## 2022-01-03 VITALS — BODY MASS INDEX: 22.59 KG/M2 | WEIGHT: 170.42 LBS | HEIGHT: 73 IN

## 2022-01-03 VITALS — SYSTOLIC BLOOD PRESSURE: 141 MMHG | DIASTOLIC BLOOD PRESSURE: 86 MMHG

## 2022-01-03 DIAGNOSIS — J96.21: ICD-10-CM

## 2022-01-03 DIAGNOSIS — F14.90: ICD-10-CM

## 2022-01-03 DIAGNOSIS — Z88.8: ICD-10-CM

## 2022-01-03 DIAGNOSIS — I42.8: ICD-10-CM

## 2022-01-03 DIAGNOSIS — I25.2: ICD-10-CM

## 2022-01-03 DIAGNOSIS — N18.9: ICD-10-CM

## 2022-01-03 DIAGNOSIS — I13.0: Primary | ICD-10-CM

## 2022-01-03 DIAGNOSIS — J44.9: ICD-10-CM

## 2022-01-03 DIAGNOSIS — Z51.5: ICD-10-CM

## 2022-01-03 DIAGNOSIS — I50.43: ICD-10-CM

## 2022-01-03 DIAGNOSIS — I21.A1: ICD-10-CM

## 2022-01-03 DIAGNOSIS — Z87.891: ICD-10-CM

## 2022-01-03 DIAGNOSIS — I48.91: ICD-10-CM

## 2022-01-03 DIAGNOSIS — N17.0: ICD-10-CM

## 2022-01-03 PROCEDURE — 94660 CPAP INITIATION&MGMT: CPT

## 2022-01-03 PROCEDURE — 5A09357 ASSISTANCE WITH RESPIRATORY VENTILATION, LESS THAN 24 CONSECUTIVE HOURS, CONTINUOUS POSITIVE AIRWAY PRESSURE: ICD-10-PCS | Performed by: INTERNAL MEDICINE

## 2022-01-03 PROCEDURE — G0378 HOSPITAL OBSERVATION PER HR: HCPCS

## 2022-01-03 PROCEDURE — 5A0945A ASSISTANCE WITH RESPIRATORY VENTILATION, 24-96 CONSECUTIVE HOURS, HIGH NASAL FLOW/VELOCITY: ICD-10-PCS | Performed by: INTERNAL MEDICINE

## 2022-01-03 RX ADMIN — IPRATROPIUM BROMIDE AND ALBUTEROL SCH PUFF: 20; 100 SPRAY, METERED RESPIRATORY (INHALATION) at 23:16

## 2022-01-03 RX ADMIN — BACITRACIN SCH MLS/HR: 5000 INJECTION, POWDER, FOR SOLUTION INTRAMUSCULAR at 23:58

## 2022-01-03 RX ADMIN — MORPHINE SULFATE PRN MLS/HR: 1 INJECTION INTRAVENOUS at 23:56

## 2022-01-04 VITALS — DIASTOLIC BLOOD PRESSURE: 97 MMHG | SYSTOLIC BLOOD PRESSURE: 140 MMHG

## 2022-01-04 VITALS — DIASTOLIC BLOOD PRESSURE: 79 MMHG | SYSTOLIC BLOOD PRESSURE: 124 MMHG

## 2022-01-04 RX ADMIN — IPRATROPIUM BROMIDE AND ALBUTEROL SCH PUFF: 20; 100 SPRAY, METERED RESPIRATORY (INHALATION) at 08:00

## 2022-01-04 RX ADMIN — MORPHINE SULFATE PRN MLS/HR: 1 INJECTION INTRAVENOUS at 23:32

## 2022-01-04 RX ADMIN — MORPHINE SULFATE PRN MLS/HR: 1 INJECTION INTRAVENOUS at 12:24

## 2022-01-04 RX ADMIN — IPRATROPIUM BROMIDE AND ALBUTEROL SCH PUFF: 20; 100 SPRAY, METERED RESPIRATORY (INHALATION) at 16:00

## 2022-01-04 RX ADMIN — IPRATROPIUM BROMIDE AND ALBUTEROL SCH PUFF: 20; 100 SPRAY, METERED RESPIRATORY (INHALATION) at 20:00

## 2022-01-04 RX ADMIN — IPRATROPIUM BROMIDE AND ALBUTEROL SCH PUFF: 20; 100 SPRAY, METERED RESPIRATORY (INHALATION) at 12:00

## 2022-01-04 NOTE — PDOC1
History and Physical


Date of Service:


DOS:


DATE: 1/4/22 


TIME: 15:19





History of Present Illness:


HPI:


66 year old male with hx of asthma, COPD and CHF here for shortness of breath. 

Patient is chronically on 6L NC at baseline.  Reportedly has not been using his 

supplemental O2. Per EMS, patient's initial O2 sat was 54% which improved to 75%

after 2x DuoNeb and CPAP.  Patient reports he has been having worsening 

shortness of breath for a month now. He denies any nausea or vomiting. He does 

report feeling ill for a week and states he has intermittent fevers. Patient is 

unvaccinated for COVID-19. 





History of present illness is limited secondary to respiratory distress.





Patient wanted to go through hospice.  Patient was transition to inpatient 

hospice.





Past Medical/Surgical History:


PMH/PSH:


PMH/PSH:


Past Medical History:  A-Fib, Angina, Asthma, CHF, COPD, Hypertension, MI, Renal

Disease, 6L02NC, ANGIOEDEMA,LUDWIGS ANGINA,CHRONIC RESP FAILURE,NSTEMI,LBBB





Past Surgical History:  Pacemaker,  Abdominal Hernia





Allergies:


Allergies:  


Coded Allergies:  


     levofloxacin (Verified  Allergy, Intermediate, Itching, 10/10/20)





Family History:


Family History:


Reviewed with no relevant findings in the chart





Social History:


Social History:


Smoking Status:  Former Smoker


Alcohol Use:  Rarely


Drug Use:  Cocaine, Marijuana





Current Medications:


Current Medications





Current Medications


Morphine Sulfate (Morphine Sulfate) 2 mg PRN Q1HR  PRN IV SOA/PAIN;  Start 

1/3/22 at 19:45


Lorazepam (Ativan) 2 mg PRN Q6HRS  PRN PO ANXIETY / AGITATION Last administered 

on 1/4/22at 12:23;  Start 1/3/22 at 19:45


Bisacodyl (Dulcolax Supp) 10 mg PRN DAILY  PRN [place] CONSTIPATION;  Start 1/3/22 at

19:45


Acetaminophen (Tylenol Supp) 650 mg PRN Q4HRS  PRN NJ MILD PAIN / TEMP > 

100.3'F;  Start 1/3/22 at 19:45


Scopolamine (Transderm-Scop) 1 patch Q3DAYS TD  Last administered on 1/4/22at 

00:06;  Start 1/3/22 at 21:00


Glycopyrrolate (Robinul) 1 mg PRN Q4HRS  PRN IV SECRETION;  Start 1/3/22 at 

19:45


Morphine Sulfate (Morphine Sulfate) 4 mg PRN Q1HR  PRN IVP SOA/PAIN;  Start 1

/3/22 at 20:00


Albuterol/ Ipratropium (Combivent Respimat  Mcg) 1 puff RTQID INH  Last 

administered on 1/3/22at 23:16;  Start 1/3/22 at 21:00


Naloxone HCl (Narcan) 0.4 mg PRN Q2MIN  PRN IV SEE INSTRUCTIONS;  Start 1/3/22 

at 20:30


Sodium Chloride 1,000 ml @  25 mls/hr Q24H IV  Last administered on 1/3/22at 

23:58;  Start 1/3/22 at 20:30


Morphine Sulfate 30 ml @ 0 mls/hr CONT PRN  PRN IV PER PROTOCOL Last 

administered on 1/4/22at 12:24;  Start 1/3/22 at 20:30





Active Scripts


Active


Isosorbide Mononitrate Er (Isosorbide Mononitrate) 30 Mg Tab.er.24h 30 Mg PO 

DAILY 30 Days


Duoneb 0.5-3(2.5) Mg/3 Ml (Albuterol/Ipratropium) 3 Ml Ampul.neb 3 Ml NEB Q4HRS 

30 Days


Albuterol Sulfate Neb Soln (Albuterol Sulfate) 0.63 Mg/3 Ml Vial.neb 0.63 Mg NEB

PRN Q4HRS PRN 30 Days


     AS NEEDED


Reported


Lorazepam Intensol ** (Lorazepam) 2 Mg/1 Ml Oral.conc 0.5 Mg PO PRN Q2HRS PRN


Roxanol Conc ** (Morphine Sulfate) 20 Mg/1 Ml Solution 10 Mg SL PRN Q2HRS PRN





ROS:


Review of Systems


REVIEW OF SYSTEMS:


GENERAL:  Denies weakness


SKIN:  No bruising, hair changes or rashes.


EYES:  No blurred, double or loss of vision.


NOSE AND THROAT:  No history of nosebleeds, hoarseness or sore throat.


HEART:  No history of palpitations, chest pain or shortness of breath on


exertion.


LUNGS: Shortness of breath


GASTROINTESTINAL:  Denies changes in appetite, nausea, vomiting, diarrhea or


constipation.


GENITOURINARY:  No history of frequency, urgency, hesitancy or nocturia.


NEUROLOGIC:  Denies history of numbness, tingling, or tremor.


PSYCHIATRIC:  No history of panic, anxiety or depression.


ENDOCRINE:  No history of heat or cold intolerance, polyuria or polydipsia.


EXTREMITIES:  Denies joint pain, pain on walking or stiffness.





Physical Exam:


Vital Signs:





Vital Signs








  Date Time  Temp Pulse Resp B/P (MAP) Pulse Ox O2 Delivery O2 Flow Rate FiO2


 


1/4/22 12:24   40  91   


 


1/4/22 11:21      BiPAP/CPAP  


 


1/4/22 07:15 98.0 69  124/79 (94)    





 98.0       








Physcial Exam:


General: Well developed, well nourished, no acute distress, well appearing


HEENT:  Pupils equally round and reactive to light, EOMI, no discharge, normal 

conjunctiva


Neck:  Supple, no nuchal rigidity, no JVD, trachea midline, no tenderness


Cardiac:  RRR, no murmurs, no gallops, no rubs


Chest/Lungs:  Bilateral rhonchi, moderate respiratory distress, no wheeze, no 

rhonchi, no crackles


Abdomen: soft, non-distended, no guarding, no peritoneal signs, non-tender 


Back:  No tenderness


Extremities: +2 edema in bilateral lower extremities, pulses intact, non-tend

er,capillary refill <3 sec bilateral upper and lower extremities,


Neuro:  Alert and oriented x 4, no focal deficits, normal speech





Labs:


Labs:


No recent labs to review





Images:


Images


No recent images to review





Assessment/Plan


Assessment/Plan


Acute hypoxic respiratory failure requiring BiPAP support


Acute on chronic CHF exacerbation


Elevated troponin suggestive of type II demand ischemia, possible non-STEMI


Moderate transaminitis likely related to congestive liver failure


Cocaine positivity with history of cocaine abuse


ABIEL due to vasomotor nephropathy, possible cardiorenal syndrome


History of combined systolic and diastolic CHF with LVEF of 25% and grade 3 reve

rsible restrictive diastolic dysfunction on echocardiogram done in 5/21/2021


History of atrial fibrillation


History of COPD


History of chronic kidney disease


History of SSS/NICM s/p BiV PPM/CRT-P (Biotronik)


History of MI





Continue with comfort care





Justifications for Admission


Other Justification


CHF vs COPD exacerbation


hypoxic resp fail











CELESTINE MENDEZ MD                   Jan 4, 2022 15:21

## 2022-01-05 VITALS — SYSTOLIC BLOOD PRESSURE: 119 MMHG | DIASTOLIC BLOOD PRESSURE: 66 MMHG

## 2022-01-05 VITALS — SYSTOLIC BLOOD PRESSURE: 99 MMHG | DIASTOLIC BLOOD PRESSURE: 53 MMHG

## 2022-01-05 RX ADMIN — IPRATROPIUM BROMIDE AND ALBUTEROL SCH PUFF: 20; 100 SPRAY, METERED RESPIRATORY (INHALATION) at 12:00

## 2022-01-05 RX ADMIN — BACITRACIN SCH MLS/HR: 5000 INJECTION, POWDER, FOR SOLUTION INTRAMUSCULAR at 08:30

## 2022-01-05 RX ADMIN — IPRATROPIUM BROMIDE AND ALBUTEROL SCH PUFF: 20; 100 SPRAY, METERED RESPIRATORY (INHALATION) at 20:00

## 2022-01-05 RX ADMIN — BACITRACIN SCH MLS/HR: 5000 INJECTION, POWDER, FOR SOLUTION INTRAMUSCULAR at 16:56

## 2022-01-05 RX ADMIN — IPRATROPIUM BROMIDE AND ALBUTEROL SCH PUFF: 20; 100 SPRAY, METERED RESPIRATORY (INHALATION) at 08:00

## 2022-01-05 RX ADMIN — MORPHINE SULFATE PRN MLS/HR: 1 INJECTION INTRAVENOUS at 16:57

## 2022-01-05 RX ADMIN — IPRATROPIUM BROMIDE AND ALBUTEROL SCH PUFF: 20; 100 SPRAY, METERED RESPIRATORY (INHALATION) at 16:00

## 2022-01-05 NOTE — NUR
pt was pronounced at  by rn and rn iva. [facility] was called and went over infor 
and it was determined that pt was not a canadate. spoke to dr kat about pt dying.  called 
brother abisai and left message with janina whom called back and he was notified. 



gurdeep at [facility] was here earlier at  and came back to assist with post mortum care. 
brother abisai could tell us the  home and family had to decide tomorrow he said. 



notified nursing supervisor as well. lcrn

## 2022-01-05 NOTE — PDOC
[facility] PROGRESS NOTE


Date of Service


DOS:


DATE: 1/5/22 


TIME: 13:37





Chief Complaint


Chief Complaint


Assessment/Plan


Acute hypoxic respiratory failure requiring BiPAP support


Acute on chronic CHF exacerbation


Elevated troponin suggestive of type II demand ischemia, possible non-STEMI


Moderate transaminitis likely related to congestive liver failure


Cocaine positivity with history of cocaine abuse


ABIEL due to vasomotor nephropathy, possible cardiorenal syndrome


History of combined systolic and diastolic CHF with LVEF of 25% and grade 3 

reversible restrictive diastolic dysfunction on echocardiogram done in 5/21/2021


History of atrial fibrillation


History of COPD


History of chronic kidney disease


History of SSS/NICM s/p BiV PPM/CRT-P (Biotronik)


History of MI





Continue with comfort care





History of Present Illness


History of Present Illness


66 year old male with hx of asthma, COPD and CHF here for shortness of breath. 

Patient is chronically on 6L NC at baseline.  Reportedly has not been using his 

supplemental O2. Per EMS, patient's initial O2 sat was 54% which improved to 75%

after 2x DuoNeb and CPAP.  Patient reports he has been having worsening 

shortness of breath for a month now. He denies any nausea or vomiting. He does 

report feeling ill for a week and states he has intermittent fevers. Patient is 

unvaccinated for COVID-19. 





History of present illness is limited secondary to respiratory distress.





Patient wanted to go through hospice.  Patient was transition to inpatient 

hospice.





1/5/2022


Patient seen examined bedside.  Still on BiPAP machine and appears to be 

comfortable and resting.  Patient's chart, labs, images were reviewed and di

scussed with RN





Vitals/I&O


Vitals/I&O:





                                   Vital Signs








  Date Time  Temp Pulse Resp B/P (MAP) Pulse Ox O2 Delivery O2 Flow Rate FiO2


 


1/5/22 11:42     73 BiPAP/CPAP  


 


1/5/22 07:00 101.1 70 22 119/66 (83)   20.0 





 101.1       














                                    I & O   


 


 1/4/22 1/4/22 1/5/22





 15:00 23:00 07:00


 


Intake Total 50 ml 0 ml 0 ml


 


Balance 50 ml 0 ml 0 ml











Physical Exam


General:  Alert


Heart:  Regular rate


Lungs:  Wheezing, Crackles


Extremities:  No clubbing


Skin:  No rashes





Comment


Review of Relevant


I have reviewed the following items brittany (where applicable) has been applied.





Justifications for Admission


Other Justification


CHF vs COPD exacerbation


hypoxic resp fail











CELESTINE MENDEZ MD                   Jan 5, 2022 13:38

## 2022-01-06 NOTE — NUR
Patient's son, Emir Colon, called and notified this nurse that patient to be released to 
[facility]. His call back number is [phone]. Nursing supervisor notified.

## 2023-08-23 NOTE — NUR
Abdomen , soft, nontender, nondistended , no guarding or rigidity , no masses palpable , normal bowel sounds , Liver and Spleen , no hepatomegaly present , no hepatosplenomegaly , liver nontender , spleen not palpable Covid pending. SOA improved ,medrol helpful. NC placed after ABGs resulted. Lung fields 
clear anterior and diminished bases bilaterally. Intermittent productive cough.